# Patient Record
Sex: MALE | Race: WHITE | NOT HISPANIC OR LATINO
[De-identification: names, ages, dates, MRNs, and addresses within clinical notes are randomized per-mention and may not be internally consistent; named-entity substitution may affect disease eponyms.]

---

## 2018-02-06 PROBLEM — Z00.00 ENCOUNTER FOR PREVENTIVE HEALTH EXAMINATION: Status: ACTIVE | Noted: 2018-02-06

## 2018-02-13 ENCOUNTER — APPOINTMENT (OUTPATIENT)
Dept: ORTHOPEDIC SURGERY | Facility: CLINIC | Age: 68
End: 2018-02-13
Payer: MEDICARE

## 2018-02-13 VITALS
SYSTOLIC BLOOD PRESSURE: 127 MMHG | HEART RATE: 68 BPM | BODY MASS INDEX: 32.9 KG/M2 | WEIGHT: 235 LBS | HEIGHT: 71 IN | DIASTOLIC BLOOD PRESSURE: 84 MMHG

## 2018-02-13 DIAGNOSIS — S76.012A STRAIN OF MUSCLE, FASCIA AND TENDON OF LEFT HIP, INITIAL ENCOUNTER: ICD-10-CM

## 2018-02-13 DIAGNOSIS — Z87.09 PERSONAL HISTORY OF OTHER DISEASES OF THE RESPIRATORY SYSTEM: ICD-10-CM

## 2018-02-13 DIAGNOSIS — Z78.9 OTHER SPECIFIED HEALTH STATUS: ICD-10-CM

## 2018-02-13 DIAGNOSIS — Z86.79 PERSONAL HISTORY OF OTHER DISEASES OF THE CIRCULATORY SYSTEM: ICD-10-CM

## 2018-02-13 DIAGNOSIS — Z86.69 PERSONAL HISTORY OF OTHER DISEASES OF THE NERVOUS SYSTEM AND SENSE ORGANS: ICD-10-CM

## 2018-02-13 DIAGNOSIS — Z80.3 FAMILY HISTORY OF MALIGNANT NEOPLASM OF BREAST: ICD-10-CM

## 2018-02-13 DIAGNOSIS — Z86.39 PERSONAL HISTORY OF OTHER ENDOCRINE, NUTRITIONAL AND METABOLIC DISEASE: ICD-10-CM

## 2018-02-13 DIAGNOSIS — Z87.891 PERSONAL HISTORY OF NICOTINE DEPENDENCE: ICD-10-CM

## 2018-02-13 DIAGNOSIS — Z87.39 PERSONAL HISTORY OF OTHER DISEASES OF THE MUSCULOSKELETAL SYSTEM AND CONNECTIVE TISSUE: ICD-10-CM

## 2018-02-13 DIAGNOSIS — Z80.0 FAMILY HISTORY OF MALIGNANT NEOPLASM OF DIGESTIVE ORGANS: ICD-10-CM

## 2018-02-13 DIAGNOSIS — C44.91 BASAL CELL CARCINOMA OF SKIN, UNSPECIFIED: ICD-10-CM

## 2018-02-13 PROCEDURE — 99204 OFFICE O/P NEW MOD 45 MIN: CPT

## 2018-02-13 RX ORDER — DOXAZOSIN 2 MG/1
2 TABLET ORAL
Qty: 30 | Refills: 0 | Status: ACTIVE | COMMUNITY
Start: 2017-08-20

## 2018-02-13 RX ORDER — MONTELUKAST 10 MG/1
10 TABLET, FILM COATED ORAL
Qty: 30 | Refills: 0 | Status: ACTIVE | COMMUNITY
Start: 2017-11-09

## 2018-02-13 RX ORDER — INGENOL MEBUTATE 150 UG/G
0.01 GEL TOPICAL
Qty: 3 | Refills: 0 | Status: ACTIVE | COMMUNITY
Start: 2017-09-01

## 2018-02-13 RX ORDER — BUSPIRONE HYDROCHLORIDE 15 MG/1
15 TABLET ORAL
Qty: 90 | Refills: 0 | Status: ACTIVE | COMMUNITY
Start: 2017-08-19

## 2018-02-13 RX ORDER — TIOTROPIUM BROMIDE 18 UG/1
18 CAPSULE ORAL; RESPIRATORY (INHALATION)
Qty: 30 | Refills: 0 | Status: ACTIVE | COMMUNITY
Start: 2017-08-21

## 2018-02-13 RX ORDER — BENZONATATE 100 MG/1
100 CAPSULE ORAL
Qty: 90 | Refills: 0 | Status: ACTIVE | COMMUNITY
Start: 2017-09-27

## 2018-02-13 RX ORDER — AZITHROMYCIN 250 MG/1
250 TABLET, FILM COATED ORAL
Qty: 6 | Refills: 0 | Status: ACTIVE | COMMUNITY
Start: 2017-12-28

## 2018-02-13 RX ORDER — ATORVASTATIN CALCIUM 10 MG/1
10 TABLET, FILM COATED ORAL
Qty: 90 | Refills: 0 | Status: ACTIVE | COMMUNITY
Start: 2017-09-25

## 2018-02-13 RX ORDER — HYDROCHLOROTHIAZIDE 25 MG/1
25 TABLET ORAL
Qty: 90 | Refills: 0 | Status: ACTIVE | COMMUNITY
Start: 2017-09-15

## 2018-02-13 RX ORDER — PAROXETINE HYDROCHLORIDE 30 MG/1
30 TABLET, FILM COATED ORAL
Qty: 30 | Refills: 0 | Status: ACTIVE | COMMUNITY
Start: 2017-09-29

## 2018-02-13 RX ORDER — CLARITHROMYCIN 500 MG/1
500 TABLET, FILM COATED ORAL
Qty: 20 | Refills: 0 | Status: ACTIVE | COMMUNITY
Start: 2017-12-23

## 2018-02-13 RX ORDER — DILTIAZEM HYDROCHLORIDE 240 MG/1
240 CAPSULE, EXTENDED RELEASE ORAL
Qty: 30 | Refills: 0 | Status: ACTIVE | COMMUNITY
Start: 2017-08-20

## 2018-02-13 RX ORDER — MOMETASONE FUROATE 200 UG/1
200 AEROSOL RESPIRATORY (INHALATION)
Qty: 13 | Refills: 0 | Status: ACTIVE | COMMUNITY
Start: 2017-09-28

## 2018-02-13 RX ORDER — OLMESARTAN MEDOXOMIL 40 MG/1
40 TABLET, FILM COATED ORAL
Qty: 30 | Refills: 0 | Status: ACTIVE | COMMUNITY
Start: 2017-08-20

## 2018-02-13 RX ORDER — CLONAZEPAM 0.5 MG/1
0.5 TABLET ORAL
Qty: 120 | Refills: 0 | Status: ACTIVE | COMMUNITY
Start: 2017-09-25

## 2018-02-22 ENCOUNTER — APPOINTMENT (OUTPATIENT)
Dept: ORTHOPEDIC SURGERY | Facility: CLINIC | Age: 68
End: 2018-02-22

## 2018-03-06 ENCOUNTER — APPOINTMENT (OUTPATIENT)
Dept: ORTHOPEDIC SURGERY | Facility: CLINIC | Age: 68
End: 2018-03-06
Payer: MEDICARE

## 2018-03-06 ENCOUNTER — APPOINTMENT (OUTPATIENT)
Dept: ORTHOPEDIC SURGERY | Facility: CLINIC | Age: 68
End: 2018-03-06

## 2018-03-06 DIAGNOSIS — M25.552 PAIN IN LEFT HIP: ICD-10-CM

## 2018-03-06 DIAGNOSIS — T14.8XXA OTHER INJURY OF UNSPECIFIED BODY REGION, INITIAL ENCOUNTER: ICD-10-CM

## 2018-03-06 PROCEDURE — 73502 X-RAY EXAM HIP UNI 2-3 VIEWS: CPT | Mod: LT

## 2018-03-06 PROCEDURE — 99214 OFFICE O/P EST MOD 30 MIN: CPT

## 2020-02-19 ENCOUNTER — INPATIENT (INPATIENT)
Facility: HOSPITAL | Age: 70
LOS: 16 days | DRG: 871 | End: 2020-03-07
Attending: INTERNAL MEDICINE | Admitting: HOSPITALIST
Payer: MEDICARE

## 2020-02-19 VITALS
TEMPERATURE: 98 F | HEIGHT: 73 IN | DIASTOLIC BLOOD PRESSURE: 70 MMHG | WEIGHT: 225.09 LBS | RESPIRATION RATE: 22 BRPM | HEART RATE: 73 BPM | OXYGEN SATURATION: 80 % | SYSTOLIC BLOOD PRESSURE: 116 MMHG

## 2020-02-19 LAB
ALBUMIN SERPL ELPH-MCNC: 3.4 G/DL — SIGNIFICANT CHANGE UP (ref 3.3–5)
ALP SERPL-CCNC: 50 U/L — SIGNIFICANT CHANGE UP (ref 40–120)
ALT FLD-CCNC: 21 U/L — SIGNIFICANT CHANGE UP (ref 10–45)
ANION GAP SERPL CALC-SCNC: 19 MMOL/L — HIGH (ref 5–17)
AST SERPL-CCNC: 48 U/L — HIGH (ref 10–40)
BASE EXCESS BLDV CALC-SCNC: 0.2 MMOL/L — SIGNIFICANT CHANGE UP (ref -2–2)
BILIRUB SERPL-MCNC: 0.5 MG/DL — SIGNIFICANT CHANGE UP (ref 0.2–1.2)
BUN SERPL-MCNC: 21 MG/DL — SIGNIFICANT CHANGE UP (ref 7–23)
CA-I SERPL-SCNC: 0.96 MMOL/L — LOW (ref 1.12–1.3)
CALCIUM SERPL-MCNC: 7.8 MG/DL — LOW (ref 8.4–10.5)
CHLORIDE BLDV-SCNC: 91 MMOL/L — LOW (ref 96–108)
CHLORIDE SERPL-SCNC: 85 MMOL/L — LOW (ref 96–108)
CO2 BLDV-SCNC: 25 MMOL/L — SIGNIFICANT CHANGE UP (ref 22–30)
CO2 SERPL-SCNC: 20 MMOL/L — LOW (ref 22–31)
CREAT SERPL-MCNC: 1.38 MG/DL — HIGH (ref 0.5–1.3)
FLU A RESULT: DETECTED
FLU A RESULT: DETECTED
FLUAV AG NPH QL: DETECTED
FLUBV AG NPH QL: SIGNIFICANT CHANGE UP
GAS PNL BLDV: 118 MMOL/L — CRITICAL LOW (ref 135–145)
GAS PNL BLDV: SIGNIFICANT CHANGE UP
GAS PNL BLDV: SIGNIFICANT CHANGE UP
GLUCOSE BLDV-MCNC: 151 MG/DL — HIGH (ref 70–99)
GLUCOSE SERPL-MCNC: 156 MG/DL — HIGH (ref 70–99)
HCO3 BLDV-SCNC: 24 MMOL/L — SIGNIFICANT CHANGE UP (ref 21–29)
HCT VFR BLD CALC: 35.7 % — LOW (ref 39–50)
HCT VFR BLDA CALC: 40 % — SIGNIFICANT CHANGE UP (ref 39–50)
HGB BLD CALC-MCNC: 13 G/DL — SIGNIFICANT CHANGE UP (ref 13–17)
HGB BLD-MCNC: 12.4 G/DL — LOW (ref 13–17)
LACTATE BLDV-MCNC: 2.8 MMOL/L — HIGH (ref 0.7–2)
MAGNESIUM SERPL-MCNC: 1.9 MG/DL — SIGNIFICANT CHANGE UP (ref 1.6–2.6)
MCHC RBC-ENTMCNC: 30.5 PG — SIGNIFICANT CHANGE UP (ref 27–34)
MCHC RBC-ENTMCNC: 34.7 GM/DL — SIGNIFICANT CHANGE UP (ref 32–36)
MCV RBC AUTO: 87.7 FL — SIGNIFICANT CHANGE UP (ref 80–100)
NRBC # BLD: 0 /100 WBCS — SIGNIFICANT CHANGE UP (ref 0–0)
NT-PROBNP SERPL-SCNC: 364 PG/ML — HIGH (ref 0–300)
PCO2 BLDV: 36 MMHG — SIGNIFICANT CHANGE UP (ref 35–50)
PH BLDV: 7.43 — SIGNIFICANT CHANGE UP (ref 7.35–7.45)
PLATELET # BLD AUTO: 196 K/UL — SIGNIFICANT CHANGE UP (ref 150–400)
PO2 BLDV: 24 MMHG — LOW (ref 25–45)
POTASSIUM BLDV-SCNC: 3.3 MMOL/L — LOW (ref 3.5–5.3)
POTASSIUM SERPL-MCNC: 3.6 MMOL/L — SIGNIFICANT CHANGE UP (ref 3.5–5.3)
POTASSIUM SERPL-SCNC: 3.6 MMOL/L — SIGNIFICANT CHANGE UP (ref 3.5–5.3)
PROT SERPL-MCNC: 6.2 G/DL — SIGNIFICANT CHANGE UP (ref 6–8.3)
RBC # BLD: 4.07 M/UL — LOW (ref 4.2–5.8)
RBC # FLD: 13.3 % — SIGNIFICANT CHANGE UP (ref 10.3–14.5)
RSV RESULT: SIGNIFICANT CHANGE UP
RSV RNA RESP QL NAA+PROBE: SIGNIFICANT CHANGE UP
SAO2 % BLDV: 34 % — LOW (ref 67–88)
SODIUM SERPL-SCNC: 124 MMOL/L — LOW (ref 135–145)
TROPONIN T, HIGH SENSITIVITY RESULT: 19 NG/L — SIGNIFICANT CHANGE UP (ref 0–51)
WBC # BLD: 8.58 K/UL — SIGNIFICANT CHANGE UP (ref 3.8–10.5)
WBC # FLD AUTO: 8.58 K/UL — SIGNIFICANT CHANGE UP (ref 3.8–10.5)

## 2020-02-19 PROCEDURE — 71045 X-RAY EXAM CHEST 1 VIEW: CPT | Mod: 26

## 2020-02-19 PROCEDURE — 99291 CRITICAL CARE FIRST HOUR: CPT | Mod: GC

## 2020-02-19 RX ORDER — CEFTRIAXONE 500 MG/1
1000 INJECTION, POWDER, FOR SOLUTION INTRAMUSCULAR; INTRAVENOUS ONCE
Refills: 0 | Status: COMPLETED | OUTPATIENT
Start: 2020-02-19 | End: 2020-02-19

## 2020-02-19 RX ORDER — ACETAMINOPHEN 500 MG
650 TABLET ORAL EVERY 6 HOURS
Refills: 0 | Status: DISCONTINUED | OUTPATIENT
Start: 2020-02-19 | End: 2020-02-22

## 2020-02-19 RX ORDER — AZITHROMYCIN 500 MG/1
500 TABLET, FILM COATED ORAL EVERY 24 HOURS
Refills: 0 | Status: DISCONTINUED | OUTPATIENT
Start: 2020-02-20 | End: 2020-02-25

## 2020-02-19 RX ORDER — SODIUM CHLORIDE 9 MG/ML
500 INJECTION INTRAMUSCULAR; INTRAVENOUS; SUBCUTANEOUS ONCE
Refills: 0 | Status: COMPLETED | OUTPATIENT
Start: 2020-02-19 | End: 2020-02-19

## 2020-02-19 RX ORDER — AZITHROMYCIN 500 MG/1
TABLET, FILM COATED ORAL
Refills: 0 | Status: DISCONTINUED | OUTPATIENT
Start: 2020-02-19 | End: 2020-02-25

## 2020-02-19 RX ORDER — CALCIUM GLUCONATE 100 MG/ML
1 VIAL (ML) INTRAVENOUS ONCE
Refills: 0 | Status: COMPLETED | OUTPATIENT
Start: 2020-02-19 | End: 2020-02-19

## 2020-02-19 RX ORDER — AZITHROMYCIN 500 MG/1
500 TABLET, FILM COATED ORAL ONCE
Refills: 0 | Status: COMPLETED | OUTPATIENT
Start: 2020-02-19 | End: 2020-02-19

## 2020-02-19 RX ADMIN — Medication 650 MILLIGRAM(S): at 23:51

## 2020-02-19 RX ADMIN — Medication 650 MILLIGRAM(S): at 23:12

## 2020-02-19 RX ADMIN — AZITHROMYCIN 250 MILLIGRAM(S): 500 TABLET, FILM COATED ORAL at 23:14

## 2020-02-19 NOTE — ED PROVIDER NOTE - CLINICAL SUMMARY MEDICAL DECISION MAKING FREE TEXT BOX
Patient in acute respiratory distress. most likely infectious vs chf. Bedside pocus remarkable for b/l diffuse b-lines. RV WNL and no focal wall abnormalities. will do infectious, chf, acs workup. unlikely pe. bipap vs high flow, admit. Patient in acute respiratory distress. most likely infectious vs chf. Bedside pocus remarkable for b/l diffuse b-lines. RV WNL and no focal wall abnormalities. will do infectious, chf, acs workup. unlikely pe. bipap vs high flow, admit.  Attending Korina Albarran: 68 y/o male presenting with sob and difficulty breathing. recently diagnosed with flul. upon arrival pt ill appearing and hypoxic. pt placed on bipap with improvement. pt with evidence of diffuse b l ketan on pocus. initially unclear pulmonary disease and as BP stable given only small amount of IVF an abx. CT performed to further evaluate showing evidence of pulmonary fibrosis. unable to exclude pna. will give abx, IVF. breathing treatments as needed. plan to admit

## 2020-02-19 NOTE — ED ADULT NURSE NOTE - OBJECTIVE STATEMENT
c/o SOB, AMS and weakness x 1 week, and 1  fall tonight at 2000. Pt states, "I have balance issues, weakness, flu like symptoms, fevers, feeling off, confused for a few days." Pt endorses headaches, SOB, cough, nausea. Denies lightheadness, blurred vision, chest pain, abdominal pain, V/D, urinary symptoms, numbness and tingling. Pt has diminished breath sounds in bilateral lower lobes. Pt has a dry cough c/o SOB, AMS and weakness x 1 week, and 1  fall tonight at 2000. Pt states, "Tracy had balance issues, weakness, flu like symptoms, fevers, feeling off, confused for a few days." Pt endorses headaches, SOB, cough, nausea. Denies lightheadness, blurred vision, chest pain, abdominal pain, V/D, urinary symptoms, numbness and tingling. Pt has diminished breath sounds in bilateral lower lobes. Pt has a dry cough, pt is satting 85 on room air and 10% on non rebreather. Pt denies using oxygen at home. Pt has bruises on right hand and arm and is neurologically intact. A&Ox4 PERRL.

## 2020-02-19 NOTE — ED PROVIDER NOTE - ATTENDING CONTRIBUTION TO CARE
Attending MD Korina Albarran:  I personally have seen and examined this patient.  Resident note reviewed and agree on plan of care and except where noted.  See HPI, PE, and MDM for details.

## 2020-02-19 NOTE — ED ADULT TRIAGE NOTE - CHIEF COMPLAINT QUOTE
congestion, shortness of breath , weakness, and a diagnosis of the flu that started five days ago. Fall this evening with difficulty ambulating.

## 2020-02-19 NOTE — ED PROVIDER NOTE - PROGRESS NOTE DETAILS
Attending Korina Albarran: labs show evidence of hyponatremia, unclear etiology. consider SIADH, pt denies any sig water intake. and less likely from medication. cxr not diagnostic therefore will obtain ct scan to further evaluate Attending Korina Albarran: pt more comfortable on bipap. BP stable sbP 120, ext wwp. awaiting cta read. wob improved.

## 2020-02-19 NOTE — ED PROVIDER NOTE - CARE PLAN
Principal Discharge DX:	Hypoxia Principal Discharge DX:	Hypoxia  Secondary Diagnosis:	Influenza  Secondary Diagnosis:	Pulmonary fibrosis

## 2020-02-19 NOTE — ED PROVIDER NOTE - PHYSICAL EXAMINATION
Attending Korina Albarran: Gen: increased wob, heent: atrauamtic, eomi, perrla, mmm, op pink, uvula midline, neck; nttp, no nuchal rigidity, chest: nttp, no crepitus, cv: rrr, no murmurs, lungs: rales diffusely, abd: soft, nontender, nondistended, no peritoneal signs, +BS, no guarding, ext: wwp, neg homans, skin: no rash, neuro: awake and alert, following commands, speech clear, sensation and strength intact, no focal deficits

## 2020-02-19 NOTE — ED PROVIDER NOTE - OBJECTIVE STATEMENT
Attending Korina Albarran: 70 y/o male h/o HTN, lung disease followed by pulmonary presenting with sob. pt;s mom recently passed away 1 week ago. afterward pt started not feeling well with increased cough, fatigue and sob. went to urgent care and tested positive for the flu. pt states has been feeling more short of breath and having difficulty breathing. does not think he has a fever. no h/o heart failure. no LE edema. no recent travel. no pain with inspiration. not on any diuretics    PCP: dr gabbie rabago.

## 2020-02-20 DIAGNOSIS — J84.10 PULMONARY FIBROSIS, UNSPECIFIED: ICD-10-CM

## 2020-02-20 DIAGNOSIS — Z98.890 OTHER SPECIFIED POSTPROCEDURAL STATES: Chronic | ICD-10-CM

## 2020-02-20 DIAGNOSIS — R09.02 HYPOXEMIA: ICD-10-CM

## 2020-02-20 DIAGNOSIS — J96.01 ACUTE RESPIRATORY FAILURE WITH HYPOXIA: ICD-10-CM

## 2020-02-20 DIAGNOSIS — E87.1 HYPO-OSMOLALITY AND HYPONATREMIA: ICD-10-CM

## 2020-02-20 DIAGNOSIS — N17.9 ACUTE KIDNEY FAILURE, UNSPECIFIED: ICD-10-CM

## 2020-02-20 DIAGNOSIS — A41.9 SEPSIS, UNSPECIFIED ORGANISM: ICD-10-CM

## 2020-02-20 DIAGNOSIS — E87.8 OTHER DISORDERS OF ELECTROLYTE AND FLUID BALANCE, NOT ELSEWHERE CLASSIFIED: ICD-10-CM

## 2020-02-20 DIAGNOSIS — J18.9 PNEUMONIA, UNSPECIFIED ORGANISM: ICD-10-CM

## 2020-02-20 DIAGNOSIS — Z02.9 ENCOUNTER FOR ADMINISTRATIVE EXAMINATIONS, UNSPECIFIED: ICD-10-CM

## 2020-02-20 DIAGNOSIS — F32.9 MAJOR DEPRESSIVE DISORDER, SINGLE EPISODE, UNSPECIFIED: ICD-10-CM

## 2020-02-20 DIAGNOSIS — I10 ESSENTIAL (PRIMARY) HYPERTENSION: ICD-10-CM

## 2020-02-20 DIAGNOSIS — J11.1 INFLUENZA DUE TO UNIDENTIFIED INFLUENZA VIRUS WITH OTHER RESPIRATORY MANIFESTATIONS: ICD-10-CM

## 2020-02-20 LAB
ALBUMIN SERPL ELPH-MCNC: 2.8 G/DL — LOW (ref 3.3–5)
ALP SERPL-CCNC: 46 U/L — SIGNIFICANT CHANGE UP (ref 40–120)
ALT FLD-CCNC: 24 U/L — SIGNIFICANT CHANGE UP (ref 10–45)
ANION GAP SERPL CALC-SCNC: 13 MMOL/L — SIGNIFICANT CHANGE UP (ref 5–17)
ANION GAP SERPL CALC-SCNC: 13 MMOL/L — SIGNIFICANT CHANGE UP (ref 5–17)
ANION GAP SERPL CALC-SCNC: 15 MMOL/L — SIGNIFICANT CHANGE UP (ref 5–17)
ANION GAP SERPL CALC-SCNC: 15 MMOL/L — SIGNIFICANT CHANGE UP (ref 5–17)
AST SERPL-CCNC: 70 U/L — HIGH (ref 10–40)
BASE EXCESS BLDA CALC-SCNC: -0.3 MMOL/L — SIGNIFICANT CHANGE UP (ref -2–2)
BASE EXCESS BLDV CALC-SCNC: 0.7 MMOL/L — SIGNIFICANT CHANGE UP (ref -2–2)
BASE EXCESS BLDV CALC-SCNC: 1 MMOL/L — SIGNIFICANT CHANGE UP (ref -2–2)
BILIRUB DIRECT SERPL-MCNC: <0.1 MG/DL — SIGNIFICANT CHANGE UP (ref 0–0.2)
BILIRUB INDIRECT FLD-MCNC: >0.2 MG/DL — SIGNIFICANT CHANGE UP (ref 0.2–1)
BILIRUB SERPL-MCNC: 0.3 MG/DL — SIGNIFICANT CHANGE UP (ref 0.2–1.2)
BUN SERPL-MCNC: 13 MG/DL — SIGNIFICANT CHANGE UP (ref 7–23)
BUN SERPL-MCNC: 13 MG/DL — SIGNIFICANT CHANGE UP (ref 7–23)
BUN SERPL-MCNC: 14 MG/DL — SIGNIFICANT CHANGE UP (ref 7–23)
BUN SERPL-MCNC: 16 MG/DL — SIGNIFICANT CHANGE UP (ref 7–23)
CA-I SERPL-SCNC: 0.97 MMOL/L — LOW (ref 1.12–1.3)
CA-I SERPL-SCNC: 0.99 MMOL/L — LOW (ref 1.12–1.3)
CALCIUM SERPL-MCNC: 7.5 MG/DL — LOW (ref 8.4–10.5)
CALCIUM SERPL-MCNC: 7.6 MG/DL — LOW (ref 8.4–10.5)
CHLORIDE BLDV-SCNC: 89 MMOL/L — LOW (ref 96–108)
CHLORIDE BLDV-SCNC: 95 MMOL/L — LOW (ref 96–108)
CHLORIDE SERPL-SCNC: 86 MMOL/L — LOW (ref 96–108)
CHLORIDE SERPL-SCNC: 88 MMOL/L — LOW (ref 96–108)
CHLORIDE SERPL-SCNC: 89 MMOL/L — LOW (ref 96–108)
CHLORIDE SERPL-SCNC: 91 MMOL/L — LOW (ref 96–108)
CHLORIDE UR-SCNC: <35 MMOL/L — SIGNIFICANT CHANGE UP
CO2 BLDA-SCNC: 23 MMOL/L — SIGNIFICANT CHANGE UP (ref 22–30)
CO2 BLDV-SCNC: 26 MMOL/L — SIGNIFICANT CHANGE UP (ref 22–30)
CO2 BLDV-SCNC: 26 MMOL/L — SIGNIFICANT CHANGE UP (ref 22–30)
CO2 SERPL-SCNC: 19 MMOL/L — LOW (ref 22–31)
CO2 SERPL-SCNC: 20 MMOL/L — LOW (ref 22–31)
CO2 SERPL-SCNC: 21 MMOL/L — LOW (ref 22–31)
CO2 SERPL-SCNC: 22 MMOL/L — SIGNIFICANT CHANGE UP (ref 22–31)
CREAT ?TM UR-MCNC: 167 MG/DL — SIGNIFICANT CHANGE UP
CREAT SERPL-MCNC: 0.85 MG/DL — SIGNIFICANT CHANGE UP (ref 0.5–1.3)
CREAT SERPL-MCNC: 0.91 MG/DL — SIGNIFICANT CHANGE UP (ref 0.5–1.3)
CREAT SERPL-MCNC: 0.95 MG/DL — SIGNIFICANT CHANGE UP (ref 0.5–1.3)
CREAT SERPL-MCNC: 1.07 MG/DL — SIGNIFICANT CHANGE UP (ref 0.5–1.3)
GAS PNL BLDV: 118 MMOL/L — CRITICAL LOW (ref 135–145)
GAS PNL BLDV: 120 MMOL/L — CRITICAL LOW (ref 135–145)
GAS PNL BLDV: SIGNIFICANT CHANGE UP
GLUCOSE BLDV-MCNC: 123 MG/DL — HIGH (ref 70–99)
GLUCOSE BLDV-MCNC: 162 MG/DL — HIGH (ref 70–99)
GLUCOSE SERPL-MCNC: 107 MG/DL — HIGH (ref 70–99)
GLUCOSE SERPL-MCNC: 118 MG/DL — HIGH (ref 70–99)
GLUCOSE SERPL-MCNC: 124 MG/DL — HIGH (ref 70–99)
GLUCOSE SERPL-MCNC: 127 MG/DL — HIGH (ref 70–99)
HCO3 BLDA-SCNC: 22 MMOL/L — SIGNIFICANT CHANGE UP (ref 21–29)
HCO3 BLDV-SCNC: 25 MMOL/L — SIGNIFICANT CHANGE UP (ref 21–29)
HCO3 BLDV-SCNC: 25 MMOL/L — SIGNIFICANT CHANGE UP (ref 21–29)
HCT VFR BLD CALC: 32.9 % — LOW (ref 39–50)
HCT VFR BLDA CALC: 34 % — LOW (ref 39–50)
HCT VFR BLDA CALC: 37 % — LOW (ref 39–50)
HGB BLD CALC-MCNC: 11 G/DL — LOW (ref 13–17)
HGB BLD CALC-MCNC: 12 G/DL — LOW (ref 13–17)
HGB BLD-MCNC: 11.8 G/DL — LOW (ref 13–17)
LACTATE BLDV-MCNC: 1.1 MMOL/L — SIGNIFICANT CHANGE UP (ref 0.7–2)
LACTATE BLDV-MCNC: 1.4 MMOL/L — SIGNIFICANT CHANGE UP (ref 0.7–2)
LEGIONELLA AG UR QL: NEGATIVE — SIGNIFICANT CHANGE UP
MAGNESIUM SERPL-MCNC: 1.9 MG/DL — SIGNIFICANT CHANGE UP (ref 1.6–2.6)
MAGNESIUM SERPL-MCNC: 1.9 MG/DL — SIGNIFICANT CHANGE UP (ref 1.6–2.6)
MCHC RBC-ENTMCNC: 31.2 PG — SIGNIFICANT CHANGE UP (ref 27–34)
MCHC RBC-ENTMCNC: 35.9 GM/DL — SIGNIFICANT CHANGE UP (ref 32–36)
MCV RBC AUTO: 87 FL — SIGNIFICANT CHANGE UP (ref 80–100)
NRBC # BLD: 0 /100 WBCS — SIGNIFICANT CHANGE UP (ref 0–0)
OSMOLALITY SERPL: 273 MOSMOL/KG — LOW (ref 280–301)
OSMOLALITY UR: 560 MOS/KG — SIGNIFICANT CHANGE UP (ref 300–900)
OTHER CELLS CSF MANUAL: 8 ML/DL — LOW (ref 18–22)
OTHER CELLS CSF MANUAL: 8 ML/DL — LOW (ref 18–22)
PCO2 BLDA: 29 MMHG — LOW (ref 32–46)
PCO2 BLDV: 39 MMHG — SIGNIFICANT CHANGE UP (ref 35–50)
PCO2 BLDV: 39 MMHG — SIGNIFICANT CHANGE UP (ref 35–50)
PH BLDA: 7.49 — HIGH (ref 7.35–7.45)
PH BLDV: 7.42 — SIGNIFICANT CHANGE UP (ref 7.35–7.45)
PH BLDV: 7.42 — SIGNIFICANT CHANGE UP (ref 7.35–7.45)
PHOSPHATE SERPL-MCNC: 2.3 MG/DL — LOW (ref 2.5–4.5)
PLATELET # BLD AUTO: 196 K/UL — SIGNIFICANT CHANGE UP (ref 150–400)
PO2 BLDA: 58 MMHG — LOW (ref 74–108)
PO2 BLDV: 28 MMHG — SIGNIFICANT CHANGE UP (ref 25–45)
PO2 BLDV: 32 MMHG — SIGNIFICANT CHANGE UP (ref 25–45)
POTASSIUM BLDV-SCNC: 2.9 MMOL/L — CRITICAL LOW (ref 3.5–5.3)
POTASSIUM BLDV-SCNC: 3 MMOL/L — LOW (ref 3.5–5.3)
POTASSIUM SERPL-MCNC: 3.2 MMOL/L — LOW (ref 3.5–5.3)
POTASSIUM SERPL-MCNC: 3.2 MMOL/L — LOW (ref 3.5–5.3)
POTASSIUM SERPL-MCNC: 3.8 MMOL/L — SIGNIFICANT CHANGE UP (ref 3.5–5.3)
POTASSIUM SERPL-MCNC: 4 MMOL/L — SIGNIFICANT CHANGE UP (ref 3.5–5.3)
POTASSIUM SERPL-SCNC: 3.2 MMOL/L — LOW (ref 3.5–5.3)
POTASSIUM SERPL-SCNC: 3.2 MMOL/L — LOW (ref 3.5–5.3)
POTASSIUM SERPL-SCNC: 3.8 MMOL/L — SIGNIFICANT CHANGE UP (ref 3.5–5.3)
POTASSIUM SERPL-SCNC: 4 MMOL/L — SIGNIFICANT CHANGE UP (ref 3.5–5.3)
POTASSIUM UR-SCNC: 49 MMOL/L — SIGNIFICANT CHANGE UP
PROT ?TM UR-MCNC: 76 MG/DL — HIGH (ref 0–12)
PROT SERPL-MCNC: 5.9 G/DL — LOW (ref 6–8.3)
PROT/CREAT UR-RTO: 0.5 RATIO — HIGH (ref 0–0.2)
RBC # BLD: 3.78 M/UL — LOW (ref 4.2–5.8)
RBC # FLD: 13.3 % — SIGNIFICANT CHANGE UP (ref 10.3–14.5)
SAO2 % BLDA: 91 % — LOW (ref 92–96)
SAO2 % BLDV: 46 % — LOW (ref 67–88)
SAO2 % BLDV: 55 % — LOW (ref 67–88)
SODIUM SERPL-SCNC: 121 MMOL/L — LOW (ref 135–145)
SODIUM SERPL-SCNC: 122 MMOL/L — LOW (ref 135–145)
SODIUM SERPL-SCNC: 124 MMOL/L — LOW (ref 135–145)
SODIUM SERPL-SCNC: 125 MMOL/L — LOW (ref 135–145)
SODIUM SERPL-SCNC: 125 MMOL/L — LOW (ref 135–145)
SODIUM UR-SCNC: <35 MMOL/L — SIGNIFICANT CHANGE UP
TROPONIN T, HIGH SENSITIVITY RESULT: 17 NG/L — SIGNIFICANT CHANGE UP (ref 0–51)
URATE SERPL-MCNC: 5.8 MG/DL — SIGNIFICANT CHANGE UP (ref 3.4–8.8)
WBC # BLD: 7.28 K/UL — SIGNIFICANT CHANGE UP (ref 3.8–10.5)
WBC # FLD AUTO: 7.28 K/UL — SIGNIFICANT CHANGE UP (ref 3.8–10.5)

## 2020-02-20 PROCEDURE — 99223 1ST HOSP IP/OBS HIGH 75: CPT

## 2020-02-20 PROCEDURE — 71275 CT ANGIOGRAPHY CHEST: CPT | Mod: 26

## 2020-02-20 RX ORDER — TAMSULOSIN HYDROCHLORIDE 0.4 MG/1
0.4 CAPSULE ORAL AT BEDTIME
Refills: 0 | Status: DISCONTINUED | OUTPATIENT
Start: 2020-02-20 | End: 2020-02-20

## 2020-02-20 RX ORDER — VANCOMYCIN HCL 1 G
1000 VIAL (EA) INTRAVENOUS ONCE
Refills: 0 | Status: COMPLETED | OUTPATIENT
Start: 2020-02-20 | End: 2020-02-20

## 2020-02-20 RX ORDER — SODIUM CHLORIDE 9 MG/ML
1000 INJECTION INTRAMUSCULAR; INTRAVENOUS; SUBCUTANEOUS
Refills: 0 | Status: DISCONTINUED | OUTPATIENT
Start: 2020-02-20 | End: 2020-02-20

## 2020-02-20 RX ORDER — TIOTROPIUM BROMIDE 18 UG/1
1 CAPSULE ORAL; RESPIRATORY (INHALATION) DAILY
Refills: 0 | Status: DISCONTINUED | OUTPATIENT
Start: 2020-02-20 | End: 2020-02-20

## 2020-02-20 RX ORDER — GUAIFENESIN/DEXTROMETHORPHAN 600MG-30MG
10 TABLET, EXTENDED RELEASE 12 HR ORAL EVERY 6 HOURS
Refills: 0 | Status: DISCONTINUED | OUTPATIENT
Start: 2020-02-20 | End: 2020-02-25

## 2020-02-20 RX ORDER — DOXAZOSIN MESYLATE 4 MG
1 TABLET ORAL
Qty: 0 | Refills: 0 | DISCHARGE

## 2020-02-20 RX ORDER — ACETAMINOPHEN 500 MG
650 TABLET ORAL ONCE
Refills: 0 | Status: COMPLETED | OUTPATIENT
Start: 2020-02-20 | End: 2020-02-20

## 2020-02-20 RX ORDER — POTASSIUM PHOSPHATE, MONOBASIC POTASSIUM PHOSPHATE, DIBASIC 236; 224 MG/ML; MG/ML
15 INJECTION, SOLUTION INTRAVENOUS ONCE
Refills: 0 | Status: COMPLETED | OUTPATIENT
Start: 2020-02-20 | End: 2020-02-20

## 2020-02-20 RX ORDER — DOXAZOSIN MESYLATE 4 MG
2 TABLET ORAL AT BEDTIME
Refills: 0 | Status: DISCONTINUED | OUTPATIENT
Start: 2020-02-20 | End: 2020-02-21

## 2020-02-20 RX ORDER — CLONAZEPAM 1 MG
1 TABLET ORAL THREE TIMES A DAY
Refills: 0 | Status: DISCONTINUED | OUTPATIENT
Start: 2020-02-20 | End: 2020-02-25

## 2020-02-20 RX ORDER — SODIUM CHLORIDE 9 MG/ML
500 INJECTION, SOLUTION INTRAVENOUS ONCE
Refills: 0 | Status: COMPLETED | OUTPATIENT
Start: 2020-02-20 | End: 2020-02-20

## 2020-02-20 RX ORDER — POTASSIUM CHLORIDE 20 MEQ
10 PACKET (EA) ORAL
Refills: 0 | Status: COMPLETED | OUTPATIENT
Start: 2020-02-20 | End: 2020-02-21

## 2020-02-20 RX ORDER — CEFTRIAXONE 500 MG/1
1000 INJECTION, POWDER, FOR SOLUTION INTRAMUSCULAR; INTRAVENOUS EVERY 24 HOURS
Refills: 0 | Status: COMPLETED | OUTPATIENT
Start: 2020-02-20 | End: 2020-02-24

## 2020-02-20 RX ORDER — PANTOPRAZOLE SODIUM 20 MG/1
40 TABLET, DELAYED RELEASE ORAL DAILY
Refills: 0 | Status: DISCONTINUED | OUTPATIENT
Start: 2020-02-20 | End: 2020-02-25

## 2020-02-20 RX ORDER — OLMESARTAN MEDOXOMIL 5 MG/1
1 TABLET, FILM COATED ORAL
Qty: 0 | Refills: 0 | DISCHARGE

## 2020-02-20 RX ORDER — POTASSIUM CHLORIDE 20 MEQ
40 PACKET (EA) ORAL ONCE
Refills: 0 | Status: DISCONTINUED | OUTPATIENT
Start: 2020-02-20 | End: 2020-02-25

## 2020-02-20 RX ORDER — CLONAZEPAM 1 MG
1 TABLET ORAL
Qty: 0 | Refills: 0 | DISCHARGE

## 2020-02-20 RX ORDER — POTASSIUM CHLORIDE 20 MEQ
40 PACKET (EA) ORAL ONCE
Refills: 0 | Status: COMPLETED | OUTPATIENT
Start: 2020-02-20 | End: 2020-02-20

## 2020-02-20 RX ORDER — ATORVASTATIN CALCIUM 80 MG/1
1 TABLET, FILM COATED ORAL
Qty: 0 | Refills: 0 | DISCHARGE

## 2020-02-20 RX ORDER — IPRATROPIUM/ALBUTEROL SULFATE 18-103MCG
3 AEROSOL WITH ADAPTER (GRAM) INHALATION EVERY 4 HOURS
Refills: 0 | Status: DISCONTINUED | OUTPATIENT
Start: 2020-02-20 | End: 2020-02-21

## 2020-02-20 RX ORDER — ENOXAPARIN SODIUM 100 MG/ML
40 INJECTION SUBCUTANEOUS DAILY
Refills: 0 | Status: DISCONTINUED | OUTPATIENT
Start: 2020-02-20 | End: 2020-02-25

## 2020-02-20 RX ORDER — DILTIAZEM HCL 120 MG
1 CAPSULE, EXT RELEASE 24 HR ORAL
Qty: 0 | Refills: 0 | DISCHARGE

## 2020-02-20 RX ORDER — POTASSIUM PHOSPHATE, MONOBASIC POTASSIUM PHOSPHATE, DIBASIC 236; 224 MG/ML; MG/ML
15 INJECTION, SOLUTION INTRAVENOUS ONCE
Refills: 0 | Status: DISCONTINUED | OUTPATIENT
Start: 2020-02-20 | End: 2020-02-25

## 2020-02-20 RX ORDER — BUDESONIDE, MICRONIZED 100 %
0.5 POWDER (GRAM) MISCELLANEOUS EVERY 12 HOURS
Refills: 0 | Status: DISCONTINUED | OUTPATIENT
Start: 2020-02-20 | End: 2020-02-25

## 2020-02-20 RX ORDER — ATORVASTATIN CALCIUM 80 MG/1
10 TABLET, FILM COATED ORAL AT BEDTIME
Refills: 0 | Status: DISCONTINUED | OUTPATIENT
Start: 2020-02-20 | End: 2020-02-25

## 2020-02-20 RX ORDER — DILTIAZEM HCL 120 MG
240 CAPSULE, EXT RELEASE 24 HR ORAL DAILY
Refills: 0 | Status: DISCONTINUED | OUTPATIENT
Start: 2020-02-20 | End: 2020-02-25

## 2020-02-20 RX ORDER — IPRATROPIUM/ALBUTEROL SULFATE 18-103MCG
3 AEROSOL WITH ADAPTER (GRAM) INHALATION EVERY 6 HOURS
Refills: 0 | Status: DISCONTINUED | OUTPATIENT
Start: 2020-02-20 | End: 2020-02-20

## 2020-02-20 RX ORDER — POTASSIUM CHLORIDE 20 MEQ
10 PACKET (EA) ORAL
Refills: 0 | Status: COMPLETED | OUTPATIENT
Start: 2020-02-20 | End: 2020-02-20

## 2020-02-20 RX ORDER — ALBUTEROL 90 UG/1
1 AEROSOL, METERED ORAL EVERY 4 HOURS
Refills: 0 | Status: DISCONTINUED | OUTPATIENT
Start: 2020-02-20 | End: 2020-02-20

## 2020-02-20 RX ADMIN — Medication 75 MILLIGRAM(S): at 18:30

## 2020-02-20 RX ADMIN — CEFTRIAXONE 100 MILLIGRAM(S): 500 INJECTION, POWDER, FOR SOLUTION INTRAMUSCULAR; INTRAVENOUS at 06:38

## 2020-02-20 RX ADMIN — CEFTRIAXONE 1000 MILLIGRAM(S): 500 INJECTION, POWDER, FOR SOLUTION INTRAMUSCULAR; INTRAVENOUS at 04:35

## 2020-02-20 RX ADMIN — Medication 650 MILLIGRAM(S): at 22:04

## 2020-02-20 RX ADMIN — SODIUM CHLORIDE 500 MILLILITER(S): 9 INJECTION, SOLUTION INTRAVENOUS at 02:34

## 2020-02-20 RX ADMIN — Medication 40 MILLIGRAM(S): at 18:30

## 2020-02-20 RX ADMIN — CEFTRIAXONE 100 MILLIGRAM(S): 500 INJECTION, POWDER, FOR SOLUTION INTRAMUSCULAR; INTRAVENOUS at 00:33

## 2020-02-20 RX ADMIN — SODIUM CHLORIDE 1000 MILLILITER(S): 9 INJECTION, SOLUTION INTRAVENOUS at 02:35

## 2020-02-20 RX ADMIN — AZITHROMYCIN 500 MILLIGRAM(S): 500 TABLET, FILM COATED ORAL at 04:35

## 2020-02-20 RX ADMIN — Medication 100 MILLIEQUIVALENT(S): at 08:21

## 2020-02-20 RX ADMIN — Medication 3 MILLILITER(S): at 12:15

## 2020-02-20 RX ADMIN — Medication 15 MILLIGRAM(S): at 18:29

## 2020-02-20 RX ADMIN — Medication 10 MILLILITER(S): at 18:29

## 2020-02-20 RX ADMIN — Medication 15 MILLIGRAM(S): at 06:48

## 2020-02-20 RX ADMIN — Medication 100 MILLIEQUIVALENT(S): at 06:38

## 2020-02-20 RX ADMIN — Medication 30 MILLIGRAM(S): at 12:12

## 2020-02-20 RX ADMIN — Medication 650 MILLIGRAM(S): at 22:35

## 2020-02-20 RX ADMIN — Medication 1 GRAM(S): at 04:35

## 2020-02-20 RX ADMIN — Medication 3 MILLILITER(S): at 18:30

## 2020-02-20 RX ADMIN — Medication 100 MILLIEQUIVALENT(S): at 09:36

## 2020-02-20 RX ADMIN — Medication 3 MILLILITER(S): at 22:04

## 2020-02-20 RX ADMIN — Medication 250 MILLIGRAM(S): at 01:56

## 2020-02-20 RX ADMIN — ENOXAPARIN SODIUM 40 MILLIGRAM(S): 100 INJECTION SUBCUTANEOUS at 12:27

## 2020-02-20 RX ADMIN — SODIUM CHLORIDE 500 MILLILITER(S): 9 INJECTION INTRAMUSCULAR; INTRAVENOUS; SUBCUTANEOUS at 04:35

## 2020-02-20 RX ADMIN — SODIUM CHLORIDE 500 MILLILITER(S): 9 INJECTION INTRAMUSCULAR; INTRAVENOUS; SUBCUTANEOUS at 00:32

## 2020-02-20 RX ADMIN — Medication 75 MILLIGRAM(S): at 00:32

## 2020-02-20 RX ADMIN — Medication 100 GRAM(S): at 00:33

## 2020-02-20 RX ADMIN — Medication 1 MILLIGRAM(S): at 11:30

## 2020-02-20 RX ADMIN — Medication 0.5 MILLIGRAM(S): at 18:30

## 2020-02-20 RX ADMIN — Medication 240 MILLIGRAM(S): at 12:13

## 2020-02-20 RX ADMIN — Medication 1000 MILLIGRAM(S): at 04:35

## 2020-02-20 RX ADMIN — POTASSIUM PHOSPHATE, MONOBASIC POTASSIUM PHOSPHATE, DIBASIC 62.5 MILLIMOLE(S): 236; 224 INJECTION, SOLUTION INTRAVENOUS at 07:46

## 2020-02-20 RX ADMIN — SODIUM CHLORIDE 500 MILLILITER(S): 9 INJECTION, SOLUTION INTRAVENOUS at 04:35

## 2020-02-20 RX ADMIN — Medication 75 MILLIGRAM(S): at 05:25

## 2020-02-20 NOTE — H&P ADULT - PROBLEM SELECTOR PLAN 4
pt septic on arrival; CT chest cannot r/o underlying PNA  given severity of pts hypoxia and sepsis, will treat for CAP with ceftriaxone and azithromycin  f/u urine legionella   c/w O2 supplementation AURA with Cr 1.4, improving now with IVF  pre-renal in setting of sepsis vs obstructive (pt with 900cc output during straight cath)   c/t monitor Cr  renally dose medications

## 2020-02-20 NOTE — ED ADULT NURSE REASSESSMENT NOTE - NS ED NURSE REASSESS COMMENT FT1
*klonopin 1 mg tablet was to be returned to [facility] as pt took home med while in ED, *return-process was witnessed by Rn CARLEY Baez - however [facility] drawer did not open to return tablet, tablet was taken to Pharmacist Harvey - who will take the tablet to upstairs pharmacy

## 2020-02-20 NOTE — H&P ADULT - NSHPREVIEWOFSYSTEMS_GEN_ALL_CORE
REVIEW OF SYSTEMS:    CONSTITUTIONAL: +weakness, no fevers, no chills  EYES/ENT: No visual changes;  no throat pain   NECK: No pain or stiffness  RESPIRATORY: as per HPI  CARDIOVASCULAR: No chest pain or palpitations, no LE swelling   GASTROINTESTINAL: +nausea, no vomiting, no abdominal pain, no BRBPR  GENITOURINARY: no hematuria, no dysuria, +decreased UOP  NEUROLOGICAL: no numbness, +headaches, no confusion   MUSCULOSKELETAL: no back pain, no focal weakness   SKIN: No rashes, or lesions   PSYCH: no anxiety, depression  HEME: no gum bleeding, no bruising

## 2020-02-20 NOTE — H&P ADULT - PROBLEM SELECTOR PLAN 2
pt with significant hypoNa 124 on admission; s/p IVF (1500cc total) in ED, with repeat BMP showing Na 121  ddx includes 2/2 poor solute intake vs SIADH (2/2 pain/nausea vs SSRI) vs HCTZ use vs in setting of urinary retention    urine studies drawn after IVF initiated - showing low serum osm, John<35 but UOsm>500  given Na has further decreased after IVF, will hold off on further fluids until renal consult in AM  monitor BMP q6h for now

## 2020-02-20 NOTE — CHART NOTE - NSCHARTNOTEFT_GEN_A_CORE
c/o unable to urinate. bladder scan showed about 440 ml. He had straight catheterization X2 in ER. Will place Scruggs catheter for now.     Mary Marx NP -C   #[id number]

## 2020-02-20 NOTE — ED ADULT NURSE REASSESSMENT NOTE - NS ED NURSE REASSESS COMMENT FT1
Handoff received from GUSTAVO Lieberman, pt. on Bipap, tachypneic, speaking in clear sentences,  A&Ox4, requesting use of urinal, pt assisted to use urinal but unable to micturate, bladder scan yields greater than 779 mL of urine, admitting team called, straight cath performed using sterile technique, 850 mL clear yellow urine voided via straight cath. First bag of Potassium Chloride 10 Meq infusing in 18 G to Left AC, Potassium Phosphate 15 mmol infusing over 4 hours. Os sat on Bipap 92%, resp 32, HR on cardiac monitor 80, /76, axillary temp 98.5. Expiratory wheezes auscultated. Normoactive bowel sounds to all 4 quadrants. Handoff received from GUSTAVO Lieberman, pt. on Bipap, tachypneic, speaking in clear sentences,  A&Ox4, requesting use of urinal, pt assisted to use urinal but unable to micturate, bladder scan yields greater than 779 mL of urine, admitting team called, straight cath performed using sterile technique, 850 mL clear yellow urine voided via straight cath. First bag of Potassium Chloride 10 Meq infusing in 18 G to Left AC, Potassium Phosphate 15 mmol infusing over 4 hours. Os sat on Bipap 92%, resp 32, HR on cardiac monitor 80, /76, axillary temp 98.5. Expiratory wheezes auscultated. Normoactive bowel sounds to all 4 quadrants. Respiratory therapist at bedside.

## 2020-02-20 NOTE — H&P ADULT - NSHPPHYSICALEXAM_GEN_ALL_CORE
Vital Signs Last 24 Hrs  T(C): 37.3 (20 Feb 2020 04:32), Max: 38.2 (19 Feb 2020 22:06)  T(F): 99.1 (20 Feb 2020 04:32), Max: 100.7 (19 Feb 2020 22:06)  HR: 76 (20 Feb 2020 04:32) (72 - 101)  BP: 120/69 (20 Feb 2020 04:32) (116/70 - 127/55)  BP(mean): 82 (20 Feb 2020 04:32) (82 - 82)  RR: 29 (20 Feb 2020 04:32) (20 - 29)  SpO2: 92% (20 Feb 2020 04:32) (80% - 100%)    PHYSICAL EXAM:  GENERAL: NAD, well-developed  HEAD:  Atraumatic, normocephalic  EYES: EOMI, conjunctiva and sclera clear  NECK: Supple, no JVD, +BiPAP  CHEST/LUNG: rhonchi throughout   HEART: Regular rate and rhythm; no murmurs  ABDOMEN: Soft, nontender, nondistended; bowel sounds present  EXTREMITIES:  2+ Peripheral Pulses, no edema  PSYCH: calm affect, not anxious  NEUROLOGY: non-focal, AAOx3  SKIN: No rashes or lesions  MUSCULOSKELETAL: no back pain, moving all extremities

## 2020-02-20 NOTE — CONSULT NOTE ADULT - ASSESSMENT
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  Urinary retention may be caused by/contributing to hyponatremia,  He is not clinically hypovolemic.  Serum Uric acid unless it is low will not assist in differentiating hypovolemia vs SIADH in this case.    Recommend:  Stop IVF.  Avoid HCTZ/ARB/NSAID.  1000 ml fluid restriction.  Bladder scan every 8 hours with straight cath for PVR over 200 ml.  Electrolytes every 4 hours x 3.  Check TFT's, Uric acid.  Will follow.  I educated the patient, all questions answered.  Thank you.

## 2020-02-20 NOTE — CONSULT NOTE ADULT - SUBJECTIVE AND OBJECTIVE BOX
[facility] DIVISION OF PULMONARY, CRITICAL CARE AND SLEEP MEDICINE  PULMONARY CONSULTATION NOTE  CONSULT NUMBER: [phone] (Monday-Friday 9am-5pm)  OFFICE NUMBER: [phone] (Afterhours and Weekends)    NAME: DOUGLAS MORRWO  MEDICAL RECORD NUMBER: [id number]    CHIEF COMPLAINT: Patient is a 69y old  Male who presents with a chief complaint of SOB, cough and weakness (2020 13:40)      HISTORY OF PRESENT ILLNESS: 69 year old gentleman, remote light smoker, followed by Dr. Dia of our practice for pulmonary fibrosis with bouts of cough following viral infections. He also has a history of HTN, HLD and anxiety type depression. The patient's mother passed away ~ 2 weeks ago and he was exposed to many sick friends and relatives at the  and [employer] services. Last week, the patient developed fatigue, malaise and fatigue associated with a hacking non-productive cough. He took biaxin which he had at home and was seen at an urgent care center ~ 4 days ago and diagnosed with the flu. He was advised to rest and continue biaxin. Over the last several days, the patient has become extremely short of breath with minimal exertion associated with chest congestion and wheeze. He was so weak yesterday, that he was unable to stand and fell to the floor. He has been unable to sleep due to the severity of his cough. The patient was brought to the ER yesterday and placed on BIPAP for hypoxic respiratory failure. RVP -> influenza. CXR is abnormal.     Patient's wife had asked for a second opinion given CT scan findings. Asked by Dr. Calzada to complete second opinion.       ====================BACKGROUND INFORMATION============================    FAMILY HISTORY: FAMILY HISTORY:  No pertinent family history in first degree relatives      PAST MEDICAL AND SURGICAL HISTORY: PAST MEDICAL & SURGICAL HISTORY:  HTN (hypertension)  Depression  Pulmonary fibrosis  H/O inguinal hernia repair      ALLERGIES:Allergies    penicillin (Anaphylaxis)    Intolerances        HOME MEDICATIONS: as per Med Rec    OUTPATIENT PULMONARY DOCTOR:     SOCIAL HISTORY:  TOBACCO USE: former smoker 10 pack years discontinued in ;   ALCOHOL: Social  DRUGS: Denies  MARITAL STATUS:   EMPLOYMENT: Retired  EXPOSURES: Denies  RECENT TRAVELS: Denies  CODE STATUS: Full Code    ====================REVIEW OF SYSTEMS=====================================  CONSTITUTIONAL: Positive fevers, chills, no night sweats  CARDIOVASCULAR: Denies chest pain or palpitations  PULMONARY: As per HPI (SOB, cough)  GASTROINTESTINAL: Denies diarrhea or abd pain  [x] ALL OTHER REVIEW OF SYSTEMS ARE NEGATIVE   [] UNABLE TO OBTAIN REVIEW OF SYSTEMS DUE TO ______________      ====================PHYSICAL EXAM=========================================    VITALS: ICU Vital Signs Last 24 Hrs  T(C): 36.6 (2020 12:29), Max: 38.2 (2020 22:06)  T(F): 97.8 (2020 12:29), Max: 100.7 (2020 22:06)  HR: 89 (2020 13:57) (72 - 101)  BP: 144/61 (2020 13:52) (116/70 - 158/70)  BP(mean): 82 (2020 04:32) (82 - 82)  ABP: --  ABP(mean): --  RR: 30 (2020 13:57) (20 - 37)  SpO2: 88% (2020 13:57) (76% - 100%)      INTAKE and OUTPUT: I&O's Summary    2020 07:01  -  2020 14:58  --------------------------------------------------------  IN: 0 mL / OUT: 850 mL / NET: -850 mL        WEIGHT: Daily Height in cm: 185.42 (2020 21:50)    Daily     GLUCOSE: CAPILLARY BLOOD GLUCOSE      GENERAL: Under mild respiratory distress  HEENT: PERRLA  NECK: Supple  CARDIOVASCULAR: Tachycardia but regular rhythm  PULMONARY: Crackles at the bases no wheezes or rhonchi noted  ABDOMEN: SNT, +BS, No R/G/R  SKIN: No rashes noted  EXTREMITIES: 1/4 edema in LE bilaterally no clubbing or cyanosis  NEUROLOGIC: Nonfocal  PSYCHIATRIC: AAOx3, No anxiety or depression noted    ====================MEDICATIONS===========================================  MEDICATIONS  (STANDING):  albuterol/ipratropium for Nebulization 3 milliLiter(s) Nebulizer every 4 hours  atorvastatin 10 milliGRAM(s) Oral at bedtime  azithromycin  IVPB      azithromycin  IVPB 500 milliGRAM(s) IV Intermittent every 24 hours  benzonatate 200 milliGRAM(s) Oral three times a day  buDESOnide    Inhalation Suspension 0.5 milliGRAM(s) Inhalation every 12 hours  busPIRone 15 milliGRAM(s) Oral three times a day  cefTRIAXone   IVPB 1000 milliGRAM(s) IV Intermittent every 24 hours  diltiazem    milliGRAM(s) Oral daily  doxazosin 2 milliGRAM(s) Oral at bedtime  enoxaparin Injectable 40 milliGRAM(s) SubCutaneous daily  guaifenesin/dextromethorphan  Syrup 10 milliLiter(s) Oral every 6 hours  hydrocodone/homatropine Syrup 5 milliLiter(s) Oral <User Schedule>  methylPREDNISolone sodium succinate Injectable 40 milliGRAM(s) IV Push every 6 hours  oseltamivir 75 milliGRAM(s) Oral two times a day  PARoxetine 30 milliGRAM(s) Oral daily  potassium chloride    Tablet ER 40 milliEquivalent(s) Oral once  potassium phosphate IVPB 15 milliMole(s) IV Intermittent once      MEDICATIONS  (PRN):  acetaminophen   Tablet .. 650 milliGRAM(s) Oral every 6 hours PRN Temp greater or equal to 38C (100.4F)  clonazePAM  Tablet 1 milliGRAM(s) Oral three times a day PRN anxiety      ====================LABORATORY RESULTS====================================  CBC Full  -  ( 2020 04:46 )  WBC Count : 7.28 K/uL  RBC Count : 3.78 M/uL  Hemoglobin : 11.8 g/dL  Hematocrit : 32.9 %  Platelet Count - Automated : 196 K/uL  Mean Cell Volume : 87.0 fl  Mean Cell Hemoglobin : 31.2 pg  Mean Cell Hemoglobin Concentration : 35.9 gm/dL  Auto Neutrophil # : x  Auto Lymphocyte # : x  Auto Monocyte # : x  Auto Eosinophil # : x  Auto Basophil # : x  Auto Neutrophil % : x  Auto Lymphocyte % : x  Auto Monocyte % : x  Auto Eosinophil % : x  Auto Basophil % : x                                        122<L>  |  88<L>  |  14  ----------------------------<  107<H>  3.8   |  21<L>  |  0.95    Ca    7.6<L>      2020 11:45  Phos  2.3     -  Mg     1.9         TPro  6.2  /  Alb  3.4  /  TBili  0.5  /  DBili  x   /  AST  48<H>  /  ALT  21  /  AlkPhos  50  02-19            Creatinine Trend: 0.95<--, 1.07<--, 1.38<--    ABG - ( 2020 11:46 )  pH, Arterial: 7.49  pH, Blood: x     /  pCO2: 29    /  pO2: 58    / HCO3: 22    / Base Excess: -.3   /  SaO2: 91                ====================RADIOLOGY and ECHOCARDIOGRAPHY=======================  CT CHEST: < from: CT Angio Chest w/ IV Cont (20 @ 00:14) >  LUNGS AND AIRWAYS: Patent central airways. Areas of architectural distortion, mild traction bronchiectasis and linear/reticular opacities. Nonspecific groundglass/patchy opacities in both lungs.      PLEURA: No pleural effusion or pneumothorax.    MEDIASTINUM AND CHAVO: Subcentimeter mediastinaland hilar lymph nodes without lymphadenopathy.    VESSELS: Adequate contrast opacification of the pulmonary arteries. No obvious pulmonary embolus.    HEART: Normal heart size. No pericardial effusion.    CHEST WALL AND LOWER NECK: Heterogeneous thyroid gland, obscured by artifact.    VISUALIZED UPPER ABDOMEN: Colon diverticulosis.    BONES: Degenerative changes of the spine.

## 2020-02-20 NOTE — PROGRESS NOTE ADULT - ASSESSMENT
69M with        PMH of HTN,  HLD, , pulmonary fibrosis,   depression/anxiety         p/w SOB and cough/  acute resp failure with O2  sat of  8 0 on arrival to  er  O2  sat is  92  to 99 %, nowIinfluenza A positive, with  bronchospasm   on Tamiflu. / nebs  wbc of  7,000/ mild anemia/  GI eval  Hyponatremia fro  poor po intake/ dehydration/ on iv fluids  ct chest,    b/l patchy opacities/   traction bronchiectasis/   pulm fibrosis  on rocephin/  zmax   dvt ppx   labs in am                       < from: CT Angio Chest w/ IV Cont (02.20.20 @ 00:14) >    IMPRESSION:   No obvious pulmonary embolus.  Findings reflecting known pulmonary fibrosis. Recommend clinical correlation to assess underlying pneumonia. Recommend comparison to previous outside study and follow-up to exclude potential underlying neoplasm.  Heterogeneous thyroid gland, which is difficult to assess due to artifact and can be further characterizedon a nonemergent ultrasound as clinically indicated.   Additional findings as described  < end of copied text > 69M with        PMH of HTN,  HLD, , pulmonary fibrosis,   depression/anxiety         p/w SOB and cough/  acute resp failure with O2  sat of  8 0 on arrival to  er  O2  sat is  92  to 99 %, now    Iinfluenza A positive, with  bronchospasm   on Tamiflu. / nebs  wbc of  7,000/ mild anemia/  GI eval  Hyponatremia fro  poor po intake/ dehydration/ on iv fluids  ct chest,    b/l patchy opacities/   traction bronchiectasis/   pulm fibrosis  on rocephin/  zmax/  CAP  hypokalemia  hypophosphatemia, repleted   dvt ppx   labs in am                       < from: CT Angio Chest w/ IV Cont (02.20.20 @ 00:14) >    IMPRESSION:   No obvious pulmonary embolus.  Findings reflecting known pulmonary fibrosis. Recommend clinical correlation to assess underlying pneumonia. Recommend comparison to previous outside study and follow-up to exclude potential underlying neoplasm.  Heterogeneous thyroid gland, which is difficult to assess due to artifact and can be further characterizedon a nonemergent ultrasound as clinically indicated.   Additional findings as described  < end of copied text > 69M with        PMH of HTN,  HLD, , pulmonary fibrosis,   depression/anxiety         p/w SOB and cough/  acute resp failure with O2  sat of  8 0 on arrival to  er  O2  sat is  92  to 99 %, now    Iinfluenza A positive, with  bronchospasm   on Tamiflu. / nebs  iv solumedrol bid  wbc of  7,000/ mild anemia/  GI eval  Hyponatremia from   poor po intake/ dehydration/ on iv fluids  ct chest,    b/l patchy opacities/   traction bronchiectasis/   pulm fibrosis  on rocephin/  zmax/  CAP  hypokalemia  hypophosphatemia, repleted  on  buspar/ paxil  and klonopin/ home mds   dvt ppx   labs in am/  follow bmp/ sodium, later today                       < from: CT Angio Chest w/ IV Cont (02.20.20 @ 00:14) >    IMPRESSION:   No obvious pulmonary embolus.  Findings reflecting known pulmonary fibrosis. Recommend clinical correlation to assess underlying pneumonia. Recommend comparison to previous outside study and follow-up to exclude potential underlying neoplasm.  Heterogeneous thyroid gland, which is difficult to assess due to artifact and can be further characterizedon a nonemergent ultrasound as clinically indicated.   Additional findings as described  < end of copied text > 69M with        PMH of HTN,  HLD, , pulmonary fibrosis,   depression/anxiety         p/w SOB and cough/  acute resp failure with O2  sat of  8 0 on arrival to  er  O2  sat is  92  to 99 %, now    Iinfluenza A positive, with  bronchospasm   on Tamiflu. / nebs  iv solumedrol bid  wbc of  7,000/ mild anemia/  GI eval  Hyponatremia from   poor po intake/ dehydration/ on iv fluids  renal  called  ct chest,    b/l patchy opacities/   traction bronchiectasis/   pulm fibrosis  on rocephin/  zmax/  CAP  hypokalemia  hypophosphatemia, repleted  on  buspar/ paxil  and klonopin/ home mds   dvt ppx   labs in am/  follow bmp/ sodium, later today                       < from: CT Angio Chest w/ IV Cont (02.20.20 @ 00:14) >    IMPRESSION:   No obvious pulmonary embolus.  Findings reflecting known pulmonary fibrosis. Recommend clinical correlation to assess underlying pneumonia. Recommend comparison to previous outside study and follow-up to exclude potential underlying neoplasm.  Heterogeneous thyroid gland, which is difficult to assess due to artifact and can be further characterizedon a nonemergent ultrasound as clinically indicated.   Additional findings as described  < end of copied text > 69M with        PMH of HTN,  HLD, , pulmonary fibrosis,   depression/anxiety         p/w SOB and cough/  acute resp failure with O2  sat of  80 on arrival to  er  O2  sat is  92  to 99 %,  earlier  and  now  lower  again    Iinfluenza A positive, with  bronchospasm   on Tamiflu. / nebs  iv solumedrol bid  wbc of  7,000/ mild anemia/  GI eval  Hyponatremia from   poor po intake/ dehydration/ on iv fluids/  stop  hctz  renal  called  ct chest,    b/l patchy opacities/   traction bronchiectasis/   pulm fibrosis  on rocephin/  zmax/  CAP/  house  pulm    hypokalemia  hypophosphatemia, repleted  on  buspar/ paxil  and klonopin/ home meds   dvt ppx   labs in am/  follow bmp/ sodium, later today                       < from: CT Angio Chest w/ IV Cont (02.20.20 @ 00:14) >    IMPRESSION:   No obvious pulmonary embolus.  Findings reflecting known pulmonary fibrosis. Recommend clinical correlation to assess underlying pneumonia. Recommend comparison to previous outside study and follow-up to exclude potential underlying neoplasm.  Heterogeneous thyroid gland, which is difficult to assess due to artifact and can be further characterizedon a nonemergent ultrasound as clinically indicated.   Additional findings as described  < end of copied text > 69M with        PMH of HTN,  HLD, , pulmonary fibrosis,  not on home  O 2.   depression/anxiety , on  Klonopin          p/w SOB and cough/  acute resp failure with O2  sat of  80 on arrival to  er  O2  sat is  92  to 99 %,  earlier  and  now  lower  again    Iinfluenza A positive, with  bronchospasm   on Tamiflu. / nebs  iv solumedrol bid  wbc of  7,000/ mild anemia/  GI eval  Hyponatremia from   poor po intake/ dehydration/ on iv fluids/  stop  hctz  ct chest,    b/l patchy opacities/   traction bronchiectasis/   pulm fibrosis  on rocephin/  zmax/  CAP/   pulm    dr gusman,  for  hypoxia  hypokalemia  hypophosphatemia, repleted  on  buspar/ paxil  and klonopin/ home meds   dvt ppx   labs in am/  follow bmp/ sodium, later today/ renal dr bah  ok to  give pt  klonopin, may briefly interrupt   bipap  for taking his meds/  pt  had  demonstrated    agitation and  anxiety,  earlier  in   er,  upon not receiving  his Klonopin  ok to  restart  cardizem/  and  will   hold  olmesartan/ hctz  ID eval  called    discussed with  PA                         < from: CT Angio Chest w/ IV Cont (02.20.20 @ 00:14) >    IMPRESSION:   No obvious pulmonary embolus.  Findings reflecting known pulmonary fibrosis. Recommend clinical correlation to assess underlying pneumonia. Recommend comparison to previous outside study and follow-up to exclude potential underlying neoplasm.  Heterogeneous thyroid gland, which is difficult to assess due to artifact and can be further characterizedon a nonemergent ultrasound as clinically indicated.   Additional findings as described  < end of copied text > 69M with        PMH of HTN,  HLD, , pulmonary fibrosis,  not on home  O 2.   depression/anxiety , on  Klonopin          p/w SOB and cough/  acute resp failure with O2  sat of  80 on arrival to  er  O2  sat is  92  to 99 %,  earlier  and  now  lower  again    Iinfluenza A positive, with  bronchospasm   on Tamiflu. / nebs  iv solumedrol bid  wbc of  7,000/ mild anemia/  GI eval  Hyponatremia from   poor po intake/ dehydration/ on iv fluids/  stop  hctz  ct chest,    b/l patchy opacities/   traction bronchiectasis/   pulm fibrosis  on rocephin/  zmax/  CAP/   pulm    dr gusman,  for  hypoxia  hypokalemia  hypophosphatemia, repleted  on  buspar/ paxil  and klonopin/ home meds   dvt ppx   labs in am/  follow bmp/ sodium, later today/ renal dr bah  ok to  give pt  klonopin, may briefly interrupt   bipap  for taking his meds/  pt  had  demonstrated    agitation and  anxiety,  earlier  in   er,  upon not receiving  his Klonopin  ok to  restart  cardizem/  and  will   hold  olmesartan/ hctz  ID eval  called    discussed with  PA  spoke  with wife. /  requesting  second   pulm opinion       < from: CT Angio Chest w/ IV Cont (02.20.20 @ 00:14) >  IMPRESSION:   No obvious pulmonary embolus.  Findings reflecting known pulmonary fibrosis. Recommend clinical correlation to assess underlying pneumonia. Recommend comparison to previous outside study and follow-up to exclude potential underlying neoplasm.  Heterogeneous thyroid gland, which is difficult to assess due to artifact and can be further characterizedon a nonemergent ultrasound as clinically indicated.   Additional findings as described  < end of copied text > 69M with        PMH of HTN,  HLD, , pulmonary fibrosis,  not on home  O 2.   depression/anxiety , on  Klonopin          p/w SOB and cough/  acute resp failure with O2  sat of  80 on arrival to  er  O2  sat is  92  to 99 %,  earlier  and  now  lower  again    Iinfluenza A positive, with  bronchospasm   on Tamiflu. / nebs  iv solumedrol bid  wbc of  7,000/ mild anemia/  GI eval  Hyponatremia from   poor po intake/ dehydration/ on iv fluids/  stop  hctz  ct chest,    b/l patchy opacities/   traction bronchiectasis/   pulm fibrosis  on rocephin/  zmax/  CAP/   pulm    dr gusman,  for  hypoxia  hypokalemia  hypophosphatemia, repleted  on  buspar/ paxil  and klonopin/ home meds   dvt ppx   labs in am/  follow bmp/ sodium, later today/ renal dr bah  ok to  give pt  klonopin, may briefly interrupt   bipap  for taking his meds/  pt  had  demonstrated    agitation and  anxiety,  earlier  in   er,  upon not receiving  his Klonopin  ok to  restart  cardizem/  and  will   hold  olmesartan/ hctz  ID eval  called    discussed with  PA  spoke  with wife. /  requesting  second   pulm opinion/  upset that  er    mentioned  ct findings     and  wants  call back   from    dr Kline  and  house    pulm     < from: CT Angio Chest w/ IV Cont (02.20.20 @ 00:14) >  IMPRESSION:   No obvious pulmonary embolus.  Findings reflecting known pulmonary fibrosis. Recommend clinical correlation to assess underlying pneumonia. Recommend comparison to previous outside study and follow-up to exclude potential underlying neoplasm.  Heterogeneous thyroid gland, which is difficult to assess due to artifact and can be further characterizedon a nonemergent ultrasound as clinically indicated.   Additional findings as described  < end of copied text >

## 2020-02-20 NOTE — CONSULT NOTE ADULT - CONSULT REASON
dyspnea; hypoxemia; influenza; pneumonia; pulmonary fiborsis hypoxic respiratory failure; influenza; pneumonia; pulmonary fibrosis

## 2020-02-20 NOTE — CONSULT NOTE ADULT - SUBJECTIVE AND OBJECTIVE BOX
[facility]  Kevin Killian PA-C  [address] [place] [address]  [phone]      Chief Complaint:  Patient is a 69y old  Male who presents with a chief complaint of SOB, cough and weakness (20 Feb 2020 10:00)      HPI: 69M with PMH of HTN, pulmonary fibrosis, depression/anxiety p/w SOB and cough. Pt states his mother passed away about 2 weeks ago and shortly after burying here, started to feel very unwell. He has had progressively worsening cough, non productive, but feels a lot of chest congestion. Associated with ROSE, headache and myalgias and fatigue. Was seen at  earlier this week and was diagnosed with flu. In the past couple of days, has been having very labored breathing; today, had an episode where he fell while trying to reach for [employer] and was so winded and weak, he was unable to get up. Has barely been eating anything due to poor appetite and nausea, but denies vomiting. Denies fevers or chills, no CP, no palpitations, no abdominal pain, had 3 episodes of loose stools today, +decreased UOP, no LE swelling. Does not know of any sick contacts. Denies recent travel.     Allergies:  penicillin (Anaphylaxis)      Medications:  acetaminophen   Tablet .. 650 milliGRAM(s) Oral every 6 hours PRN  ALBUTerol    90 MICROgram(s) HFA Inhaler 1 Puff(s) Inhalation every 4 hours  albuterol/ipratropium for Nebulization 3 milliLiter(s) Nebulizer every 6 hours  atorvastatin 10 milliGRAM(s) Oral at bedtime  azithromycin  IVPB      azithromycin  IVPB 500 milliGRAM(s) IV Intermittent every 24 hours  busPIRone 15 milliGRAM(s) Oral three times a day  cefTRIAXone   IVPB 1000 milliGRAM(s) IV Intermittent every 24 hours  clonazePAM  Tablet 1 milliGRAM(s) Oral three times a day PRN  diltiazem    milliGRAM(s) Oral daily  doxazosin 2 milliGRAM(s) Oral at bedtime  enoxaparin Injectable 40 milliGRAM(s) SubCutaneous daily  methylPREDNISolone sodium succinate Injectable 20 milliGRAM(s) IV Push two times a day  oseltamivir 75 milliGRAM(s) Oral two times a day  PARoxetine 30 milliGRAM(s) Oral daily  potassium chloride    Tablet ER 40 milliEquivalent(s) Oral once  potassium phosphate IVPB 15 milliMole(s) IV Intermittent once  sodium chloride 0.9%. 1000 milliLiter(s) IV Continuous <Continuous>  tiotropium 18 MICROgram(s) Capsule 1 Capsule(s) Inhalation daily      PMHX/PSHX:  HTN (hypertension)  Depression  Pulmonary fibrosis  BPH (benign prostatic hyperplasia)  HTN, age 0-18  H/O inguinal hernia repair      Family history:  No pertinent family history in first degree relatives      Social History:     ROS:     General:  No wt loss, fevers, chills, night sweats, fatigue,   Eyes:  Good vision, no reported pain  ENT:  No sore throat, pain, runny nose, dysphagia  CV:  No pain, palpitations, hypo/hypertension  Resp:  No dyspnea, cough, tachypnea, wheezing  GI:  No pain, No nausea, No vomiting, No diarrhea, No constipation, No weight loss, No fever, No pruritis, No rectal bleeding, No tarry stools, No dysphagia,  :  No pain, bleeding, incontinence, nocturia  Muscle:  No pain, weakness  Neuro:  No weakness, tingling, memory problems  Psych:  No fatigue, insomnia, mood problems, depression  Endocrine:  No polyuria, polydipsia, cold/heat intolerance  Heme:  No petechiae, ecchymosis, easy bruisability  Skin:  No rash, tattoos, scars, edema      PHYSICAL EXAM:   Vital Signs:  Vital Signs Last 24 Hrs  T(C): 36.9 (20 Feb 2020 07:32), Max: 38.2 (19 Feb 2020 22:06)  T(F): 98.5 (20 Feb 2020 07:32), Max: 100.7 (19 Feb 2020 22:06)  HR: 85 (20 Feb 2020 11:14) (72 - 101)  BP: 141/67 (20 Feb 2020 11:14) (116/70 - 158/70)  BP(mean): 82 (20 Feb 2020 04:32) (82 - 82)  RR: 32 (20 Feb 2020 11:14) (20 - 37)  SpO2: 89% (20 Feb 2020 11:14) (76% - 100%)  Daily Height in cm: 185.42 (19 Feb 2020 21:50)    Daily     GENERAL:  Appears stated age, well-groomed, well-nourished, no distress  HEENT:  NC/AT,  conjunctivae clear and pink, no thyromegaly, nodules, adenopathy, no JVD, sclera -anicteric  CHEST:  Full & symmetric excursion, no increased effort, breath sounds clear  HEART:  Regular rhythm, S1, S2, no murmur/rub/S3/S4, no abdominal bruit, no edema  ABDOMEN:  Soft, non-tender, non-distended, normoactive bowel sounds,  no masses ,no hepato-splenomegaly, no signs of chronic liver disease  EXTEREMITIES:  no cyanosis,clubbing or edema  SKIN:  No rash/erythema/ecchymoses/petechiae/wounds/abscess/warm/dry  NEURO:  Alert, oriented, no asterixis, no tremor, no encephalopathy    LABS:                        11.8   7.28  )-----------( 196      ( 20 Feb 2020 04:46 )             32.9     02-20    121<L>  |  86<L>  |  16  ----------------------------<  124<H>  3.2<L>   |  22  |  1.07    Ca    7.5<L>      20 Feb 2020 04:46  Phos  2.3     02-20  Mg     1.9     02-20    TPro  6.2  /  Alb  3.4  /  TBili  0.5  /  DBili  x   /  AST  48<H>  /  ALT  21  /  AlkPhos  50  02-19    LIVER FUNCTIONS - ( 19 Feb 2020 22:30 )  Alb: 3.4 g/dL / Pro: 6.2 g/dL / ALK PHOS: 50 U/L / ALT: 21 U/L / AST: 48 U/L / GGT: x                   Imaging:

## 2020-02-20 NOTE — CHART NOTE - NSCHARTNOTEFT_GEN_A_CORE
Reference #: [id number]    Others' Prescriptions  Patient Name:	Joe Bob	YOB: 1950  Address:	[address] [place] [address]	Sex:	Male  Rx Written	Rx Dispensed	Drug	Quantity	Days Supply	Prescriber Name  01/29/2020	01/30/2020	clonazepam 1 mg tablet	90	30	Vick Albarran MD  12/16/2019	12/16/2019	clonazepam 1 mg tablet	90	30	Johnson Verduzco MD  10/21/2019	10/22/2019	clonazepam 1 mg tablet	90	90	Johnson Verduzco MD  10/15/2019	10/15/2019	hydrocodone-homatropine syrup	140ml	7	Darell Dia MD  09/21/2019	09/21/2019	clonazepam 1 mg tablet	90	30	Johnson Verduzco MD  07/16/2019	07/18/2019	clonazepam 1 mg tablet	90	30	Johnson Verduzco MD  05/21/2019	05/23/2019	clonazepam 1 mg tablet	90	30	Johnson Verduzco MD  04/02/2019	04/06/2019	clonazepam 0.5 mg tablet	90	30	Johnson Verduzco MD

## 2020-02-20 NOTE — H&P ADULT - PROBLEM SELECTOR PLAN 9
1.  Name of PCP: Dr Johnson Verduzco   2.  PCP Contacted on Admission: [ ] Y    [ ] N    3.  PCP contacted at Discharge: [ ] Y    [ ] N    [ ] N/A  4.  Post-Discharge Appointment Date and Location:  5.  Summary of Handoff given to PCP: c/w home medications for now - paxil 30 daily and buspar 15 TID  if hyponatremia does not improve, may have to continue tapering off   c/w klonopin 1mg TID as needed (ISTOP #[id number])

## 2020-02-20 NOTE — CONSULT NOTE ADULT - SUBJECTIVE AND OBJECTIVE BOX
[facility] - [facility] - CONSULT NOTE    HPI: 69 year old gentleman, remote light smoker, followed by Dr. Dia of our practice for pulmonary fibrosis with bouts of cough following viral infections. He also has a history of HTN, HLD and anxiety type depression.     PMHX:  Pulmonary fibrosis  Allergic rhinitis  HTN (hypertension)  HLD (hyperlipidemia)  Anxiety type Depression  BPH (benign prostatic hyperplasia)  Basal cell carcinoma        PSHX:  Inguinal hernia repair  Moh's surgery      FAMILY HISTORY:  father - stomach cancer - HTN  mother - breast cancer - HTN  paternal grandmother - DM      SOCIAL HISTORY:  former smoker 10 pack years discontinued in 1977    Pulmonary Medications:   ALBUTerol    90 MICROgram(s) HFA Inhaler 1 Puff(s) Inhalation every 4 hours  albuterol/ipratropium for Nebulization 3 milliLiter(s) Nebulizer every 6 hours  tiotropium 18 MICROgram(s) Capsule 1 Capsule(s) Inhalation daily      Antimicrobials:  azithromycin  IVPB      azithromycin  IVPB 500 milliGRAM(s) IV Intermittent every 24 hours  cefTRIAXone   IVPB 1000 milliGRAM(s) IV Intermittent every 24 hours  oseltamivir 75 milliGRAM(s) Oral two times a day      Cardiology:  diltiazem    milliGRAM(s) Oral daily  doxazosin 2 milliGRAM(s) Oral at bedtime      Other:  atorvastatin 10 milliGRAM(s) Oral at bedtime  busPIRone 15 milliGRAM(s) Oral three times a day  enoxaparin Injectable 40 milliGRAM(s) SubCutaneous daily  methylPREDNISolone sodium succinate Injectable 20 milliGRAM(s) IV Push two times a day  PARoxetine 30 milliGRAM(s) Oral daily  potassium chloride    Tablet ER 40 milliEquivalent(s) Oral once  potassium phosphate IVPB 15 milliMole(s) IV Intermittent once      Prn:  MEDICATIONS  (PRN):  acetaminophen   Tablet .. 650 milliGRAM(s) Oral every 6 hours PRN Temp greater or equal to 38C (100.4F)  clonazePAM  Tablet 1 milliGRAM(s) Oral three times a day PRN anxiety      Allergies    penicillin (Anaphylaxis)    Intolerances        HOME MEDICATIONS: see  H & P    REVIEW OF SYSTEMS:  Constitutional: As per HPI  HEENT: Within normal limits  CV: As per HPI  Resp: As per HPI  GI: Within normal limits   : Within normal limits  Musculoskeletal: Within normal limits  Skin: Within normal limits  Neurological: Within normal limits  Psychiatric: Within normal limits  Endocrine: Within normal limits  Hematologic/Lymphatic: Within normal limits  Allergic/Immunologic: Within normal limits    [x] All other systems negative    OBJECTIVE:    I&O's Detail    20 Feb 2020 07:01  -  20 Feb 2020 12:54  --------------------------------------------------------  IN:  Total IN: 0 mL    OUT:    Intermittent Catheterization - Urethral: 850 mL  Total OUT: 850 mL    Total NET: -850 mL        Daily Height in cm: 185.42 (19 Feb 2020 21:50)    Daily       PHYSICAL EXAM:  ICU Vital Signs Last 24 Hrs  T(C): 36.6 (20 Feb 2020 12:29), Max: 38.2 (19 Feb 2020 22:06)  T(F): 97.8 (20 Feb 2020 12:29), Max: 100.7 (19 Feb 2020 22:06)  HR: 91 (20 Feb 2020 12:29) (72 - 101)  BP: 144/61 (20 Feb 2020 12:29) (116/70 - 158/70)  BP(mean): 82 (20 Feb 2020 04:32) (82 - 82)  ABP: --  ABP(mean): --  RR: 28 (20 Feb 2020 12:29) (20 - 37)  SpO2: 93% (20 Feb 2020 12:29) (76% - 100%)    General: Awake. Alert. Cooperative. No distress. Appears stated age 	  HEENT:   Atraumatic. Normocephalic. Anicteric. Normal oral mucosa. PERRL. EOMI.  Neck: Supple. Trachea midline. Thyroid without enlargement/tenderness/nodules. No carotid bruit. No JVD.	  Cardiovascular: Regular rate and rhythm. S1 S2 normal. No murmurs, rubs or gallops.  Respiratory: Respirations unlabored. Clear to auscultation and percussion bilaterally. No curvature.  Abdomen: Soft. Non-tender. Non-distended. No organomegaly. No masses. Normal bowel sounds.  Extremities: Warm to touch. No clubbing or cyanosis. No pedal edema.  Pulses: 2+ peripheral pulses all extremities.	  Skin: Normal skin color. No rashes or lesions. No ecchymoses. No cyanosis. Warm to touch.  Lymph Nodes: Cervical, supraclavicular and axillary nodes normal  Neurological: Motor and sensory examination equal and normal. A and O x 3  Psychiatry: Appropriate mood and affect.      LABS:                          11.8   7.28  )-----------( 196      ( 20 Feb 2020 04:46 )             32.9     CBC    WBC  7.28 <==, 8.58 <==    Hemoglobin  11.8 <<==, 12.4 <<==    Hematocrit  32.9 <==, 35.7 <==    Platelets  196 <==, 196 <==      122<L>  |  88<L>  |  14  ----------------------------<  107<H>    02-20  3.8   |  21<L>  |  0.95    LYTES    sodium  122 <==, 121 <==, 124 <==    potassium   3.8 <==, 3.2 <==, 3.6 <==    chloride  88 <==, 86 <==, 85 <==    carbon dioxide  21 <==, 22 <==, 20 <==    =============================================================================================  RENAL FUNCTION:    Creatinine:   0.95  <<==, 1.07  <<==, 1.38  <<==    BUN:   14 <==, 16 <==, 21 <==    ============================================================================================    calcium   7.6 <==, 7.5 <==, 7.8 <==    phos   2.3 <==    mag   1.9 <==, 1.9 <==    ============================================================================================  LFTs    AST:   48 <==     ALT:  21  <==     AP:  50  <=    Bili:  0.5  <=      ABG - ( 20 Feb 2020 11:46 )  pH: 7.49  /  pCO2: 29    /  pO2: 58    / HCO3: 22    / Base Excess: -.3   /  SaO2: 91        Venous Blood Gas:  02-20 @ 04:51  7.42/39/28/25/46  VBG Lactate: 1.1    Venous Blood Gas:  02-20 @ 00:57  7.42/39/32/25/55  VBG Lactate: 1.4    Venous Blood Gas:  02-19 @ 22:30  7.43/36/24/24/34  VBG Lactate: 2.8    Serum Pro-Brain Natriuretic Peptide: 364 pg/mL (02-19 @ 22:30)      CARDIAC MARKERS ( 20 Feb 2020 00:57 )  CPK x     /CKMB x     /CKMB Units x        troponin 17 ng/L    CARDIAC MARKERS ( 19 Feb 2020 22:30 )  CPK x     /CKMB x     /CKMB Units x        troponin 19 ng/L      MICROBIOLOGY:     FLU A B RSV Detection by PCR (02.19.20 @ 22:30)    Flu A Result: Detected    Flu B Result: NotDetec    RSV Result: NotDetec    RADIOLOGY:  [x ] Chest radiographs reviewed and interpreted by me      EXAM:  XR CHEST AP OR PA 1V                          PROCEDURE DATE:  02/19/2020      INTERPRETATION:  CLINICAL INFORMATION: Chest pain.    EXAM: 1 view of the chest.    COMPARISON: None.    FINDINGS:    Bilateral midlung linear opacities. No pneumothorax. The cardiomediastinal silhouette is poorly evaluated on this projection. No acute osseous findings.    IMPRESSION:    Bilateral midlung linear opacities, which are of uncertain etiology.      CHRISTROBERTER YEISLEY M.D., RADIOLOGY RESIDENT  This document has been electronically signed.  ES VELAZQUEZ M.D., ATTENDING RADIOLOGIST  This document has been electronically signed. Feb 20 2020  9:42AM    ---------------------------------------------------------------------------------------------------------------    EXAM:  CT ANGIO CHEST (W)AW IC                          PROCEDURE DATE:  02/20/2020      INTERPRETATION:  CLINICAL INFORMATION: Shortness of breath and fatigue. History of lung disease.    COMPARISON: Chest radiograph 2/19/2020    PROCEDURE:   CT Angiography of the Chest.  90 ml of Omnipaque 350 was injected intravenously. 10 ml were discarded.  Sagittal and coronal reformats were performed as well as 3D (MIP) reconstructions.    FINDINGS:    LUNGS AND AIRWAYS: Patent central airways. Areas of architectural distortion, mild traction bronchiectasis and linear/reticular opacities. Nonspecific groundglass/patchy opacities in both lungs.      PLEURA: No pleural effusion or pneumothorax.    MEDIASTINUM AND CHAVO: Subcentimeter mediastinal and hilar lymph nodes without lymphadenopathy.    VESSELS: Adequate contrast opacification of the pulmonary arteries. No obvious pulmonary embolus.    HEART: Normal heart size. No pericardial effusion.    CHEST WALL AND LOWER NECK: Heterogeneous thyroid gland, obscured by artifact.    VISUALIZED UPPER ABDOMEN: Colon diverticulosis.    BONES: Degenerative changes of the spine.    IMPRESSION:     No obvious pulmonary embolus.    Findings reflecting known pulmonary fibrosis. Recommend clinical correlation to assess underlying pneumonia. Recommend comparison to previous outside study and follow-up to exclude potential underlying neoplasm.    Heterogeneous thyroid gland, which is difficult to assess due to artifact and can be further characterized on a nonemergent ultrasound as clinically indicated.     Additional findings as described.    RAMONA REDDY M.D., RADIOLOGY RESIDENT  This document has been electronically signed.  MIAH CHAUDHRY M.D., ATTENDING RADIOLOGIST  This document has been electronically signed. Feb 20 2020  1:49AM  --------------------------------------------------------------------------------------------------------------- [facility] - [facility] - CONSULT NOTE    HPI: 69 year old gentleman, remote light smoker, followed by Dr. Dia of our practice for pulmonary fibrosis with bouts of cough following viral infections. He also has a history of HTN, HLD and anxiety type depression. The patient's mother passed away ~ 2 weeks ago and he was exposed to many sick friends and relatives at the  and memorial services. Last week, the patient developed fatigue, malaise and fatigue associated with a hacking non-productive cough. He took biaxin which he had at home and was seen at an urgent care center ~ 4 days ago and diagnosed with the flu. He was advised to rest and continue biaxin. Over the last several days, the patient has become extremely short of breath with minimal exertion associated with chest congestion and wheeze. He was so weak yesterday, that he was unable to stand and fell to the floor. He has been unable to sleep due to the severity of his cough. The patient was brought to the ER yesterday and placed on BIPAP for hypoxic respiratory failure. RVP -> influenza. CXR is abnormal. Asked to evaluate.    PMHX:  Pulmonary fibrosis  Allergic rhinitis  HTN (hypertension)  HLD (hyperlipidemia)  Anxiety type Depression  BPH (benign prostatic hyperplasia)  Basal cell carcinoma    PSHX:  Inguinal hernia repair  Moh's surgery      FAMILY HISTORY:  father - stomach cancer - HTN  mother - breast cancer - HTN  paternal grandmother - DM      SOCIAL HISTORY:  former smoker 10 pack years discontinued in ;     Pulmonary Medications:   ALBUTerol    90 MICROgram(s) HFA Inhaler 1 Puff(s) Inhalation every 4 hours  albuterol/ipratropium for Nebulization 3 milliLiter(s) Nebulizer every 6 hours  tiotropium 18 MICROgram(s) Capsule 1 Capsule(s) Inhalation daily      Antimicrobials:  azithromycin  IVPB      azithromycin  IVPB 500 milliGRAM(s) IV Intermittent every 24 hours  cefTRIAXone   IVPB 1000 milliGRAM(s) IV Intermittent every 24 hours  oseltamivir 75 milliGRAM(s) Oral two times a day      Cardiology:  diltiazem    milliGRAM(s) Oral daily  doxazosin 2 milliGRAM(s) Oral at bedtime      Other:  atorvastatin 10 milliGRAM(s) Oral at bedtime  busPIRone 15 milliGRAM(s) Oral three times a day  enoxaparin Injectable 40 milliGRAM(s) SubCutaneous daily  methylPREDNISolone sodium succinate Injectable 20 milliGRAM(s) IV Push two times a day  PARoxetine 30 milliGRAM(s) Oral daily  potassium chloride    Tablet ER 40 milliEquivalent(s) Oral once  potassium phosphate IVPB 15 milliMole(s) IV Intermittent once      Prn:  MEDICATIONS  (PRN):  acetaminophen   Tablet .. 650 milliGRAM(s) Oral every 6 hours PRN Temp greater or equal to 38C (100.4F)  clonazePAM  Tablet 1 milliGRAM(s) Oral three times a day PRN anxiety      Allergies    penicillin (Anaphylaxis)    HOME MEDICATIONS: see  H & P    REVIEW OF SYSTEMS:  Constitutional: As per HPI  HEENT: Within normal limits  CV: As per HPI  Resp: As per HPI  GI: Within normal limits   : Within normal limits  Musculoskeletal: Within normal limits  Skin: Within normal limits  Neurological: Within normal limits  Psychiatric: anxiety/depresson  Endocrine: Within normal limits  Hematologic/Lymphatic: Within normal limits  Allergic/Immunologic: Within normal limits    [x] All other systems negative    OBJECTIVE:    I&O's Detail    2020 07:01  -  2020 12:54  --------------------------------------------------------  IN:  Total IN: 0 mL    OUT:    Intermittent Catheterization - Urethral: 850 mL  Total OUT: 850 mL    Total NET: -850 mL    Daily Height in cm: 185.42 (2020 21:50)      PHYSICAL EXAM:  ICU Vital Signs Last 24 Hrs  T(C): 36.6 (2020 12:29), Max: 38.2 (2020 22:06)  T(F): 97.8 (2020 12:29), Max: 100.7 (2020 22:06)  HR: 91 (2020 12:29) (72 - 101)  BP: 144/61 (2020 12:29) (116/70 - 158/70)  BP(mean): 82 (2020 04:32) (82 - 82)  ABP: --  ABP(mean): --  RR: 28 (2020 12:29) (20 - 37)  SpO2: 93% (2020 12:29) (76% on 6lpm nasal canula - 100% on BIPAP 60% - 87% on high flow oxygen 100% FiO2 @ 60lpm    General: Awake. Alert. Cooperative. No distress. Appears stated age. BIPAP.	  HEENT: Atraumatic. Normocephalic. Anicteric. Normal oral mucosa. PERRL. EOMI.  Neck: Supple. Trachea midline. Thyroid without enlargement/tenderness/nodules. No carotid bruit. No JVD.	  Cardiovascular: Regular rate and rhythm. S1 S2 normal. No murmurs, rubs or gallops.  Respiratory: Respirations unlabored. Cough with inspiration. Diffuse rhonchi and wheeze. No curvature.  Abdomen: Soft. Non-tender. Non-distended. No organomegaly. No masses. Normal bowel sounds. Obese.  Extremities: Warm to touch. No clubbing or cyanosis. No pedal edema.  Pulses: 2+ peripheral pulses all extremities.	  Skin: Normal skin color. No rashes or lesions. No ecchymoses. No cyanosis. Warm to touch.  Lymph Nodes: Cervical, supraclavicular and axillary nodes normal  Neurological: Motor and sensory examination equal and normal. A and O x 3  Psychiatry: Appropriate mood and affect.      LABS:                          11.8   7.28  )-----------( 196      ( 2020 04:46 )             32.9     CBC    WBC  7.28 <==, 8.58 <==    Hemoglobin  11.8 <<==, 12.4 <<==    Hematocrit  32.9 <==, 35.7 <==    Platelets  196 <==, 196 <==      122<L>  |  88<L>  |  14  ----------------------------<  107<H>    02-20  3.8   |  21<L>  |  0.95    LYTES    sodium  122 <==, 121 <==, 124 <==    potassium   3.8 <==, 3.2 <==, 3.6 <==    chloride  88 <==, 86 <==, 85 <==    carbon dioxide  21 <==, 22 <==, 20 <==    =============================================================================================  RENAL FUNCTION:    Creatinine:   0.95  <<==, 1.07  <<==, 1.38  <<==    BUN:   14 <==, 16 <==, 21 <==    ============================================================================================    calcium   7.6 <==, 7.5 <==, 7.8 <==    phos   2.3 <==    mag   1.9 <==, 1.9 <==    ============================================================================================  LFTs    AST:   48 <==     ALT:  21  <==     AP:  50  <=    Bili:  0.5  <=      ABG - ( 2020 11:46 )  pH: 7.49  /  pCO2: 29    /  pO2: 58    / HCO3: 22    / Base Excess: -.3   /  SaO2: 91        Venous Blood Gas:   @ 04:51  7.42/39/28//46  VBG Lactate: 1.1    Venous Blood Gas:   @ 00:57  7.42/39/32//55  VBG Lactate: 1.4    Venous Blood Gas:   @ 22:30  7.43/36/24/24/34  VBG Lactate: 2.8    Serum Pro-Brain Natriuretic Peptide: 364 pg/mL ( @ 22:30)      CARDIAC MARKERS ( 2020 00:57 )  CPK x     /CKMB x     /CKMB Units x        troponin 17 ng/L    CARDIAC MARKERS ( 2020 22:30 )  CPK x     /CKMB x     /CKMB Units x        troponin 19 ng/L      MICROBIOLOGY:     FLU A B RSV Detection by PCR (20 @ 22:30)    Flu A Result: Detected    Flu B Result: NotDetec    RSV Result: NotDetec    RADIOLOGY:  [x ] Chest radiographs reviewed and interpreted by me      EXAM:  XR CHEST AP OR PA 1V                          PROCEDURE DATE:  2020      INTERPRETATION:  CLINICAL INFORMATION: Chest pain.    EXAM: 1 view of the chest.    COMPARISON: None.    FINDINGS:    Bilateral midlung linear opacities. No pneumothorax. The cardiomediastinal silhouette is poorly evaluated on this projection. No acute osseous findings.    IMPRESSION:    Bilateral midlung linear opacities, which are of uncertain etiology.      RAMONA REDDY M.D., RADIOLOGY RESIDENT  This document has been electronically signed.  ES VELAZQUEZ M.D., ATTENDING RADIOLOGIST  This document has been electronically signed. 2020  9:42AM    ---------------------------------------------------------------------------------------------------------------    EXAM:  CT ANGIO CHEST (W)AW IC                          PROCEDURE DATE:  2020      INTERPRETATION:  CLINICAL INFORMATION: Shortness of breath and fatigue. History of lung disease.    COMPARISON: Chest radiograph 2020    PROCEDURE:   CT Angiography of the Chest.  90 ml of Omnipaque 350 was injected intravenously. 10 ml were discarded.  Sagittal and coronal reformats were performed as well as 3D (MIP) reconstructions.    FINDINGS:    LUNGS AND AIRWAYS: Patent central airways. Areas of architectural distortion, mild traction bronchiectasis and linear/reticular opacities. Nonspecific groundglass/patchy opacities in both lungs.      PLEURA: No pleural effusion or pneumothorax.    MEDIASTINUM AND CHAVO: Subcentimeter mediastinal and hilar lymph nodes without lymphadenopathy.    VESSELS: Adequate contrast opacification of the pulmonary arteries. No obvious pulmonary embolus.    HEART: Normal heart size. No pericardial effusion.    CHEST WALL AND LOWER NECK: Heterogeneous thyroid gland, obscured by artifact.    VISUALIZED UPPER ABDOMEN: Colon diverticulosis.    BONES: Degenerative changes of the spine.    IMPRESSION:     No obvious pulmonary embolus.    Findings reflecting known pulmonary fibrosis. Recommend clinical correlation to assess underlying pneumonia. Recommend comparison to previous outside study and follow-up to exclude potential underlying neoplasm.    Heterogeneous thyroid gland, which is difficult to assess due to artifact and can be further characterized on a nonemergent ultrasound as clinically indicated.     Additional findings as described.    RAMONA REDDY M.D., RADIOLOGY RESIDENT  This document has been electronically signed.  MIAH CHAUDHRY M.D., ATTENDING RADIOLOGIST  This document has been electronically signed. 2020  1:49AM  ---------------------------------------------------------------------------------------------------------------

## 2020-02-20 NOTE — ED ADULT NURSE REASSESSMENT NOTE - NS ED NURSE REASSESS COMMENT FT1
Report called to GUSTAVO Lea on [facility], GUSTAVO Lea informed me that [facility] does not have continuous pulse ox monitoring capability. I called [phone] and spoke with NITHIN Fisher to discuss either cancelling order for continuous pulse ox monitoring or cancel the bed. NITHIN Fisher states pt. needs continuous pulse ox monitoring because he is on High Flow Nasal Cannula. Called [phone], spoke with Yanelis at bed board, explained situation, bed board states [facility] is the only floor with continuous pulse ox monitoring capability and has no available beds, pt will remain in ED.

## 2020-02-20 NOTE — H&P ADULT - PROBLEM SELECTOR PLAN 8
c/w home medications for now - paxil 30 daily and buspar 15 TID  if hyponatremia does not improve, may have to continue tapering off   c/w klonopin 1mg TID as needed ([facility]OP #[id number]) BP currently at goal   will hold home antiHTN in setting of sepsis - resume as appropriate   would c/t hold HCTZ given hypoNa

## 2020-02-20 NOTE — H&P ADULT - NSHPSOCIALHISTORY_GEN_ALL_CORE
remote smoking hx (quit >40y ago), no etoh, no drugs   lives at home with his wife  normally ambulatory

## 2020-02-20 NOTE — CONSULT NOTE ADULT - SUBJECTIVE AND OBJECTIVE BOX
"Patient is a 69y old  Male who presents with a chief complaint of SOB, cough and weakness.  HPI:  69M with PMH of HTN, pulmonary fibrosis, depression/anxiety p/w SOB and cough. Pt states his mother passed away about 2 weeks ago and shortly after burying here, started to feel very unwell. He has had progressively worsening cough, non productive, but feels a lot of chest congestion. Associated with ROSE, headache and myalgias and fatigue. Was seen at  earlier this week and was diagnosed with flu. In the past couple of days, has been having very labored breathing; today, had an episode where he fell while trying to reach for [employer] switch and was so winded and weak, he was unable to get up. Has barely been eating anything due to poor appetite and nausea, but denies vomiting. Denies fevers or chills, no CP, no palpitations, no abdominal pain, had 3 episodes of loose stools today, +decreased UOP, no LE swelling. Does not know of any sick contacts. Denies recent travel."    Renal consult called for hyponatremia. The patient denies prior history of hyponatremia. He has been on SSRI, Aleve, ARB, and HCTZ, has not been eating well for a few days but drinking at least 8 bottles of water every day. His urine osmolarity was over 500 suggestive ADH presence (not simple isostenuria from HCTZ). He was not hypotensive on presentation. He received 1500 ml LR and 100 ml NS boluses for sepsis considerations. He also had CT scan with IV contrast. He has urinary retention with straight cath this AM of 850 ml (known prostate "issues' and no recent G.U. care). His Sodium dropped post IVF boluses.    At present he is in iso room in E.R., on BIPAP. AAO x3. BP high-normal.    PAST MEDICAL & SURGICAL HISTORY:  HTN (hypertension)  Depression  Pulmonary fibrosis  H/O inguinal hernia repair    FAMILY HISTORY:  No pertinent family history in first degree relatives    Allergies:  Penicillin (Anaphylaxis)    Home Medications:   · 	dilTIAZem 240 mg/24 hours oral tablet, extended release: Last Dose Taken:  , 1 tab(s) orally once a day  · 	BuSpar: Last Dose Taken:  , 15 milligram(s) orally 3 times a day  · 	doxazosin 2 mg oral tablet: Last Dose Taken:  , 1 tab(s) orally once a day  · 	Paxil 30 mg oral tablet: Last Dose Taken:  , 1 tab(s) orally once a day  · 	Lipitor 10 mg oral tablet: Last Dose Taken:  , 1 tab(s) orally once a day  · 	KlonoPIN 0.5 mg oral tablet: Last Dose Taken:  , 1 tab(s) orally 4 times a day, As Needed  · 	hydroCHLOROthiazide 25 mg oral tablet: Last Dose Taken:  , 1 tab(s) orally once a day          · 	olmesartan 40 mg oral tablet: 1 tab(s) orally once a day    MEDICATIONS  (STANDING):  ALBUTerol    90 MICROgram(s) HFA Inhaler 1 Puff(s) Inhalation every 4 hours  albuterol/ipratropium for Nebulization 3 milliLiter(s) Nebulizer every 6 hours  atorvastatin 10 milliGRAM(s) Oral at bedtime  azithromycin  IVPB      azithromycin  IVPB 500 milliGRAM(s) IV Intermittent every 24 hours  busPIRone 15 milliGRAM(s) Oral three times a day  cefTRIAXone   IVPB 1000 milliGRAM(s) IV Intermittent every 24 hours  diltiazem    milliGRAM(s) Oral daily  doxazosin 2 milliGRAM(s) Oral at bedtime  enoxaparin Injectable 40 milliGRAM(s) SubCutaneous daily  methylPREDNISolone sodium succinate Injectable 20 milliGRAM(s) IV Push two times a day  oseltamivir 75 milliGRAM(s) Oral two times a day  PARoxetine 30 milliGRAM(s) Oral daily  potassium chloride    Tablet ER 40 milliEquivalent(s) Oral once  potassium phosphate IVPB 15 milliMole(s) IV Intermittent once  tiotropium 18 MICROgram(s) Capsule 1 Capsule(s) Inhalation daily    MEDICATIONS  (PRN):  acetaminophen   Tablet .. 650 milliGRAM(s) Oral every 6 hours PRN Temp greater or equal to 38C (100.4F)  clonazePAM  Tablet 1 milliGRAM(s) Oral three times a day PRN anxiety    I&O's Detail    20 Feb 2020 07:01  -  20 Feb 2020 12:31  --------------------------------------------------------  IN:  Total IN: 0 mL    OUT:    Intermittent Catheterization - Urethral: 850 mL  Total OUT: 850 mL    Total NET: -850 mL      Vital Signs Last 24 Hrs  T(C): 36.9 (20 Feb 2020 07:32), Max: 38.2 (19 Feb 2020 22:06)  T(F): 98.5 (20 Feb 2020 07:32), Max: 100.7 (19 Feb 2020 22:06)  HR: 85 (20 Feb 2020 11:14) (72 - 101)  BP: 141/67 (20 Feb 2020 11:14) (116/70 - 158/70)  BP(mean): 82 (20 Feb 2020 04:32) (82 - 82)  RR: 32 (20 Feb 2020 11:14) (20 - 37)  SpO2: 89% (20 Feb 2020 11:14) (76% - 100%)    PHYSICAL EXAM:  General: NAD, aao X3, on BIPAP, [place], conversant  No JVD  Respiratory: b/l air entry, scattered crackles  Cardiovascular: S1 S2 reg  Gastrointestinal: soft, obese, NT  Extremities: no edema  Neuro: nonfocal      02-20    122<L>  |  88<L>  |  14  ----------------------------<  107<H>  3.8   |  21<L>  |  0.95    Ca    7.6<L>      20 Feb 2020 11:45  Phos  2.3     02-20  Mg     1.9     02-20    TPro  6.2  /  Alb  3.4  /  TBili  0.5  /  DBili  x   /  AST  48<H>  /  ALT  21  /  AlkPhos  50  02-19    Potassium, Serum: 3.8 mmol/L (02-20 @ 11:45)  Blood Urea Nitrogen, Serum: 14 mg/dL (02-20 @ 11:45)  Calcium, Total Serum: 7.6 mg/dL (02-20 @ 11:45)  Hemoglobin: 11.8 g/dL (02-20 @ 04:46)  Hematocrit: 32.9 % (02-20 @ 04:46)  Potassium, Serum: 3.2 mmol/L (02-20 @ 04:46)  Blood Urea Nitrogen, Serum: 16 mg/dL (02-20 @ 04:46)  Calcium, Total Serum: 7.5 mg/dL (02-20 @ 04:46)  Potassium, Serum: 3.6 mmol/L (02-19 @ 22:30)  Blood Urea Nitrogen, Serum: 21 mg/dL (02-19 @ 22:30)  Calcium, Total Serum: 7.8 mg/dL (02-19 @ 22:30)  Hemoglobin: 12.4 g/dL (02-19 @ 22:30)  Hematocrit: 35.7 % (02-19 @ 22:30)      Creatinine, Serum: 0.95 (02-20 @ 11:45)  Creatinine, Serum: 1.07 (02-20 @ 04:46)  Creatinine, Serum: 1.38 (02-19 @ 22:30)    CBC Full  -  ( 20 Feb 2020 04:46 )  WBC Count : 7.28 K/uL  RBC Count : 3.78 M/uL  Hemoglobin : 11.8 g/dL  Hematocrit : 32.9 %  Platelet Count - Automated : 196 K/uL  Mean Cell Volume : 87.0 fl  Mean Cell Hemoglobin : 31.2 pg  Mean Cell Hemoglobin Concentration : 35.9 gm/dL  Auto Neutrophil # : x  Auto Lymphocyte # : x  Auto Monocyte # : x  Auto Eosinophil # : x  Auto Basophil # : x  Auto Neutrophil % : x  Auto Lymphocyte % : x  Auto Monocyte % : x  Auto Eosinophil % : x  Auto Basophil % : x      ABG - ( 20 Feb 2020 11:46 )  pH, Arterial: 7.49  pH, Blood: x     /  pCO2: 29    /  pO2: 58    / HCO3: 22    / Base Excess: -.3   /  SaO2: 91 "Patient is a 69y old  Male who presents with a chief complaint of SOB, cough and weakness.  HPI:  69M with PMH of HTN, pulmonary fibrosis, depression/anxiety p/w SOB and cough. Pt states his mother passed away about 2 weeks ago and shortly after burying here, started to feel very unwell. He has had progressively worsening cough, non productive, but feels a lot of chest congestion. Associated with ROSE, headache and myalgias and fatigue. Was seen at  earlier this week and was diagnosed with flu. In the past couple of days, has been having very labored breathing; today, had an episode where he fell while trying to reach for [employer] switch and was so winded and weak, he was unable to get up. Has barely been eating anything due to poor appetite and nausea, but denies vomiting. Denies fevers or chills, no CP, no palpitations, no abdominal pain, had 3 episodes of loose stools today, +decreased UOP, no LE swelling. Does not know of any sick contacts. Denies recent travel."    Renal consult called for hyponatremia. The patient denies prior history of hyponatremia. He has been on SSRI, Aleve, ARB, and HCTZ, has not been eating well for a few days but drinking at least 8 bottles of water every day. His urine osmolarity was over 500 suggestive ADH presence (not simple isostenuria from HCTZ). He was not hypotensive on presentation. He received 1500 ml LR and 1000 ml NS boluses for sepsis considerations. He also had CT scan with IV contrast. He has urinary retention with straight cath this AM of 850 ml (known prostate "issues' and no recent G.U. care). His Sodium dropped post IVF boluses.    At present he is in iso room in E.R., on BIPAP. AAO x3. BP high-normal.    PAST MEDICAL & SURGICAL HISTORY:  HTN (hypertension)  Depression  Pulmonary fibrosis  H/O inguinal hernia repair    FAMILY HISTORY:  No pertinent family history in first degree relatives    Allergies:  Penicillin (Anaphylaxis)    Home Medications:   · 	dilTIAZem 240 mg/24 hours oral tablet, extended release: Last Dose Taken:  , 1 tab(s) orally once a day  · 	BuSpar: Last Dose Taken:  , 15 milligram(s) orally 3 times a day  · 	doxazosin 2 mg oral tablet: Last Dose Taken:  , 1 tab(s) orally once a day  · 	Paxil 30 mg oral tablet: Last Dose Taken:  , 1 tab(s) orally once a day  · 	Lipitor 10 mg oral tablet: Last Dose Taken:  , 1 tab(s) orally once a day  · 	KlonoPIN 0.5 mg oral tablet: Last Dose Taken:  , 1 tab(s) orally 4 times a day, As Needed  · 	hydroCHLOROthiazide 25 mg oral tablet: Last Dose Taken:  , 1 tab(s) orally once a day          · 	olmesartan 40 mg oral tablet: 1 tab(s) orally once a day    MEDICATIONS  (STANDING):  ALBUTerol    90 MICROgram(s) HFA Inhaler 1 Puff(s) Inhalation every 4 hours  albuterol/ipratropium for Nebulization 3 milliLiter(s) Nebulizer every 6 hours  atorvastatin 10 milliGRAM(s) Oral at bedtime  azithromycin  IVPB      azithromycin  IVPB 500 milliGRAM(s) IV Intermittent every 24 hours  busPIRone 15 milliGRAM(s) Oral three times a day  cefTRIAXone   IVPB 1000 milliGRAM(s) IV Intermittent every 24 hours  diltiazem    milliGRAM(s) Oral daily  doxazosin 2 milliGRAM(s) Oral at bedtime  enoxaparin Injectable 40 milliGRAM(s) SubCutaneous daily  methylPREDNISolone sodium succinate Injectable 20 milliGRAM(s) IV Push two times a day  oseltamivir 75 milliGRAM(s) Oral two times a day  PARoxetine 30 milliGRAM(s) Oral daily  potassium chloride    Tablet ER 40 milliEquivalent(s) Oral once  potassium phosphate IVPB 15 milliMole(s) IV Intermittent once  tiotropium 18 MICROgram(s) Capsule 1 Capsule(s) Inhalation daily    MEDICATIONS  (PRN):  acetaminophen   Tablet .. 650 milliGRAM(s) Oral every 6 hours PRN Temp greater or equal to 38C (100.4F)  clonazePAM  Tablet 1 milliGRAM(s) Oral three times a day PRN anxiety    I&O's Detail    20 Feb 2020 07:01  -  20 Feb 2020 12:31  --------------------------------------------------------  IN:  Total IN: 0 mL    OUT:    Intermittent Catheterization - Urethral: 850 mL  Total OUT: 850 mL    Total NET: -850 mL      Vital Signs Last 24 Hrs  T(C): 36.9 (20 Feb 2020 07:32), Max: 38.2 (19 Feb 2020 22:06)  T(F): 98.5 (20 Feb 2020 07:32), Max: 100.7 (19 Feb 2020 22:06)  HR: 85 (20 Feb 2020 11:14) (72 - 101)  BP: 141/67 (20 Feb 2020 11:14) (116/70 - 158/70)  BP(mean): 82 (20 Feb 2020 04:32) (82 - 82)  RR: 32 (20 Feb 2020 11:14) (20 - 37)  SpO2: 89% (20 Feb 2020 11:14) (76% - 100%)    PHYSICAL EXAM:  General: NAD, aao X3, on BIPAP, [place], conversant  No JVD  Respiratory: b/l air entry, scattered crackles  Cardiovascular: S1 S2 reg  Gastrointestinal: soft, obese, NT  Extremities: no edema  Neuro: nonfocal      02-20    122<L>  |  88<L>  |  14  ----------------------------<  107<H>  3.8   |  21<L>  |  0.95    Ca    7.6<L>      20 Feb 2020 11:45  Phos  2.3     02-20  Mg     1.9     02-20    TPro  6.2  /  Alb  3.4  /  TBili  0.5  /  DBili  x   /  AST  48<H>  /  ALT  21  /  AlkPhos  50  02-19    Potassium, Serum: 3.8 mmol/L (02-20 @ 11:45)  Blood Urea Nitrogen, Serum: 14 mg/dL (02-20 @ 11:45)  Calcium, Total Serum: 7.6 mg/dL (02-20 @ 11:45)  Hemoglobin: 11.8 g/dL (02-20 @ 04:46)  Hematocrit: 32.9 % (02-20 @ 04:46)  Potassium, Serum: 3.2 mmol/L (02-20 @ 04:46)  Blood Urea Nitrogen, Serum: 16 mg/dL (02-20 @ 04:46)  Calcium, Total Serum: 7.5 mg/dL (02-20 @ 04:46)  Potassium, Serum: 3.6 mmol/L (02-19 @ 22:30)  Blood Urea Nitrogen, Serum: 21 mg/dL (02-19 @ 22:30)  Calcium, Total Serum: 7.8 mg/dL (02-19 @ 22:30)  Hemoglobin: 12.4 g/dL (02-19 @ 22:30)  Hematocrit: 35.7 % (02-19 @ 22:30)      Creatinine, Serum: 0.95 (02-20 @ 11:45)  Creatinine, Serum: 1.07 (02-20 @ 04:46)  Creatinine, Serum: 1.38 (02-19 @ 22:30)    CBC Full  -  ( 20 Feb 2020 04:46 )  WBC Count : 7.28 K/uL  RBC Count : 3.78 M/uL  Hemoglobin : 11.8 g/dL  Hematocrit : 32.9 %  Platelet Count - Automated : 196 K/uL  Mean Cell Volume : 87.0 fl  Mean Cell Hemoglobin : 31.2 pg  Mean Cell Hemoglobin Concentration : 35.9 gm/dL  Auto Neutrophil # : x  Auto Lymphocyte # : x  Auto Monocyte # : x  Auto Eosinophil # : x  Auto Basophil # : x  Auto Neutrophil % : x  Auto Lymphocyte % : x  Auto Monocyte % : x  Auto Eosinophil % : x  Auto Basophil % : x      ABG - ( 20 Feb 2020 11:46 )  pH, Arterial: 7.49  pH, Blood: x     /  pCO2: 29    /  pO2: 58    / HCO3: 22    / Base Excess: -.3   /  SaO2: 91

## 2020-02-20 NOTE — CONSULT NOTE ADULT - SUBJECTIVE AND OBJECTIVE BOX
HPI:   Patient is a 69y male with a past history of anxiety disorder, HTN,depression, and pulmonary fibrosis whom was admitted 2/19 with cough and difficulty breathing.He is in ER, wife is at bedside and states"my  is being ignored".She is reluctant to answer questions and threatening to remove him from hospital before he is killed here.He went to a [facility] on 2/16 with fever and cough, diagnosed with the flu.Despite tamiflu he got worse, daily fever and difficulty breathing.He is not bringing up much phlegm.He came to ER yesterday , has received CTX,azithro and tamiflu as well as steroids.No recent travel,no one ill at home.No recent antibiotic use.He is presently very anxious on BIPAP machine.He required a strait cath this AM to drain bladder    REVIEW OF SYSTEMS:  All other review of systems negative (Comprehensive ROS)    PAST MEDICAL & SURGICAL HISTORY:  HTN (hypertension)  Depression  Pulmonary fibrosis  H/O inguinal hernia repair      Allergies    penicillin (Anaphylaxis)    Intolerances        Antimicrobials Day #  :day 2  azithromycin  IVPB      azithromycin  IVPB 500 milliGRAM(s) IV Intermittent every 24 hours  cefTRIAXone   IVPB 1000 milliGRAM(s) IV Intermittent every 24 hours  oseltamivir 75 milliGRAM(s) Oral two times a day day 4    Other Medications:  acetaminophen   Tablet .. 650 milliGRAM(s) Oral every 6 hours PRN  ALBUTerol    90 MICROgram(s) HFA Inhaler 1 Puff(s) Inhalation every 4 hours  albuterol/ipratropium for Nebulization 3 milliLiter(s) Nebulizer every 6 hours  atorvastatin 10 milliGRAM(s) Oral at bedtime  busPIRone 15 milliGRAM(s) Oral three times a day  clonazePAM  Tablet 1 milliGRAM(s) Oral three times a day PRN  diltiazem    milliGRAM(s) Oral daily  doxazosin 2 milliGRAM(s) Oral at bedtime  enoxaparin Injectable 40 milliGRAM(s) SubCutaneous daily  methylPREDNISolone sodium succinate Injectable 20 milliGRAM(s) IV Push two times a day  PARoxetine 30 milliGRAM(s) Oral daily  potassium chloride    Tablet ER 40 milliEquivalent(s) Oral once  potassium phosphate IVPB 15 milliMole(s) IV Intermittent once  tiotropium 18 MICROgram(s) Capsule 1 Capsule(s) Inhalation daily      FAMILY HISTORY:  No pertinent family history in first degree relatives      SOCIAL HISTORY:  Smoking: ex    ETOH:?     Drug Use: ?       T(F): 97.8 (02-20-20 @ 12:29), Max: 100.7 (02-19-20 @ 22:06)  HR: 87 (02-20-20 @ 12:52)  BP: 144/61 (02-20-20 @ 12:29)  RR: 28 (02-20-20 @ 12:29)  SpO2: 92% (02-20-20 @ 12:52)  Wt(kg): --    PHYSICAL EXAM:  General: alert, moderate respiratory distress on BIPAP  Eyes:  anicteric, no conjunctival injection, no discharge  Oropharynx: no lesions or injection , dry  Neck: supple, without adenopathy  Lungs: coarse BS  Heart: regular rate and rhythm; no murmur, rubs or gallops  Abdomen: soft, nondistended, nontender, without mass or organomegaly  Skin: no lesions  Extremities: no clubbing, cyanosis, or edema  Neurologic: alert, oriented, moves all extremities    LAB RESULTS:                        11.8   7.28  )-----------( 196      ( 20 Feb 2020 04:46 )             32.9     02-20    122<L>  |  88<L>  |  14  ----------------------------<  107<H>  3.8   |  21<L>  |  0.95    Ca    7.6<L>      20 Feb 2020 11:45  Phos  2.3     02-20  Mg     1.9     02-20    TPro  6.2  /  Alb  3.4  /  TBili  0.5  /  DBili  x   /  AST  48<H>  /  ALT  21  /  AlkPhos  50  02-19    LIVER FUNCTIONS - ( 19 Feb 2020 22:30 )  Alb: 3.4 g/dL / Pro: 6.2 g/dL / ALK PHOS: 50 U/L / ALT: 21 U/L / AST: 48 U/L / GGT: x               MICROBIOLOGY:  RECENT CULTURES:    RVP influenza    RADIOLOGY REVIEWED:  < from: CT Angio Chest w/ IV Cont (02.20.20 @ 00:14) >  IMPRESSION:     No obvious pulmonary embolus.    Findings reflecting known pulmonary fibrosis. Recommend clinical correlation to assess underlying pneumonia. Recommend comparison to previous outside study and follow-up to exclude potential underlying neoplasm.    Heterogeneous thyroid gland, which is difficult to assess due to artifact and can be further characterizedon a nonemergent ultrasound as clinically indicated.     Additional findings as described.    < end of copied text >

## 2020-02-20 NOTE — CONSULT NOTE ADULT - ASSESSMENT
diarrhea  anemia    no further diarrhea  denies melena/hematochezia  diet as tolerated  colonoscopy 3 years ago, no need to repeat as inpatient  monitor for gi bleed  will follow

## 2020-02-20 NOTE — H&P ADULT - PROBLEM SELECTOR PLAN 10
1.  Name of PCP: Dr Johnson Verduzco   2.  PCP Contacted on Admission: [ ] Y    [ ] N    3.  PCP contacted at Discharge: [ ] Y    [ ] N    [ ] N/A  4.  Post-Discharge Appointment Date and Location:  5.  Summary of Handoff given to PCP:

## 2020-02-20 NOTE — H&P ADULT - NSHPLABSRESULTS_GEN_ALL_CORE
Labs personally reviewed and interpreted by me -                           12.4   8.58  )-----------( 196      ( 19 Feb 2020 22:30 )             35.7     02-19    124<L>  |  85<L>  |  21  ----------------------------<  156<H>  3.6   |  20<L>  |  1.38<H>    Ca    7.8<L>      19 Feb 2020 22:30  Mg     1.9     02-19    TPro  6.2  /  Alb  3.4  /  TBili  0.5  /  DBili  x   /  AST  48<H>  /  ALT  21  /  AlkPhos  50  02-19      < from: CT Angio Chest w/ IV Cont (02.20.20 @ 00:14) >  LUNGS AND AIRWAYS: Patent central airways. Areas of architectural distortion, mild traction bronchiectasis and linear/reticular opacities. Nonspecific groundglass/patchy opacities in both lungs.      PLEURA: No pleural effusion or pneumothorax.  MEDIASTINUM AND CHAVO: Subcentimeter mediastinaland hilar lymph nodes without lymphadenopathy.  VESSELS: Adequate contrast opacification of the pulmonary arteries. No obvious pulmonary embolus.  HEART: Normal heart size. No pericardial effusion.  CHEST WALL AND LOWER NECK: Heterogeneous thyroid gland, obscured by artifact.  VISUALIZED UPPER ABDOMEN: Colon diverticulosis.  BONES: Degenerative changes of the spine.    IMPRESSION:   No obvious pulmonary embolus.  Findings reflecting known pulmonary fibrosis. Recommend clinical correlation to assess underlying pneumonia. Recommend comparison to previous outside study and follow-up to exclude potential underlying neoplasm.  Heterogeneous thyroid gland, which is difficult to assess due to artifact and can be further characterizedon a nonemergent ultrasound as clinically indicated. Labs, imaging and EKG personally reviewed and interpreted by me. EKG sinus 70s, no acute ischemic changes,                            12.4   8.58  )-----------( 196      ( 19 Feb 2020 22:30 )             35.7     02-19    124<L>  |  85<L>  |  21  ----------------------------<  156<H>  3.6   |  20<L>  |  1.38<H>    Ca    7.8<L>      19 Feb 2020 22:30  Mg     1.9     02-19    TPro  6.2  /  Alb  3.4  /  TBili  0.5  /  DBili  x   /  AST  48<H>  /  ALT  21  /  AlkPhos  50  02-19      < from: CT Angio Chest w/ IV Cont (02.20.20 @ 00:14) >  LUNGS AND AIRWAYS: Patent central airways. Areas of architectural distortion, mild traction bronchiectasis and linear/reticular opacities. Nonspecific groundglass/patchy opacities in both lungs.      PLEURA: No pleural effusion or pneumothorax.  MEDIASTINUM AND CHAVO: Subcentimeter mediastinaland hilar lymph nodes without lymphadenopathy.  VESSELS: Adequate contrast opacification of the pulmonary arteries. No obvious pulmonary embolus.  HEART: Normal heart size. No pericardial effusion.  CHEST WALL AND LOWER NECK: Heterogeneous thyroid gland, obscured by artifact.  VISUALIZED UPPER ABDOMEN: Colon diverticulosis.  BONES: Degenerative changes of the spine.    IMPRESSION:   No obvious pulmonary embolus.  Findings reflecting known pulmonary fibrosis. Recommend clinical correlation to assess underlying pneumonia. Recommend comparison to previous outside study and follow-up to exclude potential underlying neoplasm.  Heterogeneous thyroid gland, which is difficult to assess due to artifact and can be further characterizedon a nonemergent ultrasound as clinically indicated.

## 2020-02-20 NOTE — CONSULT NOTE ADULT - ASSESSMENT
ASSESSMENT:    hypoxic respiratory failure - multifactorial - no evidence of hypercapnic respiratory failure    1) influenza infection complicated by bronchospasm and pneumonia (likely viral infection in the absence of a leukocytosis  2) underlying pulmonary fibrosis    PLAN/RECOMMENDATIONS:    trial off BIPAP and on high flow nasal canula - keep saturation greater than 88%  restart BIPAP for increased work of breathing, resistant hypoxemia or respiratory acidosis  tamiflu x 5 days  ceftriaxone/azithromycin for now pending procalcitonin level  solumedrol 40mg IVP q6h  albuterol/atrovent nebs q4h   pulmicort 0.5mg nebs q12h - give after duoneb - rinse mouth after use  robitussin DM/tessalon/hycodan  buspar/clonopin/paxil  DVT prophylaxis    Thank you for the courtesy of this referral. Plan of care discussed with the patient and his daughter at bedside and the dedicated ER PA.    Galileo Pop MD, [facility]  [phone]  Pulmonary Medicine

## 2020-02-20 NOTE — H&P ADULT - PROBLEM SELECTOR PLAN 1
pt hypoxic to 80% on RA on arrival to ED, initially on NRB and now on BiPAP at 60% fiO2  likely 2/2 flu and PNA in setting of pulmonary fibrosis   c/w BiPAP for now, can consider switching to HFNC   continuous pulse ox   pulmonary consult for pulmonary fibrosis

## 2020-02-20 NOTE — H&P ADULT - HISTORY OF PRESENT ILLNESS
69M with PMH of HTN, pulmonary fibrosis, depression/anxiety p/w SOB and cough. Pt states his mother passed away about 2 weeks ago and shortly after burying here, started to feel very unwell. He has had progressively worsening cough, non productive, but feels a lot of chest congestion. Associated with ROSE, headache and myalgias and fatigue. Was seen at  earlier this week and was diagnosed with flu. In the past couple of days, has been having very labored breathing; today, had an episode where he fell while trying to reach for [employer] switch and was so winded and weak, he was unable to get up. Has barely been eating anything due to poor appetite and nausea, but denies vomiting. Denies fevers or chills, no CP, no palpitations, no abdominal pain, had 3 episodes of loose stools today, +decreased UOP, no LE swelling. Does not know of any sick contacts. Denies recent travel.

## 2020-02-20 NOTE — PROGRESS NOTE ADULT - SUBJECTIVE AND OBJECTIVE BOX
no cp/   afebriel    REVIEW OF SYSTEMS:  GEN: no fever,    no chills  RESP: no SOB,   no cough  CVS: no chest pain,   no palpitations  GI: no abdominal pain,   no nausea,   no vomiting,   no constipation,   no diarrhea  : no dysuria,   no frequency  NEURO: no headache,   no dizziness  PSYCH: no depression,   not anxious  Derm : no rash    MEDICATIONS  (STANDING):  atorvastatin 10 milliGRAM(s) Oral at bedtime  azithromycin  IVPB      azithromycin  IVPB 500 milliGRAM(s) IV Intermittent every 24 hours  busPIRone 15 milliGRAM(s) Oral three times a day  cefTRIAXone   IVPB 1000 milliGRAM(s) IV Intermittent every 24 hours  doxazosin 2 milliGRAM(s) Oral at bedtime  enoxaparin Injectable 40 milliGRAM(s) SubCutaneous daily  oseltamivir 75 milliGRAM(s) Oral two times a day  PARoxetine 30 milliGRAM(s) Oral daily    MEDICATIONS  (PRN):  acetaminophen   Tablet .. 650 milliGRAM(s) Oral every 6 hours PRN Temp greater or equal to 38C (100.4F)  clonazePAM  Tablet 1 milliGRAM(s) Oral three times a day PRN anxiety      Vital Signs Last 24 Hrs  T(C): 36.9 (20 Feb 2020 07:32), Max: 38.2 (19 Feb 2020 22:06)  T(F): 98.5 (20 Feb 2020 07:32), Max: 100.7 (19 Feb 2020 22:06)  HR: 87 (20 Feb 2020 09:39) (72 - 101)  BP: 128/64 (20 Feb 2020 09:39) (116/70 - 145/86)  BP(mean): 82 (20 Feb 2020 04:32) (82 - 82)  RR: 37 (20 Feb 2020 09:39) (20 - 37)  SpO2: 92% (20 Feb 2020 09:39) (80% - 100%)  CAPILLARY BLOOD GLUCOSE        I&O's Summary    20 Feb 2020 07:01  -  20 Feb 2020 10:00  --------------------------------------------------------  IN: 0 mL / OUT: 850 mL / NET: -850 mL        PHYSICAL EXAM:  HEAD:  Atraumatic, Normocephalic  NECK: Supple, No   JVD  CHEST/LUNG:   no     rales,     no,    rhonchi  HEART: Regular rate and rhythm;         murmur  ABDOMEN: Soft, Nontender, ;   EXTREMITIES:   no     edema  NEUROLOGY:  alert    LABS:                        11.8   7.28  )-----------( 196      ( 20 Feb 2020 04:46 )             32.9     02-20    121<L>  |  86<L>  |  16  ----------------------------<  124<H>  3.2<L>   |  22  |  1.07    Ca    7.5<L>      20 Feb 2020 04:46  Phos  2.3     02-20  Mg     1.9     02-20    TPro  6.2  /  Alb  3.4  /  TBili  0.5  /  DBili  x   /  AST  48<H>  /  ALT  21  /  AlkPhos  50  02-19 02-20 @ 04:51  2.9  28              Consultant(s) Notes Reviewed:      Care Discussed with Consultants/Other Providers: no cp/   afebriel    REVIEW OF SYSTEMS:  GEN: no fever,    no chills  RESP:less SOB,   no cough  CVS: no chest pain,   no palpitations  GI: no abdominal pain,   no nausea,   no vomiting,   no constipation,   no diarrhea  : no dysuria,   no frequency  NEURO: no headache,   no dizziness  PSYCH: no depression,   not anxious  Derm : no rash    MEDICATIONS  (STANDING):  atorvastatin 10 milliGRAM(s) Oral at bedtime  azithromycin  IVPB      azithromycin  IVPB 500 milliGRAM(s) IV Intermittent every 24 hours  busPIRone 15 milliGRAM(s) Oral three times a day  cefTRIAXone   IVPB 1000 milliGRAM(s) IV Intermittent every 24 hours  doxazosin 2 milliGRAM(s) Oral at bedtime  enoxaparin Injectable 40 milliGRAM(s) SubCutaneous daily  oseltamivir 75 milliGRAM(s) Oral two times a day  PARoxetine 30 milliGRAM(s) Oral daily    MEDICATIONS  (PRN):  acetaminophen   Tablet .. 650 milliGRAM(s) Oral every 6 hours PRN Temp greater or equal to 38C (100.4F)  clonazePAM  Tablet 1 milliGRAM(s) Oral three times a day PRN anxiety      Vital Signs Last 24 Hrs  T(C): 36.9 (20 Feb 2020 07:32), Max: 38.2 (19 Feb 2020 22:06)  T(F): 98.5 (20 Feb 2020 07:32), Max: 100.7 (19 Feb 2020 22:06)  HR: 87 (20 Feb 2020 09:39) (72 - 101)  BP: 128/64 (20 Feb 2020 09:39) (116/70 - 145/86)  BP(mean): 82 (20 Feb 2020 04:32) (82 - 82)  RR: 37 (20 Feb 2020 09:39) (20 - 37)  SpO2: 92% (20 Feb 2020 09:39) (80% - 100%)  CAPILLARY BLOOD GLUCOSE        I&O's Summary    20 Feb 2020 07:01  -  20 Feb 2020 10:00  --------------------------------------------------------  IN: 0 mL / OUT: 850 mL / NET: -850 mL        PHYSICAL EXAM:  HEAD:  Atraumatic, Normocephalic  NECK: Supple, No   JVD  CHEST/LUNG:   no     rales,      b/l    rhonchi  HEART: Regular rate and rhythm;         murmur  ABDOMEN: Soft, Nontender, ;   EXTREMITIES:   no     edema  NEUROLOGY:  alert    LABS:                        11.8   7.28  )-----------( 196      ( 20 Feb 2020 04:46 )             32.9     02-20    121<L>  |  86<L>  |  16  ----------------------------<  124<H>  3.2<L>   |  22  |  1.07    Ca    7.5<L>      20 Feb 2020 04:46  Phos  2.3     02-20  Mg     1.9     02-20    TPro  6.2  /  Alb  3.4  /  TBili  0.5  /  DBili  x   /  AST  48<H>  /  ALT  21  /  AlkPhos  50  02-19 02-20 @ 04:51  2.9  28              Consultant(s) Notes Reviewed:      Care Discussed with Consultants/Other Providers: no cp/   afebriel  on bipap/ failed  non breather   pt  with h/o anxiety/   pt  was  given klonopinbrian rn    REVIEW OF SYSTEMS:  GEN: no fever,    no chills  RESP:less SOB,   no cough  CVS: no chest pain,   no palpitations  GI: no abdominal pain,   no nausea,   no vomiting,   no constipation,   no diarrhea  : no dysuria,   no frequency  NEURO: no headache,   no dizziness  PSYCH: no depression,   not anxious  Derm : no rash    MEDICATIONS  (STANDING):  atorvastatin 10 milliGRAM(s) Oral at bedtime  azithromycin  IVPB      azithromycin  IVPB 500 milliGRAM(s) IV Intermittent every 24 hours  busPIRone 15 milliGRAM(s) Oral three times a day  cefTRIAXone   IVPB 1000 milliGRAM(s) IV Intermittent every 24 hours  doxazosin 2 milliGRAM(s) Oral at bedtime  enoxaparin Injectable 40 milliGRAM(s) SubCutaneous daily  oseltamivir 75 milliGRAM(s) Oral two times a day  PARoxetine 30 milliGRAM(s) Oral daily    MEDICATIONS  (PRN):  acetaminophen   Tablet .. 650 milliGRAM(s) Oral every 6 hours PRN Temp greater or equal to 38C (100.4F)  clonazePAM  Tablet 1 milliGRAM(s) Oral three times a day PRN anxiety      Vital Signs Last 24 Hrs  T(C): 36.9 (20 Feb 2020 07:32), Max: 38.2 (19 Feb 2020 22:06)  T(F): 98.5 (20 Feb 2020 07:32), Max: 100.7 (19 Feb 2020 22:06)  HR: 87 (20 Feb 2020 09:39) (72 - 101)  BP: 128/64 (20 Feb 2020 09:39) (116/70 - 145/86)  BP(mean): 82 (20 Feb 2020 04:32) (82 - 82)  RR: 37 (20 Feb 2020 09:39) (20 - 37)  SpO2: 92% (20 Feb 2020 09:39) (80% - 100%)  CAPILLARY BLOOD GLUCOSE        I&O's Summary    20 Feb 2020 07:01  -  20 Feb 2020 10:00  --------------------------------------------------------  IN: 0 mL / OUT: 850 mL / NET: -850 mL        PHYSICAL EXAM:  HEAD:  Atraumatic, Normocephalic  NECK: Supple, No   JVD  CHEST/LUNG:   no     rales,        mild  rhonchi  HEART: Regular rate and rhythm;         murmur  ABDOMEN: Soft, Nontender, ;   EXTREMITIES:   no     edema  NEUROLOGY:  alert    LABS:                        11.8   7.28  )-----------( 196      ( 20 Feb 2020 04:46 )             32.9     02-20    121<L>  |  86<L>  |  16  ----------------------------<  124<H>  3.2<L>   |  22  |  1.07    Ca    7.5<L>      20 Feb 2020 04:46  Phos  2.3     02-20  Mg     1.9     02-20    TPro  6.2  /  Alb  3.4  /  TBili  0.5  /  DBili  x   /  AST  48<H>  /  ALT  21  /  AlkPhos  50  02-19 02-20 @ 04:51  2.9  28              Consultant(s) Notes Reviewed:      Care Discussed with Consultants/Other Providers:

## 2020-02-20 NOTE — ED ADULT NURSE REASSESSMENT NOTE - NS ED NURSE REASSESS COMMENT FT1
NITHIN Adkins at bedside, pt. took off his Bipap mask and began taking his own home meds when PA walked in. Pt. was repeatedly instructed not to take his home meds but did not heed instructions. On room air with NITHIN Adkins at bedside pt. o2 sat 78 %. Patient placed. NITHIN Adkins at bedside, pt. took off his Bipap mask and began taking his own home meds when PA walked in. Pt. was repeatedly instructed not to take his home meds but did not heed instructions. On room air with NITHIN Adkins at bedside pt. o2 sat 78 %. Patient placed on non rebreather, o2 sat 81 %. Patient took Klonopin 5 mg, Paxil 30 mg, Cardizem 240, Onelsted 40 mg. Patient placed back on BiPAp, PA states O2 sat of 92% s goal. Patient endorsed anxiety af Called [phone] to contact admitting team  NITHIN Adkins at bedside, pt. took off his Bipap mask and began taking his own home meds when PA walked in. Pt. was repeatedly instructed not to take his home meds but did not heed instructions. On room air with NITHIN Adkins at bedside pt. o2 sat 78 %. Patient placed on non rebreather, o2 sat 81 %. Patient took Klonopin 5 mg, Paxil 30 mg, Cardizem 240, Onelsted 40 mg. Patient placed back on BiPAp, PA states O2 sat of 92% s goal. Patient endorsed anxiety and desire to take HTN medications - Explained to pt. that while on Bipap with O2 Sat 91% - PO meds on hold, Called [phone] to contact admitting team to get orders for IV anxiolytic medication. PA was not familiar with pt and told me to call back in 5 minutes after she was able to speak with hospitalist.     Upon return from break, pt was found sitting up in bed with NITHIN Adkins at bedside, bipap removed by pt, found to be taking his own home meds when PA walked in. Pt. was repeatedly instructed not to take his home meds but did not heed instructions. On room air with NITHIN Adkins at bedside pt. o2 sat: 78 % with normal waveform and +speaking in full sentences, placed NRB with o2 sat 81 % with normal waveform, pt took home dose of: Klonopin 5 mg, Paxil 30 mg, Cardizem 240, olmesartan 40 mg and hydrocholorothiazide 10 mg - witnessed by Lolis Sweeney and NITHIN Moses. . Patient placed back on BiPAp, PA states O2 sat goal: of 92%. pt reclined in bed, awaiting wife's arrival to ED, no other c/o, call bell in reach, +droplet iso maintained, bed pending Patient endorsed anxiety and desire to take HTN medications - Explained to pt. that while on Bipap with O2 Sat 91% - PO meds on hold, Called [phone] to contact admitting team to get orders for IV anxiolytic medication. PA was not familiar with pt and told me to call back in 5 minutes after she was able to speak with hospitalist.     Upon return from break, pt was found sitting up in bed with NITHIN Adkins at bedside, bipap removed by pt, found to be taking his own home meds when PA walked in. Pt. was repeatedly instructed not to take his home meds but did not heed instructions. On room air with NITHIN Adkins at bedside pt. o2 sat: 78 % with normal waveform and +speaking in full sentences, placed NRB with o2 sat 81 % with normal waveform, pt took home dose of: Klonopin 5 mg, Paxil 30 mg, Cardizem 240, olmesartan 40 mg and hydrocholorothiazide 10 mg - witnessed by Lolis Sweeney and NITHIN Moses. . Patient placed back on BiPAp, PA states O2 sat goal: of 92%. pt reclined in bed, awaiting wife's arrival to ED, no other c/o, call bell in reach, +droplet iso maintained, bed pending    ABG and activate-able q6 hr BMP sent to lab Patient endorsed anxiety and desire to take HTN medications - Explained to pt. that while on Bipap with O2 Sat 91% - PO meds on hold, Called [phone] to contact admitting team to get orders for IV anxiolytic medication. PA was not familiar with pt and told me to call back in 5 minutes after she was able to speak with hospitalist.     Upon return from break, pt was found sitting up in bed with NITHIN Adkins at bedside, bipap removed by pt, found to be taking his own home meds when PA walked in. Pt. was repeatedly instructed not to take his home meds but did not heed instructions. On room air with NITHIN Adkins at bedside pt. o2 sat: 78 % with normal waveform and +speaking in full sentences, placed NRB with o2 sat 81 % with normal waveform, pt took home dose of: Klonopin 5 mg, Paxil 30 mg, Cardizem 240, olmesartan 40 mg and hydrocholorothiazide 10 mg - witnessed by Lolis Sweeney and NITHIN Moess. . Patient placed back on BiPAp, PA states O2 sat goal: of 92%. pt reclined in bed, awaiting wife's arrival to ED, no other c/o, call bell in reach, +droplet iso maintained, bed pending    ABG and activate-able q6 hr BMP sent to lab, dr genaro corea arrived at bedside

## 2020-02-20 NOTE — H&P ADULT - PROBLEM SELECTOR PLAN 5
+fluA, possibly cause of pts sepsis and hypoxia (in setting of pulm fibrosis)   c/w tamiflu   supportive care pt septic on arrival; CT chest cannot r/o underlying PNA  given severity of pts hypoxia and sepsis, will treat for CAP with ceftriaxone and azithromycin  f/u urine legionella   c/w O2 supplementation

## 2020-02-20 NOTE — CHART NOTE - NSCHARTNOTEFT_GEN_A_CORE
Medicine NP     Received callback from Dr. Sommer for Mary, NP regarding Scruggs placement for retention. To add Flomax per Dr. Sommer however received call from pharmacy patient on [facility] and do not accept both medications. F/u primary team in .    Natacha Sequeira, [facility]  [phone]

## 2020-02-20 NOTE — ED ADULT NURSE REASSESSMENT NOTE - NS ED NURSE REASSESS COMMENT FT1
Pt straight catheterized using sterile technique. 900ml of dark urine drained. Pt tolerated well and feels relief. Urine specimens sent to lab as ordered.

## 2020-02-20 NOTE — ED ADULT NURSE REASSESSMENT NOTE - NS ED NURSE REASSESS COMMENT FT1
Patient oxygen saturation ranging from 89-91% on BiPap. Patient was ordered potassium orally. Asked ED Resident Vamsi Anderson if we will be able to administer potassium via IV. ED Resident stated he will replace oral potassium for IV Potassium.

## 2020-02-20 NOTE — H&P ADULT - PROBLEM SELECTOR PLAN 3
sepsis with fever and tachycardia, likely 2/2 sepsis with fever and tachycardia, likely 2/2 PNA and fluA+   lactate 3.3 on admission, improved after IVF   treat PNA with ceftriaxone/azithromycin as below   tamiflu for FluA  hold off on further IVF for now in setting of hypoNa of unclear etiology   monitor vitals q4h   trend fever curve

## 2020-02-20 NOTE — CONSULT NOTE ADULT - ASSESSMENT
69 year old male with a significant pmhx of pulmonary fibrosis coming in with acute hypoxic respiratory failure likely secondary to influenza pneumonia. Asked to see patient for a second opinion at the request of the wife and Dr. Calzada. Dr. Ppo assessment and plan was reviewed. CT scan findings were reviewed as well. I spoke with both the wife and the patient while Dr. Pop was in the room.     trial off BIPAP and on high flow nasal canula - keep saturation greater than 88%  restart BIPAP for increased work of breathing, resistant hypoxemia or respiratory acidosis  tamiflu x 5 days  ceftriaxone/azithromycin for now pending procalcitonin level  solumedrol 40mg IVP q6h  albuterol/atrovent nebs q4h   pulmicort 0.5mg nebs q12h - give after duoneb - rinse mouth after use  robitussin DM/tessalon/hycodan    Thank you for your consult. The patient may continue to receive his pulmonary care by Dr. Pop.

## 2020-02-20 NOTE — H&P ADULT - PROBLEM SELECTOR PLAN 7
BP currently at goal   will hold home antiHTN in setting of sepsis - resume as appropriate   would c/t hold HCTZ given hypoNa pt with e/o pulmonary fibrosis on CT chest, pt unaware of any such diagnosis but was following with pulmonologist who told pt he had nodules in his lung  would consult pulmonology for work-up   c/w O2 as needed

## 2020-02-20 NOTE — CONSULT NOTE ADULT - ASSESSMENT
70 yo male with HTN, anxiety disorder, and pulmonary fibrosis admitted with hypoxic respiratory failure.  Influenza A diagnosed 2/16 with a rapid test.  Lack of improvement with tamiflu documented and raises concern of both viral pneumonia and a secondary process.  CTA is negative for PE.  No baseline CT is system to compare yesterdays scan to.  Agree with CTX and azithro pending w/u  Additional history may be available later from his pulmonary physician and he and his wife have been reluctant to talk to me, they are to upset.  Suggest:  1.Tamiflu  2,CTX,azithro  3,Sputum culture, MRSA screen, legionella urine antigen, PC level  4.Steroids per pulmonary  5.Thanks, will follow 68 yo male with HTN, anxiety disorder, and pulmonary fibrosis admitted with hypoxic respiratory failure.  Influenza A diagnosed 2/16 with a rapid test.  Lack of improvement with tamiflu documented and raises concern of both viral pneumonia and a secondary process such as a bacterial pneumonia  CTA is negative for PE.  No baseline CT in  system to compare yesterdays scan to.  Agree with CTX and azithro pending w/u  Additional history may be available later from his pulmonary physician and he and his wife have been reluctant to talk to me, they are to upset.  Suggest:  1.Tamiflu  2,CTX,azithro  3,Sputum culture, MRSA screen, legionella urine antigen, PC level  4.Steroids per pulmonary  5.Thanks, will follow

## 2020-02-20 NOTE — H&P ADULT - ASSESSMENT
69M with PMH of HTN, pulmonary fibrosis, depression/anxiety p/w SOB and cough with recent diagnosis of Flu, admitted with hypoxic respiratory failure.

## 2020-02-21 LAB
ANION GAP SERPL CALC-SCNC: 13 MMOL/L — SIGNIFICANT CHANGE UP (ref 5–17)
ANION GAP SERPL CALC-SCNC: 15 MMOL/L — SIGNIFICANT CHANGE UP (ref 5–17)
BASE EXCESS BLDA CALC-SCNC: -0.1 MMOL/L — SIGNIFICANT CHANGE UP (ref -2–2)
BASE EXCESS BLDA CALC-SCNC: -1 MMOL/L — SIGNIFICANT CHANGE UP (ref -2–2)
BUN SERPL-MCNC: 14 MG/DL — SIGNIFICANT CHANGE UP (ref 7–23)
BUN SERPL-MCNC: 14 MG/DL — SIGNIFICANT CHANGE UP (ref 7–23)
CALCIUM SERPL-MCNC: 7.6 MG/DL — LOW (ref 8.4–10.5)
CALCIUM SERPL-MCNC: 7.6 MG/DL — LOW (ref 8.4–10.5)
CHLORIDE SERPL-SCNC: 91 MMOL/L — LOW (ref 96–108)
CHLORIDE SERPL-SCNC: 92 MMOL/L — LOW (ref 96–108)
CO2 BLDA-SCNC: 23 MMOL/L — SIGNIFICANT CHANGE UP (ref 22–30)
CO2 BLDA-SCNC: 23 MMOL/L — SIGNIFICANT CHANGE UP (ref 22–30)
CO2 SERPL-SCNC: 21 MMOL/L — LOW (ref 22–31)
CO2 SERPL-SCNC: 21 MMOL/L — LOW (ref 22–31)
CREAT SERPL-MCNC: 0.79 MG/DL — SIGNIFICANT CHANGE UP (ref 0.5–1.3)
CREAT SERPL-MCNC: 0.8 MG/DL — SIGNIFICANT CHANGE UP (ref 0.5–1.3)
GAS PNL BLDA: SIGNIFICANT CHANGE UP
GAS PNL BLDA: SIGNIFICANT CHANGE UP
GLUCOSE SERPL-MCNC: 180 MG/DL — HIGH (ref 70–99)
GLUCOSE SERPL-MCNC: 283 MG/DL — HIGH (ref 70–99)
HCO3 BLDA-SCNC: 22 MMOL/L — SIGNIFICANT CHANGE UP (ref 21–29)
HCO3 BLDA-SCNC: 22 MMOL/L — SIGNIFICANT CHANGE UP (ref 21–29)
HCT VFR BLD CALC: 34.1 % — LOW (ref 39–50)
HCV AB S/CO SERPL IA: 0.63 S/CO — SIGNIFICANT CHANGE UP (ref 0–0.99)
HCV AB SERPL-IMP: SIGNIFICANT CHANGE UP
HGB BLD-MCNC: 11.6 G/DL — LOW (ref 13–17)
HOROWITZ INDEX BLDA+IHG-RTO: 100 — SIGNIFICANT CHANGE UP
HOROWITZ INDEX BLDA+IHG-RTO: 100 — SIGNIFICANT CHANGE UP
MAGNESIUM SERPL-MCNC: 2.3 MG/DL — SIGNIFICANT CHANGE UP (ref 1.6–2.6)
MCHC RBC-ENTMCNC: 30 PG — SIGNIFICANT CHANGE UP (ref 27–34)
MCHC RBC-ENTMCNC: 34 GM/DL — SIGNIFICANT CHANGE UP (ref 32–36)
MCV RBC AUTO: 88.1 FL — SIGNIFICANT CHANGE UP (ref 80–100)
NRBC # BLD: 0 /100 WBCS — SIGNIFICANT CHANGE UP (ref 0–0)
PCO2 BLDA: 29 MMHG — LOW (ref 32–46)
PCO2 BLDA: 31 MMHG — LOW (ref 32–46)
PH BLDA: 7.46 — HIGH (ref 7.35–7.45)
PH BLDA: 7.49 — HIGH (ref 7.35–7.45)
PHOSPHATE SERPL-MCNC: 2.8 MG/DL — SIGNIFICANT CHANGE UP (ref 2.5–4.5)
PLATELET # BLD AUTO: 194 K/UL — SIGNIFICANT CHANGE UP (ref 150–400)
PO2 BLDA: 53 MMHG — LOW (ref 74–108)
PO2 BLDA: 58 MMHG — LOW (ref 74–108)
POTASSIUM SERPL-MCNC: 3.6 MMOL/L — SIGNIFICANT CHANGE UP (ref 3.5–5.3)
POTASSIUM SERPL-MCNC: 3.7 MMOL/L — SIGNIFICANT CHANGE UP (ref 3.5–5.3)
POTASSIUM SERPL-SCNC: 3.6 MMOL/L — SIGNIFICANT CHANGE UP (ref 3.5–5.3)
POTASSIUM SERPL-SCNC: 3.7 MMOL/L — SIGNIFICANT CHANGE UP (ref 3.5–5.3)
PROCALCITONIN SERPL-MCNC: 0.69 NG/ML — HIGH (ref 0.02–0.1)
RBC # BLD: 3.87 M/UL — LOW (ref 4.2–5.8)
RBC # FLD: 13.8 % — SIGNIFICANT CHANGE UP (ref 10.3–14.5)
SAO2 % BLDA: 89 % — LOW (ref 92–96)
SAO2 % BLDA: 91 % — LOW (ref 92–96)
SODIUM SERPL-SCNC: 125 MMOL/L — LOW (ref 135–145)
SODIUM SERPL-SCNC: 128 MMOL/L — LOW (ref 135–145)
T3 SERPL-MCNC: 56 NG/DL — LOW (ref 80–200)
T4 AB SER-ACNC: 7.2 UG/DL — SIGNIFICANT CHANGE UP (ref 4.6–12)
T4 FREE SERPL-MCNC: 1.5 NG/DL — SIGNIFICANT CHANGE UP (ref 0.9–1.8)
TSH SERPL-MCNC: 0.39 UIU/ML — SIGNIFICANT CHANGE UP (ref 0.27–4.2)
TSH SERPL-MCNC: 0.47 UIU/ML — SIGNIFICANT CHANGE UP (ref 0.27–4.2)
URATE SERPL-MCNC: 5.8 MG/DL — SIGNIFICANT CHANGE UP (ref 3.4–8.8)
WBC # BLD: 5.46 K/UL — SIGNIFICANT CHANGE UP (ref 3.8–10.5)
WBC # FLD AUTO: 5.46 K/UL — SIGNIFICANT CHANGE UP (ref 3.8–10.5)

## 2020-02-21 PROCEDURE — 99222 1ST HOSP IP/OBS MODERATE 55: CPT

## 2020-02-21 PROCEDURE — 99233 SBSQ HOSP IP/OBS HIGH 50: CPT

## 2020-02-21 RX ORDER — DOXAZOSIN MESYLATE 4 MG
4 TABLET ORAL AT BEDTIME
Refills: 0 | Status: DISCONTINUED | OUTPATIENT
Start: 2020-02-21 | End: 2020-02-25

## 2020-02-21 RX ORDER — IPRATROPIUM/ALBUTEROL SULFATE 18-103MCG
3 AEROSOL WITH ADAPTER (GRAM) INHALATION EVERY 6 HOURS
Refills: 0 | Status: DISCONTINUED | OUTPATIENT
Start: 2020-02-21 | End: 2020-02-25

## 2020-02-21 RX ADMIN — Medication 40 MILLIGRAM(S): at 12:47

## 2020-02-21 RX ADMIN — Medication 1 MILLIGRAM(S): at 21:28

## 2020-02-21 RX ADMIN — Medication 240 MILLIGRAM(S): at 06:54

## 2020-02-21 RX ADMIN — Medication 4 MILLIGRAM(S): at 21:29

## 2020-02-21 RX ADMIN — Medication 0.5 MILLIGRAM(S): at 06:55

## 2020-02-21 RX ADMIN — ENOXAPARIN SODIUM 40 MILLIGRAM(S): 100 INJECTION SUBCUTANEOUS at 12:48

## 2020-02-21 RX ADMIN — Medication 100 MILLIEQUIVALENT(S): at 00:40

## 2020-02-21 RX ADMIN — Medication 10 MILLILITER(S): at 23:18

## 2020-02-21 RX ADMIN — Medication 15 MILLIGRAM(S): at 14:10

## 2020-02-21 RX ADMIN — PANTOPRAZOLE SODIUM 40 MILLIGRAM(S): 20 TABLET, DELAYED RELEASE ORAL at 06:26

## 2020-02-21 RX ADMIN — Medication 40 MILLIGRAM(S): at 00:41

## 2020-02-21 RX ADMIN — Medication 75 MILLIGRAM(S): at 06:55

## 2020-02-21 RX ADMIN — Medication 3 MILLILITER(S): at 09:48

## 2020-02-21 RX ADMIN — Medication 15 MILLIGRAM(S): at 21:29

## 2020-02-21 RX ADMIN — Medication 100 MILLIEQUIVALENT(S): at 01:48

## 2020-02-21 RX ADMIN — Medication 3 MILLILITER(S): at 23:18

## 2020-02-21 RX ADMIN — Medication 75 MILLIGRAM(S): at 17:58

## 2020-02-21 RX ADMIN — CEFTRIAXONE 100 MILLIGRAM(S): 500 INJECTION, POWDER, FOR SOLUTION INTRAMUSCULAR; INTRAVENOUS at 06:25

## 2020-02-21 RX ADMIN — Medication 200 MILLIGRAM(S): at 14:08

## 2020-02-21 RX ADMIN — Medication 3 MILLILITER(S): at 17:57

## 2020-02-21 RX ADMIN — Medication 40 MILLIGRAM(S): at 06:25

## 2020-02-21 RX ADMIN — ATORVASTATIN CALCIUM 10 MILLIGRAM(S): 80 TABLET, FILM COATED ORAL at 21:30

## 2020-02-21 RX ADMIN — Medication 40 MILLIGRAM(S): at 23:18

## 2020-02-21 RX ADMIN — Medication 15 MILLIGRAM(S): at 06:55

## 2020-02-21 RX ADMIN — AZITHROMYCIN 250 MILLIGRAM(S): 500 TABLET, FILM COATED ORAL at 23:18

## 2020-02-21 RX ADMIN — Medication 200 MILLIGRAM(S): at 21:29

## 2020-02-21 RX ADMIN — Medication 3 MILLILITER(S): at 12:47

## 2020-02-21 RX ADMIN — AZITHROMYCIN 250 MILLIGRAM(S): 500 TABLET, FILM COATED ORAL at 00:40

## 2020-02-21 RX ADMIN — Medication 10 MILLILITER(S): at 12:47

## 2020-02-21 RX ADMIN — Medication 100 MILLIEQUIVALENT(S): at 02:58

## 2020-02-21 RX ADMIN — Medication 3 MILLILITER(S): at 01:48

## 2020-02-21 RX ADMIN — Medication 3 MILLILITER(S): at 06:25

## 2020-02-21 RX ADMIN — Medication 10 MILLILITER(S): at 17:09

## 2020-02-21 RX ADMIN — Medication 0.5 MILLIGRAM(S): at 17:57

## 2020-02-21 RX ADMIN — Medication 40 MILLIGRAM(S): at 17:57

## 2020-02-21 NOTE — PROGRESS NOTE ADULT - ASSESSMENT
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  Urinary retention may be caused by/contributing to hyponatremia,    Recommend:  I re-educated the patient on 1000 ml fluid restriction, I spoke with patient's nurse as well.  Avoid HCTZ/ARB/NSAID.  Will follow.  Thank you.

## 2020-02-21 NOTE — CHART NOTE - NSCHARTNOTEFT_GEN_A_CORE
Medicine NP- EPISODIC NOTE    ABG this AM while on high flow for approx. 30 min with O2 sat 90s, pt w/o s/s resp distress. D/W RN to give medications this AM, wait 30 min before replacing BiPAP.     Natacha Sequeira, [facility]  [id number] Medicine NP- EPISODIC NOTE    Patient NPO overnight while on BIPAP. ABG this AM while on high flow for approx. 30 min with O2 sat 90s, pt w/o s/s resp distress. D/W RN to give oral medications this AM, wait 30 min before replacing BiPAP.     Natacha Sequeira, [facility]-BC  [id number]

## 2020-02-21 NOTE — PROGRESS NOTE ADULT - SUBJECTIVE AND OBJECTIVE BOX
no cp  REVIEW OF SYSTEMS:  GEN: no fever,    no chills  RESP: no SOB,   no cough  CVS: no chest pain,   no palpitations  GI: no abdominal pain,   no nausea,   no vomiting,   no constipation,   no diarrhea  : no dysuria,   no frequency  NEURO: no headache,   no dizziness  PSYCH: no depression,   not anxious  Derm : no rash    MEDICATIONS  (STANDING):  albuterol/ipratropium for Nebulization 3 milliLiter(s) Nebulizer every 4 hours  atorvastatin 10 milliGRAM(s) Oral at bedtime  azithromycin  IVPB      azithromycin  IVPB 500 milliGRAM(s) IV Intermittent every 24 hours  benzonatate 200 milliGRAM(s) Oral three times a day  buDESOnide    Inhalation Suspension 0.5 milliGRAM(s) Inhalation every 12 hours  busPIRone 15 milliGRAM(s) Oral three times a day  cefTRIAXone   IVPB 1000 milliGRAM(s) IV Intermittent every 24 hours  diltiazem    milliGRAM(s) Oral daily  doxazosin 2 milliGRAM(s) Oral at bedtime  enoxaparin Injectable 40 milliGRAM(s) SubCutaneous daily  guaifenesin/dextromethorphan  Syrup 10 milliLiter(s) Oral every 6 hours  hydrocodone/homatropine Syrup 5 milliLiter(s) Oral <User Schedule>  methylPREDNISolone sodium succinate Injectable 40 milliGRAM(s) IV Push every 6 hours  oseltamivir 75 milliGRAM(s) Oral two times a day  pantoprazole  Injectable 40 milliGRAM(s) IV Push daily  PARoxetine 30 milliGRAM(s) Oral daily  potassium chloride    Tablet ER 40 milliEquivalent(s) Oral once  potassium phosphate IVPB 15 milliMole(s) IV Intermittent once    MEDICATIONS  (PRN):  acetaminophen   Tablet .. 650 milliGRAM(s) Oral every 6 hours PRN Temp greater or equal to 38C (100.4F)  clonazePAM  Tablet 1 milliGRAM(s) Oral three times a day PRN anxiety      Vital Signs Last 24 Hrs  T(C): 36.6 (21 Feb 2020 05:10), Max: 38.2 (20 Feb 2020 20:49)  T(F): 97.8 (21 Feb 2020 05:10), Max: 100.7 (20 Feb 2020 20:49)  HR: 69 (21 Feb 2020 06:39) (66 - 98)  BP: 126/62 (21 Feb 2020 05:10) (126/62 - 164/78)  BP(mean): --  RR: 20 (21 Feb 2020 06:39) (19 - 37)  SpO2: 92% (21 Feb 2020 06:39) (76% - 98%)  CAPILLARY BLOOD GLUCOSE        I&O's Summary    20 Feb 2020 07:01  -  21 Feb 2020 07:00  --------------------------------------------------------  IN: 280 mL / OUT: 1650 mL / NET: -1370 mL        PHYSICAL EXAM:  HEAD:  Atraumatic, Normocephalic  NECK: Supple, No   JVD  CHEST/LUNG:   no     rales,    less   rhonchi  HEART: Regular rate and rhythm;         murmur  ABDOMEN: Soft, Nontender, ;   EXTREMITIES:   no     edema  NEUROLOGY:  alert    LABS:                        11.6   5.46  )-----------( 194      ( 21 Feb 2020 07:45 )             34.1     02-20    125<L>  |  91<L>  |  13  ----------------------------<  127<H>  3.2<L>   |  19<L>  |  0.85    Ca    7.5<L>      20 Feb 2020 22:02  Phos  2.3     02-20  Mg     1.9     02-20    TPro  5.9<L>  /  Alb  2.8<L>  /  TBili  0.3  /  DBili  <0.1  /  AST  70<H>  /  ALT  24  /  AlkPhos  46  02-20              ABG - ( 21 Feb 2020 06:24 )  pH, Arterial: 7.49  pH, Blood: x     /  pCO2: 29    /  pO2: 53    / HCO3: 22    / Base Excess: -.1   /  SaO2: 89                02-20 @ 04:51  2.9  28      Thyroid Stimulating Hormone, Serum: 0.47 uIU/mL (02-21 @ 00:19)          Consultant(s) Notes Reviewed:      Care Discussed with Consultants/Other Providers: no cp/  ha s improved/ on hi flow O2  REVIEW OF SYSTEMS:  GEN: no fever,    no chills  RESP: no SOB,   no cough  CVS: no chest pain,   no palpitations  GI: no abdominal pain,   no nausea,   no vomiting,   no constipation,   no diarrhea  : no dysuria,   no frequency  NEURO: no headache,   no dizziness  PSYCH: no depression,   not anxious  Derm : no rash    MEDICATIONS  (STANDING):  albuterol/ipratropium for Nebulization 3 milliLiter(s) Nebulizer every 4 hours  atorvastatin 10 milliGRAM(s) Oral at bedtime  azithromycin  IVPB      azithromycin  IVPB 500 milliGRAM(s) IV Intermittent every 24 hours  benzonatate 200 milliGRAM(s) Oral three times a day  buDESOnide    Inhalation Suspension 0.5 milliGRAM(s) Inhalation every 12 hours  busPIRone 15 milliGRAM(s) Oral three times a day  cefTRIAXone   IVPB 1000 milliGRAM(s) IV Intermittent every 24 hours  diltiazem    milliGRAM(s) Oral daily  doxazosin 2 milliGRAM(s) Oral at bedtime  enoxaparin Injectable 40 milliGRAM(s) SubCutaneous daily  guaifenesin/dextromethorphan  Syrup 10 milliLiter(s) Oral every 6 hours  hydrocodone/homatropine Syrup 5 milliLiter(s) Oral <User Schedule>  methylPREDNISolone sodium succinate Injectable 40 milliGRAM(s) IV Push every 6 hours  oseltamivir 75 milliGRAM(s) Oral two times a day  pantoprazole  Injectable 40 milliGRAM(s) IV Push daily  PARoxetine 30 milliGRAM(s) Oral daily  potassium chloride    Tablet ER 40 milliEquivalent(s) Oral once  potassium phosphate IVPB 15 milliMole(s) IV Intermittent once    MEDICATIONS  (PRN):  acetaminophen   Tablet .. 650 milliGRAM(s) Oral every 6 hours PRN Temp greater or equal to 38C (100.4F)  clonazePAM  Tablet 1 milliGRAM(s) Oral three times a day PRN anxiety      Vital Signs Last 24 Hrs  T(C): 36.6 (21 Feb 2020 05:10), Max: 38.2 (20 Feb 2020 20:49)  T(F): 97.8 (21 Feb 2020 05:10), Max: 100.7 (20 Feb 2020 20:49)  HR: 69 (21 Feb 2020 06:39) (66 - 98)  BP: 126/62 (21 Feb 2020 05:10) (126/62 - 164/78)  BP(mean): --  RR: 20 (21 Feb 2020 06:39) (19 - 37)  SpO2: 92% (21 Feb 2020 06:39) (76% - 98%)  CAPILLARY BLOOD GLUCOSE        I&O's Summary    20 Feb 2020 07:01  -  21 Feb 2020 07:00  --------------------------------------------------------  IN: 280 mL / OUT: 1650 mL / NET: -1370 mL        PHYSICAL EXAM:  HEAD:  Atraumatic, Normocephalic  NECK: Supple, No   JVD  CHEST/LUNG:   no     rales,    less   rhonchi  HEART: Regular rate and rhythm;         murmur  ABDOMEN: Soft, Nontender, ;   EXTREMITIES:   no     edema  NEUROLOGY:  alert    LABS:                        11.6   5.46  )-----------( 194      ( 21 Feb 2020 07:45 )             34.1     02-20    125<L>  |  91<L>  |  13  ----------------------------<  127<H>  3.2<L>   |  19<L>  |  0.85    Ca    7.5<L>      20 Feb 2020 22:02  Phos  2.3     02-20  Mg     1.9     02-20    TPro  5.9<L>  /  Alb  2.8<L>  /  TBili  0.3  /  DBili  <0.1  /  AST  70<H>  /  ALT  24  /  AlkPhos  46  02-20              ABG - ( 21 Feb 2020 06:24 )  pH, Arterial: 7.49  pH, Blood: x     /  pCO2: 29    /  pO2: 53    / HCO3: 22    / Base Excess: -.1   /  SaO2: 89                02-20 @ 04:51  2.9  28      Thyroid Stimulating Hormone, Serum: 0.47 uIU/mL (02-21 @ 00:19)          Consultant(s) Notes Reviewed:      Care Discussed with Consultants/Other Providers:

## 2020-02-21 NOTE — CONSULT NOTE ADULT - SUBJECTIVE AND OBJECTIVE BOX
HPI    68yo male h/o HTN, pulmonary fibrosis, depression/anxiety admitted 2/20 with hypoxia/respiratory failure, found to have +flu and pneumonia. In ED unable to urinate and was straight cath'd for 850 and 500 prior to loza placement. Pt states he noticed dribbling leading up to coming to hospital but typically does not have difficulty voiding. He does have h/o constipation but take metamucil and last BM was last night. Has not been ambulating lately because feeling unwell. Denies any hematuria, dysuria, frequency. Does take Cardura per his PMD but does not follow with a urologist.    PAST MEDICAL & SURGICAL HISTORY:  HTN (hypertension)  Depression  Pulmonary fibrosis  H/O inguinal hernia repair      MEDICATIONS  (STANDING):  albuterol/ipratropium for Nebulization 3 milliLiter(s) Nebulizer every 6 hours  atorvastatin 10 milliGRAM(s) Oral at bedtime  azithromycin  IVPB      azithromycin  IVPB 500 milliGRAM(s) IV Intermittent every 24 hours  benzonatate 200 milliGRAM(s) Oral three times a day  buDESOnide    Inhalation Suspension 0.5 milliGRAM(s) Inhalation every 12 hours  busPIRone 15 milliGRAM(s) Oral three times a day  cefTRIAXone   IVPB 1000 milliGRAM(s) IV Intermittent every 24 hours  diltiazem    milliGRAM(s) Oral daily  doxazosin 4 milliGRAM(s) Oral at bedtime  enoxaparin Injectable 40 milliGRAM(s) SubCutaneous daily  guaifenesin/dextromethorphan  Syrup 10 milliLiter(s) Oral every 6 hours  hydrocodone/homatropine Syrup 5 milliLiter(s) Oral <User Schedule>  methylPREDNISolone sodium succinate Injectable 40 milliGRAM(s) IV Push every 6 hours  oseltamivir 75 milliGRAM(s) Oral two times a day  pantoprazole  Injectable 40 milliGRAM(s) IV Push daily  PARoxetine 30 milliGRAM(s) Oral daily  potassium chloride    Tablet ER 40 milliEquivalent(s) Oral once  potassium phosphate IVPB 15 milliMole(s) IV Intermittent once    MEDICATIONS  (PRN):  acetaminophen   Tablet .. 650 milliGRAM(s) Oral every 6 hours PRN Temp greater or equal to 38C (100.4F)  clonazePAM  Tablet 1 milliGRAM(s) Oral three times a day PRN anxiety      FAMILY HISTORY:  No pertinent family history in first degree relatives      Allergies    penicillin (Anaphylaxis)    Intolerances        SOCIAL HISTORY:    REVIEW OF SYSTEMS: Otherwise negative as stated in HPI    Physical Exam  Vital signs  T(C): 37.1 (02-21-20 @ 16:24), Max: 38.2 (02-20-20 @ 20:49)  HR: 70 (02-21-20 @ 18:13)  BP: 128/67 (02-21-20 @ 16:24)  SpO2: 89% (02-21-20 @ 18:13)  Wt(kg): --    Output    OUT:    Intermittent Catheterization - Urethral: 850 mL    Voided: 800 mL  Total OUT: 1650 mL    Total NET: -1650 mL      OUT:    Intermittent Catheterization - Urethral: 500 mL  Total OUT: 500 mL    Total NET: -500 mL          Physical Exam  Gen: NAD  Pulm: No respiratory distress, no subcostal retractions  CV: RRR, no JVD  Abd: Soft, NT, ND  : Circumcised, no lesions.  No discharge or blood at urethral meatus. Testes and epididymis nontender bilaterally.    NIKHIL: nontender, non boggy prostate, no nodules appreciated       02-21 @ 16:08    WBC --    / Hct --    / SCr 0.80     02-21 @ 07:45    WBC 5.46  / Hct 34.1  / SCr --       02-21    125<L>  |  91<L>  |  14  ----------------------------<  283<H>  3.6   |  21<L>  |  0.80    Ca    7.6<L>      21 Feb 2020 16:08  Phos  2.8     02-21  Mg     2.3     02-21    TPro  5.9<L>  /  Alb  2.8<L>  /  TBili  0.3  /  DBili  <0.1  /  AST  70<H>  /  ALT  24  /  AlkPhos  46  02-20          Blood Cx:  Culture - Blood (02.20.20 @ 10:14)    Specimen Source: .Blood Blood-Venous    Culture Results:   No growth to date.

## 2020-02-21 NOTE — PROGRESS NOTE ADULT - SUBJECTIVE AND OBJECTIVE BOX
INTERVAL HPI/OVERNIGHT EVENTS:    patient seen and examined with wife present  on HFNC   tolerating PO  no new GI complaints   H/H stable     MEDICATIONS  (STANDING):  albuterol/ipratropium for Nebulization 3 milliLiter(s) Nebulizer every 6 hours  atorvastatin 10 milliGRAM(s) Oral at bedtime  azithromycin  IVPB      azithromycin  IVPB 500 milliGRAM(s) IV Intermittent every 24 hours  benzonatate 200 milliGRAM(s) Oral three times a day  buDESOnide    Inhalation Suspension 0.5 milliGRAM(s) Inhalation every 12 hours  busPIRone 15 milliGRAM(s) Oral three times a day  cefTRIAXone   IVPB 1000 milliGRAM(s) IV Intermittent every 24 hours  diltiazem    milliGRAM(s) Oral daily  doxazosin 4 milliGRAM(s) Oral at bedtime  enoxaparin Injectable 40 milliGRAM(s) SubCutaneous daily  guaifenesin/dextromethorphan  Syrup 10 milliLiter(s) Oral every 6 hours  hydrocodone/homatropine Syrup 5 milliLiter(s) Oral <User Schedule>  methylPREDNISolone sodium succinate Injectable 40 milliGRAM(s) IV Push every 6 hours  oseltamivir 75 milliGRAM(s) Oral two times a day  pantoprazole  Injectable 40 milliGRAM(s) IV Push daily  PARoxetine 30 milliGRAM(s) Oral daily  potassium chloride    Tablet ER 40 milliEquivalent(s) Oral once  potassium phosphate IVPB 15 milliMole(s) IV Intermittent once    MEDICATIONS  (PRN):  acetaminophen   Tablet .. 650 milliGRAM(s) Oral every 6 hours PRN Temp greater or equal to 38C (100.4F)  clonazePAM  Tablet 1 milliGRAM(s) Oral three times a day PRN anxiety      Allergies    penicillin (Anaphylaxis)    Intolerances        Review of Systems:    General:  No wt loss, fevers, chills, night sweats,fatigue,   Eyes:  Good vision, no reported pain  ENT:  No sore throat, pain, runny nose, dysphagia  CV:  No pain, palpitatioins, hypo/hypertension  Resp:  No dyspnea, cough, tachypnea, wheezing  GI:  No pain, No nausea, No vomiting, No diarrhea, No constipatiion, No weight loss, No fever, No pruritis, No rectal bleeding, No tarry stools, No dysphagia,  :  No pain, bleeding, incontinence, nocturia  Muscle:  No pain, weakness  Neuro:  No weakness, tingling, memory problems  Psych:  No fatigue, insomnia, mood problems, depression  Endocrine:  No polyuria, polydypsia, cold/heat intolerance  Heme:  No petechiae, ecchymosis, easy bruisability  Skin:  No rash, tattoos, scars, edema      Vital Signs Last 24 Hrs  T(C): 37.1 (21 Feb 2020 16:24), Max: 38.2 (20 Feb 2020 20:49)  T(F): 98.8 (21 Feb 2020 16:24), Max: 100.7 (20 Feb 2020 20:49)  HR: 72 (21 Feb 2020 16:24) (66 - 98)  BP: 128/67 (21 Feb 2020 16:24) (126/62 - 164/78)  BP(mean): --  RR: 20 (21 Feb 2020 16:24) (18 - 30)  SpO2: 88% (21 Feb 2020 16:24) (85% - 98%)    PHYSICAL EXAM:    Constitutional: NAD  HEENT: on HFNC   Cardiovascular: S1 and S2, RRR  Gastrointestinal: BS+, soft, NT/ND  Extremities: No peripheral edema  Neurological: A/O x 3, no focal deficits      LABS:                        11.6   5.46  )-----------( 194      ( 21 Feb 2020 07:45 )             34.1     02-21    125<L>  |  91<L>  |  14  ----------------------------<  283<H>  3.6   |  21<L>  |  0.80    Ca    7.6<L>      21 Feb 2020 16:08  Phos  2.8     02-21  Mg     2.3     02-21    TPro  5.9<L>  /  Alb  2.8<L>  /  TBili  0.3  /  DBili  <0.1  /  AST  70<H>  /  ALT  24  /  AlkPhos  46  02-20          RADIOLOGY & ADDITIONAL TESTS:

## 2020-02-21 NOTE — PROGRESS NOTE ADULT - ASSESSMENT
ASSESSMENT:    hypoxic respiratory failure - multifactorial - no evidence of hypercapnic respiratory failure - ABG reveals an acute respiratory alkalosis due to hyperventilatioin    1) influenza infection complicated by bronchospasm and pneumonia (likely viral infection in the absence of a leukocytosis although the procalcitonin level is somewhat elevated  2) underlying pulmonary fibrosis    PLAN/RECOMMENDATIONS:    high flow nasal canula 100% FiO2 @ 50lpm - keep saturation greater than 88%  restart BIPAP only for increased work of breathing, resistant hypoxemia or respiratory acidosis  tamiflu x 5 days  continue ceftriaxone/azithromycin  continue solumedrol 40mg IVP q6h  decrease albuterol/atrovent nebs to q6h  pulmicort 0.5mg nebs q12h - give after duoneb - rinse mouth after use  robitussin DM/tessalon/hycodan  buspar/klonopin/paxil  DVT prophylaxis    Will follow with you. Plan of care discussed with the patient at bedside, respiratory therapy and the dedicated floor NP.    Galileo Pop MD, [facility]  [phone]  Pulmonary Medicine ASSESSMENT:    hypoxic respiratory failure - multifactorial - no evidence of hypercapnic respiratory failure - ABG reveals an acute respiratory alkalosis due to hyperventilatioin    1) influenza infection complicated by bronchospasm and pneumonia (likely viral infection in the absence of a leukocytosis although the procalcitonin level is somewhat elevated  2) underlying pulmonary fibrosis    PLAN/RECOMMENDATIONS:    high flow nasal canula 100% FiO2 @ 50lpm - keep saturation greater than 88%  restart BIPAP only for increased work of breathing, resistant hypoxemia or respiratory acidosis  tamiflu x 5 days  continue ceftriaxone/azithromycin  continue solumedrol 40mg IVP q6h  decrease albuterol/atrovent nebs to q6h  pulmicort 0.5mg nebs q12h - give after duoneb - rinse mouth after use  robitussin DM/tessalon/hycodan  buspar/klonopin/paxil  DVT prophylaxis  urology consult noted - loza catheter/increase doxazosin      Will follow with you. Plan of care discussed with the patient at bedside, respiratory therapy and the dedicated floor NP.    Galileo Pop MD, [facility]  [phone]  Pulmonary Medicine

## 2020-02-21 NOTE — CONSULT NOTE ADULT - ASSESSMENT
70yo male with urinary retention.    - Rec increase cardura to 4mg daily  - keep loza for now  - encourage ambulation  - continue bowel regimen (takes metamucil at home)  - may repeat TOV in 2-3 days 70yo male with urinary retention.    - Please send urine culture  - Rec increase cardura to 4mg daily  - keep loza for now  - encourage ambulation  - continue bowel regimen (takes metamucil at home)  - may repeat TOV in 2-3 days

## 2020-02-21 NOTE — PROGRESS NOTE ADULT - SUBJECTIVE AND OBJECTIVE BOX
[facility] - [facility] - PROGRESS NOTE    CHIEF COMPLAINT: hypoxic respiratory failure; influenza; pneumonia; bronchospasm; pulmonary fibrosis;     INTERVAL HISTORY: placed on BIPAP overnight which the patient tolerated poorly; awake and alert on high flow nasal canula with borderline oxygenation (88% - 92%); low grade fever without chills or sweats; occasional cough productive of scant sputum; mild chest congestion and wheeze; less fatigue, malaise and weakness; good appetite;    REVIEW OF SYSTEMS:  Constitutional: As per interval history  HEENT: Within normal limits  CV: As per interval history  Resp: As per interval history  GI: Within normal limits   : Within normal limits  Musculoskeletal: Within normal limits  Skin: Within normal limits  Neurological: Within normal limits  Psychiatric: anxiety type depression  Endocrine: Within normal limits  Hematologic/Lymphatic: Within normal limits  Allergic/Immunologic: Within normal limits      MEDICATIONS:     Pulmonary "  albuterol/ipratropium for Nebulization 3 milliLiter(s) Nebulizer every 4 hours  benzonatate 200 milliGRAM(s) Oral three times a day  buDESOnide    Inhalation Suspension 0.5 milliGRAM(s) Inhalation every 12 hours  guaifenesin/dextromethorphan  Syrup 10 milliLiter(s) Oral every 6 hours  hydrocodone/homatropine Syrup 5 milliLiter(s) Oral <User Schedule>    Anti-microbials:  azithromycin  IVPB      azithromycin  IVPB 500 milliGRAM(s) IV Intermittent every 24 hours  cefTRIAXone   IVPB 1000 milliGRAM(s) IV Intermittent every 24 hours  oseltamivir 75 milliGRAM(s) Oral two times a day    Cardiovascular:  diltiazem    milliGRAM(s) Oral daily  doxazosin 2 milliGRAM(s) Oral at bedtime    Other:  atorvastatin 10 milliGRAM(s) Oral at bedtime  busPIRone 15 milliGRAM(s) Oral three times a day  enoxaparin Injectable 40 milliGRAM(s) SubCutaneous daily  methylPREDNISolone sodium succinate Injectable 40 milliGRAM(s) IV Push every 6 hours  pantoprazole  Injectable 40 milliGRAM(s) IV Push daily  PARoxetine 30 milliGRAM(s) Oral daily  potassium chloride    Tablet ER 40 milliEquivalent(s) Oral once  potassium phosphate IVPB 15 milliMole(s) IV Intermittent once    MEDICATIONS  (PRN):  acetaminophen   Tablet .. 650 milliGRAM(s) Oral every 6 hours PRN Temp greater or equal to 38C (100.4F)  clonazePAM  Tablet 1 milliGRAM(s) Oral three times a day PRN anxiety    OBJECTIVE:    I&O's Detail    20 Feb 2020 07:01  -  21 Feb 2020 07:00  --------------------------------------------------------  IN:    Oral Fluid: 280 mL  Total IN: 280 mL    OUT:    Intermittent Catheterization - Urethral: 850 mL    Voided: 800 mL  Total OUT: 1650 mL    Total NET: -1370 mL    Daily Height in cm: 182.88 (20 Feb 2020 16:53)      PHYSICAL EXAM:       ICU Vital Signs Last 24 Hrs  T(C): 36.6 (21 Feb 2020 05:10), Max: 38.2 (20 Feb 2020 20:49)  T(F): 97.8 (21 Feb 2020 05:10), Max: 100.7 (20 Feb 2020 20:49)  HR: 69 (21 Feb 2020 06:39) (66 - 98)  BP: 126/62 (21 Feb 2020 05:10) (126/62 - 164/78)  BP(mean): --  ABP: --  ABP(mean): --  RR: 20 (21 Feb 2020 06:39) (19 - 34)  SpO2: 92% (21 Feb 2020 06:39) (88% - 92% on high flow 100% @ 50lpm)     General: Awake. Alert. Cooperative. No distress. Appears stated age. 	  HEENT: Atraumatic. Normocephalic. Anicteric. Normal oral mucosa. PERRL. EOMI.  Neck: Supple. Trachea midline. Thyroid without enlargement/tenderness/nodules. No carotid bruit. No JVD.	  Cardiovascular: Regular rate and rhythm. S1 S2 normal. No murmurs, rubs or gallops.  Respiratory: Respirations unlabored. Cough with inspiration. Mild bilateral rhonchi and wheeze. Scattered rales. No curvature.  Abdomen: Soft. Non-tender. Non-distended. No organomegaly. No masses. Normal bowel sounds. Obese.  Extremities: Warm to touch. No clubbing or cyanosis. No pedal edema.  Pulses: 2+ peripheral pulses all extremities.	  Skin: Normal skin color. No rashes or lesions. No ecchymoses. No cyanosis. Warm to touch.  Lymph Nodes: Cervical, supraclavicular and axillary nodes normal  Neurological: Motor and sensory examination equal and normal. A and O x 3  Psychiatry: Appropriate mood and affect.    LABS:                          11.6   5.46  )-----------( 194      ( 21 Feb 2020 07:45 )             34.1     CBC    WBC  5.46 <==, 7.28 <==, 8.58 <==    Hemoglobin  11.6 <<==, 11.8 <<==, 12.4 <<==    Hematocrit  34.1 <==, 32.9 <==, 35.7 <==    Platelets  194 <==, 196 <==, 196 <==      125<L>  |  91<L>  |  13  ----------------------------<  127<H>    02-20  3.2<L>   |  19<L>  |  0.85      LYTES    sodium  125 <==, 124 <==, 122 <==, 121 <==, 124 <==    potassium   3.2 <==, 4.0 <==, 3.8 <==, 3.2 <==, 3.6 <==    chloride  91 <==, 89 <==, 88 <==, 86 <==, 85 <==    carbon dioxide  19 <==, 20 <==, 21 <==, 22 <==, 20 <==    =============================================================================================  RENAL FUNCTION:    Creatinine:   0.85  <<==, 0.91  <<==, 0.95  <<==, 1.07  <<==, 1.38  <<==    BUN:   13 <==, 13 <==, 14 <==, 16 <==, 21 <==    ============================================================================================    calcium   7.5 <==, 7.5 <==, 7.6 <==, 7.5 <==, 7.8 <==    phos   2.3 <==    mag   1.9 <==, 1.9 <==, 1.9 <==    ============================================================================================  LFTs    AST:   70 <== , 48 <==     ALT:  24  <== , 21  <==     AP:  46  <=, 50  <=    Bili:  0.3  <=, 0.5  <=    Procalcitonin, Serum (02.21.20 @ 07:45)    Procalcitonin, Serum: 0.69    ABG - ( 21 Feb 2020 06:24 )  pH: 7.49  /  pCO2: 29    /  pO2: 53    / HCO3: 22    / Base Excess: -.1   /  SaO2: 89        ABG - ( 21 Feb 2020 00:03 )  pH: 7.46  /  pCO2: 31    /  pO2: 58    / HCO3: 22    / Base Excess: -1.0  /  SaO2: 91    ABG - ( 20 Feb 2020 11:46 )  pH: 7.49  /  pCO2: 29    /  pO2: 58    / HCO3: 22    / Base Excess: -.3   /  SaO2: 91        Serum Pro-Brain Natriuretic Peptide: 364 pg/mL (02-19 @ 22:30)    CARDIAC MARKERS ( 20 Feb 2020 00:57 )  CPK x     /CKMB x     /CKMB Units x        troponin 17 ng/L  CARDIAC MARKERS ( 19 Feb 2020 22:30 )  CPK x     /CKMB x     /CKMB Units x        troponin 19 ng/L      MICROBIOLOGY:     FLU A B RSV Detection by PCR (02.19.20 @ 22:30)    Flu A Result: Detected    Flu B Result: NotDetec    RSV Result: NotDetec    Legionella pneumophila Antigen, Urine (02.20.20 @ 09:50)    Legionella Antigen, Urine: Negative    RADIOLOGY:  [x] Chest radiographs reviewed and interpreted by me    EXAM:  XR CHEST AP OR PA 1V                          PROCEDURE DATE:  02/19/2020      INTERPRETATION:  CLINICAL INFORMATION: Chest pain.    EXAM: 1 view of the chest.    COMPARISON: None.    FINDINGS:    Bilateral midlung linear opacities. No pneumothorax. The cardiomediastinal silhouette is poorly evaluated on this projection. No acute osseous findings.    IMPRESSION:    Bilateral midlung linear opacities, which are of uncertain etiology.      RAMONA REDDY M.D., RADIOLOGY RESIDENT  This document has been electronically signed.  ES VELAZQUEZ M.D., ATTENDING RADIOLOGIST  This document has been electronically signed. Feb 20 2020  9:42AM    ---------------------------------------------------------------------------------------------------------------    EXAM:  CT ANGIO CHEST (W)AW IC                          PROCEDURE DATE:  02/20/2020      INTERPRETATION:  CLINICAL INFORMATION: Shortness of breath and fatigue. History of lung disease.    COMPARISON: Chest radiograph 2/19/2020    PROCEDURE:   CT Angiography of the Chest.  90 ml of Omnipaque 350 was injected intravenously. 10 ml were discarded.  Sagittal and coronal reformats were performed as well as 3D (MIP) reconstructions.    FINDINGS:    LUNGS AND AIRWAYS: Patent central airways. Areas of architectural distortion, mild traction bronchiectasis and linear/reticular opacities. Nonspecific groundglass/patchy opacities in both lungs.      PLEURA: No pleural effusion or pneumothorax.    MEDIASTINUM AND CHAVO: Subcentimeter mediastinal and hilar lymph nodes without lymphadenopathy.    VESSELS: Adequate contrast opacification of the pulmonary arteries. No obvious pulmonary embolus.    HEART: Normal heart size. No pericardial effusion.    CHEST WALL AND LOWER NECK: Heterogeneous thyroid gland, obscured by artifact.    VISUALIZED UPPER ABDOMEN: Colon diverticulosis.    BONES: Degenerative changes of the spine.    IMPRESSION:     No obvious pulmonary embolus.    Findings reflecting known pulmonary fibrosis. Recommend clinical correlation to assess underlying pneumonia. Recommend comparison to previous outside study and follow-up to exclude potential underlying neoplasm.    Heterogeneous thyroid gland, which is difficult to assess due to artifact and can be further characterized on a nonemergent ultrasound as clinically indicated.     Additional findings as described.    RAMONA REDDY M.D., RADIOLOGY RESIDENT  This document has been electronically signed.  MIAH CHAUDHRY M.D., ATTENDING RADIOLOGIST  This document has been electronically signed. Feb 20 2020  1:49AM  --------------------------------------------------------------------------------------------------------------- [facility] - [facility] - PROGRESS NOTE    CHIEF COMPLAINT: hypoxic respiratory failure; influenza; pneumonia; bronchospasm; pulmonary fibrosis;     INTERVAL HISTORY: placed on BIPAP overnight which the patient tolerated poorly; awake and alert on high flow nasal canula with borderline oxygenation (88% - 92%); low grade fever without chills or sweats; occasional cough productive of scant sputum; mild chest congestion and wheeze; less fatigue, malaise and weakness; good appetite; urinary retention -> loza    REVIEW OF SYSTEMS:  Constitutional: As per interval history  HEENT: Within normal limits  CV: As per interval history  Resp: As per interval history  GI: Within normal limits   : urinary retention  Musculoskeletal: Within normal limits  Skin: Within normal limits  Neurological: Within normal limits  Psychiatric: anxiety type depression  Endocrine: Within normal limits  Hematologic/Lymphatic: Within normal limits  Allergic/Immunologic: Within normal limits      MEDICATIONS:     Pulmonary "  albuterol/ipratropium for Nebulization 3 milliLiter(s) Nebulizer every 4 hours  benzonatate 200 milliGRAM(s) Oral three times a day  buDESOnide    Inhalation Suspension 0.5 milliGRAM(s) Inhalation every 12 hours  guaifenesin/dextromethorphan  Syrup 10 milliLiter(s) Oral every 6 hours  hydrocodone/homatropine Syrup 5 milliLiter(s) Oral <User Schedule>    Anti-microbials:  azithromycin  IVPB      azithromycin  IVPB 500 milliGRAM(s) IV Intermittent every 24 hours  cefTRIAXone   IVPB 1000 milliGRAM(s) IV Intermittent every 24 hours  oseltamivir 75 milliGRAM(s) Oral two times a day    Cardiovascular:  diltiazem    milliGRAM(s) Oral daily  doxazosin 2 milliGRAM(s) Oral at bedtime    Other:  atorvastatin 10 milliGRAM(s) Oral at bedtime  busPIRone 15 milliGRAM(s) Oral three times a day  enoxaparin Injectable 40 milliGRAM(s) SubCutaneous daily  methylPREDNISolone sodium succinate Injectable 40 milliGRAM(s) IV Push every 6 hours  pantoprazole  Injectable 40 milliGRAM(s) IV Push daily  PARoxetine 30 milliGRAM(s) Oral daily  potassium chloride    Tablet ER 40 milliEquivalent(s) Oral once  potassium phosphate IVPB 15 milliMole(s) IV Intermittent once    MEDICATIONS  (PRN):  acetaminophen   Tablet .. 650 milliGRAM(s) Oral every 6 hours PRN Temp greater or equal to 38C (100.4F)  clonazePAM  Tablet 1 milliGRAM(s) Oral three times a day PRN anxiety    OBJECTIVE:    I&O's Detail    20 Feb 2020 07:01  -  21 Feb 2020 07:00  --------------------------------------------------------  IN:    Oral Fluid: 280 mL  Total IN: 280 mL    OUT:    Intermittent Catheterization - Urethral: 850 mL    Voided: 800 mL  Total OUT: 1650 mL    Total NET: -1370 mL    Daily Height in cm: 182.88 (20 Feb 2020 16:53)      PHYSICAL EXAM:       ICU Vital Signs Last 24 Hrs  T(C): 36.6 (21 Feb 2020 05:10), Max: 38.2 (20 Feb 2020 20:49)  T(F): 97.8 (21 Feb 2020 05:10), Max: 100.7 (20 Feb 2020 20:49)  HR: 69 (21 Feb 2020 06:39) (66 - 98)  BP: 126/62 (21 Feb 2020 05:10) (126/62 - 164/78)  BP(mean): --  ABP: --  ABP(mean): --  RR: 20 (21 Feb 2020 06:39) (19 - 34)  SpO2: 92% (21 Feb 2020 06:39) (88% - 92% on high flow 100% @ 50lpm)     General: Awake. Alert. Cooperative. No distress. Appears stated age. 	  HEENT: Atraumatic. Normocephalic. Anicteric. Normal oral mucosa. PERRL. EOMI.  Neck: Supple. Trachea midline. Thyroid without enlargement/tenderness/nodules. No carotid bruit. No JVD.	  Cardiovascular: Regular rate and rhythm. S1 S2 normal. No murmurs, rubs or gallops.  Respiratory: Respirations unlabored. Cough with inspiration. Mild bilateral rhonchi and wheeze. Scattered rales. No curvature.  Abdomen: Soft. Non-tender. Non-distended. No organomegaly. No masses. Normal bowel sounds. Obese.  Extremities: Warm to touch. No clubbing or cyanosis. No pedal edema.  Pulses: 2+ peripheral pulses all extremities.	  Skin: Normal skin color. No rashes or lesions. No ecchymoses. No cyanosis. Warm to touch.  Lymph Nodes: Cervical, supraclavicular and axillary nodes normal  Neurological: Motor and sensory examination equal and normal. A and O x 3  Psychiatry: Appropriate mood and affect.    LABS:                          11.6   5.46  )-----------( 194      ( 21 Feb 2020 07:45 )             34.1     CBC    WBC  5.46 <==, 7.28 <==, 8.58 <==    Hemoglobin  11.6 <<==, 11.8 <<==, 12.4 <<==    Hematocrit  34.1 <==, 32.9 <==, 35.7 <==    Platelets  194 <==, 196 <==, 196 <==      125<L>  |  91<L>  |  13  ----------------------------<  127<H>    02-20  3.2<L>   |  19<L>  |  0.85      LYTES    sodium  125 <==, 124 <==, 122 <==, 121 <==, 124 <==    potassium   3.2 <==, 4.0 <==, 3.8 <==, 3.2 <==, 3.6 <==    chloride  91 <==, 89 <==, 88 <==, 86 <==, 85 <==    carbon dioxide  19 <==, 20 <==, 21 <==, 22 <==, 20 <==    =============================================================================================  RENAL FUNCTION:    Creatinine:   0.85  <<==, 0.91  <<==, 0.95  <<==, 1.07  <<==, 1.38  <<==    BUN:   13 <==, 13 <==, 14 <==, 16 <==, 21 <==    ============================================================================================    calcium   7.5 <==, 7.5 <==, 7.6 <==, 7.5 <==, 7.8 <==    phos   2.3 <==    mag   1.9 <==, 1.9 <==, 1.9 <==    ============================================================================================  LFTs    AST:   70 <== , 48 <==     ALT:  24  <== , 21  <==     AP:  46  <=, 50  <=    Bili:  0.3  <=, 0.5  <=    Procalcitonin, Serum (02.21.20 @ 07:45)    Procalcitonin, Serum: 0.69    ABG - ( 21 Feb 2020 06:24 )  pH: 7.49  /  pCO2: 29    /  pO2: 53    / HCO3: 22    / Base Excess: -.1   /  SaO2: 89        ABG - ( 21 Feb 2020 00:03 )  pH: 7.46  /  pCO2: 31    /  pO2: 58    / HCO3: 22    / Base Excess: -1.0  /  SaO2: 91    ABG - ( 20 Feb 2020 11:46 )  pH: 7.49  /  pCO2: 29    /  pO2: 58    / HCO3: 22    / Base Excess: -.3   /  SaO2: 91        Serum Pro-Brain Natriuretic Peptide: 364 pg/mL (02-19 @ 22:30)    CARDIAC MARKERS ( 20 Feb 2020 00:57 )  CPK x     /CKMB x     /CKMB Units x        troponin 17 ng/L  CARDIAC MARKERS ( 19 Feb 2020 22:30 )  CPK x     /CKMB x     /CKMB Units x        troponin 19 ng/L      MICROBIOLOGY:     FLU A B RSV Detection by PCR (02.19.20 @ 22:30)    Flu A Result: Detected    Flu B Result: NotDetec    RSV Result: NotDetec    Legionella pneumophila Antigen, Urine (02.20.20 @ 09:50)    Legionella Antigen, Urine: Negative    RADIOLOGY:  [x] Chest radiographs reviewed and interpreted by me    EXAM:  XR CHEST AP OR PA 1V                          PROCEDURE DATE:  02/19/2020      INTERPRETATION:  CLINICAL INFORMATION: Chest pain.    EXAM: 1 view of the chest.    COMPARISON: None.    FINDINGS:    Bilateral midlung linear opacities. No pneumothorax. The cardiomediastinal silhouette is poorly evaluated on this projection. No acute osseous findings.    IMPRESSION:    Bilateral midlung linear opacities, which are of uncertain etiology.      RAMONA REDDY M.D., RADIOLOGY RESIDENT  This document has been electronically signed.  ES VELAZQUEZ M.D., ATTENDING RADIOLOGIST  This document has been electronically signed. Feb 20 2020  9:42AM    ---------------------------------------------------------------------------------------------------------------    EXAM:  CT ANGIO CHEST (W)AW IC                          PROCEDURE DATE:  02/20/2020      INTERPRETATION:  CLINICAL INFORMATION: Shortness of breath and fatigue. History of lung disease.    COMPARISON: Chest radiograph 2/19/2020    PROCEDURE:   CT Angiography of the Chest.  90 ml of Omnipaque 350 was injected intravenously. 10 ml were discarded.  Sagittal and coronal reformats were performed as well as 3D (MIP) reconstructions.    FINDINGS:    LUNGS AND AIRWAYS: Patent central airways. Areas of architectural distortion, mild traction bronchiectasis and linear/reticular opacities. Nonspecific groundglass/patchy opacities in both lungs.      PLEURA: No pleural effusion or pneumothorax.    MEDIASTINUM AND CHAVO: Subcentimeter mediastinal and hilar lymph nodes without lymphadenopathy.    VESSELS: Adequate contrast opacification of the pulmonary arteries. No obvious pulmonary embolus.    HEART: Normal heart size. No pericardial effusion.    CHEST WALL AND LOWER NECK: Heterogeneous thyroid gland, obscured by artifact.    VISUALIZED UPPER ABDOMEN: Colon diverticulosis.    BONES: Degenerative changes of the spine.    IMPRESSION:     No obvious pulmonary embolus.    Findings reflecting known pulmonary fibrosis. Recommend clinical correlation to assess underlying pneumonia. Recommend comparison to previous outside study and follow-up to exclude potential underlying neoplasm.    Heterogeneous thyroid gland, which is difficult to assess due to artifact and can be further characterized on a nonemergent ultrasound as clinically indicated.     Additional findings as described.    RAMONA REDDY M.D., RADIOLOGY RESIDENT  This document has been electronically signed.  MIAH CHAUDHRY M.D., ATTENDING RADIOLOGIST  This document has been electronically signed. Feb 20 2020  1:49AM  ---------------------------------------------------------------------------------------------------------------

## 2020-02-21 NOTE — PROGRESS NOTE ADULT - SUBJECTIVE AND OBJECTIVE BOX
HPI: 68yo male h/o HTN, pulmonary fibrosis, depression/anxiety admitted 2/20 with hypoxia/respiratory failure, found to have +flu and pneumonia. In ED unable to urinate and was straight cath'd 3 times prior to loza being placed. Pt states he noticed dribbling leading up to coming to hospital but typically does not have difficulty voiding. He does have h/o constipation but take metamucil and last BM was last night. Has not been ambulating lately because feeling unwell. Denies any hematuria, dysuria, frequency. Has never seen urologist before.     PAST MEDICAL & SURGICAL HISTORY:  HTN (hypertension)  Depression  Pulmonary fibrosis  H/O inguinal hernia repair    FAMILY HISTORY:  No pertinent family history in first degree relatives    SOCIAL HISTORY:   Tobacco hx:    MEDICATIONS  (STANDING):  albuterol/ipratropium for Nebulization 3 milliLiter(s) Nebulizer every 6 hours  atorvastatin 10 milliGRAM(s) Oral at bedtime  azithromycin  IVPB      azithromycin  IVPB 500 milliGRAM(s) IV Intermittent every 24 hours  benzonatate 200 milliGRAM(s) Oral three times a day  buDESOnide    Inhalation Suspension 0.5 milliGRAM(s) Inhalation every 12 hours  busPIRone 15 milliGRAM(s) Oral three times a day  cefTRIAXone   IVPB 1000 milliGRAM(s) IV Intermittent every 24 hours  diltiazem    milliGRAM(s) Oral daily  doxazosin 2 milliGRAM(s) Oral at bedtime  enoxaparin Injectable 40 milliGRAM(s) SubCutaneous daily  guaifenesin/dextromethorphan  Syrup 10 milliLiter(s) Oral every 6 hours  hydrocodone/homatropine Syrup 5 milliLiter(s) Oral <User Schedule>  methylPREDNISolone sodium succinate Injectable 40 milliGRAM(s) IV Push every 6 hours  oseltamivir 75 milliGRAM(s) Oral two times a day  pantoprazole  Injectable 40 milliGRAM(s) IV Push daily  PARoxetine 30 milliGRAM(s) Oral daily  potassium chloride    Tablet ER 40 milliEquivalent(s) Oral once  potassium phosphate IVPB 15 milliMole(s) IV Intermittent once    MEDICATIONS  (PRN):  acetaminophen   Tablet .. 650 milliGRAM(s) Oral every 6 hours PRN Temp greater or equal to 38C (100.4F)  clonazePAM  Tablet 1 milliGRAM(s) Oral three times a day PRN anxiety    Allergies    penicillin (Anaphylaxis)    Intolerances        REVIEW OF SYSTEMS: Pertinent positives and negatives as stated in HPI, otherwise negative    Vital signs  T(C): 36.8 (02-21-20 @ 12:49), Max: 38.2 (02-20-20 @ 20:49)  HR: 80 (02-21-20 @ 12:49)  BP: 138/64 (02-21-20 @ 12:49)  SpO2: 90% (02-21-20 @ 12:49)  Wt(kg): --    Output    UOP    Physical Exam  Gen: NAD  Pulm: No respiratory distress, no subcostal retractions  CV: RRR, no JVD  Abd: Soft, NT, ND  : Circumcised, no lesions.  No discharge or blood at urethral meatus.  Testes descended bilaterally.  Testes and epididymis nontender bilaterally.    NIKHIL: nontender, non boggy prostate, did not appreciate any nodules.     LABS:          02-21 @ 07:45    WBC 5.46  / Hct 34.1  / SCr --       02-21 @ 07:42    WBC --    / Hct --    / SCr 0.79     02-21    128<L>  |  92<L>  |  14  ----------------------------<  180<H>  3.7   |  21<L>  |  0.79    Ca    7.6<L>      21 Feb 2020 07:42  Phos  2.8     02-21  Mg     2.3     02-21    TPro  5.9<L>  /  Alb  2.8<L>  /  TBili  0.3  /  DBili  <0.1  /  AST  70<H>  /  ALT  24  /  AlkPhos  46  02-20          Urine Cx:   Blood Cx:    RADIOLOGY:

## 2020-02-21 NOTE — PROGRESS NOTE ADULT - SUBJECTIVE AND OBJECTIVE BOX
Patient seen in follow up for hyponatremia. Sodium improved to 128 this morning but went down again to 125. The patient is on high flow O2 and has been drinking water at [facility], states that he does not recall to be told about fluid restriction. Seen by BIRGIT With Scruggs catheter.    HTN (hypertension)  Depression  Pulmonary fibrosis  BPH (benign prostatic hyperplasia)  HTN    MEDICATIONS  (STANDING):  albuterol/ipratropium for Nebulization 3 milliLiter(s) Nebulizer every 6 hours  atorvastatin 10 milliGRAM(s) Oral at bedtime  azithromycin  IVPB      azithromycin  IVPB 500 milliGRAM(s) IV Intermittent every 24 hours  benzonatate 200 milliGRAM(s) Oral three times a day  buDESOnide    Inhalation Suspension 0.5 milliGRAM(s) Inhalation every 12 hours  busPIRone 15 milliGRAM(s) Oral three times a day  cefTRIAXone   IVPB 1000 milliGRAM(s) IV Intermittent every 24 hours  diltiazem    milliGRAM(s) Oral daily  doxazosin 4 milliGRAM(s) Oral at bedtime  enoxaparin Injectable 40 milliGRAM(s) SubCutaneous daily  guaifenesin/dextromethorphan  Syrup 10 milliLiter(s) Oral every 6 hours  hydrocodone/homatropine Syrup 5 milliLiter(s) Oral <User Schedule>  methylPREDNISolone sodium succinate Injectable 40 milliGRAM(s) IV Push every 6 hours  oseltamivir 75 milliGRAM(s) Oral two times a day  pantoprazole  Injectable 40 milliGRAM(s) IV Push daily  PARoxetine 30 milliGRAM(s) Oral daily  potassium chloride    Tablet ER 40 milliEquivalent(s) Oral once  potassium phosphate IVPB 15 milliMole(s) IV Intermittent once    MEDICATIONS  (PRN):  acetaminophen   Tablet .. 650 milliGRAM(s) Oral every 6 hours PRN Temp greater or equal to 38C (100.4F)  clonazePAM  Tablet 1 milliGRAM(s) Oral three times a day PRN anxiety    T(C): 37.1 (02-21-20 @ 16:24), Max: 38.2 (02-20-20 @ 20:49)  HR: 70 (02-21-20 @ 18:13) (66 - 98)  BP: 128/67 (02-21-20 @ 16:24) (126/62 - 164/78)  RR: 20 (02-21-20 @ 18:13) (18 - 37)  SpO2: 89% (02-21-20 @ 18:13) (76% - 98%)  Wt(kg): --  I&O's Detail    20 Feb 2020 07:01  -  21 Feb 2020 07:00  --------------------------------------------------------  IN:    Oral Fluid: 280 mL  Total IN: 280 mL    OUT:    Intermittent Catheterization - Urethral: 850 mL    Voided: 800 mL  Total OUT: 1650 mL    Total NET: -1370 mL      21 Feb 2020 07:01  -  21 Feb 2020 18:32  --------------------------------------------------------  IN:  Total IN: 0 mL    OUT:    Intermittent Catheterization - Urethral: 500 mL  Total OUT: 500 mL    Total NET: -500 mL        PHYSICAL EXAM:  General: NAD, AAO x3, conversant  No JVD  Respiratory: b/l air entry, scattered crackles  Cardiovascular: S1 S2 reg  Gastrointestinal: soft, obese, NT  Extremities: no edema  Neuro: nonfocal      CBC Full  -  ( 21 Feb 2020 07:45 )  WBC Count : 5.46 K/uL  RBC Count : 3.87 M/uL  Hemoglobin : 11.6 g/dL  Hematocrit : 34.1 %  Platelet Count - Automated : 194 K/uL  Mean Cell Volume : 88.1 fl  Mean Cell Hemoglobin : 30.0 pg  Mean Cell Hemoglobin Concentration : 34.0 gm/dL  Auto Neutrophil # : x  Auto Lymphocyte # : x  Auto Monocyte # : x  Auto Eosinophil # : x  Auto Basophil # : x  Auto Neutrophil % : x  Auto Lymphocyte % : x  Auto Monocyte % : x  Auto Eosinophil % : x  Auto Basophil % : x    02-21    125<L>  |  91<L>  |  14  ----------------------------<  283<H>  3.6   |  21<L>  |  0.80    Ca    7.6<L>      21 Feb 2020 16:08  Phos  2.8     02-21  Mg     2.3     02-21    TPro  5.9<L>  /  Alb  2.8<L>  /  TBili  0.3  /  DBili  <0.1  /  AST  70<H>  /  ALT  24  /  AlkPhos  46  02-20    ABG - ( 21 Feb 2020 06:24 )  pH, Arterial: 7.49  pH, Blood: x     /  pCO2: 29    /  pO2: 53    / HCO3: 22    / Base Excess: -.1   /  SaO2: 89                  Sodium, Serum: 125 (02-21 @ 16:08)  Sodium, Serum: 128 (02-21 @ 07:42)  Sodium, Serum: 125 (02-20 @ 22:02)  Sodium, Serum: 124 (02-20 @ 16:24)  Sodium, Serum: 122 (02-20 @ 11:45)  Sodium, Serum: 121 (02-20 @ 04:46)  Sodium, Serum: 124 (02-19 @ 22:30)    Creatinine, Serum: 0.80 (02-21 @ 16:08)  Creatinine, Serum: 0.79 (02-21 @ 07:42)  Creatinine, Serum: 0.85 (02-20 @ 22:02)  Creatinine, Serum: 0.91 (02-20 @ 16:24)  Creatinine, Serum: 0.95 (02-20 @ 11:45)  Creatinine, Serum: 1.07 (02-20 @ 04:46)  Creatinine, Serum: 1.38 (02-19 @ 22:30)    Potassium, Serum: 3.6 (02-21 @ 16:08)  Potassium, Serum: 3.7 (02-21 @ 07:42)  Potassium, Serum: 3.2 (02-20 @ 22:02)  Potassium, Serum: 4.0 (02-20 @ 16:24)  Potassium, Serum: 3.8 (02-20 @ 11:45)  Potassium, Serum: 3.2 (02-20 @ 04:46)  Potassium, Serum: 3.6 (02-19 @ 22:30)    Hemoglobin: 11.6 (02-21 @ 07:45)  Hemoglobin: 11.8 (02-20 @ 04:46)  Hemoglobin: 12.4 (02-19 @ 22:30)

## 2020-02-21 NOTE — PROGRESS NOTE ADULT - SUBJECTIVE AND OBJECTIVE BOX
CC: f/u for respiratory distress and influenza    Patient reports: he is more comfortable, breathing on BIPAP,no fever, minimal sputum, he required loza for retention    REVIEW OF SYSTEMS:  All other review of systems negative (Comprehensive ROS)    Antimicrobials Day #  :day 3  azithromycin  IVPB      azithromycin  IVPB 500 milliGRAM(s) IV Intermittent every 24 hours  cefTRIAXone   IVPB 1000 milliGRAM(s) IV Intermittent every 24 hours  oseltamivir 75 milliGRAM(s) Oral two times a day day ?5    Other Medications Reviewed    T(F): 97.8 (02-21-20 @ 05:10), Max: 100.7 (02-20-20 @ 20:49)  HR: 69 (02-21-20 @ 06:39)  BP: 126/62 (02-21-20 @ 05:10)  RR: 20 (02-21-20 @ 06:39)  SpO2: 92% (02-21-20 @ 06:39)  Wt(kg): --    PHYSICAL EXAM:  General: alert, no acute distress, on BIPAP  Eyes:  anicteric, no conjunctival injection, no discharge  Oropharynx: no lesions or injection 	  Neck: supple, without adenopathy  Lungs: scattered wheezes and distant BS  Heart: regular rate and rhythm; no murmur, rubs or gallops  Abdomen: soft, nondistended, nontender, without mass or organomegaly  Skin: no lesions  Extremities: no clubbing, cyanosis, or edema  Neurologic: alert, oriented, moves all extremities    LAB RESULTS:                        11.8   7.28  )-----------( 196      ( 20 Feb 2020 04:46 )             32.9     02-20    125<L>  |  91<L>  |  13  ----------------------------<  127<H>  3.2<L>   |  19<L>  |  0.85    Ca    7.5<L>      20 Feb 2020 22:02  Phos  2.3     02-20  Mg     1.9     02-20    TPro  5.9<L>  /  Alb  2.8<L>  /  TBili  0.3  /  DBili  <0.1  /  AST  70<H>  /  ALT  24  /  AlkPhos  46  02-20    LIVER FUNCTIONS - ( 20 Feb 2020 16:24 )  Alb: 2.8 g/dL / Pro: 5.9 g/dL / ALK PHOS: 46 U/L / ALT: 24 U/L / AST: 70 U/L / GGT: x             MICROBIOLOGY:  RECENT CULTURES:    legionella urine antigen negative  RADIOLOGY REVIEWED:    < from: CT Angio Chest w/ IV Cont (02.20.20 @ 00:14) >    INTERPRETATION:  CLINICAL INFORMATION: Shortness of breath and fatigue. History of lung disease.    COMPARISON: Chest radiograph 2/19/2020    PROCEDURE:   CT Angiography of the Chest.  90 ml of Omnipaque 350 was injected intravenously. 10 ml were discarded.  Sagittal and coronal reformats were performed as well as 3D (MIP) reconstructions.    FINDINGS:    LUNGS AND AIRWAYS: Patent central airways. Areas of architectural distortion, mild traction bronchiectasis and linear/reticular opacities. Nonspecific groundglass/patchy opacities in both lungs.      PLEURA: No pleural effusion or pneumothorax.    MEDIASTINUM AND CHAVO: Subcentimeter mediastinaland hilar lymph nodes without lymphadenopathy.    VESSELS: Adequate contrast opacification of the pulmonary arteries. No obvious pulmonary embolus.    HEART: Normal heart size. No pericardial effusion.    CHEST WALL AND LOWER NECK: Heterogeneous thyroid gland, obscured by artifact.    VISUALIZED UPPER ABDOMEN: Colon diverticulosis.    BONES: Degenerative changes of the spine.    IMPRESSION:     No obvious pulmonary embolus.    Findings reflecting known pulmonary fibrosis. Recommend clinical correlation to assess underlying pneumonia. Recommend comparison to previous outside study and follow-up to exclude potential underlying neoplasm.    Heterogeneous thyroid gland, which is difficult to assess due to artifact and can be further characterizedon a nonemergent ultrasound as clinically indicated.     Additional findings as described.    < end of copied text >  < from: CT Angio Chest w/ IV Cont (02.20.20 @ 00:14) >    INTERPRETATION:  CLINICAL INFORMATION: Shortness of breath and fatigue. History of lung disease.    COMPARISON: Chest radiograph 2/19/2020    PROCEDURE:   CT Angiography of the Chest.  90 ml of Omnipaque 350 was injected intravenously. 10 ml were discarded.  Sagittal and coronal reformats were performed as well as 3D (MIP) reconstructions.    FINDINGS:    LUNGS AND AIRWAYS: Patent central airways. Areas of architectural distortion, mild traction bronchiectasis and linear/reticular opacities. Nonspecific groundglass/patchy opacities in both lungs.      PLEURA: No pleural effusion or pneumothorax.    MEDIASTINUM AND CHAVO: Subcentimeter mediastinaland hilar lymph nodes without lymphadenopathy.    VESSELS: Adequate contrast opacification of the pulmonary arteries. No obvious pulmonary embolus.    HEART: Normal heart size. No pericardial effusion.    CHEST WALL AND LOWER NECK: Heterogeneous thyroid gland, obscured by artifact.    VISUALIZED UPPER ABDOMEN: Colon diverticulosis.    BONES: Degenerative changes of the spine.    IMPRESSION:     No obvious pulmonary embolus.    Findings reflecting known pulmonary fibrosis. Recommend clinical correlation to assess underlying pneumonia. Recommend comparison to previous outside study and follow-up to exclude potential underlying neoplasm.    Heterogeneous thyroid gland, which is difficult to assess due to artifact and can be further characterizedon a nonemergent ultrasound as clinically indicated.     Additional findings as described.    < end of copied text >

## 2020-02-21 NOTE — PROGRESS NOTE ADULT - ASSESSMENT
70yo male with urinary retention  - increase cardura to 4mg daily  - keep loza for now  - encourage ambulation  - continue bowel regimen (takes metamucil at home)  - will discuss with attending

## 2020-02-21 NOTE — PROGRESS NOTE ADULT - ASSESSMENT
diarrhea  anemia    no further diarrhea  denies melena/hematochezia  h/h stable   diet as tolerated  colonoscopy 3 years ago, no need to repeat as inpatient  monitor for gi bleed  will follow

## 2020-02-21 NOTE — PROGRESS NOTE ADULT - ASSESSMENT
70 yo male with HTN, anxiety disorder, and pulmonary fibrosis admitted with hypoxic respiratory failure.  Influenza A diagnosed 2/16 with a rapid test.  Lack of improvement with tamiflu documented and raises concern of both viral pneumonia and a secondary process such as a bacterial pneumonia  CTA is negative for PE.  No baseline CT in  system to compare yesterdays scan to.  Agree with CTX and azithro pending w/u  Additional history may be available later from his pulmonary physician and he and his wife have been reluctant to talk to me, they are to upset.  He reportedly self medicated with some clarithromycin last week, not clear how many doses  No blood cultures sent in ER.Legionella urine antigen negative  Suggest:  1.Tamiflu, perhaps would complete 5 days from time of admission at hospital  2,CTX,azithro given lack of response to tamiflu-perhaps 5 days  3,Sputum culture, MRSA screen, PC level ordered, pending  4.Steroids per pulmonary  5.Even if PC level is low I still might opt for 3-5 days of antibiotics here.  6,Will advise blood cultures if fever persists

## 2020-02-22 LAB
ALBUMIN SERPL ELPH-MCNC: 2.9 G/DL — LOW (ref 3.3–5)
ANION GAP SERPL CALC-SCNC: 12 MMOL/L — SIGNIFICANT CHANGE UP (ref 5–17)
ANION GAP SERPL CALC-SCNC: 13 MMOL/L — SIGNIFICANT CHANGE UP (ref 5–17)
ANION GAP SERPL CALC-SCNC: 15 MMOL/L — SIGNIFICANT CHANGE UP (ref 5–17)
BUN SERPL-MCNC: 17 MG/DL — SIGNIFICANT CHANGE UP (ref 7–23)
BUN SERPL-MCNC: 21 MG/DL — SIGNIFICANT CHANGE UP (ref 7–23)
BUN SERPL-MCNC: 21 MG/DL — SIGNIFICANT CHANGE UP (ref 7–23)
BUN SERPL-MCNC: 22 MG/DL — SIGNIFICANT CHANGE UP (ref 7–23)
BUN SERPL-MCNC: 24 MG/DL — HIGH (ref 7–23)
CALCIUM SERPL-MCNC: 7.5 MG/DL — LOW (ref 8.4–10.5)
CALCIUM SERPL-MCNC: 7.6 MG/DL — LOW (ref 8.4–10.5)
CALCIUM SERPL-MCNC: 7.6 MG/DL — LOW (ref 8.4–10.5)
CALCIUM SERPL-MCNC: 7.7 MG/DL — LOW (ref 8.4–10.5)
CALCIUM SERPL-MCNC: 7.7 MG/DL — LOW (ref 8.4–10.5)
CHLORIDE SERPL-SCNC: 88 MMOL/L — LOW (ref 96–108)
CHLORIDE SERPL-SCNC: 90 MMOL/L — LOW (ref 96–108)
CHLORIDE SERPL-SCNC: 91 MMOL/L — LOW (ref 96–108)
CO2 SERPL-SCNC: 21 MMOL/L — LOW (ref 22–31)
CO2 SERPL-SCNC: 21 MMOL/L — LOW (ref 22–31)
CO2 SERPL-SCNC: 22 MMOL/L — SIGNIFICANT CHANGE UP (ref 22–31)
CREAT SERPL-MCNC: 0.87 MG/DL — SIGNIFICANT CHANGE UP (ref 0.5–1.3)
CREAT SERPL-MCNC: 0.88 MG/DL — SIGNIFICANT CHANGE UP (ref 0.5–1.3)
CREAT SERPL-MCNC: 0.9 MG/DL — SIGNIFICANT CHANGE UP (ref 0.5–1.3)
GLUCOSE SERPL-MCNC: 248 MG/DL — HIGH (ref 70–99)
GLUCOSE SERPL-MCNC: 248 MG/DL — HIGH (ref 70–99)
GLUCOSE SERPL-MCNC: 269 MG/DL — HIGH (ref 70–99)
GLUCOSE SERPL-MCNC: 273 MG/DL — HIGH (ref 70–99)
GLUCOSE SERPL-MCNC: 321 MG/DL — HIGH (ref 70–99)
HCT VFR BLD CALC: 34.3 % — LOW (ref 39–50)
HGB BLD-MCNC: 11.8 G/DL — LOW (ref 13–17)
MAGNESIUM SERPL-MCNC: 2.6 MG/DL — SIGNIFICANT CHANGE UP (ref 1.6–2.6)
MCHC RBC-ENTMCNC: 30.2 PG — SIGNIFICANT CHANGE UP (ref 27–34)
MCHC RBC-ENTMCNC: 34.4 GM/DL — SIGNIFICANT CHANGE UP (ref 32–36)
MCV RBC AUTO: 87.7 FL — SIGNIFICANT CHANGE UP (ref 80–100)
MRSA PCR RESULT.: SIGNIFICANT CHANGE UP
NRBC # BLD: 0 /100 WBCS — SIGNIFICANT CHANGE UP (ref 0–0)
PHOSPHATE SERPL-MCNC: 2.2 MG/DL — LOW (ref 2.5–4.5)
PLATELET # BLD AUTO: 186 K/UL — SIGNIFICANT CHANGE UP (ref 150–400)
POTASSIUM SERPL-MCNC: 3.8 MMOL/L — SIGNIFICANT CHANGE UP (ref 3.5–5.3)
POTASSIUM SERPL-MCNC: 3.9 MMOL/L — SIGNIFICANT CHANGE UP (ref 3.5–5.3)
POTASSIUM SERPL-MCNC: 4 MMOL/L — SIGNIFICANT CHANGE UP (ref 3.5–5.3)
POTASSIUM SERPL-MCNC: 4 MMOL/L — SIGNIFICANT CHANGE UP (ref 3.5–5.3)
POTASSIUM SERPL-MCNC: 4.1 MMOL/L — SIGNIFICANT CHANGE UP (ref 3.5–5.3)
POTASSIUM SERPL-SCNC: 3.8 MMOL/L — SIGNIFICANT CHANGE UP (ref 3.5–5.3)
POTASSIUM SERPL-SCNC: 3.9 MMOL/L — SIGNIFICANT CHANGE UP (ref 3.5–5.3)
POTASSIUM SERPL-SCNC: 4 MMOL/L — SIGNIFICANT CHANGE UP (ref 3.5–5.3)
POTASSIUM SERPL-SCNC: 4 MMOL/L — SIGNIFICANT CHANGE UP (ref 3.5–5.3)
POTASSIUM SERPL-SCNC: 4.1 MMOL/L — SIGNIFICANT CHANGE UP (ref 3.5–5.3)
RBC # BLD: 3.91 M/UL — LOW (ref 4.2–5.8)
RBC # FLD: 13.9 % — SIGNIFICANT CHANGE UP (ref 10.3–14.5)
S AUREUS DNA NOSE QL NAA+PROBE: SIGNIFICANT CHANGE UP
SODIUM SERPL-SCNC: 123 MMOL/L — LOW (ref 135–145)
SODIUM SERPL-SCNC: 123 MMOL/L — LOW (ref 135–145)
SODIUM SERPL-SCNC: 124 MMOL/L — LOW (ref 135–145)
SODIUM SERPL-SCNC: 125 MMOL/L — LOW (ref 135–145)
SODIUM SERPL-SCNC: 126 MMOL/L — LOW (ref 135–145)
WBC # BLD: 15.96 K/UL — HIGH (ref 3.8–10.5)
WBC # FLD AUTO: 15.96 K/UL — HIGH (ref 3.8–10.5)

## 2020-02-22 PROCEDURE — 99233 SBSQ HOSP IP/OBS HIGH 50: CPT

## 2020-02-22 PROCEDURE — 71045 X-RAY EXAM CHEST 1 VIEW: CPT | Mod: 26

## 2020-02-22 RX ORDER — FUROSEMIDE 40 MG
20 TABLET ORAL ONCE
Refills: 0 | Status: DISCONTINUED | OUTPATIENT
Start: 2020-02-22 | End: 2020-02-22

## 2020-02-22 RX ORDER — ACETAMINOPHEN 500 MG
650 TABLET ORAL EVERY 6 HOURS
Refills: 0 | Status: DISCONTINUED | OUTPATIENT
Start: 2020-02-22 | End: 2020-02-25

## 2020-02-22 RX ORDER — FUROSEMIDE 40 MG
20 TABLET ORAL ONCE
Refills: 0 | Status: COMPLETED | OUTPATIENT
Start: 2020-02-22 | End: 2020-02-22

## 2020-02-22 RX ADMIN — Medication 10 MILLILITER(S): at 18:08

## 2020-02-22 RX ADMIN — Medication 3 MILLILITER(S): at 05:18

## 2020-02-22 RX ADMIN — Medication 0.5 MILLIGRAM(S): at 05:19

## 2020-02-22 RX ADMIN — Medication 3 MILLILITER(S): at 18:07

## 2020-02-22 RX ADMIN — Medication 75 MILLIGRAM(S): at 18:07

## 2020-02-22 RX ADMIN — Medication 200 MILLIGRAM(S): at 21:43

## 2020-02-22 RX ADMIN — Medication 10 MILLILITER(S): at 05:17

## 2020-02-22 RX ADMIN — Medication 40 MILLIGRAM(S): at 22:23

## 2020-02-22 RX ADMIN — Medication 10 MILLILITER(S): at 22:23

## 2020-02-22 RX ADMIN — ATORVASTATIN CALCIUM 10 MILLIGRAM(S): 80 TABLET, FILM COATED ORAL at 21:43

## 2020-02-22 RX ADMIN — Medication 40 MILLIGRAM(S): at 05:17

## 2020-02-22 RX ADMIN — Medication 15 MILLIGRAM(S): at 14:17

## 2020-02-22 RX ADMIN — Medication 3 MILLILITER(S): at 11:06

## 2020-02-22 RX ADMIN — CEFTRIAXONE 100 MILLIGRAM(S): 500 INJECTION, POWDER, FOR SOLUTION INTRAMUSCULAR; INTRAVENOUS at 05:20

## 2020-02-22 RX ADMIN — Medication 75 MILLIGRAM(S): at 05:18

## 2020-02-22 RX ADMIN — Medication 1 MILLIGRAM(S): at 11:05

## 2020-02-22 RX ADMIN — Medication 3 MILLILITER(S): at 22:26

## 2020-02-22 RX ADMIN — Medication 0.5 MILLIGRAM(S): at 18:07

## 2020-02-22 RX ADMIN — ENOXAPARIN SODIUM 40 MILLIGRAM(S): 100 INJECTION SUBCUTANEOUS at 11:05

## 2020-02-22 RX ADMIN — Medication 20 MILLIGRAM(S): at 22:22

## 2020-02-22 RX ADMIN — Medication 240 MILLIGRAM(S): at 05:17

## 2020-02-22 RX ADMIN — Medication 200 MILLIGRAM(S): at 14:17

## 2020-02-22 RX ADMIN — Medication 15 MILLIGRAM(S): at 21:43

## 2020-02-22 RX ADMIN — Medication 200 MILLIGRAM(S): at 05:17

## 2020-02-22 RX ADMIN — Medication 40 MILLIGRAM(S): at 18:08

## 2020-02-22 RX ADMIN — Medication 40 MILLIGRAM(S): at 11:06

## 2020-02-22 RX ADMIN — AZITHROMYCIN 250 MILLIGRAM(S): 500 TABLET, FILM COATED ORAL at 21:44

## 2020-02-22 RX ADMIN — PANTOPRAZOLE SODIUM 40 MILLIGRAM(S): 20 TABLET, DELAYED RELEASE ORAL at 05:17

## 2020-02-22 RX ADMIN — Medication 15 MILLIGRAM(S): at 05:18

## 2020-02-22 RX ADMIN — Medication 30 MILLIGRAM(S): at 11:05

## 2020-02-22 RX ADMIN — Medication 4 MILLIGRAM(S): at 21:43

## 2020-02-22 RX ADMIN — Medication 10 MILLILITER(S): at 11:06

## 2020-02-22 NOTE — PATIENT PROFILE ADULT - [facility] [employer]
Patient Name: Vita Carrillo  Specialist or PCP: zach  Symptoms: stomach pain  Best Availability: anytime  Callback Number: [phone]      Thank you,  Denice Leslie    
See Triage Call   
(3) slightly limited

## 2020-02-22 NOTE — PROGRESS NOTE ADULT - SUBJECTIVE AND OBJECTIVE BOX
Patient seen in follow up for hyponatremia, remains on high flow O2, receiving large amount of water with I.V. antibiotics, states adherence to oral fluid restriction.    MEDICATIONS  (STANDING):  albuterol/ipratropium for Nebulization 3 milliLiter(s) Nebulizer every 6 hours  atorvastatin 10 milliGRAM(s) Oral at bedtime  azithromycin  IVPB      azithromycin  IVPB 500 milliGRAM(s) IV Intermittent every 24 hours  benzonatate 200 milliGRAM(s) Oral three times a day  buDESOnide    Inhalation Suspension 0.5 milliGRAM(s) Inhalation every 12 hours  busPIRone 15 milliGRAM(s) Oral three times a day  cefTRIAXone   IVPB 1000 milliGRAM(s) IV Intermittent every 24 hours  diltiazem    milliGRAM(s) Oral daily  doxazosin 4 milliGRAM(s) Oral at bedtime  enoxaparin Injectable 40 milliGRAM(s) SubCutaneous daily  furosemide   Injectable 20 milliGRAM(s) IV Push once  furosemide   Injectable 20 milliGRAM(s) IV Push once  guaifenesin/dextromethorphan  Syrup 10 milliLiter(s) Oral every 6 hours  hydrocodone/homatropine Syrup 5 milliLiter(s) Oral <User Schedule>  methylPREDNISolone sodium succinate Injectable 40 milliGRAM(s) IV Push every 6 hours  oseltamivir 75 milliGRAM(s) Oral two times a day  pantoprazole  Injectable 40 milliGRAM(s) IV Push daily  PARoxetine 30 milliGRAM(s) Oral daily  potassium chloride    Tablet ER 40 milliEquivalent(s) Oral once  potassium phosphate IVPB 15 milliMole(s) IV Intermittent once    MEDICATIONS  (PRN):  acetaminophen   Tablet .. 650 milliGRAM(s) Oral every 6 hours PRN Temp greater or equal to 38C (100.4F), Mild Pain (1 - 3), Moderate Pain (4 - 6)  clonazePAM  Tablet 1 milliGRAM(s) Oral three times a day PRN anxiety    T(C): 36.7 (02-22-20 @ 21:28), Max: 37.1 (02-21-20 @ 16:24)  HR: 62 (02-22-20 @ 21:28) (59 - 82)  BP: 156/79 (02-22-20 @ 21:28) (122/64 - 156/79)  RR: 20 (02-22-20 @ 21:28) (18 - 22)  SpO2: 84% (02-22-20 @ 21:28) (84% - 90%)  Wt(kg): --  I&O's Detail    21 Feb 2020 07:01  -  22 Feb 2020 07:00  --------------------------------------------------------  IN:    IV PiggyBack: 300 mL    Oral Fluid: 700 mL  Total IN: 1000 mL    OUT:    Indwelling Catheter - Urethral: 350 mL    Intermittent Catheterization - Urethral: 500 mL    Voided: 500 mL  Total OUT: 1350 mL    Total NET: -350 mL      22 Feb 2020 07:01  -  22 Feb 2020 22:18  --------------------------------------------------------  IN:  Total IN: 0 mL    OUT:    Indwelling Catheter - Urethral: 1400 mL  Total OUT: 1400 mL    Total NET: -1400 mL      PHYSICAL EXAM:  General: NAD, AAO x3  No JVD  Respiratory: b/l air entry, bilateral crackles  Cardiovascular: S1 S2  Gastrointestinal: soft, obese, NT  Extremities: no edema    CBC Full  -  ( 22 Feb 2020 06:47 )  WBC Count : 15.96 K/uL  RBC Count : 3.91 M/uL  Hemoglobin : 11.8 g/dL  Hematocrit : 34.3 %  Platelet Count - Automated : 186 K/uL  Mean Cell Volume : 87.7 fl  Mean Cell Hemoglobin : 30.2 pg  Mean Cell Hemoglobin Concentration : 34.4 gm/dL  Auto Neutrophil # : x  Auto Lymphocyte # : x  Auto Monocyte # : x  Auto Eosinophil # : x  Auto Basophil # : x  Auto Neutrophil % : x  Auto Lymphocyte % : x  Auto Monocyte % : x  Auto Eosinophil % : x  Auto Basophil % : x    02-22    126<L>  |  91<L>  |  24<H>  ----------------------------<  273<H>  4.1   |  22  |  0.90    Ca    7.5<L>      22 Feb 2020 18:24  Phos  2.2     02-22  Mg     2.6     02-22    TPro  x   /  Alb  2.9<L>  /  TBili  x   /  DBili  x   /  AST  x   /  ALT  x   /  AlkPhos  x   02-22    ABG - ( 21 Feb 2020 06:24 )  pH, Arterial: 7.49  pH, Blood: x     /  pCO2: 29    /  pO2: 53    / HCO3: 22    / Base Excess: -.1   /  SaO2: 89            Sodium, Serum: 126 (02-22 @ 18:24)  Sodium, Serum: 125 (02-22 @ 12:51)  Sodium, Serum: 123 (02-22 @ 06:48)  Sodium, Serum: 124 (02-21 @ 23:32)  Sodium, Serum: 125 (02-21 @ 16:08)  Sodium, Serum: 128 (02-21 @ 07:42)  Sodium, Serum: 125 (02-20 @ 22:02)  Sodium, Serum: 124 (02-20 @ 16:24)  Sodium, Serum: 122 (02-20 @ 11:45)  Sodium, Serum: 121 (02-20 @ 04:46)  Sodium, Serum: 124 (02-19 @ 22:30)    Creatinine, Serum: 0.90 (02-22 @ 18:24)  Creatinine, Serum: 0.87 (02-22 @ 12:51)  Creatinine, Serum: 0.90 (02-22 @ 06:48)  Creatinine, Serum: 0.88 (02-21 @ 23:32)  Creatinine, Serum: 0.80 (02-21 @ 16:08)  Creatinine, Serum: 0.79 (02-21 @ 07:42)  Creatinine, Serum: 0.85 (02-20 @ 22:02)  Creatinine, Serum: 0.91 (02-20 @ 16:24)  Creatinine, Serum: 0.95 (02-20 @ 11:45)  Creatinine, Serum: 1.07 (02-20 @ 04:46)  Creatinine, Serum: 1.38 (02-19 @ 22:30)    Potassium, Serum: 4.1 (02-22 @ 18:24)  Potassium, Serum: 3.9 (02-22 @ 12:51)  Potassium, Serum: 4.0 (02-22 @ 06:48)  Potassium, Serum: 3.8 (02-21 @ 23:32)  Potassium, Serum: 3.6 (02-21 @ 16:08)  Potassium, Serum: 3.7 (02-21 @ 07:42)  Potassium, Serum: 3.2 (02-20 @ 22:02)  Potassium, Serum: 4.0 (02-20 @ 16:24)  Potassium, Serum: 3.8 (02-20 @ 11:45)  Potassium, Serum: 3.2 (02-20 @ 04:46)  Potassium, Serum: 3.6 (02-19 @ 22:30)    Hemoglobin: 11.8 (02-22 @ 06:47)  Hemoglobin: 11.6 (02-21 @ 07:45)  Hemoglobin: 11.8 (02-20 @ 04:46)  Hemoglobin: 12.4 (02-19 @ 22:30)

## 2020-02-22 NOTE — PROGRESS NOTE ADULT - SUBJECTIVE AND OBJECTIVE BOX
GASTROENTEROLOGY    Patient seen and examined earlier this am  Awake, alert, responsive  On Hi flow nasal cannula  Last BM on Thursday-soft brown stool  Hgb/Hct stable        MEDICATIONS  (STANDING):  albuterol/ipratropium for Nebulization 3 milliLiter(s) Nebulizer every 6 hours  atorvastatin 10 milliGRAM(s) Oral at bedtime  azithromycin  IVPB      azithromycin  IVPB 500 milliGRAM(s) IV Intermittent every 24 hours  benzonatate 200 milliGRAM(s) Oral three times a day  buDESOnide    Inhalation Suspension 0.5 milliGRAM(s) Inhalation every 12 hours  busPIRone 15 milliGRAM(s) Oral three times a day  cefTRIAXone   IVPB 1000 milliGRAM(s) IV Intermittent every 24 hours  diltiazem    milliGRAM(s) Oral daily  doxazosin 4 milliGRAM(s) Oral at bedtime  enoxaparin Injectable 40 milliGRAM(s) SubCutaneous daily  guaifenesin/dextromethorphan  Syrup 10 milliLiter(s) Oral every 6 hours  hydrocodone/homatropine Syrup 5 milliLiter(s) Oral <User Schedule>  methylPREDNISolone sodium succinate Injectable 40 milliGRAM(s) IV Push every 6 hours  oseltamivir 75 milliGRAM(s) Oral two times a day  pantoprazole  Injectable 40 milliGRAM(s) IV Push daily  PARoxetine 30 milliGRAM(s) Oral daily  potassium chloride    Tablet ER 40 milliEquivalent(s) Oral once  potassium phosphate IVPB 15 milliMole(s) IV Intermittent once        T(F): 98.1 (02-22-20 @ 05:27), Max: 98.8 (02-21-20 @ 16:24)  HR: 70 (02-22-20 @ 06:10) (66 - 82)  BP: 122/64 (02-22-20 @ 05:27) (122/64 - 138/64)  RR: 20 (02-22-20 @ 06:10) (18 - 22)  SpO2: 90% (02-22-20 @ 06:10) (88% - 90%)  Wt(kg): --    PHYSICAL EXAM  GENERAL:   NAD  HEENT:  NC/AT, no JVD, sclera-anicteric  CHEST:  coarse breath sounds bilaterally, respirations nonlabored  HEART:  +S1+S2   ABDOMEN:  Soft, non-tender, non-distended, + bowel sounds  EXTREMITIES:  no cyanosis, clubbing or edema  NEURO:  Alert, responsive  SKIN:  No rash/warm/dry      LABS:                        11.8<L>  15.96<H> )-----------( 186      ( 22 Feb 2020 06:47 )             34.3<L>  22 Feb 2020 06:47    02-22    123<L>  |  90<L>  |  21  ----------------------------<  248<H>  4.0   |  21<L>  |  0.90    Ca    7.6<L>      22 Feb 2020 06:48  Phos  2.2     02-22  Mg     2.6     02-22    TPro  x   /  Alb  2.9<L>  /  TBili  x   /  DBili  x   /  AST  x   /  ALT  x   /  AlkPhos  x   02-22    LIVER FUNCTIONS - ( 22 Feb 2020 06:48 )  Alb: 2.9 g/dL / Pro: x     / ALK PHOS: x     / ALT: x     / AST: x     / GGT: x             I&O's Detail    21 Feb 2020 07:01  -  22 Feb 2020 07:00  --------------------------------------------------------  IN:    IV PiggyBack: 300 mL    Oral Fluid: 700 mL  Total IN: 1000 mL    OUT:    Indwelling Catheter - Urethral: 350 mL    Intermittent Catheterization - Urethral: 500 mL    Voided: 500 mL  Total OUT: 1350 mL    Total NET: -350 mL          Culture - Blood (collected 20 Feb 2020 10:14)  Source: .Blood Blood-Venous  Preliminary Report (21 Feb 2020 11:01):    No growth to date.    Culture - Blood (collected 20 Feb 2020 10:14)  Source: .Blood Blood-Peripheral  Preliminary Report (21 Feb 2020 11:01):    No growth to date.

## 2020-02-22 NOTE — PROGRESS NOTE ADULT - SUBJECTIVE AND OBJECTIVE BOX
[facility] - PROGRESS NOTE    CHIEF COMPLAINT: hypoxic respiratory failure; influenza; pneumonia; bronchospasm; pulmonary fibrosis;     INTERVAL HISTORY: hypoxic on maximal dose of high flow nasal canula - refusing BIPAP; short of breath with minimal exertion; improved cough with minimal sputum production; no chest congestion or wheeze; no fevers, chills or sweats; no chest pain/pressure or palpitations;  less fatigue, malaise and weakness; good appetite; urinary retention -> loza    REVIEW OF SYSTEMS:  Constitutional: As per interval history  HEENT: Within normal limits  CV: As per interval history  Resp: As per interval history  GI: Within normal limits   : urinary retention  Musculoskeletal: Within normal limits  Skin: Within normal limits  Neurological: Within normal limits  Psychiatric: anxiety type depression  Endocrine: Within normal limits  Hematologic/Lymphatic: Within normal limits  Allergic/Immunologic: Within normal limits    MEDICATIONS:     Pulmonary "  albuterol/ipratropium for Nebulization 3 milliLiter(s) Nebulizer every 6 hours  benzonatate 200 milliGRAM(s) Oral three times a day  buDESOnide    Inhalation Suspension 0.5 milliGRAM(s) Inhalation every 12 hours  guaifenesin/dextromethorphan  Syrup 10 milliLiter(s) Oral every 6 hours  hydrocodone/homatropine Syrup 5 milliLiter(s) Oral <User Schedule>    Anti-microbials:  azithromycin  IVPB      azithromycin  IVPB 500 milliGRAM(s) IV Intermittent every 24 hours  cefTRIAXone   IVPB 1000 milliGRAM(s) IV Intermittent every 24 hours  oseltamivir 75 milliGRAM(s) Oral two times a day    Cardiovascular:  diltiazem    milliGRAM(s) Oral daily  doxazosin 4 milliGRAM(s) Oral at bedtime    Other:  atorvastatin 10 milliGRAM(s) Oral at bedtime  busPIRone 15 milliGRAM(s) Oral three times a day  enoxaparin Injectable 40 milliGRAM(s) SubCutaneous daily  methylPREDNISolone sodium succinate Injectable 40 milliGRAM(s) IV Push every 6 hours  pantoprazole  Injectable 40 milliGRAM(s) IV Push daily  PARoxetine 30 milliGRAM(s) Oral daily  potassium chloride    Tablet ER 40 milliEquivalent(s) Oral once  potassium phosphate IVPB 15 milliMole(s) IV Intermittent once    MEDICATIONS  (PRN):  acetaminophen   Tablet .. 650 milliGRAM(s) Oral every 6 hours PRN Temp greater or equal to 38C (100.4F)  clonazePAM  Tablet 1 milliGRAM(s) Oral three times a day PRN anxiety        OBJECTIVE:    I&O's Detail    21 Feb 2020 07:01  -  22 Feb 2020 07:00  --------------------------------------------------------  IN:    IV PiggyBack: 300 mL    Oral Fluid: 700 mL  Total IN: 1000 mL    OUT:    Indwelling Catheter - Urethral: 350 mL    Intermittent Catheterization - Urethral: 500 mL    Voided: 500 mL  Total OUT: 1350 mL    Total NET: -350 mL    PHYSICAL EXAM:       ICU Vital Signs Last 24 Hrs  T(C): 36.7 (22 Feb 2020 05:27), Max: 37.1 (21 Feb 2020 16:24)  T(F): 98.1 (22 Feb 2020 05:27), Max: 98.8 (21 Feb 2020 16:24)  HR: 70 (22 Feb 2020 06:10) (66 - 82)  BP: 122/64 (22 Feb 2020 05:27) (122/64 - 138/64)  BP(mean): --  ABP: --  ABP(mean): --  RR: 20 (22 Feb 2020 06:10) (18 - 22)  SpO2: 90% (22 Feb 2020 06:10) (88% - 90%) on high flow 100% FiO2 @ 50lpm     General: Awake. Alert. Cooperative. No distress. Appears stated age. 	  HEENT: Atraumatic. Normocephalic. Anicteric. Normal oral mucosa. PERRL. EOMI.  Neck: Supple. Trachea midline. Thyroid without enlargement/tenderness/nodules. No carotid bruit. No JVD.	  Cardiovascular: Regular rate and rhythm. S1 S2 normal. No murmurs, rubs or gallops.  Respiratory: Respirations unlabored. Cough with inspiration. Clear to auscultation and percussion. No curvature.  Abdomen: Soft. Non-tender. Non-distended. No organomegaly. No masses. Normal bowel sounds. Obese.  Extremities: Warm to touch. No clubbing or cyanosis. No pedal edema.  Pulses: 2+ peripheral pulses all extremities.	  Skin: Normal skin color. No rashes or lesions. No ecchymoses. No cyanosis. Warm to touch.  Lymph Nodes: Cervical, supraclavicular and axillary nodes normal  Neurological: Motor and sensory examination equal and normal. A and O x 3  Psychiatry: Appropriate mood and affect.    LABS:                          11.8   15.96 )-----------( 186      ( 22 Feb 2020 06:47 )             34.3     CBC    WBC  15.96 <==, 5.46 <==, 7.28 <==, 8.58 <==    Hemoglobin  11.8 <<==, 11.6 <<==, 11.8 <<==, 12.4 <<==    Hematocrit  34.3 <==, 34.1 <==, 32.9 <==, 35.7 <==    Platelets  186 <==, 194 <==, 196 <==, 196 <==      123<L>  |  90<L>  |  21  ----------------------------<  248<H>    02-22  4.0   |  21<L>  |  0.90      LYTES    sodium  123 <==, 124 <==, 125 <==, 128 <==, 125 <==, 124 <==, 122 <==    potassium   4.0 <==, 3.8 <==, 3.6 <==, 3.7 <==, 3.2 <==, 4.0 <==, 3.8 <==    chloride  90 <==, 90 <==, 91 <==, 92 <==, 91 <==, 89 <==, 88 <==    carbon dioxide  21 <==, 22 <==, 21 <==, 21 <==, 19 <==, 20 <==, 21 <==    =============================================================================================  RENAL FUNCTION:    Creatinine:   0.90  <<==, 0.88  <<==, 0.80  <<==, 0.79  <<==, 0.85  <<==, 0.91  <<==, 0.95  <<==    BUN:   21 <==, 17 <==, 14 <==, 14 <==, 13 <==, 13 <==, 14 <==    ============================================================================================    calcium   7.6 <==, 7.7 <==, 7.6 <==, 7.6 <==, 7.5 <==, 7.5 <==, 7.6 <==    phos   2.2 <==, 2.8 <==, 2.3 <==    mag   2.6 <==, 2.3 <==, 1.9 <==, 1.9 <==, 1.9 <==    ============================================================================================  LFTs    AST:   70 <== , 48 <==     ALT:  24  <== , 21  <==     AP:  46  <=, 50  <=    Bili:  0.3  <=, 0.5  <=    ABG - ( 21 Feb 2020 06:24 )  pH: 7.49  /  pCO2: 29    /  pO2: 53    / HCO3: 22    / Base Excess: -.1   /  SaO2: 89        ABG - ( 21 Feb 2020 00:03 )  pH: 7.46  /  pCO2: 31    /  pO2: 58    / HCO3: 22    / Base Excess: -1.0  /  SaO2: 91        ABG - ( 20 Feb 2020 11:46 )  pH: 7.49  /  pCO2: 29    /  pO2: 58    / HCO3: 22    / Base Excess: -.3   /  SaO2: 91        Procalcitonin, Serum: 0.69 ng/mL (02-21 @ 07:45)    Serum Pro-Brain Natriuretic Peptide: 364 pg/mL (02-19 @ 22:30)    CARDIAC MARKERS ( 20 Feb 2020 00:57 )  CPK x     /CKMB x     /CKMB Units x        troponin 17 ng/L    CARDIAC MARKERS ( 19 Feb 2020 22:30 )  CPK x     /CKMB x     /CKMB Units x        troponin 19 ng/L    MICROBIOLOGY:     FLU A B RSV Detection by PCR (02.19.20 @ 22:30)    Flu A Result: Detected    Flu B Result: NotDetec    RSV Result: NotDetec    Legionella pneumophila Antigen, Urine (02.20.20 @ 09:50)    Legionella Antigen, Urine: Negative    Culture - Blood (02.20.20 @ 10:14)    Specimen Source: .Blood Blood-Venous    Culture Results:   No growth to date.    Culture - Blood (02.20.20 @ 10:14)    Specimen Source: .Blood Blood-Peripheral    Culture Results:   No growth to date.    RADIOLOGY:  [x] Chest radiographs reviewed and interpreted by me    EXAM:  XR CHEST AP OR PA 1V                          PROCEDURE DATE:  02/19/2020      INTERPRETATION:  CLINICAL INFORMATION: Chest pain.    EXAM: 1 view of the chest.    COMPARISON: None.    FINDINGS:    Bilateral midlung linear opacities. No pneumothorax. The cardiomediastinal silhouette is poorly evaluated on this projection. No acute osseous findings.    IMPRESSION:    Bilateral midlung linear opacities, which are of uncertain etiology.      RAMONA REDDY M.D., RADIOLOGY RESIDENT  This document has been electronically signed.  ES VELAZQUEZ M.D., ATTENDING RADIOLOGIST  This document has been electronically signed. Feb 20 2020  9:42AM    ---------------------------------------------------------------------------------------------------------------    EXAM:  CT ANGIO CHEST (W)AW IC                          PROCEDURE DATE:  02/20/2020      INTERPRETATION:  CLINICAL INFORMATION: Shortness of breath and fatigue. History of lung disease.    COMPARISON: Chest radiograph 2/19/2020    PROCEDURE:   CT Angiography of the Chest.  90 ml of Omnipaque 350 was injected intravenously. 10 ml were discarded.  Sagittal and coronal reformats were performed as well as 3D (MIP) reconstructions.    FINDINGS:    LUNGS AND AIRWAYS: Patent central airways. Areas of architectural distortion, mild traction bronchiectasis and linear/reticular opacities. Nonspecific groundglass/patchy opacities in both lungs.      PLEURA: No pleural effusion or pneumothorax.    MEDIASTINUM AND CHAVO: Subcentimeter mediastinal and hilar lymph nodes without lymphadenopathy.    VESSELS: Adequate contrast opacification of the pulmonary arteries. No obvious pulmonary embolus.    HEART: Normal heart size. No pericardial effusion.    CHEST WALL AND LOWER NECK: Heterogeneous thyroid gland, obscured by artifact.    VISUALIZED UPPER ABDOMEN: Colon diverticulosis.    BONES: Degenerative changes of the spine.    IMPRESSION:     No obvious pulmonary embolus.    Findings reflecting known pulmonary fibrosis. Recommend clinical correlation to assess underlying pneumonia. Recommend comparison to previous outside study and follow-up to exclude potential underlying neoplasm.    Heterogeneous thyroid gland, which is difficult to assess due to artifact and can be further characterized on a nonemergent ultrasound as clinically indicated.     Additional findings as described.    RAMONA REDDY M.D., RADIOLOGY RESIDENT  This document has been electronically signed.  MIAH CHAUDHRY M.D., ATTENDING RADIOLOGIST  This document has been electronically signed. Feb 20 2020  1:49AM  ---------------------------------------------------------------------------------------------------------------

## 2020-02-22 NOTE — PROGRESS NOTE ADULT - ASSESSMENT
ASSESSMENT:    hypoxic respiratory failure - multifactorial - no evidence of hypercapnic respiratory failure - ABG reveals an acute respiratory alkalosis due to hyperventilatioin    1) influenza infection complicated by bronchospasm and pneumonia (likely viral infection in the absence of a leukocytosis although the procalcitonin level is somewhat elevated)  2) underlying pulmonary fibrosis    PLAN/RECOMMENDATIONS:    high flow nasal canula 100% FiO2 @ 50lpm - keep saturation greater than 88% as able  restart BIPAP only for increased work of breathing, resistant hypoxemia or respiratory acidosis  tamiflu x 5 days  continue ceftriaxone/azithromycin  continue solumedrol 40mg IVP q6h  albuterol/atrovent nebs to q6h  pulmicort 0.5mg nebs q12h - give after duoneb - rinse mouth after use  robitussin DM/tessalon/hycodan  buspar/klonopin/paxil  DVT prophylaxis - SQ lovenox  urology consult noted - loza catheter/doxazosin    Will follow with you. Plan of care discussed with the patient at bedside, respiratory therapy and the dedicated floor NP.    Galileo Pop MD, [facility]  [phone]  Pulmonary Medicine

## 2020-02-22 NOTE — PROGRESS NOTE ADULT - SUBJECTIVE AND OBJECTIVE BOX
CC: f/u for hyoxic respiratory failure    Patient reports: he is on high flow nasal oxygen, desaturates to 80"s with any activity or even speaking.    REVIEW OF SYSTEMS:  All other review of systems negative (Comprehensive ROS): loza for retention, no GI symptoms, minimal sputum production    Antimicrobials Day #  :day 3  azithromycin  IVPB      azithromycin  IVPB 500 milliGRAM(s) IV Intermittent every 24 hours  cefTRIAXone   IVPB 1000 milliGRAM(s) IV Intermittent every 24 hours  oseltamivir 75 milliGRAM(s) Oral two times a day    Other Medications Reviewed    T(F): 98.1 (02-22-20 @ 05:27), Max: 98.8 (02-21-20 @ 16:24)  HR: 70 (02-22-20 @ 06:10)  BP: 122/64 (02-22-20 @ 05:27)  RR: 20 (02-22-20 @ 06:10)  SpO2: 90% (02-22-20 @ 06:10)  Wt(kg): --    PHYSICAL EXAM:  General: alert, no acute distress  Eyes:  anicteric, no conjunctival injection, no discharge  Oropharynx: no lesions or injection 	  Neck: supple, without adenopathy  Lungs: wheezes and ronchi  Heart: regular rate and rhythm; no murmur, rubs or gallops  Abdomen: soft, nondistended, nontender, without mass or organomegaly  Skin: no lesions  Extremities: no clubbing, cyanosis, or edema  Neurologic: alert, oriented, moves all extremities    LAB RESULTS:                        11.8   15.96 )-----------( 186      ( 22 Feb 2020 06:47 )             34.3     02-22    123<L>  |  90<L>  |  21  ----------------------------<  248<H>  4.0   |  21<L>  |  0.90    Ca    7.6<L>      22 Feb 2020 06:48  Phos  2.2     02-22  Mg     2.6     02-22    TPro  x   /  Alb  2.9<L>  /  TBili  x   /  DBili  x   /  AST  x   /  ALT  x   /  AlkPhos  x   02-22    LIVER FUNCTIONS - ( 22 Feb 2020 06:48 )  Alb: 2.9 g/dL / Pro: x     / ALK PHOS: x     / ALT: x     / AST: x     / GGT: x             MICROBIOLOGY:  RECENT CULTURES:  02-20 @ 10:14 .Blood Blood-Peripheral     No growth to date.    legionella urine antigen negative      RADIOLOGY REVIEWED:    < from: CT Angio Chest w/ IV Cont (02.20.20 @ 00:14) >  IMPRESSION:     No obvious pulmonary embolus.    Findings reflecting known pulmonary fibrosis. Recommend clinical correlation to assess underlying pneumonia. Recommend comparison to previous outside study and follow-up to exclude potential underlying neoplasm.    Heterogeneous thyroid gland, which is difficult to assess due to artifact and can be further characterizedon a nonemergent ultrasound as clinically indicated.     Additional findings as described.    < end of copied text >

## 2020-02-22 NOTE — PROGRESS NOTE ADULT - ASSESSMENT
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  Urinary retention may be caused by/contributing to hyponatremia.  Hyponatremia persists, at this time it is likely all SIADH due to pulmonary process.    Recommend:  I re-educated the patient on 1000 ml fluid restriction, I spoke with patient's nurse and NP.  Lasix 20 mg I.V. for aquaresis.  Will follow.  Thank you.

## 2020-02-22 NOTE — PROGRESS NOTE ADULT - ASSESSMENT
69M with        PMH of HTN,  HLD, , pulmonary fibrosis,  not on home  O 2.   depression/anxiety , on  Klonopin          p/w SOB and cough/  acute resp failure with O2  sat of  80 on arrival to  er  O2  sat is  92  to 99 %,  earlier  and  now  lower  again    Iinfluenza A positive, with  bronchospasm   on Tamiflu. / nebs  and   solumedrol    mild anemia/  GI eval  Hyponatremia from   poor po intake/ dehydration/ on iv fluids/  stop  hctz  ct chest,    b/l patchy opacities/   traction bronchiectasis/   pulm fibrosis  on rocephin/  zmax/  CAP/   pulm    dr gusman,  for  hypoxia  on  buspar/ paxil  and klonopin/ home meds, on  dvt ppx   hyponatremia,  renal dr bah  ok to  restart  cardizem/  and  will   hold  olmesartan/ hctz  ID eval    noted , continue /    iv  ab/ afebrile, normal wbc/   sodium of  123/  await renal f/p   pe r  urplogy, TOV in 2  days   bmp in am     < from: CT Angio Chest w/ IV Cont (02.20.20 @ 00:14) >  IMPRESSION:   No obvious pulmonary embolus.  Findings reflecting known pulmonary fibrosis. Recommend clinical correlation to assess underlying pneumonia. Recommend comparison to previous outside study and follow-up to exclude potential underlying neoplasm.  Heterogeneous thyroid gland, which is difficult to assess due to artifact and can be further characterizedon a nonemergent ultrasound as clinically indicated.   Additional findings as described  < end of copied text >

## 2020-02-22 NOTE — PROGRESS NOTE ADULT - SUBJECTIVE AND OBJECTIVE BOX
UROLOGY DAILY PROGRESS NOTE:     Subjective:    Patient feels okay. Tolerating loza.  Had GI function. Minimally ambulatory.    Objective:    NAD, awake and alert  Respirations nonlabored  Abdomen soft, nontender, nondistended  Loza clear yellow    MEDICATIONS  (STANDING):  albuterol/ipratropium for Nebulization 3 milliLiter(s) Nebulizer every 6 hours  atorvastatin 10 milliGRAM(s) Oral at bedtime  azithromycin  IVPB      azithromycin  IVPB 500 milliGRAM(s) IV Intermittent every 24 hours  benzonatate 200 milliGRAM(s) Oral three times a day  buDESOnide    Inhalation Suspension 0.5 milliGRAM(s) Inhalation every 12 hours  busPIRone 15 milliGRAM(s) Oral three times a day  cefTRIAXone   IVPB 1000 milliGRAM(s) IV Intermittent every 24 hours  diltiazem    milliGRAM(s) Oral daily  doxazosin 4 milliGRAM(s) Oral at bedtime  enoxaparin Injectable 40 milliGRAM(s) SubCutaneous daily  guaifenesin/dextromethorphan  Syrup 10 milliLiter(s) Oral every 6 hours  hydrocodone/homatropine Syrup 5 milliLiter(s) Oral <User Schedule>  methylPREDNISolone sodium succinate Injectable 40 milliGRAM(s) IV Push every 6 hours  oseltamivir 75 milliGRAM(s) Oral two times a day  pantoprazole  Injectable 40 milliGRAM(s) IV Push daily  PARoxetine 30 milliGRAM(s) Oral daily  potassium chloride    Tablet ER 40 milliEquivalent(s) Oral once  potassium phosphate IVPB 15 milliMole(s) IV Intermittent once    MEDICATIONS  (PRN):  acetaminophen   Tablet .. 650 milliGRAM(s) Oral every 6 hours PRN Temp greater or equal to 38C (100.4F)  clonazePAM  Tablet 1 milliGRAM(s) Oral three times a day PRN anxiety      Vital Signs Last 24 Hrs  T(C): 36.8 (22 Feb 2020 10:15), Max: 37.1 (21 Feb 2020 16:24)  T(F): 98.3 (22 Feb 2020 10:15), Max: 98.8 (21 Feb 2020 16:24)  HR: 62 (22 Feb 2020 10:15) (62 - 82)  BP: 123/61 (22 Feb 2020 10:15) (122/64 - 138/64)  BP(mean): --  RR: 20 (22 Feb 2020 10:15) (18 - 22)  SpO2: 85% (22 Feb 2020 10:15) (85% - 90%)    I&O's Detail    21 Feb 2020 07:01  -  22 Feb 2020 07:00  --------------------------------------------------------  IN:    IV PiggyBack: 300 mL    Oral Fluid: 700 mL  Total IN: 1000 mL    OUT:    Indwelling Catheter - Urethral: 350 mL    Intermittent Catheterization - Urethral: 500 mL    Voided: 500 mL  Total OUT: 1350 mL    Total NET: -350 mL          Daily     Daily     LABS:                        11.8   15.96 )-----------( 186      ( 22 Feb 2020 06:47 )             34.3     02-22    123<L>  |  90<L>  |  21  ----------------------------<  248<H>  4.0   |  21<L>  |  0.90    Ca    7.6<L>      22 Feb 2020 06:48  Phos  2.2     02-22  Mg     2.6     02-22    TPro  x   /  Alb  2.9<L>  /  TBili  x   /  DBili  x   /  AST  x   /  ALT  x   /  AlkPhos  x   02-22

## 2020-02-22 NOTE — CHART NOTE - NSCHARTNOTEFT_GEN_A_CORE
Medicine NP Episodic note    CC: Hypoxia, Destauration  O2sat 84 to 86% on High flow O2NC  Evaluated the patient at bedside. Patient in no acute distress. mildly tachypneic. SOB on exertion  Denies HA, dizziness, CP    Vital Signs Last 24 Hrs  T(C): 36.7 (22 Feb 2020 21:28), Max: 36.8 (22 Feb 2020 10:15)  T(F): 98 (22 Feb 2020 21:28), Max: 98.3 (22 Feb 2020 10:15)  HR: 62 (22 Feb 2020 21:28) (59 - 77)  BP: 156/79 (22 Feb 2020 21:28) (122/64 - 156/79)  BP(mean): --  RR: 20 (22 Feb 2020 21:28) (20 - 20)  SpO2: 84% (22 Feb 2020 21:28) (84% - 90%)            68 Y/O male with PMH of HTN,  HLD, , pulmonary fibrosis,  not on home  O 2.   depression/anxiety , on  Klonopin   p/w SOB and cough, Admitted with acute hypoxic respiratory failure r/t Flu, bronchospasm, , PNA.    On high flow with optimization of fio2 100% and Flow 50L/MT with target o2sat >88%.  Hypoxic respiratory failure, coughing  Patient mildly tachypneic, desaturating to 84%  Duoneb stat now  Hycodan due dose now Medicine NP Episodic note    CC: Hypoxia, Desaturation  O2sat 84 to 86% on High flow O2NC  Evaluated the patient at bedside. Patient in no acute distress. mildly tachypneic. SOB on exertion  Denies HA, dizziness, CP    Vital Signs Last 24 Hrs  T(C): 36.7 (22 Feb 2020 21:28), Max: 36.8 (22 Feb 2020 10:15)  T(F): 98 (22 Feb 2020 21:28), Max: 98.3 (22 Feb 2020 10:15)  HR: 62 (22 Feb 2020 21:28) (59 - 77)  BP: 156/79 (22 Feb 2020 21:28) (122/64 - 156/79)  BP(mean): --  RR: 20 (22 Feb 2020 21:28) (20 - 20)  SpO2: 84% (22 Feb 2020 21:28) (84% - 90%)    General: Awake. Alert. mildly tachypneic	  Cardiovascular: Regular rate and rhythm. S1 S2 normal. No murmurs, rubs or gallops.  Respiratory: CTA B/L, No wheeze, Diminished BS+ LLL  Abdomen: Soft. Non-tender. Non-distended.   Extremities: Warm to touch. No clubbing or cyanosis. No pedal edema.  Psychiatry: Appropriate mood and affect.  Neuro: A&Ox3     68 Y/O male with PMH of HTN,  HLD, , pulmonary fibrosis,  not on home  O 2.   depression/anxiety , on  Klonopin   p/w SOB and cough, Admitted with acute hypoxic respiratory failure r/t Flu, bronchospasm, , PNA.   On high flow with optimization of fio2 100% and Flow 50L/MT with target o2sat >88%.  Hypoxic respiratory failure, coughing, patient appears hypervolemic  Patient mildly tachypneic, desaturating to 84%  Duoneb stat now  Hycodan due dose now  Lasix 20mg IVP  Nephrology Attending at bedside also  Patient refusing BIPAP, if o2sat not improving post treatment, will encourage the pt again for BIPAP  Will consider MICU consult if pt worsening hypoxic resp failure  Will closely monitor    Landon Joyner [facility] [place]  [id number] Medicine NP Episodic note    CC: Hypoxia, Desaturation  O2sat 84 to 86% on High flow O2NC  Evaluated the patient at bedside. Patient in no acute distress. mildly tachypneic. SOB on exertion  Denies HA, dizziness, CP    Vital Signs Last 24 Hrs  T(C): 36.7 (22 Feb 2020 21:28), Max: 36.8 (22 Feb 2020 10:15)  T(F): 98 (22 Feb 2020 21:28), Max: 98.3 (22 Feb 2020 10:15)  HR: 62 (22 Feb 2020 21:28) (59 - 77)  BP: 156/79 (22 Feb 2020 21:28) (122/64 - 156/79)  BP(mean): --  RR: 20 (22 Feb 2020 21:28) (20 - 20)  SpO2: 84% (22 Feb 2020 21:28) (84% - 90%)    General: Awake. Alert. mildly tachypneic	  Cardiovascular: Regular rate and rhythm. S1 S2 normal. No murmurs, rubs or gallops.  Respiratory: CTA B/L, No wheeze, Diminished BS+ LLL  Abdomen: Soft. Non-tender. Non-distended.   Extremities: Warm to touch. No clubbing or cyanosis. No pedal edema.  Psychiatry: Appropriate mood and affect.  Neuro: A&Ox3     70 Y/O male with PMH of HTN,  HLD, , pulmonary fibrosis,  not on home  O 2.   depression/anxiety , on  Klonopin   p/w SOB and cough, Admitted with acute hypoxic respiratory failure r/t Flu, bronchospasm, , PNA.   On high flow with optimization of fio2 100% and Flow 50L/MT with target o2sat >88%.  Hypoxic respiratory failure, coughing, patient appears hypervolemic  Patient mildly tachypneic, desaturating to 84%  Duoneb stat now  Hycodan due dose now  Lasix 20mg IVP  Nephrology Attending at bedside also  Patient refusing BIPAP, if o2sat not improving post treatment, will encourage the pt again for BIPAP  Will consider MICU consult if pt worsening hypoxic resp failure    Steroid Induced Hyperglycemia  F/U AM a1c  Started on correctional insulin coverage  Trend F/S and add basal insulin coverage am, patient with inadequate solid intake  Will closely monitor    Landon Joyner [facility] [place]  [id number]

## 2020-02-22 NOTE — PROGRESS NOTE ADULT - ASSESSMENT
70 yo male with HTN, anxiety disorder, and pulmonary fibrosis admitted with hypoxic respiratory failure.  Influenza A diagnosed 2/16 with a rapid test.  Lack of improvement with tamiflu documented and raises concern of both viral pneumonia and a secondary process such as a bacterial pneumonia  CTA is negative for PE.  No baseline CT in  system to compare yesterdays scan to.  Agree with CTX and azithro pending w/u  Additional history may be available later from his pulmonary physician and he and his wife have been reluctant to talk to me, they are to upset.  He reportedly self medicated with some clarithromycin last week, not clear how many doses  No blood cultures sent in ER.Legionella urine antigen negative, MRSA screen is pending and his PC is .69.  The elevated PC is suggestive of a bacterial infection  Suggest:  1.Tamiflu, perhaps would complete 5 days from time of admission at hospital, day 3/5  2,CTX,azithro given lack of response to tamiflu-perhaps 5 days, day 3  3.Steroids per pulmonary  4,Will advise blood cultures if fever returns  5.Suspect infection superimposed on chronic lung disease, however I do not see any evidence of uncontrolled infection.

## 2020-02-22 NOTE — PROGRESS NOTE ADULT - SUBJECTIVE AND OBJECTIVE BOX
less  sob/   no cp    REVIEW OF SYSTEMS:  GEN: no fever,    no chills  RESP: no SOB,   no cough  CVS: no chest pain,   no palpitations  GI: no abdominal pain,   no nausea,   no vomiting,   no constipation,   no diarrhea  : no dysuria,   no frequency  NEURO: no headache,   no dizziness  PSYCH: no depression,   not anxious  Derm : no rash    MEDICATIONS  (STANDING):  albuterol/ipratropium for Nebulization 3 milliLiter(s) Nebulizer every 6 hours  atorvastatin 10 milliGRAM(s) Oral at bedtime  azithromycin  IVPB      azithromycin  IVPB 500 milliGRAM(s) IV Intermittent every 24 hours  benzonatate 200 milliGRAM(s) Oral three times a day  buDESOnide    Inhalation Suspension 0.5 milliGRAM(s) Inhalation every 12 hours  busPIRone 15 milliGRAM(s) Oral three times a day  cefTRIAXone   IVPB 1000 milliGRAM(s) IV Intermittent every 24 hours  diltiazem    milliGRAM(s) Oral daily  doxazosin 4 milliGRAM(s) Oral at bedtime  enoxaparin Injectable 40 milliGRAM(s) SubCutaneous daily  guaifenesin/dextromethorphan  Syrup 10 milliLiter(s) Oral every 6 hours  hydrocodone/homatropine Syrup 5 milliLiter(s) Oral <User Schedule>  methylPREDNISolone sodium succinate Injectable 40 milliGRAM(s) IV Push every 6 hours  oseltamivir 75 milliGRAM(s) Oral two times a day  pantoprazole  Injectable 40 milliGRAM(s) IV Push daily  PARoxetine 30 milliGRAM(s) Oral daily  potassium chloride    Tablet ER 40 milliEquivalent(s) Oral once  potassium phosphate IVPB 15 milliMole(s) IV Intermittent once    MEDICATIONS  (PRN):  acetaminophen   Tablet .. 650 milliGRAM(s) Oral every 6 hours PRN Temp greater or equal to 38C (100.4F)  clonazePAM  Tablet 1 milliGRAM(s) Oral three times a day PRN anxiety      Vital Signs Last 24 Hrs  T(C): 36.7 (22 Feb 2020 05:27), Max: 37.1 (21 Feb 2020 16:24)  T(F): 98.1 (22 Feb 2020 05:27), Max: 98.8 (21 Feb 2020 16:24)  HR: 70 (22 Feb 2020 06:10) (66 - 82)  BP: 122/64 (22 Feb 2020 05:27) (122/64 - 138/64)  BP(mean): --  RR: 20 (22 Feb 2020 06:10) (18 - 22)  SpO2: 90% (22 Feb 2020 06:10) (88% - 90%)  CAPILLARY BLOOD GLUCOSE        I&O's Summary    21 Feb 2020 07:01  -  22 Feb 2020 07:00  --------------------------------------------------------  IN: 1000 mL / OUT: 1350 mL / NET: -350 mL        PHYSICAL EXAM:  HEAD:  Atraumatic, Normocephalic  NECK: Supple, No   JVD  CHEST/LUNG:   no     rales,  less   rhonchi  HEART: Regular rate and rhythm;         murmur  ABDOMEN: Soft, Nontender, ;   EXTREMITIES:    no    edema  NEUROLOGY:  alert    LABS:                        11.8   15.96 )-----------( 186      ( 22 Feb 2020 06:47 )             34.3     02-22    123<L>  |  90<L>  |  21  ----------------------------<  248<H>  4.0   |  21<L>  |  0.90    Ca    7.6<L>      22 Feb 2020 06:48  Phos  2.2     02-22  Mg     2.6     02-22    TPro  x   /  Alb  2.9<L>  /  TBili  x   /  DBili  x   /  AST  x   /  ALT  x   /  AlkPhos  x   02-22              ABG - ( 21 Feb 2020 06:24 )  pH, Arterial: 7.49  pH, Blood: x     /  pCO2: 29    /  pO2: 53    / HCO3: 22    / Base Excess: -.1   /  SaO2: 89                    Thyroid Stimulating Hormone, Serum: 0.39 uIU/mL (02-21 @ 09:19)          Consultant(s) Notes Reviewed:      Care Discussed with Consultants/Other Providers:

## 2020-02-22 NOTE — PROGRESS NOTE ADULT - ASSESSMENT
Diarrhea  anemia    no further diarrhea  denies melena/hematochezia  h/h remains stable   diet as tolerated  colonoscopy 3 years ago, no need to repeat as inpatient  monitor for GI bleeding  will follow

## 2020-02-22 NOTE — PROGRESS NOTE ADULT - ASSESSMENT
70yo male admitted with influenza and found to have urinary retention. Now with indwelling catheter    - Please send UA and urine culture  - Continue doxazosin 4mg daily  - Keep loza for now  - Encourage ambulation  - Bowel regimen  - Repeat TOV in ~2 days

## 2020-02-23 LAB
ALBUMIN SERPL ELPH-MCNC: 3 G/DL — LOW (ref 3.3–5)
ALP SERPL-CCNC: 101 U/L — SIGNIFICANT CHANGE UP (ref 40–120)
ALT FLD-CCNC: 40 U/L — SIGNIFICANT CHANGE UP (ref 10–45)
ANION GAP SERPL CALC-SCNC: 13 MMOL/L — SIGNIFICANT CHANGE UP (ref 5–17)
ANION GAP SERPL CALC-SCNC: 15 MMOL/L — SIGNIFICANT CHANGE UP (ref 5–17)
ANION GAP SERPL CALC-SCNC: 16 MMOL/L — SIGNIFICANT CHANGE UP (ref 5–17)
ANION GAP SERPL CALC-SCNC: 17 MMOL/L — SIGNIFICANT CHANGE UP (ref 5–17)
ANION GAP SERPL CALC-SCNC: 17 MMOL/L — SIGNIFICANT CHANGE UP (ref 5–17)
APTT BLD: 22.3 SEC — LOW (ref 27.5–36.3)
AST SERPL-CCNC: 41 U/L — HIGH (ref 10–40)
BASE EXCESS BLDA CALC-SCNC: 3.2 MMOL/L — HIGH (ref -2–2)
BASE EXCESS BLDA CALC-SCNC: 4.4 MMOL/L — HIGH (ref -2–2)
BILIRUB SERPL-MCNC: 0.4 MG/DL — SIGNIFICANT CHANGE UP (ref 0.2–1.2)
BUN SERPL-MCNC: 25 MG/DL — HIGH (ref 7–23)
BUN SERPL-MCNC: 26 MG/DL — HIGH (ref 7–23)
BUN SERPL-MCNC: 27 MG/DL — HIGH (ref 7–23)
BUN SERPL-MCNC: 28 MG/DL — HIGH (ref 7–23)
BUN SERPL-MCNC: 28 MG/DL — HIGH (ref 7–23)
CALCIUM SERPL-MCNC: 7.6 MG/DL — LOW (ref 8.4–10.5)
CALCIUM SERPL-MCNC: 8 MG/DL — LOW (ref 8.4–10.5)
CALCIUM SERPL-MCNC: 8.1 MG/DL — LOW (ref 8.4–10.5)
CALCIUM SERPL-MCNC: 8.2 MG/DL — LOW (ref 8.4–10.5)
CALCIUM SERPL-MCNC: 8.2 MG/DL — LOW (ref 8.4–10.5)
CHLORIDE SERPL-SCNC: 89 MMOL/L — LOW (ref 96–108)
CHLORIDE SERPL-SCNC: 93 MMOL/L — LOW (ref 96–108)
CHLORIDE SERPL-SCNC: 94 MMOL/L — LOW (ref 96–108)
CHLORIDE SERPL-SCNC: 95 MMOL/L — LOW (ref 96–108)
CHLORIDE SERPL-SCNC: 95 MMOL/L — LOW (ref 96–108)
CO2 BLDA-SCNC: 27 MMOL/L — SIGNIFICANT CHANGE UP (ref 22–30)
CO2 BLDA-SCNC: 30 MMOL/L — SIGNIFICANT CHANGE UP (ref 22–30)
CO2 SERPL-SCNC: 20 MMOL/L — LOW (ref 22–31)
CO2 SERPL-SCNC: 21 MMOL/L — LOW (ref 22–31)
CO2 SERPL-SCNC: 21 MMOL/L — LOW (ref 22–31)
CO2 SERPL-SCNC: 24 MMOL/L — SIGNIFICANT CHANGE UP (ref 22–31)
CO2 SERPL-SCNC: 25 MMOL/L — SIGNIFICANT CHANGE UP (ref 22–31)
CREAT SERPL-MCNC: 0.82 MG/DL — SIGNIFICANT CHANGE UP (ref 0.5–1.3)
CREAT SERPL-MCNC: 0.84 MG/DL — SIGNIFICANT CHANGE UP (ref 0.5–1.3)
CREAT SERPL-MCNC: 0.85 MG/DL — SIGNIFICANT CHANGE UP (ref 0.5–1.3)
CREAT SERPL-MCNC: 0.87 MG/DL — SIGNIFICANT CHANGE UP (ref 0.5–1.3)
CREAT SERPL-MCNC: 0.87 MG/DL — SIGNIFICANT CHANGE UP (ref 0.5–1.3)
GLUCOSE BLDC GLUCOMTR-MCNC: 212 MG/DL — HIGH (ref 70–99)
GLUCOSE BLDC GLUCOMTR-MCNC: 228 MG/DL — HIGH (ref 70–99)
GLUCOSE BLDC GLUCOMTR-MCNC: 265 MG/DL — HIGH (ref 70–99)
GLUCOSE BLDC GLUCOMTR-MCNC: 290 MG/DL — HIGH (ref 70–99)
GLUCOSE SERPL-MCNC: 211 MG/DL — HIGH (ref 70–99)
GLUCOSE SERPL-MCNC: 225 MG/DL — HIGH (ref 70–99)
GLUCOSE SERPL-MCNC: 239 MG/DL — HIGH (ref 70–99)
GLUCOSE SERPL-MCNC: 277 MG/DL — HIGH (ref 70–99)
GLUCOSE SERPL-MCNC: 294 MG/DL — HIGH (ref 70–99)
HCO3 BLDA-SCNC: 26 MMOL/L — SIGNIFICANT CHANGE UP (ref 21–29)
HCO3 BLDA-SCNC: 28 MMOL/L — SIGNIFICANT CHANGE UP (ref 21–29)
HCT VFR BLD CALC: 33.8 % — LOW (ref 39–50)
HCT VFR BLD CALC: 35.8 % — LOW (ref 39–50)
HGB BLD-MCNC: 11.9 G/DL — LOW (ref 13–17)
HGB BLD-MCNC: 12.8 G/DL — LOW (ref 13–17)
HOROWITZ INDEX BLDA+IHG-RTO: 80 — SIGNIFICANT CHANGE UP
INR BLD: 1.09 RATIO — SIGNIFICANT CHANGE UP (ref 0.88–1.16)
MAGNESIUM SERPL-MCNC: 2.8 MG/DL — HIGH (ref 1.6–2.6)
MCHC RBC-ENTMCNC: 30.6 PG — SIGNIFICANT CHANGE UP (ref 27–34)
MCHC RBC-ENTMCNC: 31.1 PG — SIGNIFICANT CHANGE UP (ref 27–34)
MCHC RBC-ENTMCNC: 35.2 GM/DL — SIGNIFICANT CHANGE UP (ref 32–36)
MCHC RBC-ENTMCNC: 35.8 GM/DL — SIGNIFICANT CHANGE UP (ref 32–36)
MCV RBC AUTO: 86.9 FL — SIGNIFICANT CHANGE UP (ref 80–100)
MCV RBC AUTO: 87.1 FL — SIGNIFICANT CHANGE UP (ref 80–100)
NRBC # BLD: 0 /100 WBCS — SIGNIFICANT CHANGE UP (ref 0–0)
NRBC # BLD: 0 /100 WBCS — SIGNIFICANT CHANGE UP (ref 0–0)
PCO2 BLDA: 36 MMHG — SIGNIFICANT CHANGE UP (ref 32–46)
PCO2 BLDA: 42 MMHG — SIGNIFICANT CHANGE UP (ref 32–46)
PH BLDA: 7.45 — SIGNIFICANT CHANGE UP (ref 7.35–7.45)
PH BLDA: 7.47 — HIGH (ref 7.35–7.45)
PHOSPHATE SERPL-MCNC: 2.2 MG/DL — LOW (ref 2.5–4.5)
PLATELET # BLD AUTO: 118 K/UL — LOW (ref 150–400)
PLATELET # BLD AUTO: 139 K/UL — LOW (ref 150–400)
PO2 BLDA: 49 MMHG — CRITICAL LOW (ref 74–108)
PO2 BLDA: 56 MMHG — LOW (ref 74–108)
POTASSIUM SERPL-MCNC: 4.2 MMOL/L — SIGNIFICANT CHANGE UP (ref 3.5–5.3)
POTASSIUM SERPL-MCNC: 4.4 MMOL/L — SIGNIFICANT CHANGE UP (ref 3.5–5.3)
POTASSIUM SERPL-MCNC: 4.4 MMOL/L — SIGNIFICANT CHANGE UP (ref 3.5–5.3)
POTASSIUM SERPL-MCNC: 4.5 MMOL/L — SIGNIFICANT CHANGE UP (ref 3.5–5.3)
POTASSIUM SERPL-MCNC: 4.6 MMOL/L — SIGNIFICANT CHANGE UP (ref 3.5–5.3)
POTASSIUM SERPL-SCNC: 4.2 MMOL/L — SIGNIFICANT CHANGE UP (ref 3.5–5.3)
POTASSIUM SERPL-SCNC: 4.4 MMOL/L — SIGNIFICANT CHANGE UP (ref 3.5–5.3)
POTASSIUM SERPL-SCNC: 4.4 MMOL/L — SIGNIFICANT CHANGE UP (ref 3.5–5.3)
POTASSIUM SERPL-SCNC: 4.5 MMOL/L — SIGNIFICANT CHANGE UP (ref 3.5–5.3)
POTASSIUM SERPL-SCNC: 4.6 MMOL/L — SIGNIFICANT CHANGE UP (ref 3.5–5.3)
PROT SERPL-MCNC: 6.3 G/DL — SIGNIFICANT CHANGE UP (ref 6–8.3)
PROTHROM AB SERPL-ACNC: 12.5 SEC — SIGNIFICANT CHANGE UP (ref 10–12.9)
RBC # BLD: 3.89 M/UL — LOW (ref 4.2–5.8)
RBC # BLD: 4.11 M/UL — LOW (ref 4.2–5.8)
RBC # FLD: 13.7 % — SIGNIFICANT CHANGE UP (ref 10.3–14.5)
RBC # FLD: 13.7 % — SIGNIFICANT CHANGE UP (ref 10.3–14.5)
SAO2 % BLDA: 83 % — LOW (ref 92–96)
SAO2 % BLDA: 89 % — LOW (ref 92–96)
SODIUM SERPL-SCNC: 125 MMOL/L — LOW (ref 135–145)
SODIUM SERPL-SCNC: 130 MMOL/L — LOW (ref 135–145)
SODIUM SERPL-SCNC: 132 MMOL/L — LOW (ref 135–145)
SODIUM SERPL-SCNC: 133 MMOL/L — LOW (ref 135–145)
SODIUM SERPL-SCNC: 135 MMOL/L — SIGNIFICANT CHANGE UP (ref 135–145)
WBC # BLD: 18.56 K/UL — HIGH (ref 3.8–10.5)
WBC # BLD: 21.59 K/UL — HIGH (ref 3.8–10.5)
WBC # FLD AUTO: 18.56 K/UL — HIGH (ref 3.8–10.5)
WBC # FLD AUTO: 21.59 K/UL — HIGH (ref 3.8–10.5)

## 2020-02-23 PROCEDURE — 99233 SBSQ HOSP IP/OBS HIGH 50: CPT

## 2020-02-23 PROCEDURE — 99231 SBSQ HOSP IP/OBS SF/LOW 25: CPT

## 2020-02-23 RX ORDER — DEXTROSE 50 % IN WATER 50 %
25 SYRINGE (ML) INTRAVENOUS ONCE
Refills: 0 | Status: DISCONTINUED | OUTPATIENT
Start: 2020-02-23 | End: 2020-02-25

## 2020-02-23 RX ORDER — POLYETHYLENE GLYCOL 3350 17 G/17G
17 POWDER, FOR SOLUTION ORAL DAILY
Refills: 0 | Status: DISCONTINUED | OUTPATIENT
Start: 2020-02-23 | End: 2020-02-25

## 2020-02-23 RX ORDER — DEXTROSE 50 % IN WATER 50 %
12.5 SYRINGE (ML) INTRAVENOUS ONCE
Refills: 0 | Status: DISCONTINUED | OUTPATIENT
Start: 2020-02-23 | End: 2020-02-25

## 2020-02-23 RX ORDER — SODIUM CHLORIDE 9 MG/ML
1000 INJECTION, SOLUTION INTRAVENOUS
Refills: 0 | Status: DISCONTINUED | OUTPATIENT
Start: 2020-02-23 | End: 2020-02-25

## 2020-02-23 RX ORDER — DEXTROSE 50 % IN WATER 50 %
15 SYRINGE (ML) INTRAVENOUS ONCE
Refills: 0 | Status: DISCONTINUED | OUTPATIENT
Start: 2020-02-23 | End: 2020-02-25

## 2020-02-23 RX ORDER — POTASSIUM PHOSPHATE, MONOBASIC POTASSIUM PHOSPHATE, DIBASIC 236; 224 MG/ML; MG/ML
15 INJECTION, SOLUTION INTRAVENOUS ONCE
Refills: 0 | Status: COMPLETED | OUTPATIENT
Start: 2020-02-23 | End: 2020-02-24

## 2020-02-23 RX ORDER — INSULIN LISPRO 100/ML
VIAL (ML) SUBCUTANEOUS AT BEDTIME
Refills: 0 | Status: DISCONTINUED | OUTPATIENT
Start: 2020-02-23 | End: 2020-02-25

## 2020-02-23 RX ORDER — GLUCAGON INJECTION, SOLUTION 0.5 MG/.1ML
1 INJECTION, SOLUTION SUBCUTANEOUS ONCE
Refills: 0 | Status: DISCONTINUED | OUTPATIENT
Start: 2020-02-23 | End: 2020-02-25

## 2020-02-23 RX ORDER — INSULIN LISPRO 100/ML
VIAL (ML) SUBCUTANEOUS
Refills: 0 | Status: DISCONTINUED | OUTPATIENT
Start: 2020-02-23 | End: 2020-02-25

## 2020-02-23 RX ADMIN — Medication 15 MILLIGRAM(S): at 14:30

## 2020-02-23 RX ADMIN — Medication 200 MILLIGRAM(S): at 05:16

## 2020-02-23 RX ADMIN — Medication 40 MILLIGRAM(S): at 20:26

## 2020-02-23 RX ADMIN — Medication 75 MILLIGRAM(S): at 18:51

## 2020-02-23 RX ADMIN — Medication 3 MILLILITER(S): at 05:17

## 2020-02-23 RX ADMIN — Medication 6: at 12:30

## 2020-02-23 RX ADMIN — Medication 0.5 MILLIGRAM(S): at 18:50

## 2020-02-23 RX ADMIN — Medication 240 MILLIGRAM(S): at 05:16

## 2020-02-23 RX ADMIN — Medication 200 MILLIGRAM(S): at 14:30

## 2020-02-23 RX ADMIN — Medication 4: at 18:51

## 2020-02-23 RX ADMIN — Medication 30 MILLIGRAM(S): at 12:34

## 2020-02-23 RX ADMIN — Medication 40 MILLIGRAM(S): at 05:16

## 2020-02-23 RX ADMIN — Medication 3 MILLILITER(S): at 12:34

## 2020-02-23 RX ADMIN — Medication 15 MILLIGRAM(S): at 05:16

## 2020-02-23 RX ADMIN — CEFTRIAXONE 100 MILLIGRAM(S): 500 INJECTION, POWDER, FOR SOLUTION INTRAMUSCULAR; INTRAVENOUS at 06:08

## 2020-02-23 RX ADMIN — Medication 75 MILLIGRAM(S): at 05:16

## 2020-02-23 RX ADMIN — Medication 6: at 08:30

## 2020-02-23 RX ADMIN — Medication 10 MILLILITER(S): at 12:29

## 2020-02-23 RX ADMIN — POLYETHYLENE GLYCOL 3350 17 GRAM(S): 17 POWDER, FOR SOLUTION ORAL at 14:32

## 2020-02-23 RX ADMIN — PANTOPRAZOLE SODIUM 40 MILLIGRAM(S): 20 TABLET, DELAYED RELEASE ORAL at 05:16

## 2020-02-23 RX ADMIN — Medication 0.5 MILLIGRAM(S): at 05:17

## 2020-02-23 RX ADMIN — Medication 1 MILLIGRAM(S): at 00:07

## 2020-02-23 RX ADMIN — ENOXAPARIN SODIUM 40 MILLIGRAM(S): 100 INJECTION SUBCUTANEOUS at 12:29

## 2020-02-23 RX ADMIN — Medication 10 MILLILITER(S): at 18:55

## 2020-02-23 RX ADMIN — AZITHROMYCIN 250 MILLIGRAM(S): 500 TABLET, FILM COATED ORAL at 21:32

## 2020-02-23 RX ADMIN — Medication 10 MILLILITER(S): at 05:16

## 2020-02-23 RX ADMIN — Medication 3 MILLILITER(S): at 18:50

## 2020-02-23 NOTE — PROGRESS NOTE ADULT - ASSESSMENT
Diarrhea  anemia    no further diarrhea  denies melena/hematochezia  h/h remains stable   diet as tolerated  start miralax 17g orally daily  colonoscopy 3 years ago, no need to repeat as inpatient  monitor for signs of GI bleeding  will follow

## 2020-02-23 NOTE — CHART NOTE - NSCHARTNOTEFT_GEN_A_CORE
MEDICINE NP-EPISODIC NOTE    Received from day report patient desat to 70% while on Hi-flow w/ FiO2 100% approx. 5:30pm and placed back on BiPAP. Per pulmonary note, recommend to c/w Solu-medrol 40mg q6h however was switched to prednisone PO today. Last dose of steroids given at 5am. Paged Dr. Sommer, awaiting callback. Placed patient back on IV regimen given episode of hypoxia earlier. Continue to monitor patient overnight.    Natacha Sequeira, [facility]-BC  [id number]

## 2020-02-23 NOTE — RAPID RESPONSE TEAM SUMMARY - NSADDTLFINDINGSRRT_GEN_ALL_CORE
Upon arrival to RRT the patient was already placed on bi pap SPO 95% NIVMV settings 12/7 60%.   No notable labored breathing or auditable wheezing / rhonchi noted. Hemodynamics remain stable Upon arrival to RRT the patient was already placed on bi pap SPO 95% NIVMV settings 12/8  80%.   No notable labored breathing or auditable wheezing / rhonchi noted. Hemodynamics remain stable

## 2020-02-23 NOTE — CHART NOTE - NSCHARTNOTEFT_GEN_A_CORE
MEDICINE NP- EPISODIC NOTE    Received call from RN patient is s/p RRT for hypoxia 79% on BiPAP, ordered ABG and went to next RR. Seen patient, AOx3, sat 96%, BiPAP settings unchanged.     ABG Blood Gas Profile - Arterial (02.23.20 @ 22:13)    pH, Arterial: 7.45    pCO2, Arterial: 42 mmHg    pO2, Arterial: 49 mmHg    HCO3, Arterial: 28 mmol/L    Base Excess, Arterial: 4.4 mmol/L    Oxygen Saturation, Arterial: 83 %    Total CO2, Arterial: 30 mmoL/L MEDICINE NP- EPISODIC NOTE    Received call from RN patient is s/p RRT for hypoxia 79% on BiPAP, ordered ABG and went to next RR. Seen patient, AOx3, sat 96%, BiPAP settings unchanged; c/o mouth dryness. Denies SOB.     ABG Blood Gas Profile - Arterial (02.23.20 @ 22:13)    pH, Arterial: 7.45    pCO2, Arterial: 42 mmHg    pO2, Arterial: 49 mmHg    HCO3, Arterial: 28 mmol/L    Base Excess, Arterial: 4.4 mmol/L    Oxygen Saturation, Arterial: 83 %    Total CO2, Arterial: 30 mmoL/L    Natacha Sequeira, [facility]-BC  [id number] MEDICINE NP- EPISODIC NOTE    Received call from RN patient is s/p RRT for hypoxia 79% on BiPAP, ordered ABG and went to next RR. Seen patient, AOx3, sat 96%, BiPAP settings unchanged; c/o mouth dryness. Denies SOB. Following ABG results, per RN ABG drawn looked venous during RR. Will repeat.    ABG Blood Gas Profile - Arterial (02.23.20 @ 22:13)    pH, Arterial: 7.45    pCO2, Arterial: 42 mmHg    pO2, Arterial: 49 mmHg    HCO3, Arterial: 28 mmol/L    Base Excess, Arterial: 4.4 mmol/L    Oxygen Saturation, Arterial: 83 %    Total CO2, Arterial: 30 mmoL/L    Natacha Sequeira, [facility]-BC  [id number] MEDICINE NP- EPISODIC NOTE    Received call from RN patient is s/p RRT for hypoxia 79% on BiPAP, ordered ABG and RRT went to next RR. Seen patient, AOx3, sat 96%, BiPAP settings unchanged; c/o mouth dryness. Denies SOB. Following ABG results, per RN ABG drawn looked venous during RR. Will repeat.    ABG Blood Gas Profile - Arterial (02.23.20 @ 22:13)    pH, Arterial: 7.45    pCO2, Arterial: 42 mmHg    pO2, Arterial: 49 mmHg    HCO3, Arterial: 28 mmol/L    Base Excess, Arterial: 4.4 mmol/L    Oxygen Saturation, Arterial: 83 %    Total CO2, Arterial: 30 mmoL/L    Natacha Sequeira, [facility]-BC  [id number] MEDICINE NP- EPISODIC NOTE    Received call from RN patient is s/p RRT for hypoxia 79% on BiPAP, ordered ABG and RRT went to next RR. Seen patient, AOx3, sat 96%, BiPAP settings unchanged; c/o mouth dryness. Denies SOB. Following ABG results, per RN ABG drawn looked venous during RR. Will repeat. RN spoke with wife.    ABG Blood Gas Profile - Arterial (02.23.20 @ 22:13)    pH, Arterial: 7.45    pCO2, Arterial: 42 mmHg    pO2, Arterial: 49 mmHg    HCO3, Arterial: 28 mmol/L    Base Excess, Arterial: 4.4 mmol/L    Oxygen Saturation, Arterial: 83 %    Total CO2, Arterial: 30 mmoL/L    Natacha Sequeira, [facility]-BC  [phone]

## 2020-02-23 NOTE — PROGRESS NOTE ADULT - SUBJECTIVE AND OBJECTIVE BOX
no cp/  less sob    REVIEW OF SYSTEMS:  GEN: no fever,    no chills  RESP: no SOB,   no cough  CVS: no chest pain,   no palpitations  GI: no abdominal pain,   no nausea,   no vomiting,   no constipation,   no diarrhea  : no dysuria,   no frequency  NEURO: no headache,   no dizziness  PSYCH: no depression,   not anxious  Derm : no rash    MEDICATIONS  (STANDING):  albuterol/ipratropium for Nebulization 3 milliLiter(s) Nebulizer every 6 hours  atorvastatin 10 milliGRAM(s) Oral at bedtime  azithromycin  IVPB      azithromycin  IVPB 500 milliGRAM(s) IV Intermittent every 24 hours  benzonatate 200 milliGRAM(s) Oral three times a day  buDESOnide    Inhalation Suspension 0.5 milliGRAM(s) Inhalation every 12 hours  busPIRone 15 milliGRAM(s) Oral three times a day  cefTRIAXone   IVPB 1000 milliGRAM(s) IV Intermittent every 24 hours  dextrose 5%. 1000 milliLiter(s) (50 mL/Hr) IV Continuous <Continuous>  dextrose 50% Injectable 12.5 Gram(s) IV Push once  dextrose 50% Injectable 25 Gram(s) IV Push once  dextrose 50% Injectable 25 Gram(s) IV Push once  diltiazem    milliGRAM(s) Oral daily  doxazosin 4 milliGRAM(s) Oral at bedtime  enoxaparin Injectable 40 milliGRAM(s) SubCutaneous daily  guaifenesin/dextromethorphan  Syrup 10 milliLiter(s) Oral every 6 hours  hydrocodone/homatropine Syrup 5 milliLiter(s) Oral <User Schedule>  insulin lispro (HumaLOG) corrective regimen sliding scale   SubCutaneous three times a day before meals  insulin lispro (HumaLOG) corrective regimen sliding scale   SubCutaneous at bedtime  methylPREDNISolone sodium succinate Injectable 40 milliGRAM(s) IV Push every 6 hours  oseltamivir 75 milliGRAM(s) Oral two times a day  pantoprazole  Injectable 40 milliGRAM(s) IV Push daily  PARoxetine 30 milliGRAM(s) Oral daily  potassium chloride    Tablet ER 40 milliEquivalent(s) Oral once  potassium phosphate IVPB 15 milliMole(s) IV Intermittent once    MEDICATIONS  (PRN):  acetaminophen   Tablet .. 650 milliGRAM(s) Oral every 6 hours PRN Temp greater or equal to 38C (100.4F), Mild Pain (1 - 3), Moderate Pain (4 - 6)  clonazePAM  Tablet 1 milliGRAM(s) Oral three times a day PRN anxiety  dextrose 40% Gel 15 Gram(s) Oral once PRN Blood Glucose LESS THAN 70 milliGRAM(s)/deciliter  glucagon  Injectable 1 milliGRAM(s) IntraMuscular once PRN Glucose LESS THAN 70 milligrams/deciliter      Vital Signs Last 24 Hrs  T(C): 36.4 (23 Feb 2020 05:03), Max: 36.8 (22 Feb 2020 10:15)  T(F): 97.6 (23 Feb 2020 05:03), Max: 98.3 (22 Feb 2020 10:15)  HR: 66 (23 Feb 2020 09:03) (59 - 77)  BP: 137/76 (23 Feb 2020 08:27) (119/68 - 156/79)  BP(mean): --  RR: 22 (23 Feb 2020 08:27) (20 - 22)  SpO2: 91% (23 Feb 2020 09:03) (84% - 98%)  CAPILLARY BLOOD GLUCOSE      POCT Blood Glucose.: 265 mg/dL (23 Feb 2020 07:57)    I&O's Summary    22 Feb 2020 07:01  -  23 Feb 2020 07:00  --------------------------------------------------------  IN: 780 mL / OUT: 2400 mL / NET: -1620 mL        PHYSICAL EXAM:  HEAD:  Atraumatic, Normocephalic  NECK: Supple, No   JVD  CHEST/LUNG:   no     rales,   less   rhonchi  HEART: Regular rate and rhythm;         murmur  ABDOMEN: Soft, Nontender, ;   EXTREMITIES:     no   edema  NEUROLOGY:  alert    LABS:                        11.9   18.56 )-----------( 139      ( 23 Feb 2020 05:36 )             33.8     02-23    130<L>  |  93<L>  |  27<H>  ----------------------------<  239<H>  4.4   |  24  |  0.87    Ca    8.1<L>      23 Feb 2020 05:36  Phos  2.2     02-22  Mg     2.6     02-22    TPro  x   /  Alb  2.9<L>  /  TBili  x   /  DBili  x   /  AST  x   /  ALT  x   /  AlkPhos  x   02-22              ABG - ( 23 Feb 2020 03:16 )  pH, Arterial: 7.47  pH, Blood: x     /  pCO2: 36    /  pO2: 56    / HCO3: 26    / Base Excess: 3.2   /  SaO2: 89                    Thyroid Stimulating Hormone, Serum: 0.39 uIU/mL (02-21 @ 09:19)          Consultant(s) Notes Reviewed:      Care Discussed with Consultants/Other Providers:

## 2020-02-23 NOTE — PROGRESS NOTE ADULT - SUBJECTIVE AND OBJECTIVE BOX
Patient seen in follow up for hyponatremia, less dyspneic, on high flow nasal O2    HTN (hypertension)  Depression  Pulmonary fibrosis  BPH (benign prostatic hyperplasia)  HTN    MEDICATIONS  (STANDING):  albuterol/ipratropium for Nebulization 3 milliLiter(s) Nebulizer every 6 hours  atorvastatin 10 milliGRAM(s) Oral at bedtime  azithromycin  IVPB      azithromycin  IVPB 500 milliGRAM(s) IV Intermittent every 24 hours  benzonatate 200 milliGRAM(s) Oral three times a day  buDESOnide    Inhalation Suspension 0.5 milliGRAM(s) Inhalation every 12 hours  busPIRone 15 milliGRAM(s) Oral three times a day  cefTRIAXone   IVPB 1000 milliGRAM(s) IV Intermittent every 24 hours  dextrose 5%. 1000 milliLiter(s) (50 mL/Hr) IV Continuous <Continuous>  dextrose 50% Injectable 12.5 Gram(s) IV Push once  dextrose 50% Injectable 25 Gram(s) IV Push once  dextrose 50% Injectable 25 Gram(s) IV Push once  diltiazem    milliGRAM(s) Oral daily  doxazosin 4 milliGRAM(s) Oral at bedtime  enoxaparin Injectable 40 milliGRAM(s) SubCutaneous daily  guaifenesin/dextromethorphan  Syrup 10 milliLiter(s) Oral every 6 hours  insulin lispro (HumaLOG) corrective regimen sliding scale   SubCutaneous three times a day before meals  insulin lispro (HumaLOG) corrective regimen sliding scale   SubCutaneous at bedtime  oseltamivir 75 milliGRAM(s) Oral two times a day  pantoprazole  Injectable 40 milliGRAM(s) IV Push daily  PARoxetine 30 milliGRAM(s) Oral daily  polyethylene glycol 3350 17 Gram(s) Oral daily  potassium chloride    Tablet ER 40 milliEquivalent(s) Oral once  potassium phosphate IVPB 15 milliMole(s) IV Intermittent once  predniSONE   Tablet 20 milliGRAM(s) Oral daily    MEDICATIONS  (PRN):  acetaminophen   Tablet .. 650 milliGRAM(s) Oral every 6 hours PRN Temp greater or equal to 38C (100.4F), Mild Pain (1 - 3), Moderate Pain (4 - 6)  clonazePAM  Tablet 1 milliGRAM(s) Oral three times a day PRN anxiety  dextrose 40% Gel 15 Gram(s) Oral once PRN Blood Glucose LESS THAN 70 milliGRAM(s)/deciliter  glucagon  Injectable 1 milliGRAM(s) IntraMuscular once PRN Glucose LESS THAN 70 milligrams/deciliter    T(C): 36.4 (02-23-20 @ 11:17), Max: 36.8 (02-22-20 @ 10:15)  HR: 78 (02-23-20 @ 17:44) (59 - 78)  BP: 137/76 (02-23-20 @ 08:27) (119/68 - 156/79)  RR: 22 (02-23-20 @ 08:27) (20 - 22)  SpO2: 93% (02-23-20 @ 17:44) (84% - 98%)  Wt(kg): --  I&O's Detail    22 Feb 2020 07:01  -  23 Feb 2020 07:00  --------------------------------------------------------  IN:    Oral Fluid: 480 mL    Solution: 250 mL    Solution: 50 mL  Total IN: 780 mL    OUT:    Indwelling Catheter - Urethral: 2400 mL  Total OUT: 2400 mL    Total NET: -1620 mL      23 Feb 2020 07:01  -  23 Feb 2020 18:15  --------------------------------------------------------  IN:    Oral Fluid: 360 mL  Total IN: 360 mL    OUT:    Indwelling Catheter - Urethral: 1000 mL  Total OUT: 1000 mL    Total NET: -640 mL      PHYSICAL EXAM:  General: NAD  No JVD  Respiratory: b/l air entry, bilateral crackles  Cardiovascular: S1 S2  Gastrointestinal: soft, obese, NT  Extremities: no edema      CBC Full  -  ( 23 Feb 2020 05:36 )  WBC Count : 18.56 K/uL  RBC Count : 3.89 M/uL  Hemoglobin : 11.9 g/dL  Hematocrit : 33.8 %  Platelet Count - Automated : 139 K/uL  Mean Cell Volume : 86.9 fl  Mean Cell Hemoglobin : 30.6 pg  Mean Cell Hemoglobin Concentration : 35.2 gm/dL  Auto Neutrophil # : x  Auto Lymphocyte # : x  Auto Monocyte # : x  Auto Eosinophil # : x  Auto Basophil # : x  Auto Neutrophil % : x  Auto Lymphocyte % : x  Auto Monocyte % : x  Auto Eosinophil % : x  Auto Basophil % : x    02-23    132<L>  |  94<L>  |  26<H>  ----------------------------<  277<H>  4.6   |  21<L>  |  0.82    Ca    8.2<L>      23 Feb 2020 14:07  Phos  2.2     02-22  Mg     2.6     02-22    TPro  x   /  Alb  2.9<L>  /  TBili  x   /  DBili  x   /  AST  x   /  ALT  x   /  AlkPhos  x   02-22    ABG - ( 23 Feb 2020 03:16 )  pH, Arterial: 7.47  pH, Blood: x     /  pCO2: 36    /  pO2: 56    / HCO3: 26    / Base Excess: 3.2   /  SaO2: 89                  Sodium, Serum: 132 (02-23 @ 14:07)  Sodium, Serum: 130 (02-23 @ 05:36)  Sodium, Serum: 125 (02-23 @ 00:11)  Sodium, Serum: 126 (02-22 @ 18:24)  Sodium, Serum: 125 (02-22 @ 12:51)  Sodium, Serum: 123 (02-22 @ 06:48)  Sodium, Serum: 124 (02-21 @ 23:32)  Sodium, Serum: 125 (02-21 @ 16:08)  Sodium, Serum: 128 (02-21 @ 07:42)  Sodium, Serum: 125 (02-20 @ 22:02)    Creatinine, Serum: 0.82 (02-23 @ 14:07)  Creatinine, Serum: 0.87 (02-23 @ 05:36)  Creatinine, Serum: 0.84 (02-23 @ 00:11)  Creatinine, Serum: 0.90 (02-22 @ 18:24)  Creatinine, Serum: 0.87 (02-22 @ 12:51)  Creatinine, Serum: 0.90 (02-22 @ 06:48)  Creatinine, Serum: 0.88 (02-21 @ 23:32)  Creatinine, Serum: 0.80 (02-21 @ 16:08)  Creatinine, Serum: 0.79 (02-21 @ 07:42)  Creatinine, Serum: 0.85 (02-20 @ 22:02)    Potassium, Serum: 4.6 (02-23 @ 14:07)  Potassium, Serum: 4.4 (02-23 @ 05:36)  Potassium, Serum: 4.2 (02-23 @ 00:11)  Potassium, Serum: 4.1 (02-22 @ 18:24)  Potassium, Serum: 3.9 (02-22 @ 12:51)  Potassium, Serum: 4.0 (02-22 @ 06:48)  Potassium, Serum: 3.8 (02-21 @ 23:32)  Potassium, Serum: 3.6 (02-21 @ 16:08)  Potassium, Serum: 3.7 (02-21 @ 07:42)  Potassium, Serum: 3.2 (02-20 @ 22:02)    Hemoglobin: 11.9 (02-23 @ 05:36)  Hemoglobin: 11.8 (02-22 @ 06:47)  Hemoglobin: 11.6 (02-21 @ 07:45)

## 2020-02-23 NOTE — RAPID RESPONSE TEAM SUMMARY - NSOTHERINTERVENTIONSRRT_GEN_ALL_CORE
Please continue with Current management   f/u with Pulmonary team in the am   Please consider qhs Bi PAP for pulm fibrosis / probable AMANDA   continue nebs and Steroid therapy per Pulmonary team   Primary team to f/u with repeat Blood gas

## 2020-02-23 NOTE — PROGRESS NOTE ADULT - ASSESSMENT
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  Urinary retention may be caused by/contributing to hyponatremia.  On 100o ml fluid restriction, received 20 mg Lasix yesterday for aquaresis.  Hyponatremia resolving.  PO4 supplementd    Recommend:  Maintain 1000 ml fluid restriction  Monitor lytes

## 2020-02-23 NOTE — PROGRESS NOTE ADULT - SUBJECTIVE AND OBJECTIVE BOX
CC: f/u for hypoxic respiratory failure    Patient reports: he has minimal sputum, still with borderline sats despite nasal higg flow    REVIEW OF SYSTEMS:  All other review of systems negative (Comprehensive ROS)    Antimicrobials Day #  :day 4  azithromycin  IVPB      azithromycin  IVPB 500 milliGRAM(s) IV Intermittent every 24 hours  cefTRIAXone   IVPB 1000 milliGRAM(s) IV Intermittent every 24 hours  oseltamivir 75 milliGRAM(s) Oral two times a day    Other Medications Reviewed    T(F): 97.6 (02-23-20 @ 05:03), Max: 98.3 (02-22-20 @ 10:15)  HR: 63 (02-23-20 @ 05:03)  BP: 119/68 (02-23-20 @ 05:03)  RR: 20 (02-23-20 @ 05:03)  SpO2: 93% (02-23-20 @ 05:03)  Wt(kg): --    PHYSICAL EXAM:  General: alert, no acute distress, on high flow  Eyes:  anicteric, no conjunctival injection, no discharge  Oropharynx: no lesions or injection 	  Neck: supple, without adenopathy  Lungs: distant bs  Heart: regular rate and rhythm; no murmur, rubs or gallops  Abdomen: soft, nondistended, nontender, without mass or organomegaly  Skin: no lesions  Extremities: no clubbing, cyanosis, or edema  Neurologic: alert, oriented, moves all extremities  :loza  LAB RESULTS:                        11.9   18.56 )-----------( 139      ( 23 Feb 2020 05:36 )             33.8     02-23    130<L>  |  93<L>  |  27<H>  ----------------------------<  239<H>  4.4   |  24  |  0.87    Ca    8.1<L>      23 Feb 2020 05:36  Phos  2.2     02-22  Mg     2.6     02-22    TPro  x   /  Alb  2.9<L>  /  TBili  x   /  DBili  x   /  AST  x   /  ALT  x   /  AlkPhos  x   02-22    LIVER FUNCTIONS - ( 22 Feb 2020 06:48 )  Alb: 2.9 g/dL / Pro: x     / ALK PHOS: x     / ALT: x     / AST: x     / GGT: x             MICROBIOLOGY:  RECENT CULTURES:  02-20 @ 10:14 .Blood Blood-Peripheral     No growth to date.          RADIOLOGY REVIEWED:  < from: Xray Chest 1 View- PORTABLE-Urgent (02.22.20 @ 11:22) >  FINDINGS AND   IMPRESSION:  Heart size is difficult to assess in this projection.  Bilateral linear hazy opacities, more prominent in the left lung, appear increased since previous chest radiograph on 2/19/2020. No pleural effusion or pneumothorax.  The visualized osseous structures demonstrate no acute pathology.    >

## 2020-02-23 NOTE — PROGRESS NOTE ADULT - SUBJECTIVE AND OBJECTIVE BOX
Subjective    Seen and examined. Remains on high flow O2.  Not yet OOB or ambulating.    Objective    Vital signs  T(F): , Max: 98 (02-22-20 @ 21:28)  HR: 66 (02-23-20 @ 09:03)  BP: 137/76 (02-23-20 @ 08:27)  SpO2: 91% (02-23-20 @ 09:03)  Wt(kg): --    Output     OUT:    Indwelling Catheter - Urethral: 2400 mL  Total OUT: 2400 mL    Total NET: -2400 mL          Physical Exam  Gen on high flow O2  Abd soft NT ND   loza draining clear yellow urine    Labs      02-23 @ 05:36    WBC 18.56 / Hct 33.8  / SCr 0.87     02-23 @ 00:11    WBC --    / Hct --    / SCr 0.84       Culture - Blood (02.20.20 @ 10:14)    Specimen Source: .Blood Blood-Venous    Culture Results:   No growth to date.      Imaging

## 2020-02-23 NOTE — PROGRESS NOTE ADULT - ASSESSMENT
69M with        PMH of HTN,  HLD, , pulmonary fibrosis,  not on home  O 2.   depression/anxiety , on  Klonopin          p/w SOB and cough/  acute resp failure with O2  sat of  80 on arrival to  er  O2  sat is  92  to 99 %,  earlier  and  now  lower  again    Iinfluenza A positive, with  bronchospasm   on Tamiflu. / nebs  and   solumedrol    mild anemia/  GI eval  Hyponatremia from   poor po intake/ dehydration/ on iv fluids/  stop  hctz  ct chest,    b/l patchy opacities/   traction bronchiectasis/   pulm fibrosis  on rocephin/  zmax/  CAP/   pulm    dr gusman,  for  hypoxia  on  buspar/ paxil  and klonopin/ home meds, on  dvt ppx   hyponatremia,  improved to 130/  seen by renal  ok to  restart  cardizem/  and  will   hold  olmesartan/ hctz  ID eval    noted , continue /    iv  ab/ afebrile,/  elevtaed wbc   from iv steroid/ may  taper  it  per  urology  TOV in 2  days/  wife  aware    bmp in am     < from: CT Angio Chest w/ IV Cont (02.20.20 @ 00:14) >  IMPRESSION:   No obvious pulmonary embolus.  Findings reflecting known pulmonary fibrosis. Recommend clinical correlation to assess underlying pneumonia. Recommend comparison to previous outside study and follow-up to exclude potential underlying neoplasm.  Heterogeneous thyroid gland, which is difficult to assess due to artifact and can be further characterizedon a nonemergent ultrasound as clinically indicated.   Additional findings as described  < end of copied text > 69M with        PMH of HTN,  HLD, , pulmonary fibrosis,  not on home  O 2.   depression/anxiety , on  Klonopin          p/w SOB and cough/  acute resp failure with O2  sat of  80 on arrival to  er  O2  sat is  92  to 99 %,  earlier  and  now  lower  again    Iinfluenza A positive, with  bronchospasm   on Tamiflu. / nebs  and   solumedrol    mild anemia/  GI eval  Hyponatremia from   poor po intake/ dehydration/ on iv fluids/  stop  hctz  ct chest,    b/l patchy opacities/   traction bronchiectasis/   pulm fibrosis  on rocephin/  zmax/  CAP/   pulm    dr gusman,  for  hypoxia  on  buspar/ paxil  and klonopin/ home meds, on  dvt ppx   hyponatremia,  improved to 130/  seen by renal  ok to  restart  cardizem/  and  will   hold  olmesartan/ hctz  ID eval    noted , continue /    iv  ab/ afebrile,/  elevtaed wbc   from iv steroid/ may  taper  it  per  urology  TOV in 2  days/  wife  aware /  upset about highfs/ on prednisone now.  breathing has impeoved   bmp in am     < from: CT Angio Chest w/ IV Cont (02.20.20 @ 00:14) >  IMPRESSION:   No obvious pulmonary embolus.  Findings reflecting known pulmonary fibrosis. Recommend clinical correlation to assess underlying pneumonia. Recommend comparison to previous outside study and follow-up to exclude potential underlying neoplasm.  Heterogeneous thyroid gland, which is difficult to assess due to artifact and can be further characterizedon a nonemergent ultrasound as clinically indicated.   Additional findings as described  < end of copied text >

## 2020-02-23 NOTE — RAPID RESPONSE TEAM SUMMARY - NSSITUATIONBACKGROUNDRRT_GEN_ALL_CORE
69M with PMH of HTN, pulmonary fibrosis, depression/anxiety p/w SOB and cough.  RRT called today (+) hypoxia.

## 2020-02-23 NOTE — PROGRESS NOTE ADULT - SUBJECTIVE AND OBJECTIVE BOX
GASTROENTEROLOGY    Patient seen and examined at bedside  Events noted  On Hiflo nasal cannula  He denies nausea/vomiting/abdominal pain  Tolerating diet  No BM since thursday      MEDICATIONS  (STANDING):  albuterol/ipratropium for Nebulization 3 milliLiter(s) Nebulizer every 6 hours  atorvastatin 10 milliGRAM(s) Oral at bedtime  azithromycin  IVPB      azithromycin  IVPB 500 milliGRAM(s) IV Intermittent every 24 hours  benzonatate 200 milliGRAM(s) Oral three times a day  buDESOnide    Inhalation Suspension 0.5 milliGRAM(s) Inhalation every 12 hours  busPIRone 15 milliGRAM(s) Oral three times a day  cefTRIAXone   IVPB 1000 milliGRAM(s) IV Intermittent every 24 hours  dextrose 5%. 1000 milliLiter(s) (50 mL/Hr) IV Continuous <Continuous>  dextrose 50% Injectable 12.5 Gram(s) IV Push once  dextrose 50% Injectable 25 Gram(s) IV Push once  dextrose 50% Injectable 25 Gram(s) IV Push once  diltiazem    milliGRAM(s) Oral daily  doxazosin 4 milliGRAM(s) Oral at bedtime  enoxaparin Injectable 40 milliGRAM(s) SubCutaneous daily  guaifenesin/dextromethorphan  Syrup 10 milliLiter(s) Oral every 6 hours  insulin lispro (HumaLOG) corrective regimen sliding scale   SubCutaneous three times a day before meals  insulin lispro (HumaLOG) corrective regimen sliding scale   SubCutaneous at bedtime  methylPREDNISolone sodium succinate Injectable 40 milliGRAM(s) IV Push every 6 hours  oseltamivir 75 milliGRAM(s) Oral two times a day  pantoprazole  Injectable 40 milliGRAM(s) IV Push daily  PARoxetine 30 milliGRAM(s) Oral daily  potassium chloride    Tablet ER 40 milliEquivalent(s) Oral once  potassium phosphate IVPB 15 milliMole(s) IV Intermittent once        T(F): 97.6 (02-23-20 @ 11:17), Max: 98 (02-22-20 @ 21:28)  HR: 66 (02-23-20 @ 09:03) (59 - 77)  BP: 137/76 (02-23-20 @ 08:27) (119/68 - 156/79)  RR: 22 (02-23-20 @ 08:27) (20 - 22)  SpO2: 91% (02-23-20 @ 09:03) (84% - 98%)  Wt(kg): --    PHYSICAL EXAM  GENERAL:   NAD  HEENT:  NC/AT, no JVD, sclera-anicteric  CHEST:  coarse b/l breath sounds, Hi flow oxygen   HEART:  +S1+S2  ABDOMEN:  Soft, non-tender, non-distended, + bowel sounds  EXTREMITIES:  no cyanosis, clubbing or edema  NEURO:  Alert, oriented  SKIN:  No rash/warm/dry      LABS:                        11.9<L>  18.56<H> )-----------( 139<L>    ( 23 Feb 2020 05:36 )             33.8<L>  23 Feb 2020 05:36    02-23    130<L>  |  93<L>  |  27<H>  ----------------------------<  239<H>  4.4   |  24  |  0.87    Ca    8.1<L>      23 Feb 2020 05:36  Phos  2.2     02-22  Mg     2.6     02-22    TPro  x   /  Alb  2.9<L>  /  TBili  x   /  DBili  x   /  AST  x   /  ALT  x   /  AlkPhos  x   02-22    LIVER FUNCTIONS - ( 22 Feb 2020 06:48 )  Alb: 2.9 g/dL / Pro: x     / ALK PHOS: x     / ALT: x     / AST: x     / GGT: x             I&O's Detail    22 Feb 2020 07:01  -  23 Feb 2020 07:00  --------------------------------------------------------  IN:    Oral Fluid: 480 mL    Solution: 250 mL    Solution: 50 mL  Total IN: 780 mL    OUT:    Indwelling Catheter - Urethral: 2400 mL  Total OUT: 2400 mL    Total NET: -1620 mL      23 Feb 2020 07:01  -  23 Feb 2020 11:47  --------------------------------------------------------  IN:    Oral Fluid: 120 mL  Total IN: 120 mL    OUT:  Total OUT: 0 mL    Total NET: 120 mL

## 2020-02-23 NOTE — PROGRESS NOTE ADULT - SUBJECTIVE AND OBJECTIVE BOX
[facility] - PROGRESS NOTE    CHIEF COMPLAINT: hypoxic respiratory failure; influenza; pneumonia; bronchospasm; pulmonary fibrosis;     INTERVAL HISTORY: placed on BIPAP for several hours overnight and for several minutes this AM due to hypoxemia - no with saturation > 90% on maximal dose of high flow nasal oxygen; short of breath and hypoxic with minimal exertion; improved cough with minimal sputum production; no chest congestion or wheeze; no fevers, chills or sweats; no chest pain/pressure or palpitations;  less fatigue, malaise and weakness; good appetite; urinary retention -> loza    REVIEW OF SYSTEMS:  Constitutional: As per interval history  HEENT: Within normal limits  CV: As per interval history  Resp: As per interval history  GI: Within normal limits   : urinary retention  Musculoskeletal: Within normal limits  Skin: Within normal limits  Neurological: Within normal limits  Psychiatric: anxiety type depression  Endocrine: Within normal limits  Hematologic/Lymphatic: Within normal limits  Allergic/Immunologic: Within normal limits    MEDICATIONS:     Pulmonary "  albuterol/ipratropium for Nebulization 3 milliLiter(s) Nebulizer every 6 hours  benzonatate 200 milliGRAM(s) Oral three times a day  buDESOnide    Inhalation Suspension 0.5 milliGRAM(s) Inhalation every 12 hours  guaifenesin/dextromethorphan  Syrup 10 milliLiter(s) Oral every 6 hours  hydrocodone/homatropine Syrup 5 milliLiter(s) Oral <User Schedule>    Anti-microbials:  azithromycin  IVPB      azithromycin  IVPB 500 milliGRAM(s) IV Intermittent every 24 hours  cefTRIAXone   IVPB 1000 milliGRAM(s) IV Intermittent every 24 hours  oseltamivir 75 milliGRAM(s) Oral two times a day    Cardiovascular:  diltiazem    milliGRAM(s) Oral daily  doxazosin 4 milliGRAM(s) Oral at bedtime    Other:  atorvastatin 10 milliGRAM(s) Oral at bedtime  busPIRone 15 milliGRAM(s) Oral three times a day  dextrose 5%. 1000 milliLiter(s) IV Continuous <Continuous>  dextrose 50% Injectable 12.5 Gram(s) IV Push once  dextrose 50% Injectable 25 Gram(s) IV Push once  dextrose 50% Injectable 25 Gram(s) IV Push once  enoxaparin Injectable 40 milliGRAM(s) SubCutaneous daily  insulin lispro (HumaLOG) corrective regimen sliding scale   SubCutaneous three times a day before meals  insulin lispro (HumaLOG) corrective regimen sliding scale   SubCutaneous at bedtime  methylPREDNISolone sodium succinate Injectable 40 milliGRAM(s) IV Push every 6 hours  pantoprazole  Injectable 40 milliGRAM(s) IV Push daily  PARoxetine 30 milliGRAM(s) Oral daily  potassium chloride    Tablet ER 40 milliEquivalent(s) Oral once  potassium phosphate IVPB 15 milliMole(s) IV Intermittent once    MEDICATIONS  (PRN):  acetaminophen   Tablet .. 650 milliGRAM(s) Oral every 6 hours PRN Temp greater or equal to 38C (100.4F), Mild Pain (1 - 3), Moderate Pain (4 - 6)  clonazePAM  Tablet 1 milliGRAM(s) Oral three times a day PRN anxiety  dextrose 40% Gel 15 Gram(s) Oral once PRN Blood Glucose LESS THAN 70 milliGRAM(s)/deciliter  glucagon  Injectable 1 milliGRAM(s) IntraMuscular once PRN Glucose LESS THAN 70 milligrams/deciliter        OBJECTIVE:    I&O's Detail    22 Feb 2020 07:01  -  23 Feb 2020 07:00  --------------------------------------------------------  IN:    Oral Fluid: 480 mL    Solution: 250 mL    Solution: 50 mL  Total IN: 780 mL    OUT:    Indwelling Catheter - Urethral: 2400 mL  Total OUT: 2400 mL    Total NET: -1620 mL      23 Feb 2020 07:01  -  23 Feb 2020 10:31  --------------------------------------------------------  IN:    Oral Fluid: 120 mL  Total IN: 120 mL    OUT:  Total OUT: 0 mL    Total NET: 120 mL    POCT Blood Glucose.: 265 mg/dL (23 Feb 2020 07:57)      PHYSICAL EXAM:       ICU Vital Signs Last 24 Hrs  T(C): 36.4 (23 Feb 2020 05:03), Max: 36.7 (22 Feb 2020 21:28)  T(F): 97.6 (23 Feb 2020 05:03), Max: 98 (22 Feb 2020 21:28)  HR: 66 (23 Feb 2020 09:03) (59 - 77)  BP: 137/76 (23 Feb 2020 08:27) (119/68 - 156/79)  BP(mean): --  ABP: --  ABP(mean): --  RR: 22 (23 Feb 2020 08:27) (20 - 22)  SpO2: 91% (23 Feb 2020 09:03) (84% - 98%) on high flow 100% FiO2 @ 50lpm -> BIPAP @ 80% FiO2    General: Awake. Alert. Cooperative. No distress. Appears stated age. 	  HEENT: Atraumatic. Normocephalic. Anicteric. Normal oral mucosa. PERRL. EOMI.  Neck: Supple. Trachea midline. Thyroid without enlargement/tenderness/nodules. No carotid bruit. No JVD.	  Cardiovascular: Regular rate and rhythm. S1 S2 normal. No murmurs, rubs or gallops.  Respiratory: Respirations unlabored. Clear to auscultation and percussion. No curvature.  Abdomen: Soft. Non-tender. Non-distended. No organomegaly. No masses. Normal bowel sounds. Obese.  Extremities: Warm to touch. No clubbing or cyanosis. No pedal edema.  Pulses: 2+ peripheral pulses all extremities.	  Skin: Normal skin color. No rashes or lesions. No ecchymoses. No cyanosis. Warm to touch.  Lymph Nodes: Cervical, supraclavicular and axillary nodes normal  Neurological: Motor and sensory examination equal and normal. A and O x 3  Psychiatry: Appropriate mood and affect.    LABS:                          11.9   18.56 )-----------( 139      ( 23 Feb 2020 05:36 )             33.8     CBC    WBC  18.56 <==, 15.96 <==, 5.46 <==, 7.28 <==, 8.58 <==    Hemoglobin  11.9 <<==, 11.8 <<==, 11.6 <<==, 11.8 <<==, 12.4 <<==    Hematocrit  33.8 <==, 34.3 <==, 34.1 <==, 32.9 <==, 35.7 <==    Platelets  139 <==, 186 <==, 194 <==, 196 <==, 196 <==      130<L>  |  93<L>  |  27<H>  ----------------------------<  239<H>    02-23  4.4   |  24  |  0.87      LYTES    sodium  130 <==, 125 <==, 126 <==, 125 <==, 123 <==, 124 <==, 125 <==    potassium   4.4 <==, 4.2 <==, 4.1 <==, 3.9 <==, 4.0 <==, 3.8 <==, 3.6 <==    chloride  93 <==, 89 <==, 91 <==, 88 <==, 90 <==, 90 <==, 91 <==    carbon dioxide  24 <==, 20 <==, 22 <==, 22 <==, 21 <==, 22 <==, 21 <==    =============================================================================================  RENAL FUNCTION:    Creatinine:   0.87  <<==, 0.84  <<==, 0.90  <<==, 0.87  <<==, 0.90  <<==, 0.88  <<==, 0.80  <<==    BUN:   27 <==, 25 <==, 24 <==, 22 <==, 21 <==, 17 <==, 14 <==    ============================================================================================    calcium   8.1 <==, 7.6 <==, 7.5 <==, 7.7 <==, 7.6 <==, 7.7 <==, 7.6 <==    phos   2.2 <==, 2.8 <==, 2.3 <==    mag   2.6 <==, 2.3 <==, 1.9 <==, 1.9 <==, 1.9 <==    ============================================================================================  LFTs    AST:   70 <== , 48 <==     ALT:  24  <== , 21  <==     AP:  46  <=, 50  <=    Bili:  0.3  <=, 0.5  <=    ABG - ( 23 Feb 2020 03:16 )  pH: 7.47  /  pCO2: 36    /  pO2: 56    / HCO3: 26    / Base Excess: 3.2   /  SaO2: 89        ABG - ( 21 Feb 2020 06:24 )  pH: 7.49  /  pCO2: 29    /  pO2: 53    / HCO3: 22    / Base Excess: -.1   /  SaO2: 89        ABG - ( 21 Feb 2020 00:03 )  pH: 7.46  /  pCO2: 31    /  pO2: 58    / HCO3: 22    / Base Excess: -1.0  /  SaO2: 91        ABG - ( 20 Feb 2020 11:46 )  pH: 7.49  /  pCO2: 29    /  pO2: 58    / HCO3: 22    / Base Excess: -.3   /  SaO2: 91        Procalcitonin, Serum: 0.69 ng/mL (02-21 @ 07:45)    Serum Pro-Brain Natriuretic Peptide: 364 pg/mL (02-19 @ 22:30)    CARDIAC MARKERS ( 20 Feb 2020 00:57 )  CPK x     /CKMB x     /CKMB Units x        troponin 17 ng/L    CARDIAC MARKERS ( 19 Feb 2020 22:30 )  CPK x     /CKMB x     /CKMB Units x        troponin 19 ng/L    MICROBIOLOGY:     FLU A B RSV Detection by PCR (02.19.20 @ 22:30)    Flu A Result: Detected    Flu B Result: NotDetec    RSV Result: NotDetec    Legionella pneumophila Antigen, Urine (02.20.20 @ 09:50)    Legionella Antigen, Urine: Negative    Culture - Blood (02.20.20 @ 10:14)    Specimen Source: .Blood Blood-Venous    Culture Results:   No growth to date.    Culture - Blood (02.20.20 @ 10:14)    Specimen Source: .Blood Blood-Peripheral    Culture Results:   No growth to date.    RADIOLOGY:  [x] Chest radiographs reviewed and interpreted by me    EXAM:  XR CHEST PORTABLE URGENT 1V                          PROCEDURE DATE:  02/22/2020      INTERPRETATION:  CLINICAL INFORMATION: Hypoxic respiratory failure.    EXAM: AP chest radiograph.    COMPARISON: Chest radiograph from 2/19/2020.    FINDINGS AND   IMPRESSION:  Heart size is difficult to assess in this projection.  Bilateral linear hazy opacities, more prominent in the left lung, appear increased since previous chest radiograph on 2/19/2020. No pleural effusion or pneumothorax.  The visualized osseous structures demonstrate no acute pathology.    GALILEO AGUILAR M.D., RADIOLOGY RESIDENT  This document has been electronically signed.  ES VELAZQUEZ M.D., ATTENDING RADIOLOGIST  This document has been electronically signed. Feb 22 2020 11:42AM  ---------------------------------------------------------------------------------------------------------------    EXAM:  XR CHEST AP OR PA 1V                          PROCEDURE DATE:  02/19/2020      INTERPRETATION:  CLINICAL INFORMATION: Chest pain.    EXAM: 1 view of the chest.    COMPARISON: None.    FINDINGS:    Bilateral midlung linear opacities. No pneumothorax. The cardiomediastinal silhouette is poorly evaluated on this projection. No acute osseous findings.    IMPRESSION:    Bilateral midlung linear opacities, which are of uncertain etiology.      RAMONA REDDY M.D., RADIOLOGY RESIDENT  This document has been electronically signed.  ES VELAZQUEZ M.D., ATTENDING RADIOLOGIST  This document has been electronically signed. Feb 20 2020  9:42AM    ---------------------------------------------------------------------------------------------------------------    EXAM:  CT ANGIO CHEST (W)AW IC                          PROCEDURE DATE:  02/20/2020      INTERPRETATION:  CLINICAL INFORMATION: Shortness of breath and fatigue. History of lung disease.    COMPARISON: Chest radiograph 2/19/2020    PROCEDURE:   CT Angiography of the Chest.  90 ml of Omnipaque 350 was injected intravenously. 10 ml were discarded.  Sagittal and coronal reformats were performed as well as 3D (MIP) reconstructions.    FINDINGS:    LUNGS AND AIRWAYS: Patent central airways. Areas of architectural distortion, mild traction bronchiectasis and linear/reticular opacities. Nonspecific groundglass/patchy opacities in both lungs.      PLEURA: No pleural effusion or pneumothorax.    MEDIASTINUM AND CHAVO: Subcentimeter mediastinal and hilar lymph nodes without lymphadenopathy.    VESSELS: Adequate contrast opacification of the pulmonary arteries. No obvious pulmonary embolus.    HEART: Normal heart size. No pericardial effusion.    CHEST WALL AND LOWER NECK: Heterogeneous thyroid gland, obscured by artifact.    VISUALIZED UPPER ABDOMEN: Colon diverticulosis.    BONES: Degenerative changes of the spine.    IMPRESSION:     No obvious pulmonary embolus.    Findings reflecting known pulmonary fibrosis. Recommend clinical correlation to assess underlying pneumonia. Recommend comparison to previous outside study and follow-up to exclude potential underlying neoplasm.    Heterogeneous thyroid gland, which is difficult to assess due to artifact and can be further characterized on a nonemergent ultrasound as clinically indicated.     Additional findings as described.    RAMONA REDDY M.D., RADIOLOGY RESIDENT  This document has been electronically signed.  MIAH CHAUDHRY M.D., ATTENDING RADIOLOGIST  This document has been electronically signed. Feb 20 2020  1:49AM  ---------------------------------------------------------------------------------------------------------------

## 2020-02-23 NOTE — PROGRESS NOTE ADULT - ASSESSMENT
68 yo male with HTN, anxiety disorder, and pulmonary fibrosis admitted with hypoxic respiratory failure.  Influenza A diagnosed 2/16 with a rapid test.  Lack of improvement with tamiflu documented and raises concern of both viral pneumonia and a secondary process such as a bacterial pneumonia  CTA is negative for PE.  No baseline CT in  system to compare yesterdays scan to.  Agree with CTX and azithro pending w/u  Additional history may be available later from his pulmonary physician and he and his wife have been reluctant to talk to me, they are to upset.  He reportedly self medicated with some clarithromycin last week, not clear how many doses  No blood cultures sent in ER.Legionella urine antigen negative, MRSA screen is negative  and his PC is .69.  The elevated PC is suggestive of a bacterial infection  Suggest:  1.Tamiflu, perhaps would complete 5 days from time of admission at hospital, day 4/5  2,CTX,azithro given lack of response to tamiflu-perhaps 5 days, day 4/5  3.Steroids per pulmonary  4,Will advise blood cultures if fever returns  5.Suspect infection superimposed on chronic lung disease, however I do not see any evidence of uncontrolled infection.Expect slow improvement

## 2020-02-23 NOTE — PROGRESS NOTE ADULT - ASSESSMENT
70yo male admitted with influenza and found to have urinary retention. Now with indwelling catheter    - Will continue to recommend urine culture  - Continue doxazosin 4mg daily  - Keep loza for now  - Encourage ambulation  - Bowel regimen  - May proceed with TOV tomorrow (2/24). If failed he will need loza replaced until he is ambulating

## 2020-02-23 NOTE — PROGRESS NOTE ADULT - ASSESSMENT
ASSESSMENT:    hypoxic respiratory failure - multifactorial - no evidence of hypercapnic respiratory failure - ABG reveals an acute respiratory alkalosis due to hyperventilatioin    1) influenza infection complicated by bronchospasm and pneumonia (likely viral infection in the absence of a leukocytosis although the procalcitonin level is somewhat elevated)  2) underlying pulmonary fibrosis    PLAN/RECOMMENDATIONS:    high flow nasal canula 100% FiO2 @ 50lpm - keep saturation greater than 88% as able  restart BIPAP only for increased work of breathing, resistant hypoxemia or respiratory acidosis  would continue tamiflu past 5 days given ongoing pneumonia  continue ceftriaxone/azithromycin  continue solumedrol 40mg IVP q6h  albuterol/atrovent nebs to q6h  pulmicort 0.5mg nebs q12h - give after duoneb - rinse mouth after use  robitussin DM/tessalon - discontinue hycodan  buspar/klonopin/paxil  DVT prophylaxis - SQ lovenox  urology consult noted - loza catheter/doxazosin    Will follow with you. Plan of care discussed with the patient at bedside, respiratory therapy and the dedicated floor NP.    Galileo Pop MD, [facility]  [phone]  Pulmonary Medicine

## 2020-02-24 LAB
ANION GAP SERPL CALC-SCNC: 16 MMOL/L — SIGNIFICANT CHANGE UP (ref 5–17)
BASE EXCESS BLDA CALC-SCNC: 4.8 MMOL/L — HIGH (ref -2–2)
BASOPHILS # BLD AUTO: 0.07 K/UL — SIGNIFICANT CHANGE UP (ref 0–0.2)
BASOPHILS NFR BLD AUTO: 0.3 % — SIGNIFICANT CHANGE UP (ref 0–2)
BUN SERPL-MCNC: 29 MG/DL — HIGH (ref 7–23)
CALCIUM SERPL-MCNC: 8 MG/DL — LOW (ref 8.4–10.5)
CHLORIDE SERPL-SCNC: 95 MMOL/L — LOW (ref 96–108)
CO2 BLDA-SCNC: 29 MMOL/L — SIGNIFICANT CHANGE UP (ref 22–30)
CO2 SERPL-SCNC: 23 MMOL/L — SIGNIFICANT CHANGE UP (ref 22–31)
CREAT SERPL-MCNC: 0.87 MG/DL — SIGNIFICANT CHANGE UP (ref 0.5–1.3)
EOSINOPHIL # BLD AUTO: 0.09 K/UL — SIGNIFICANT CHANGE UP (ref 0–0.5)
EOSINOPHIL NFR BLD AUTO: 0.4 % — SIGNIFICANT CHANGE UP (ref 0–6)
GAS PNL BLDA: SIGNIFICANT CHANGE UP
GLUCOSE BLDC GLUCOMTR-MCNC: 207 MG/DL — HIGH (ref 70–99)
GLUCOSE BLDC GLUCOMTR-MCNC: 235 MG/DL — HIGH (ref 70–99)
GLUCOSE BLDC GLUCOMTR-MCNC: 238 MG/DL — HIGH (ref 70–99)
GLUCOSE BLDC GLUCOMTR-MCNC: 246 MG/DL — HIGH (ref 70–99)
GLUCOSE BLDC GLUCOMTR-MCNC: 251 MG/DL — HIGH (ref 70–99)
GLUCOSE SERPL-MCNC: 222 MG/DL — HIGH (ref 70–99)
HBA1C BLD-MCNC: 6.6 % — HIGH (ref 4–5.6)
HCO3 BLDA-SCNC: 28 MMOL/L — SIGNIFICANT CHANGE UP (ref 21–29)
HCT VFR BLD CALC: 37.2 % — LOW (ref 39–50)
HCT VFR BLD CALC: 37.3 % — LOW (ref 39–50)
HGB BLD-MCNC: 12.7 G/DL — LOW (ref 13–17)
HGB BLD-MCNC: 12.7 G/DL — LOW (ref 13–17)
HOROWITZ INDEX BLDA+IHG-RTO: 80 — SIGNIFICANT CHANGE UP
IMM GRANULOCYTES NFR BLD AUTO: 4 % — HIGH (ref 0–1.5)
LACTATE SERPL-SCNC: 2 MMOL/L — SIGNIFICANT CHANGE UP (ref 0.7–2)
LYMPHOCYTES # BLD AUTO: 0.64 K/UL — LOW (ref 1–3.3)
LYMPHOCYTES # BLD AUTO: 3.1 % — LOW (ref 13–44)
MCHC RBC-ENTMCNC: 29.9 PG — SIGNIFICANT CHANGE UP (ref 27–34)
MCHC RBC-ENTMCNC: 30.2 PG — SIGNIFICANT CHANGE UP (ref 27–34)
MCHC RBC-ENTMCNC: 34 GM/DL — SIGNIFICANT CHANGE UP (ref 32–36)
MCHC RBC-ENTMCNC: 34.1 GM/DL — SIGNIFICANT CHANGE UP (ref 32–36)
MCV RBC AUTO: 87.5 FL — SIGNIFICANT CHANGE UP (ref 80–100)
MCV RBC AUTO: 88.6 FL — SIGNIFICANT CHANGE UP (ref 80–100)
MONOCYTES # BLD AUTO: 1.34 K/UL — HIGH (ref 0–0.9)
MONOCYTES NFR BLD AUTO: 6.6 % — SIGNIFICANT CHANGE UP (ref 2–14)
NEUTROPHILS # BLD AUTO: 17.46 K/UL — HIGH (ref 1.8–7.4)
NEUTROPHILS NFR BLD AUTO: 85.6 % — HIGH (ref 43–77)
NRBC # BLD: 0 /100 WBCS — SIGNIFICANT CHANGE UP (ref 0–0)
NRBC # BLD: 0 /100 WBCS — SIGNIFICANT CHANGE UP (ref 0–0)
PCO2 BLDA: 36 MMHG — SIGNIFICANT CHANGE UP (ref 32–46)
PH BLDA: 7.5 — HIGH (ref 7.35–7.45)
PLATELET # BLD AUTO: 112 K/UL — LOW (ref 150–400)
PLATELET # BLD AUTO: 120 K/UL — LOW (ref 150–400)
PO2 BLDA: 72 MMHG — LOW (ref 74–108)
POTASSIUM SERPL-MCNC: 4.8 MMOL/L — SIGNIFICANT CHANGE UP (ref 3.5–5.3)
POTASSIUM SERPL-SCNC: 4.8 MMOL/L — SIGNIFICANT CHANGE UP (ref 3.5–5.3)
RBC # BLD: 4.21 M/UL — SIGNIFICANT CHANGE UP (ref 4.2–5.8)
RBC # BLD: 4.25 M/UL — SIGNIFICANT CHANGE UP (ref 4.2–5.8)
RBC # FLD: 14 % — SIGNIFICANT CHANGE UP (ref 10.3–14.5)
RBC # FLD: 14 % — SIGNIFICANT CHANGE UP (ref 10.3–14.5)
SAO2 % BLDA: 94 % — SIGNIFICANT CHANGE UP (ref 92–96)
SODIUM SERPL-SCNC: 132 MMOL/L — LOW (ref 135–145)
SODIUM SERPL-SCNC: 134 MMOL/L — LOW (ref 135–145)
WBC # BLD: 20.41 K/UL — HIGH (ref 3.8–10.5)
WBC # BLD: 21.64 K/UL — HIGH (ref 3.8–10.5)
WBC # FLD AUTO: 20.41 K/UL — HIGH (ref 3.8–10.5)
WBC # FLD AUTO: 21.64 K/UL — HIGH (ref 3.8–10.5)

## 2020-02-24 PROCEDURE — 71250 CT THORAX DX C-: CPT | Mod: 26

## 2020-02-24 PROCEDURE — 71045 X-RAY EXAM CHEST 1 VIEW: CPT | Mod: 26

## 2020-02-24 RX ORDER — CEFTRIAXONE 500 MG/1
1000 INJECTION, POWDER, FOR SOLUTION INTRAMUSCULAR; INTRAVENOUS EVERY 24 HOURS
Refills: 0 | Status: DISCONTINUED | OUTPATIENT
Start: 2020-02-25 | End: 2020-02-25

## 2020-02-24 RX ORDER — FUROSEMIDE 40 MG
40 TABLET ORAL ONCE
Refills: 0 | Status: DISCONTINUED | OUTPATIENT
Start: 2020-02-24 | End: 2020-02-25

## 2020-02-24 RX ADMIN — Medication 200 MILLIGRAM(S): at 05:46

## 2020-02-24 RX ADMIN — Medication 1 MILLIGRAM(S): at 15:56

## 2020-02-24 RX ADMIN — CEFTRIAXONE 100 MILLIGRAM(S): 500 INJECTION, POWDER, FOR SOLUTION INTRAMUSCULAR; INTRAVENOUS at 05:47

## 2020-02-24 RX ADMIN — Medication 40 MILLIGRAM(S): at 12:52

## 2020-02-24 RX ADMIN — Medication 1 MILLIGRAM(S): at 20:10

## 2020-02-24 RX ADMIN — Medication 650 MILLIGRAM(S): at 16:30

## 2020-02-24 RX ADMIN — Medication 15 MILLIGRAM(S): at 05:46

## 2020-02-24 RX ADMIN — Medication 4: at 08:55

## 2020-02-24 RX ADMIN — Medication 40 MILLIGRAM(S): at 00:27

## 2020-02-24 RX ADMIN — Medication 2: at 21:46

## 2020-02-24 RX ADMIN — Medication 40 MILLIGRAM(S): at 05:46

## 2020-02-24 RX ADMIN — AZITHROMYCIN 250 MILLIGRAM(S): 500 TABLET, FILM COATED ORAL at 21:47

## 2020-02-24 RX ADMIN — Medication 3 MILLILITER(S): at 18:24

## 2020-02-24 RX ADMIN — Medication 15 MILLIGRAM(S): at 21:14

## 2020-02-24 RX ADMIN — Medication 4: at 14:13

## 2020-02-24 RX ADMIN — POTASSIUM PHOSPHATE, MONOBASIC POTASSIUM PHOSPHATE, DIBASIC 62.5 MILLIMOLE(S): 236; 224 INJECTION, SOLUTION INTRAVENOUS at 00:27

## 2020-02-24 RX ADMIN — Medication 4 MILLIGRAM(S): at 21:14

## 2020-02-24 RX ADMIN — Medication 240 MILLIGRAM(S): at 05:46

## 2020-02-24 RX ADMIN — Medication 200 MILLIGRAM(S): at 21:14

## 2020-02-24 RX ADMIN — Medication 40 MILLIGRAM(S): at 23:08

## 2020-02-24 RX ADMIN — Medication 0.5 MILLIGRAM(S): at 18:24

## 2020-02-24 RX ADMIN — Medication 3 MILLILITER(S): at 05:44

## 2020-02-24 RX ADMIN — Medication 3 MILLILITER(S): at 00:27

## 2020-02-24 RX ADMIN — Medication 650 MILLIGRAM(S): at 15:57

## 2020-02-24 RX ADMIN — ATORVASTATIN CALCIUM 10 MILLIGRAM(S): 80 TABLET, FILM COATED ORAL at 21:14

## 2020-02-24 RX ADMIN — ENOXAPARIN SODIUM 40 MILLIGRAM(S): 100 INJECTION SUBCUTANEOUS at 12:52

## 2020-02-24 RX ADMIN — Medication 0.5 MILLIGRAM(S): at 05:45

## 2020-02-24 RX ADMIN — Medication 75 MILLIGRAM(S): at 05:46

## 2020-02-24 RX ADMIN — Medication 1 MILLIGRAM(S): at 06:05

## 2020-02-24 RX ADMIN — PANTOPRAZOLE SODIUM 40 MILLIGRAM(S): 20 TABLET, DELAYED RELEASE ORAL at 05:46

## 2020-02-24 RX ADMIN — Medication 10 MILLILITER(S): at 23:09

## 2020-02-24 RX ADMIN — Medication 40 MILLIGRAM(S): at 18:24

## 2020-02-24 RX ADMIN — Medication 3 MILLILITER(S): at 23:08

## 2020-02-24 RX ADMIN — Medication 10 MILLILITER(S): at 18:24

## 2020-02-24 RX ADMIN — Medication 3 MILLILITER(S): at 10:49

## 2020-02-24 RX ADMIN — Medication 75 MILLIGRAM(S): at 18:24

## 2020-02-24 RX ADMIN — Medication 10 MILLILITER(S): at 05:49

## 2020-02-24 RX ADMIN — Medication 4: at 18:48

## 2020-02-24 NOTE — PROGRESS NOTE ADULT - ASSESSMENT
ASSESSMENT:    hypoxic respiratory failure - multifactorial - no evidence of hypercapnia - ABG reveals an acute respiratory alkalosis due to hyperventilatioin    1) influenza infection complicated by bronchospasm and pneumonia (likely viral infection in the absence of a leukocytosis although the procalcitonin level is somewhat elevated) - leukocytosis now is likely due to high dose steroids  2) underlying pulmonary fibrosis    PLAN/RECOMMENDATIONS:    high flow nasal canula 100% FiO2 @ 50lpm - keep saturation greater than 88% as able  restart BIPAP only for increased work of breathing, resistant hypoxemia or respiratory acidosis  repeat chest CT  would continue tamiflu past 5 days given ongoing pneumonia  continue ceftriaxone/azithromycin  continue solumedrol 40mg IVP q6h  albuterol/atrovent nebs to q6h  pulmicort 0.5mg nebs q12h - give after duoneb - rinse mouth after use  robitussin DM/tessalon - discontinue hycodan  buspar/klonopin/paxil  DVT prophylaxis - SQ lovenox  urology consult noted - loza catheter/doxazosin - trial of void    Will follow with you. Plan of care discussed with the patient at bedside and the dedicated floor NP.     Galileo Pop MD, [facility]  [phone]  Pulmonary Medicine

## 2020-02-24 NOTE — CHART NOTE - NSCHARTNOTEFT_GEN_A_CORE
MEDICINE NP- EPISODIC NOTE    Received call from RN patient is s/p RRT for hypoxia again after patient found with BiPAP off and trying to ambulate. Was notified after team left. Labs sent and reviewed, ABG improved. Seen patient, sleeping w/ O2 sat 90%. CXR ordered. F/U primary team in AM.    ABG - ( 24 Feb 2020 03:20 )  pH, Arterial: 7.49  pH, Blood: x     /  pCO2: 34    /  pO2: 191   / HCO3: 26    / Base Excess: 3.2   /  SaO2: 99        Natacha Sequeira, [facility]  [id number] MEDICINE NP- EPISODIC NOTE    Received call from RN patient is s/p RRT for hypoxia again after patient found with BiPAP off and trying to ambulate. Was notified after team left. Labs sent and reviewed, ABG improved. Lactate 2.4, received Lasix. Continue to trend. Seen patient, sleeping w/ O2 sat 90%. VSS, afebrile. CXR ordered. F/U primary team in AM.    ABG - ( 24 Feb 2020 03:20 )  pH, Arterial: 7.49  pH, Blood: x     /  pCO2: 34    /  pO2: 191   / HCO3: 26    / Base Excess: 3.2   /  SaO2: 99        Natacha Sequeira, [facility]  [id number]

## 2020-02-24 NOTE — PROVIDER CONTACT NOTE (CRITICAL VALUE NOTIFICATION) - ACTION/TREATMENT ORDERED:
Natacha CHINCHILLA notified and aware of situation. no intervention at this time. will continue to monitor.

## 2020-02-24 NOTE — RAPID RESPONSE TEAM SUMMARY - NSADDTLFINDINGSRRT_GEN_ALL_CORE
Per primary team the patient was attempting to ambulate oob and bi pap mask was not secured to face. Then hypoxia occurred.  SBP noted to be 190's.

## 2020-02-24 NOTE — PROGRESS NOTE ADULT - ASSESSMENT
Diarrhea  anemia    no further diarrhea  denies melena/hematochezia  h/h remains stable   diet as tolerated  cont miralax 17g orally daily  colonoscopy 3 years ago, no need to repeat as inpatient  monitor for signs of GI bleeding  will follow

## 2020-02-24 NOTE — PROGRESS NOTE ADULT - SUBJECTIVE AND OBJECTIVE BOX
Patient seen in follow up for hyponatremia, less dyspneic, on high flow nasal O2    Vital Signs Last 24 Hrs  T(C): 36.5 (02-24-20 @ 13:09), Max: 36.8 (02-23-20 @ 20:24)  T(F): 97.7 (02-24-20 @ 13:09), Max: 98.3 (02-23-20 @ 20:24)  HR: 87 (02-24-20 @ 18:50) (71 - 88)  BP: 160/83 (02-24-20 @ 18:50) (158/84 - 168/90)  RR: 19 (02-24-20 @ 18:50) (19 - 23)  SpO2: 90% (02-24-20 @ 18:50) (88% - 98%)    PHYSICAL EXAM:  General: NAD  Respiratory: b/l air entry, scat crackles  Cardiovascular: S1 S2  Gastrointestinal: soft, NT  Extremities: no edema                          12.7   20.41 )-----------( 112      ( 24 Feb 2020 06:35 )             37.3     24 Feb 2020 06:35    132    |  x      |  x      ----------------------------<  x      x       |  x      |  x        Ca    8.0        24 Feb 2020 03:16  Phos  2.2       23 Feb 2020 22:22  Mg     2.8       23 Feb 2020 22:22    TPro  6.3    /  Alb  3.0    /  TBili  0.4    /  DBili  x      /  AST  41     /  ALT  40     /  AlkPhos  101    23 Feb 2020 22:22    LIVER FUNCTIONS - ( 23 Feb 2020 22:22 )  Alb: 3.0 g/dL / Pro: 6.3 g/dL / ALK PHOS: 101 U/L / ALT: 40 U/L / AST: 41 U/L / GGT: x           PT/INR - ( 23 Feb 2020 22:22 )   PT: 12.5 sec;   INR: 1.09 ratio      acetaminophen   Tablet .. 650 milliGRAM(s) Oral every 6 hours PRN  albuterol/ipratropium for Nebulization 3 milliLiter(s) Nebulizer every 6 hours  atorvastatin 10 milliGRAM(s) Oral at bedtime  azithromycin  IVPB      azithromycin  IVPB 500 milliGRAM(s) IV Intermittent every 24 hours  benzonatate 200 milliGRAM(s) Oral three times a day  buDESOnide    Inhalation Suspension 0.5 milliGRAM(s) Inhalation every 12 hours  busPIRone 15 milliGRAM(s) Oral three times a day  clonazePAM  Tablet 1 milliGRAM(s) Oral three times a day PRN  dextrose 40% Gel 15 Gram(s) Oral once PRN  dextrose 5%. 1000 milliLiter(s) IV Continuous <Continuous>  dextrose 50% Injectable 12.5 Gram(s) IV Push once  dextrose 50% Injectable 25 Gram(s) IV Push once  dextrose 50% Injectable 25 Gram(s) IV Push once  diltiazem    milliGRAM(s) Oral daily  doxazosin 4 milliGRAM(s) Oral at bedtime  enoxaparin Injectable 40 milliGRAM(s) SubCutaneous daily  furosemide   Injectable 40 milliGRAM(s) IV Push once  glucagon  Injectable 1 milliGRAM(s) IntraMuscular once PRN  guaifenesin/dextromethorphan  Syrup 10 milliLiter(s) Oral every 6 hours  insulin lispro (HumaLOG) corrective regimen sliding scale   SubCutaneous three times a day before meals  insulin lispro (HumaLOG) corrective regimen sliding scale   SubCutaneous at bedtime  methylPREDNISolone sodium succinate Injectable 40 milliGRAM(s) IV Push every 6 hours  pantoprazole  Injectable 40 milliGRAM(s) IV Push daily  PARoxetine 30 milliGRAM(s) Oral daily  polyethylene glycol 3350 17 Gram(s) Oral daily  potassium chloride    Tablet ER 40 milliEquivalent(s) Oral once  potassium phosphate IVPB 15 milliMole(s) IV Intermittent once      Assessment and Plan:     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/PNA/Pulmonary fibrosis plus effects of HCTZ, NSAIDs, ARB, excessive water intake with poor solutes intake, and urinary retention  Na has improved  Regular diet  Maintain 1000ml/day fluid restriction  BMP in am  Diuresis as needed Patient seen in follow up for hyponatremia, events noted, on high flow nasal O2    Vital Signs Last 24 Hrs  T(C): 36.5 (02-24-20 @ 13:09), Max: 36.8 (02-23-20 @ 20:24)  T(F): 97.7 (02-24-20 @ 13:09), Max: 98.3 (02-23-20 @ 20:24)  HR: 87 (02-24-20 @ 18:50) (71 - 88)  BP: 160/83 (02-24-20 @ 18:50) (158/84 - 168/90)  RR: 19 (02-24-20 @ 18:50) (19 - 23)  SpO2: 90% (02-24-20 @ 18:50) (88% - 98%)    PHYSICAL EXAM:    Respiratory: b/l air entry, scat crackles  Cardiovascular: S1 S2  Gastrointestinal: soft, NT, ND  Extremities: no edema                          12.7   20.41 )-----------( 112      ( 24 Feb 2020 06:35 )             37.3     24 Feb 2020 06:35    132    |  x      |  x      ----------------------------<  x      x       |  x      |  x        Ca    8.0        24 Feb 2020 03:16  Phos  2.2       23 Feb 2020 22:22  Mg     2.8       23 Feb 2020 22:22    TPro  6.3    /  Alb  3.0    /  TBili  0.4    /  DBili  x      /  AST  41     /  ALT  40     /  AlkPhos  101    23 Feb 2020 22:22    LIVER FUNCTIONS - ( 23 Feb 2020 22:22 )  Alb: 3.0 g/dL / Pro: 6.3 g/dL / ALK PHOS: 101 U/L / ALT: 40 U/L / AST: 41 U/L / GGT: x           PT/INR - ( 23 Feb 2020 22:22 )   PT: 12.5 sec;   INR: 1.09 ratio      acetaminophen   Tablet .. 650 milliGRAM(s) Oral every 6 hours PRN  albuterol/ipratropium for Nebulization 3 milliLiter(s) Nebulizer every 6 hours  atorvastatin 10 milliGRAM(s) Oral at bedtime  azithromycin  IVPB      azithromycin  IVPB 500 milliGRAM(s) IV Intermittent every 24 hours  benzonatate 200 milliGRAM(s) Oral three times a day  buDESOnide    Inhalation Suspension 0.5 milliGRAM(s) Inhalation every 12 hours  busPIRone 15 milliGRAM(s) Oral three times a day  clonazePAM  Tablet 1 milliGRAM(s) Oral three times a day PRN  dextrose 40% Gel 15 Gram(s) Oral once PRN  dextrose 5%. 1000 milliLiter(s) IV Continuous <Continuous>  dextrose 50% Injectable 12.5 Gram(s) IV Push once  dextrose 50% Injectable 25 Gram(s) IV Push once  dextrose 50% Injectable 25 Gram(s) IV Push once  diltiazem    milliGRAM(s) Oral daily  doxazosin 4 milliGRAM(s) Oral at bedtime  enoxaparin Injectable 40 milliGRAM(s) SubCutaneous daily  furosemide   Injectable 40 milliGRAM(s) IV Push once  glucagon  Injectable 1 milliGRAM(s) IntraMuscular once PRN  guaifenesin/dextromethorphan  Syrup 10 milliLiter(s) Oral every 6 hours  insulin lispro (HumaLOG) corrective regimen sliding scale   SubCutaneous three times a day before meals  insulin lispro (HumaLOG) corrective regimen sliding scale   SubCutaneous at bedtime  methylPREDNISolone sodium succinate Injectable 40 milliGRAM(s) IV Push every 6 hours  pantoprazole  Injectable 40 milliGRAM(s) IV Push daily  PARoxetine 30 milliGRAM(s) Oral daily  polyethylene glycol 3350 17 Gram(s) Oral daily  potassium chloride    Tablet ER 40 milliEquivalent(s) Oral once  potassium phosphate IVPB 15 milliMole(s) IV Intermittent once      Assessment and Plan:     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

## 2020-02-24 NOTE — RAPID RESPONSE TEAM SUMMARY - NSMEDICATIONSRRT_GEN_ALL_CORE
lasix 40 x 1 for possible flash pulm edema   Please assist patient with mobility   maintain bi pap titrate current settings for a goal Spo2 92 or greater and Ph [phone]

## 2020-02-24 NOTE — PHYSICAL THERAPY INITIAL EVALUATION ADULT - GENERAL OBSERVATIONS, REHAB EVAL
Pt received semisupine in bed with +cardiac monitor, +pulse ox, +BP cuff, +IV and +high flow O2. NAD noted.

## 2020-02-24 NOTE — PHYSICAL THERAPY INITIAL EVALUATION ADULT - ADDITIONAL COMMENTS
Pt lives with his wife in a house with 5 steps to enter, +HR. Pt states he stays on the 1st floor. Pt was independent with all ADLs and ambulation PTA. Pt did not use a AD for ambulation PTA. +drives. +eyeglasses.

## 2020-02-24 NOTE — PHYSICAL THERAPY INITIAL EVALUATION ADULT - PLANNED THERAPY INTERVENTIONS, PT EVAL
GOAL: Stair Negotiation Training: Patient will be able to negotiate up & down 1 flight of stairs with unilateral rail, step to gait pattern, in 4 weeks./balance training/bed mobility training/strengthening/transfer training/gait training

## 2020-02-24 NOTE — PROGRESS NOTE ADULT - SUBJECTIVE AND OBJECTIVE BOX
[facility] - PROGRESS NOTE    CHIEF COMPLAINT: hypoxic respiratory failure; influenza; pneumonia; bronchospasm; pulmonary fibrosis;     INTERVAL HISTORY: placed on BIPAP several times overnight for hypoxemia - now awake and alert and without shortness of breath on maximal dose of high flow nasal canula with saturation ~ 93%; short of breath and hypoxic with minimal exertion; improved cough with minimal sputum production; no chest congestion or wheeze; no fevers, chills or sweats; no chest pain/pressure or palpitations;  less fatigue, malaise and weakness; good appetite; urinary retention -> loza    REVIEW OF SYSTEMS:  Constitutional: As per interval history  HEENT: Within normal limits  CV: As per interval history  Resp: As per interval history  GI: Within normal limits   : urinary retention  Musculoskeletal: Within normal limits  Skin: Within normal limits  Neurological: Within normal limits  Psychiatric: anxiety type depression  Endocrine: Within normal limits  Hematologic/Lymphatic: Within normal limits  Allergic/Immunologic: Within normal limits    MEDICATIONS:     Pulmonary "  albuterol/ipratropium for Nebulization 3 milliLiter(s) Nebulizer every 6 hours  benzonatate 200 milliGRAM(s) Oral three times a day  buDESOnide    Inhalation Suspension 0.5 milliGRAM(s) Inhalation every 12 hours  guaifenesin/dextromethorphan  Syrup 10 milliLiter(s) Oral every 6 hours    Anti-microbials:  azithromycin  IVPB      azithromycin  IVPB 500 milliGRAM(s) IV Intermittent every 24 hours  oseltamivir 75 milliGRAM(s) Oral two times a day    Cardiovascular:  diltiazem    milliGRAM(s) Oral daily  doxazosin 4 milliGRAM(s) Oral at bedtime  furosemide   Injectable 40 milliGRAM(s) IV Push once    Other:  atorvastatin 10 milliGRAM(s) Oral at bedtime  busPIRone 15 milliGRAM(s) Oral three times a day  dextrose 5%. 1000 milliLiter(s) IV Continuous <Continuous>  dextrose 50% Injectable 12.5 Gram(s) IV Push once  dextrose 50% Injectable 25 Gram(s) IV Push once  dextrose 50% Injectable 25 Gram(s) IV Push once  enoxaparin Injectable 40 milliGRAM(s) SubCutaneous daily  insulin lispro (HumaLOG) corrective regimen sliding scale   SubCutaneous three times a day before meals  insulin lispro (HumaLOG) corrective regimen sliding scale   SubCutaneous at bedtime  methylPREDNISolone sodium succinate Injectable 40 milliGRAM(s) IV Push every 6 hours  pantoprazole  Injectable 40 milliGRAM(s) IV Push daily  PARoxetine 30 milliGRAM(s) Oral daily  polyethylene glycol 3350 17 Gram(s) Oral daily  potassium chloride    Tablet ER 40 milliEquivalent(s) Oral once  potassium phosphate IVPB 15 milliMole(s) IV Intermittent once    MEDICATIONS  (PRN):  acetaminophen   Tablet .. 650 milliGRAM(s) Oral every 6 hours PRN Temp greater or equal to 38C (100.4F), Mild Pain (1 - 3), Moderate Pain (4 - 6)  clonazePAM  Tablet 1 milliGRAM(s) Oral three times a day PRN anxiety  dextrose 40% Gel 15 Gram(s) Oral once PRN Blood Glucose LESS THAN 70 milliGRAM(s)/deciliter  glucagon  Injectable 1 milliGRAM(s) IntraMuscular once PRN Glucose LESS THAN 70 milligrams/deciliter        OBJECTIVE:    I&O's Detail    23 Feb 2020 07:01  -  24 Feb 2020 07:00  --------------------------------------------------------  IN:    Oral Fluid: 500 mL  Total IN: 500 mL    OUT:    Indwelling Catheter - Urethral: 2650 mL  Total OUT: 2650 mL    Total NET: -2150 mL    POCT Blood Glucose.: 246 mg/dL (24 Feb 2020 08:40)  POCT Blood Glucose.: 207 mg/dL (24 Feb 2020 02:56)  POCT Blood Glucose.: 212 mg/dL (23 Feb 2020 21:54)  POCT Blood Glucose.: 228 mg/dL (23 Feb 2020 18:16)  POCT Blood Glucose.: 290 mg/dL (23 Feb 2020 12:09)      PHYSICAL EXAM:       ICU Vital Signs Last 24 Hrs  T(C): 36.4 (24 Feb 2020 09:03), Max: 36.8 (23 Feb 2020 20:24)  T(F): 97.5 (24 Feb 2020 09:03), Max: 98.3 (23 Feb 2020 20:24)  HR: 81 (24 Feb 2020 09:03) (71 - 81)  BP: 158/84 (24 Feb 2020 09:03) (158/84 - 168/85)  BP(mean): --  ABP: --  ABP(mean): --  RR: 20 (24 Feb 2020 09:03) (20 - 25)  SpO2: 91% (24 Feb 2020 09:03) (70% - 96%) on high flow -> BIPAP     General: Awake. Alert. Cooperative. No distress. Appears stated age. 	  HEENT: Atraumatic. Normocephalic. Anicteric. Normal oral mucosa. PERRL. EOMI.  Neck: Supple. Trachea midline. Thyroid without enlargement/tenderness/nodules. No carotid bruit. No JVD.	  Cardiovascular: Regular rate and rhythm. S1 S2 normal. No murmurs, rubs or gallops.  Respiratory: Respirations unlabored. Scattered rales. No curvature.  Abdomen: Soft. Non-tender. Non-distended. No organomegaly. No masses. Normal bowel sounds. Obese.  Extremities: Warm to touch. No clubbing or cyanosis. No pedal edema.  Pulses: 2+ peripheral pulses all extremities.	  Skin: Normal skin color. No rashes or lesions. No ecchymoses. No cyanosis. Warm to touch.  Lymph Nodes: Cervical, supraclavicular and axillary nodes normal  Neurological: Motor and sensory examination equal and normal. A and O x 3  Psychiatry: Appropriate mood and affect.    LABS:                          12.7   20.41 )-----------( 112      ( 24 Feb 2020 06:35 )             37.3     CBC    WBC  20.41 <==, 21.64 <==, 21.59 <==, 18.56 <==, 15.96 <==, 5.46 <==, 7.28 <==    Hemoglobin  12.7 <<==, 12.7 <<==, 12.8 <<==, 11.9 <<==, 11.8 <<==, 11.6 <<==, 11.8 <<==    Hematocrit  37.3 <==, 37.2 <==, 35.8 <==, 33.8 <==, 34.3 <==, 34.1 <==, 32.9 <==    Platelets  112 <==, 120 <==, 118 <==, 139 <==, 186 <==, 194 <==, 196 <==      132<L>  |  x   |  x   ----------------------------<  x     02-24  x    |  x   |  x       LYTES    sodium  132 <==, 134 <==, 133 <==, 135 <==, 132 <==, 130 <==, 125 <==    potassium   4.8 <==, 4.5 <==, 4.4 <==, 4.6 <==, 4.4 <==, 4.2 <==, 4.1 <==    chloride  95 <==, 95 <==, 95 <==, 94 <==, 93 <==, 89 <==, 91 <==    carbon dioxide  23 <==, 21 <==, 25 <==, 21 <==, 24 <==, 20 <==, 22 <==    =============================================================================================  RENAL FUNCTION:    Creatinine:   0.87  <<==, 0.85  <<==, 0.87  <<==, 0.82  <<==, 0.87  <<==, 0.84  <<==, 0.90  <<==    BUN:   29 <==, 28 <==, 28 <==, 26 <==, 27 <==, 25 <==, 24 <==    ============================================================================================    calcium   8.0 <==, 8.0 <==, 8.2 <==, 8.2 <==, 8.1 <==, 7.6 <==, 7.5 <==    phos   2.2 <==, 2.2 <==, 2.8 <==, 2.3 <==    mag   2.8 <==, 2.6 <==, 2.3 <==, 1.9 <==, 1.9 <==, 1.9 <==    ============================================================================================  LFTs    AST:   41 <== , 70 <== , 48 <==     ALT:  40  <== , 24  <== , 21  <==     AP:  101  <=, 46  <=, 50  <=    Bili:  0.4  <=, 0.3  <=, 0.5  <=      PT/INR - ( 23 Feb 2020 22:22 )   PT: 12.5 sec;   INR: 1.09 ratio       PTT - ( 23 Feb 2020 22:22 )  PTT: 22.3 sec    ABG - ( 24 Feb 2020 03:20 )  pH: 7.49  /  pCO2: 34    /  pO2: 191   / HCO3: 26    / Base Excess: 3.2   /  SaO2: 99        ABG - ( 24 Feb 2020 00:18 )  pH: 7.50  /  pCO2: 36    /  pO2: 72    / HCO3: 28    / Base Excess: 4.8   /  SaO2: 94        ABG - ( 23 Feb 2020 22:13 )  pH: 7.45  /  pCO2: 42    /  pO2: 49    / HCO3: 28    / Base Excess: 4.4   /  SaO2: 83        ABG - ( 23 Feb 2020 03:16 )  pH: 7.47  /  pCO2: 36    /  pO2: 56    / HCO3: 26    / Base Excess: 3.2   /  SaO2: 89        ABG - ( 23 Feb 2020 03:16 )  pH: 7.47  /  pCO2: 36    /  pO2: 56    / HCO3: 26    / Base Excess: 3.2   /  SaO2: 89        ABG - ( 21 Feb 2020 06:24 )  pH: 7.49  /  pCO2: 29    /  pO2: 53    / HCO3: 22    / Base Excess: -.1   /  SaO2: 89        ABG - ( 21 Feb 2020 00:03 )  pH: 7.46  /  pCO2: 31    /  pO2: 58    / HCO3: 22    / Base Excess: -1.0  /  SaO2: 91        ABG - ( 20 Feb 2020 11:46 )  pH: 7.49  /  pCO2: 29    /  pO2: 58    / HCO3: 22    / Base Excess: -.3   /  SaO2: 91        Procalcitonin, Serum: 0.69 ng/mL (02-21 @ 07:45)    Serum Pro-Brain Natriuretic Peptide: 364 pg/mL (02-19 @ 22:30)    CARDIAC MARKERS ( 20 Feb 2020 00:57 )  CPK x     /CKMB x     /CKMB Units x        troponin 17 ng/L    CARDIAC MARKERS ( 19 Feb 2020 22:30 )  CPK x     /CKMB x     /CKMB Units x        troponin 19 ng/L    MICROBIOLOGY:     FLU A B RSV Detection by PCR (02.19.20 @ 22:30)    Flu A Result: Detected    Flu B Result: NotDetec    RSV Result: NotDetec    Legionella pneumophila Antigen, Urine (02.20.20 @ 09:50)    Legionella Antigen, Urine: Negative    Culture - Blood (02.20.20 @ 10:14)    Specimen Source: .Blood Blood-Venous    Culture Results:   No growth to date.    Culture - Blood (02.20.20 @ 10:14)    Specimen Source: .Blood Blood-Peripheral    Culture Results:   No growth to date.    RADIOLOGY:  [x] Chest radiographs reviewed and interpreted by me    EXAM:  XR CHEST PORTABLE URGENT 1V                          PROCEDURE DATE:  02/22/2020      INTERPRETATION:  CLINICAL INFORMATION: Hypoxic respiratory failure.    EXAM: AP chest radiograph.    COMPARISON: Chest radiograph from 2/19/2020.    FINDINGS AND   IMPRESSION:  Heart size is difficult to assess in this projection.  Bilateral linear hazy opacities, more prominent in the left lung, appear increased since previous chest radiograph on 2/19/2020. No pleural effusion or pneumothorax.  The visualized osseous structures demonstrate no acute pathology.    GALILEO AGUILAR M.D., RADIOLOGY RESIDENT  This document has been electronically signed.  ES VELAZQUEZ M.D., ATTENDING RADIOLOGIST  This document has been electronically signed. Feb 22 2020 11:42AM  ---------------------------------------------------------------------------------------------------------------    EXAM:  XR CHEST AP OR PA 1V                          PROCEDURE DATE:  02/19/2020      INTERPRETATION:  CLINICAL INFORMATION: Chest pain.    EXAM: 1 view of the chest.    COMPARISON: None.    FINDINGS:    Bilateral midlung linear opacities. No pneumothorax. The cardiomediastinal silhouette is poorly evaluated on this projection. No acute osseous findings.    IMPRESSION:    Bilateral midlung linear opacities, which are of uncertain etiology.      RAMONA REDDY M.D., RADIOLOGY RESIDENT  This document has been electronically signed.  ES VELAZQUEZ M.D., ATTENDING RADIOLOGIST  This document has been electronically signed. Feb 20 2020  9:42AM    ---------------------------------------------------------------------------------------------------------------    EXAM:  CT ANGIO CHEST (W)AW IC                          PROCEDURE DATE:  02/20/2020      INTERPRETATION:  CLINICAL INFORMATION: Shortness of breath and fatigue. History of lung disease.    COMPARISON: Chest radiograph 2/19/2020    PROCEDURE:   CT Angiography of the Chest.  90 ml of Omnipaque 350 was injected intravenously. 10 ml were discarded.  Sagittal and coronal reformats were performed as well as 3D (MIP) reconstructions.    FINDINGS:    LUNGS AND AIRWAYS: Patent central airways. Areas of architectural distortion, mild traction bronchiectasis and linear/reticular opacities. Nonspecific groundglass/patchy opacities in both lungs.      PLEURA: No pleural effusion or pneumothorax.    MEDIASTINUM AND CHAVO: Subcentimeter mediastinal and hilar lymph nodes without lymphadenopathy.    VESSELS: Adequate contrast opacification of the pulmonary arteries. No obvious pulmonary embolus.    HEART: Normal heart size. No pericardial effusion.    CHEST WALL AND LOWER NECK: Heterogeneous thyroid gland, obscured by artifact.    VISUALIZED UPPER ABDOMEN: Colon diverticulosis.    BONES: Degenerative changes of the spine.    IMPRESSION:     No obvious pulmonary embolus.    Findings reflecting known pulmonary fibrosis. Recommend clinical correlation to assess underlying pneumonia. Recommend comparison to previous outside study and follow-up to exclude potential underlying neoplasm.    Heterogeneous thyroid gland, which is difficult to assess due to artifact and can be further characterized on a nonemergent ultrasound as clinically indicated.     Additional findings as described.    RAMONA REDDY M.D., RADIOLOGY RESIDENT  This document has been electronically signed.  MIAH CHAUDHRY M.D., ATTENDING RADIOLOGIST  This document has been electronically signed. Feb 20 2020  1:49AM  ---------------------------------------------------------------------------------------------------------------

## 2020-02-24 NOTE — PROGRESS NOTE ADULT - SUBJECTIVE AND OBJECTIVE BOX
Had some hypoxia overnight responsive to positioning, BiPAP w/ proper use, anxiolytics.  Feels better this AM  Now on high flow Nasal O2      Vital Signs Last 24 Hrs  T(C): 36.7 (24 Feb 2020 05:24), Max: 36.8 (23 Feb 2020 20:24)  T(F): 98.1 (24 Feb 2020 05:24), Max: 98.3 (23 Feb 2020 20:24)  HR: 78 (24 Feb 2020 05:42) (66 - 81)  BP: 163/91 (24 Feb 2020 05:24) (137/76 - 168/85)  BP(mean): --  RR: 20 (24 Feb 2020 06:52) (20 - 25)  SpO2: 93% (24 Feb 2020 06:52) (70% - 98%)  Daily     Daily     02-23 @ 07:01  -  02-24 @ 07:00  --------------------------------------------------------  IN: 500 mL / OUT: 2650 mL / NET: -2150 mL      General: Awake. Alert. Cooperative. No distress. 	  HEENT:  Anicteric.   Neck: Supple.. No carotid bruit. No JVD.	  Cardiovascular: Regular rate and rhythm. S1 S2 normal. No murmurs, rubs or gallops.  Respiratory: Respirations unlabored. Clear to auscultation and percussion.  Abdomen: Soft. Non-tender. Non-distended. No organomegaly. No masses. Normal bowel sounds. Obese.  Extremities: Warm to touch. No clubbing or cyanosis. No pedal edema.  Pulses: 2+ peripheral pulses all extremities.	  Skin: Normal skin color.  No cyanosis. Warm to touch.  Lymph Nodes: Cervical, supraclavicular and axillary nodes normal  Neurological: No global or focal defivits                                12.7   20.41 )-----------( 112      ( 24 Feb 2020 06:35 )             37.3     02-24    132<L>  |  x   |  x   ----------------------------<  x   x    |  x   |  x     Ca    8.0<L>      24 Feb 2020 03:16  Phos  2.2     02-23  Mg     2.8     02-23    TPro  6.3  /  Alb  3.0<L>  /  TBili  0.4  /  DBili  x   /  AST  41<H>  /  ALT  40  /  AlkPhos  101  02-23      PT/INR - ( 23 Feb 2020 22:22 )   PT: 12.5 sec;   INR: 1.09 ratio         PTT - ( 23 Feb 2020 22:22 )  PTT:22.3 sec    MEDICATIONS  (STANDING):  albuterol/ipratropium for Nebulization 3 milliLiter(s) Nebulizer every 6 hours  atorvastatin 10 milliGRAM(s) Oral at bedtime  azithromycin  IVPB      azithromycin  IVPB 500 milliGRAM(s) IV Intermittent every 24 hours  benzonatate 200 milliGRAM(s) Oral three times a day  buDESOnide    Inhalation Suspension 0.5 milliGRAM(s) Inhalation every 12 hours  busPIRone 15 milliGRAM(s) Oral three times a day  dextrose 5%. 1000 milliLiter(s) (50 mL/Hr) IV Continuous <Continuous>  dextrose 50% Injectable 12.5 Gram(s) IV Push once  dextrose 50% Injectable 25 Gram(s) IV Push once  dextrose 50% Injectable 25 Gram(s) IV Push once  diltiazem    milliGRAM(s) Oral daily  doxazosin 4 milliGRAM(s) Oral at bedtime  enoxaparin Injectable 40 milliGRAM(s) SubCutaneous daily  furosemide   Injectable 40 milliGRAM(s) IV Push once  guaifenesin/dextromethorphan  Syrup 10 milliLiter(s) Oral every 6 hours  insulin lispro (HumaLOG) corrective regimen sliding scale   SubCutaneous three times a day before meals  insulin lispro (HumaLOG) corrective regimen sliding scale   SubCutaneous at bedtime  methylPREDNISolone sodium succinate Injectable 40 milliGRAM(s) IV Push every 6 hours  oseltamivir 75 milliGRAM(s) Oral two times a day  pantoprazole  Injectable 40 milliGRAM(s) IV Push daily  PARoxetine 30 milliGRAM(s) Oral daily  polyethylene glycol 3350 17 Gram(s) Oral daily  potassium chloride    Tablet ER 40 milliEquivalent(s) Oral once  potassium phosphate IVPB 15 milliMole(s) IV Intermittent once    MEDICATIONS  (PRN):  acetaminophen   Tablet .. 650 milliGRAM(s) Oral every 6 hours PRN Temp greater or equal to 38C (100.4F), Mild Pain (1 - 3), Moderate Pain (4 - 6)  clonazePAM  Tablet 1 milliGRAM(s) Oral three times a day PRN anxiety  dextrose 40% Gel 15 Gram(s) Oral once PRN Blood Glucose LESS THAN 70 milliGRAM(s)/deciliter  glucagon  Injectable 1 milliGRAM(s) IntraMuscular once PRN Glucose LESS THAN 70 milligrams/deciliter    Impression:      PMH of HTN,  HLD, , pulmonary fibrosis,  not on home  O 2.   depression/anxiety , on  Klonopin          p/w SOB and cough/  acute resp failure with O2  sat of  80 on arrival to  er  O2  sat is  92  to 99 %,  earlier  and  now  lower  again    Iinfluenza A positive, with  bronchospasm   on Tamiflu. / nebs  and   solumedrol    mild anemia/  GI eval  Hyponatremia from   poor po intake/ dehydration/ on iv fluids/  stop  hctz  ct chest,    b/l patchy opacities/   traction bronchiectasis/   pulm fibrosis  on rocephin/  zmax/  CAP/   pulm    dr gusman,  for  hypoxia  on  buspar/ paxil  and klonopin/ home meds, on  dvt ppx   hyponatremia,  improved to 130/  seen by renal  ok to  restart  cardizem/  and  will   hold  olmesartan/ hctz  ID eval    noted , continue /    iv  ab/ afebrile,/  elevtaed wbc   from iv steroid/ may  taper  it  per  urology  TOV in 2  days/  wife  aware /  upset about highfs/ on prednisone now.  breathing has improved          Plan:    high flow nasal canula 100% FiO2 @ 50lpm - keep saturation greater than 88% as able  restart BIPAP only for increased work of breathing, resistant hypoxemia or respiratory acidosis  would continue tamiflu past 5 days given ongoing pneumonia  continue ceftriaxone/azithromycin  continue solumedrol 40mg IVP q6h  albuterol/atrovent nebs to q6h  pulmicort 0.5mg nebs q12h - give after duoneb - rinse mouth after use  robitussin DM/tessalon - discontinue hycodan  buspar/klonopin/paxil  DVT prophylaxis - SQ lovenox  urology consult noted - loza catheter/doxazosin      Johnson Verduzco MD  [phone]

## 2020-02-24 NOTE — PROGRESS NOTE ADULT - ASSESSMENT
68 yo male with HTN, anxiety disorder, and pulmonary fibrosis admitted with hypoxic respiratory failure.  Influenza A diagnosed 2/16 with a rapid test.  CTA is negative for PE.  Started on CTX and azithro along with tamiflu  Legionella urine antigen negative, MRSA screen is negative and his PC is elevated to 0.69.  The elevated PC is suggestive of a bacterial infection  Lack of improvement with tamiflu documented and raised concern of both viral pneumonia and a secondary process such as a bacterial pneumonia    Suggest:  1.Tamiflu, d # 5 days  - to be continued for now as per pulmonary given poor overall   2.CTX,azithro given lack of response to tamiflu- duration to be determined  3.Steroids per pulmonary  4.Suspect infection superimposed on chronic lung disease, however I do not see any evidence of uncontrolled infection. Slow improvement not unexpected  5.Repeat PCT in AM  6.Follow results of repeat Ct imaging from today

## 2020-02-24 NOTE — RAPID RESPONSE TEAM SUMMARY - NSSITUATIONBACKGROUNDRRT_GEN_ALL_CORE
69M with PMH of HTN, pulmonary fibrosis, depression/anxiety p/w SOB and cough.  RRT called today (+) hypoxia.  Upon arrival to RRT the patient was already placed on bi pap SPO 95% NIVMV settings 12/8  80%.   No notable labored breathing or auditable wheezing / rhonchi noted. Hemodynamics remain stable RRT was called for reported hypoxia 77%.

## 2020-02-24 NOTE — PROGRESS NOTE ADULT - SUBJECTIVE AND OBJECTIVE BOX
INTERVAL HPI/OVERNIGHT EVENTS:    patient seen and examined earlier this morning  events noted  on HFNC  tolerating diet  no further diarrhea  denies n/v abd pain     MEDICATIONS  (STANDING):  albuterol/ipratropium for Nebulization 3 milliLiter(s) Nebulizer every 6 hours  atorvastatin 10 milliGRAM(s) Oral at bedtime  azithromycin  IVPB      azithromycin  IVPB 500 milliGRAM(s) IV Intermittent every 24 hours  benzonatate 200 milliGRAM(s) Oral three times a day  buDESOnide    Inhalation Suspension 0.5 milliGRAM(s) Inhalation every 12 hours  busPIRone 15 milliGRAM(s) Oral three times a day  dextrose 5%. 1000 milliLiter(s) (50 mL/Hr) IV Continuous <Continuous>  dextrose 50% Injectable 12.5 Gram(s) IV Push once  dextrose 50% Injectable 25 Gram(s) IV Push once  dextrose 50% Injectable 25 Gram(s) IV Push once  diltiazem    milliGRAM(s) Oral daily  doxazosin 4 milliGRAM(s) Oral at bedtime  enoxaparin Injectable 40 milliGRAM(s) SubCutaneous daily  furosemide   Injectable 40 milliGRAM(s) IV Push once  guaifenesin/dextromethorphan  Syrup 10 milliLiter(s) Oral every 6 hours  insulin lispro (HumaLOG) corrective regimen sliding scale   SubCutaneous three times a day before meals  insulin lispro (HumaLOG) corrective regimen sliding scale   SubCutaneous at bedtime  methylPREDNISolone sodium succinate Injectable 40 milliGRAM(s) IV Push every 6 hours  oseltamivir 75 milliGRAM(s) Oral two times a day  pantoprazole  Injectable 40 milliGRAM(s) IV Push daily  PARoxetine 30 milliGRAM(s) Oral daily  polyethylene glycol 3350 17 Gram(s) Oral daily  potassium chloride    Tablet ER 40 milliEquivalent(s) Oral once  potassium phosphate IVPB 15 milliMole(s) IV Intermittent once    MEDICATIONS  (PRN):  acetaminophen   Tablet .. 650 milliGRAM(s) Oral every 6 hours PRN Temp greater or equal to 38C (100.4F), Mild Pain (1 - 3), Moderate Pain (4 - 6)  clonazePAM  Tablet 1 milliGRAM(s) Oral three times a day PRN anxiety  dextrose 40% Gel 15 Gram(s) Oral once PRN Blood Glucose LESS THAN 70 milliGRAM(s)/deciliter  glucagon  Injectable 1 milliGRAM(s) IntraMuscular once PRN Glucose LESS THAN 70 milligrams/deciliter      Allergies    penicillin (Anaphylaxis)    Intolerances        Review of Systems:    General:  No wt loss, fevers, chills, night sweats,fatigue,   Eyes:  Good vision, no reported pain  ENT:  No sore throat, pain, runny nose, dysphagia  CV:  No pain, palpitatioins, hypo/hypertension  Resp:  No dyspnea, cough, tachypnea, wheezing  GI:  No pain, No nausea, No vomiting, No diarrhea, No constipatiion, No weight loss, No fever, No pruritis, No rectal bleeding, No tarry stools, No dysphagia,  :  No pain, bleeding, incontinence, nocturia  Muscle:  No pain, weakness  Neuro:  No weakness, tingling, memory problems  Psych:  No fatigue, insomnia, mood problems, depression  Endocrine:  No polyuria, polydypsia, cold/heat intolerance  Heme:  No petechiae, ecchymosis, easy bruisability  Skin:  No rash, tattoos, scars, edema      Vital Signs Last 24 Hrs  T(C): 36.4 (24 Feb 2020 09:03), Max: 36.8 (23 Feb 2020 20:24)  T(F): 97.5 (24 Feb 2020 09:03), Max: 98.3 (23 Feb 2020 20:24)  HR: 77 (24 Feb 2020 10:05) (71 - 82)  BP: 158/84 (24 Feb 2020 09:03) (158/84 - 168/85)  BP(mean): --  RR: 21 (24 Feb 2020 10:04) (20 - 25)  SpO2: 94% (24 Feb 2020 10:05) (70% - 96%)    PHYSICAL EXAM:    Constitutional: NAD  HEENT: on HFNC   Cardiovascular: S1 and S2, RRR  Gastrointestinal: BS+, soft, NT/ND  Extremities: No peripheral edema  Vascular: 2+ peripheral pulses  Neurological: A/O x 3, no focal deficits        LABS:                        12.7   20.41 )-----------( 112      ( 24 Feb 2020 06:35 )             37.3     02-24    132<L>  |  x   |  x   ----------------------------<  x   x    |  x   |  x     Ca    8.0<L>      24 Feb 2020 03:16  Phos  2.2     02-23  Mg     2.8     02-23    TPro  6.3  /  Alb  3.0<L>  /  TBili  0.4  /  DBili  x   /  AST  41<H>  /  ALT  40  /  AlkPhos  101  02-23    PT/INR - ( 23 Feb 2020 22:22 )   PT: 12.5 sec;   INR: 1.09 ratio         PTT - ( 23 Feb 2020 22:22 )  PTT:22.3 sec      RADIOLOGY & ADDITIONAL TESTS:

## 2020-02-24 NOTE — PROGRESS NOTE ADULT - SUBJECTIVE AND OBJECTIVE BOX
CC: f/u for hypoxic respiratory failure    Patient reports that he is too weak to speak & that I should talk to his wife at the bedside    REVIEW OF SYSTEMS:  All other review of systems negative except easily becomes SOB & O2 levels drop     Antimicrobials Day #  : 5  azithromycin  IVPB      azithromycin  IVPB 500 milliGRAM(s) IV Intermittent every 24 hours  oseltamivir 75 milliGRAM(s) Oral two times a day    Other Medications Reviewed    T(F): 97.7 (02-24-20 @ 13:09), Max: 98.3 (02-23-20 @ 20:24)  HR: 78 (02-24-20 @ 15:07)  BP: 165/90 (02-24-20 @ 14:14)  RR: 20 (02-24-20 @ 15:05)  SpO2: 95% (02-24-20 @ 15:07)  Wt(kg): --    PHYSICAL EXAM:  General: alert, no acute distress  Eyes:  anicteric, no conjunctival injection, no discharge  Oropharynx: no lesions or injection 	  Neck: supple, without adenopathy  Lungs: clear to auscultation  Heart: regular rate and rhythm; no murmur  Abdomen: soft, nondistended, nontender  Skin: no lesions  Extremities: no clubbing, cyanosis, or edema  Neurologic: alert, oriented, moves all extremities  : Scruggs with clear urine     LAB RESULTS:                        12.7   20.41 )-----------( 112      ( 24 Feb 2020 06:35 )             37.3     02-24    132<L>  |  x   |  x   ----------------------------<  x   x    |  x   |  x     Ca    8.0<L>      24 Feb 2020 03:16  Phos  2.2     02-23  Mg     2.8     02-23    TPro  6.3  /  Alb  3.0<L>  /  TBili  0.4  /  DBili  x   /  AST  41<H>  /  ALT  40  /  AlkPhos  101  02-23    LIVER FUNCTIONS - ( 23 Feb 2020 22:22 )  Alb: 3.0 g/dL / Pro: 6.3 g/dL / ALK PHOS: 101 U/L / ALT: 40 U/L / AST: 41 U/L / GGT: x             MICROBIOLOGY:  RECENT CULTURES:  02-20 @ 10:14 .Blood Blood-Peripheral     No growth to date.          RADIOLOGY REVIEWED:

## 2020-02-24 NOTE — PHYSICAL THERAPY INITIAL EVALUATION ADULT - PERTINENT HX OF CURRENT PROBLEM, REHAB EVAL
Pt is a 69M with PMH of HTN, pulmonary fibrosis, depression/anxiety p/w SOB and cough. Pt states his mother passed away about 2 weeks ago and shortly after burying here, started to feel very unwell. He has had progressively worsening cough, non productive, but feels a lot of chest congestion. Associated with ROSE, headache and myalgias and fatigue. Was seen at  earlier this week and was diagnosed with flu. Continued below.

## 2020-02-25 LAB
ANION GAP SERPL CALC-SCNC: 12 MMOL/L — SIGNIFICANT CHANGE UP (ref 5–17)
BASOPHILS # BLD AUTO: 0 K/UL — SIGNIFICANT CHANGE UP (ref 0–0.2)
BASOPHILS NFR BLD AUTO: 0 % — SIGNIFICANT CHANGE UP (ref 0–2)
BUN SERPL-MCNC: 32 MG/DL — HIGH (ref 7–23)
CALCIUM SERPL-MCNC: 8.4 MG/DL — SIGNIFICANT CHANGE UP (ref 8.4–10.5)
CHLORIDE SERPL-SCNC: 97 MMOL/L — SIGNIFICANT CHANGE UP (ref 96–108)
CO2 SERPL-SCNC: 29 MMOL/L — SIGNIFICANT CHANGE UP (ref 22–31)
CREAT SERPL-MCNC: 0.88 MG/DL — SIGNIFICANT CHANGE UP (ref 0.5–1.3)
CULTURE RESULTS: SIGNIFICANT CHANGE UP
CULTURE RESULTS: SIGNIFICANT CHANGE UP
EOSINOPHIL # BLD AUTO: 0 K/UL — SIGNIFICANT CHANGE UP (ref 0–0.5)
EOSINOPHIL NFR BLD AUTO: 0 % — SIGNIFICANT CHANGE UP (ref 0–6)
GLUCOSE BLDC GLUCOMTR-MCNC: 211 MG/DL — HIGH (ref 70–99)
GLUCOSE BLDC GLUCOMTR-MCNC: 264 MG/DL — HIGH (ref 70–99)
GLUCOSE BLDC GLUCOMTR-MCNC: 308 MG/DL — HIGH (ref 70–99)
GLUCOSE SERPL-MCNC: 266 MG/DL — HIGH (ref 70–99)
HCT VFR BLD CALC: 38.4 % — LOW (ref 39–50)
HGB BLD-MCNC: 13.1 G/DL — SIGNIFICANT CHANGE UP (ref 13–17)
LYMPHOCYTES # BLD AUTO: 0.63 K/UL — LOW (ref 1–3.3)
LYMPHOCYTES # BLD AUTO: 2.6 % — LOW (ref 13–44)
MAGNESIUM SERPL-MCNC: 3 MG/DL — HIGH (ref 1.6–2.6)
MANUAL SMEAR VERIFICATION: SIGNIFICANT CHANGE UP
MCHC RBC-ENTMCNC: 30.5 PG — SIGNIFICANT CHANGE UP (ref 27–34)
MCHC RBC-ENTMCNC: 34.1 GM/DL — SIGNIFICANT CHANGE UP (ref 32–36)
MCV RBC AUTO: 89.3 FL — SIGNIFICANT CHANGE UP (ref 80–100)
METAMYELOCYTES # FLD: 0.9 % — HIGH (ref 0–0)
MONOCYTES # BLD AUTO: 1.04 K/UL — HIGH (ref 0–0.9)
MONOCYTES NFR BLD AUTO: 4.3 % — SIGNIFICANT CHANGE UP (ref 2–14)
MYELOCYTES NFR BLD: 2.6 % — HIGH (ref 0–0)
NEUTROPHILS # BLD AUTO: 21.58 K/UL — HIGH (ref 1.8–7.4)
NEUTROPHILS NFR BLD AUTO: 89.6 % — HIGH (ref 43–77)
PHOSPHATE SERPL-MCNC: 3.2 MG/DL — SIGNIFICANT CHANGE UP (ref 2.5–4.5)
PLAT MORPH BLD: NORMAL — SIGNIFICANT CHANGE UP
PLATELET # BLD AUTO: 121 K/UL — LOW (ref 150–400)
POIKILOCYTOSIS BLD QL AUTO: SLIGHT — SIGNIFICANT CHANGE UP
POTASSIUM SERPL-MCNC: 4.5 MMOL/L — SIGNIFICANT CHANGE UP (ref 3.5–5.3)
POTASSIUM SERPL-SCNC: 4.5 MMOL/L — SIGNIFICANT CHANGE UP (ref 3.5–5.3)
PROCALCITONIN SERPL-MCNC: 0.15 NG/ML — HIGH (ref 0.02–0.1)
RBC # BLD: 4.3 M/UL — SIGNIFICANT CHANGE UP (ref 4.2–5.8)
RBC # FLD: 14.3 % — SIGNIFICANT CHANGE UP (ref 10.3–14.5)
RBC BLD AUTO: SIGNIFICANT CHANGE UP
SODIUM SERPL-SCNC: 138 MMOL/L — SIGNIFICANT CHANGE UP (ref 135–145)
SPECIMEN SOURCE: SIGNIFICANT CHANGE UP
SPECIMEN SOURCE: SIGNIFICANT CHANGE UP
WBC # BLD: 24.08 K/UL — HIGH (ref 3.8–10.5)
WBC # FLD AUTO: 24.08 K/UL — HIGH (ref 3.8–10.5)

## 2020-02-25 PROCEDURE — 99291 CRITICAL CARE FIRST HOUR: CPT

## 2020-02-25 RX ORDER — ATORVASTATIN CALCIUM 80 MG/1
10 TABLET, FILM COATED ORAL AT BEDTIME
Refills: 0 | Status: DISCONTINUED | OUTPATIENT
Start: 2020-02-25 | End: 2020-03-02

## 2020-02-25 RX ORDER — INSULIN LISPRO 100/ML
VIAL (ML) SUBCUTANEOUS
Refills: 0 | Status: DISCONTINUED | OUTPATIENT
Start: 2020-02-25 | End: 2020-03-01

## 2020-02-25 RX ORDER — ACETAMINOPHEN 500 MG
650 TABLET ORAL EVERY 6 HOURS
Refills: 0 | Status: DISCONTINUED | OUTPATIENT
Start: 2020-02-25 | End: 2020-03-02

## 2020-02-25 RX ORDER — GLUCAGON INJECTION, SOLUTION 0.5 MG/.1ML
1 INJECTION, SOLUTION SUBCUTANEOUS ONCE
Refills: 0 | Status: DISCONTINUED | OUTPATIENT
Start: 2020-02-25 | End: 2020-03-01

## 2020-02-25 RX ORDER — CHLORHEXIDINE GLUCONATE 213 G/1000ML
1 SOLUTION TOPICAL
Refills: 0 | Status: DISCONTINUED | OUTPATIENT
Start: 2020-02-25 | End: 2020-03-07

## 2020-02-25 RX ORDER — PANTOPRAZOLE SODIUM 20 MG/1
40 TABLET, DELAYED RELEASE ORAL DAILY
Refills: 0 | Status: DISCONTINUED | OUTPATIENT
Start: 2020-02-25 | End: 2020-02-27

## 2020-02-25 RX ORDER — DEXTROSE 50 % IN WATER 50 %
15 SYRINGE (ML) INTRAVENOUS ONCE
Refills: 0 | Status: DISCONTINUED | OUTPATIENT
Start: 2020-02-25 | End: 2020-03-01

## 2020-02-25 RX ORDER — POLYETHYLENE GLYCOL 3350 17 G/17G
17 POWDER, FOR SOLUTION ORAL DAILY
Refills: 0 | Status: DISCONTINUED | OUTPATIENT
Start: 2020-02-25 | End: 2020-02-28

## 2020-02-25 RX ORDER — CLONAZEPAM 1 MG
1 TABLET ORAL THREE TIMES A DAY
Refills: 0 | Status: DISCONTINUED | OUTPATIENT
Start: 2020-02-25 | End: 2020-03-02

## 2020-02-25 RX ORDER — CEFEPIME 1 G/1
2000 INJECTION, POWDER, FOR SOLUTION INTRAMUSCULAR; INTRAVENOUS ONCE
Refills: 0 | Status: COMPLETED | OUTPATIENT
Start: 2020-02-25 | End: 2020-02-25

## 2020-02-25 RX ORDER — INSULIN LISPRO 100/ML
VIAL (ML) SUBCUTANEOUS AT BEDTIME
Refills: 0 | Status: DISCONTINUED | OUTPATIENT
Start: 2020-02-25 | End: 2020-03-01

## 2020-02-25 RX ORDER — VANCOMYCIN HCL 1 G
1000 VIAL (EA) INTRAVENOUS EVERY 12 HOURS
Refills: 0 | Status: DISCONTINUED | OUTPATIENT
Start: 2020-02-25 | End: 2020-02-27

## 2020-02-25 RX ORDER — ENOXAPARIN SODIUM 100 MG/ML
40 INJECTION SUBCUTANEOUS DAILY
Refills: 0 | Status: DISCONTINUED | OUTPATIENT
Start: 2020-02-25 | End: 2020-03-05

## 2020-02-25 RX ORDER — DEXTROSE 50 % IN WATER 50 %
25 SYRINGE (ML) INTRAVENOUS ONCE
Refills: 0 | Status: DISCONTINUED | OUTPATIENT
Start: 2020-02-25 | End: 2020-03-01

## 2020-02-25 RX ORDER — FUROSEMIDE 40 MG
40 TABLET ORAL ONCE
Refills: 0 | Status: COMPLETED | OUTPATIENT
Start: 2020-02-25 | End: 2020-02-25

## 2020-02-25 RX ORDER — DILTIAZEM HCL 120 MG
240 CAPSULE, EXT RELEASE 24 HR ORAL DAILY
Refills: 0 | Status: DISCONTINUED | OUTPATIENT
Start: 2020-02-25 | End: 2020-03-02

## 2020-02-25 RX ORDER — GUAIFENESIN/DEXTROMETHORPHAN 600MG-30MG
10 TABLET, EXTENDED RELEASE 12 HR ORAL EVERY 6 HOURS
Refills: 0 | Status: DISCONTINUED | OUTPATIENT
Start: 2020-02-25 | End: 2020-03-02

## 2020-02-25 RX ORDER — CEFEPIME 1 G/1
INJECTION, POWDER, FOR SOLUTION INTRAMUSCULAR; INTRAVENOUS
Refills: 0 | Status: DISCONTINUED | OUTPATIENT
Start: 2020-02-25 | End: 2020-03-03

## 2020-02-25 RX ORDER — DOXAZOSIN MESYLATE 4 MG
4 TABLET ORAL AT BEDTIME
Refills: 0 | Status: DISCONTINUED | OUTPATIENT
Start: 2020-02-25 | End: 2020-02-27

## 2020-02-25 RX ORDER — VANCOMYCIN HCL 1 G
1000 VIAL (EA) INTRAVENOUS ONCE
Refills: 0 | Status: COMPLETED | OUTPATIENT
Start: 2020-02-25 | End: 2020-02-25

## 2020-02-25 RX ORDER — DEXTROSE 50 % IN WATER 50 %
12.5 SYRINGE (ML) INTRAVENOUS ONCE
Refills: 0 | Status: DISCONTINUED | OUTPATIENT
Start: 2020-02-25 | End: 2020-03-01

## 2020-02-25 RX ORDER — VANCOMYCIN HCL 1 G
VIAL (EA) INTRAVENOUS
Refills: 0 | Status: DISCONTINUED | OUTPATIENT
Start: 2020-02-25 | End: 2020-02-25

## 2020-02-25 RX ORDER — SODIUM CHLORIDE 9 MG/ML
1000 INJECTION, SOLUTION INTRAVENOUS
Refills: 0 | Status: DISCONTINUED | OUTPATIENT
Start: 2020-02-25 | End: 2020-03-01

## 2020-02-25 RX ORDER — CEFEPIME 1 G/1
2000 INJECTION, POWDER, FOR SOLUTION INTRAMUSCULAR; INTRAVENOUS EVERY 8 HOURS
Refills: 0 | Status: DISCONTINUED | OUTPATIENT
Start: 2020-02-25 | End: 2020-03-03

## 2020-02-25 RX ADMIN — Medication 200 MILLIGRAM(S): at 13:00

## 2020-02-25 RX ADMIN — Medication 10 MILLILITER(S): at 12:02

## 2020-02-25 RX ADMIN — Medication 200 MILLIGRAM(S): at 05:26

## 2020-02-25 RX ADMIN — Medication 240 MILLIGRAM(S): at 05:26

## 2020-02-25 RX ADMIN — Medication 15 MILLIGRAM(S): at 13:00

## 2020-02-25 RX ADMIN — CEFEPIME 100 MILLIGRAM(S): 1 INJECTION, POWDER, FOR SOLUTION INTRAMUSCULAR; INTRAVENOUS at 14:51

## 2020-02-25 RX ADMIN — Medication 20 MILLIGRAM(S): at 21:31

## 2020-02-25 RX ADMIN — Medication 200 MILLIGRAM(S): at 21:05

## 2020-02-25 RX ADMIN — Medication 0.5 MILLIGRAM(S): at 05:27

## 2020-02-25 RX ADMIN — Medication 40 MILLIGRAM(S): at 05:26

## 2020-02-25 RX ADMIN — ATORVASTATIN CALCIUM 10 MILLIGRAM(S): 80 TABLET, FILM COATED ORAL at 21:05

## 2020-02-25 RX ADMIN — Medication 75 MILLIGRAM(S): at 12:02

## 2020-02-25 RX ADMIN — Medication 8: at 09:41

## 2020-02-25 RX ADMIN — Medication 650 MILLIGRAM(S): at 10:33

## 2020-02-25 RX ADMIN — Medication 15 MILLIGRAM(S): at 21:05

## 2020-02-25 RX ADMIN — ENOXAPARIN SODIUM 40 MILLIGRAM(S): 100 INJECTION SUBCUTANEOUS at 12:02

## 2020-02-25 RX ADMIN — Medication 15 MILLIGRAM(S): at 05:26

## 2020-02-25 RX ADMIN — Medication 40 MILLIGRAM(S): at 13:00

## 2020-02-25 RX ADMIN — Medication 650 MILLIGRAM(S): at 11:33

## 2020-02-25 RX ADMIN — PANTOPRAZOLE SODIUM 40 MILLIGRAM(S): 20 TABLET, DELAYED RELEASE ORAL at 05:26

## 2020-02-25 RX ADMIN — Medication 30 MILLIGRAM(S): at 12:02

## 2020-02-25 RX ADMIN — POLYETHYLENE GLYCOL 3350 17 GRAM(S): 17 POWDER, FOR SOLUTION ORAL at 12:03

## 2020-02-25 RX ADMIN — Medication 6: at 17:11

## 2020-02-25 RX ADMIN — Medication 10 MILLILITER(S): at 05:26

## 2020-02-25 RX ADMIN — Medication 40 MILLIGRAM(S): at 12:02

## 2020-02-25 RX ADMIN — Medication 4 MILLIGRAM(S): at 21:05

## 2020-02-25 RX ADMIN — Medication 10 MILLILITER(S): at 17:03

## 2020-02-25 RX ADMIN — Medication 1 MILLIGRAM(S): at 17:40

## 2020-02-25 RX ADMIN — Medication 3 MILLILITER(S): at 05:27

## 2020-02-25 RX ADMIN — Medication 250 MILLIGRAM(S): at 13:00

## 2020-02-25 RX ADMIN — Medication 3 MILLILITER(S): at 12:02

## 2020-02-25 RX ADMIN — Medication 1 MILLIGRAM(S): at 03:31

## 2020-02-25 RX ADMIN — CEFEPIME 100 MILLIGRAM(S): 1 INJECTION, POWDER, FOR SOLUTION INTRAMUSCULAR; INTRAVENOUS at 21:05

## 2020-02-25 NOTE — CONSULT NOTE ADULT - SUBJECTIVE AND OBJECTIVE BOX
[facility] Initial Consult Note    HPI:        REVIEW OF SYSTEMS:   CONSTITUTIONAL: No fevers, chills, fatigue, change in appetite, change in weight  HEENT: No visual changes, vertigo, ear pain, sore throat or nasal congestion  NECK: No pain or stiffness  RESPIRATORY: +Cough (improved), wheezing, hemoptysis or shortness of breath  CARDIOVASCULAR: No chest pain, palpitations, PND, orthopnea or leg swelling  GASTROINTESTINAL: No abdominal pain, nausea, vomiting, hematemesis, diarrhea, constipation, melena or hematochezia  GENITOURINARY: No dysuria, urinary frequency or hematuria  NEUROLOGICAL: No headache, confusion, focal numbness or weakness  SKIN: No rashes or lesions  MUSCULOSKELETAL: No myalgia, arthralgia, joint swelling or back pain  All other review of systems is negative unless indicated above.       PAST MEDICAL & SURGICAL HISTORY:  HTN (hypertension)  Depression  Pulmonary fibrosis  H/O inguinal hernia repair    FAMILY HISTORY:  No pertinent family history in first degree relatives    SOCIAL HISTORY:      HOME MEDICATIONS:   BuSpar 15 mg TID  dilTIAZem 240 mg ER daily  doxazosin 2 mg daily  hydroCHLOROthiazide 25 mg daily  KlonoPIN 1 mg TID PRN  Lipitor 10 mg daily  olmesartan 40 mg daily  Paxil 30 mg daily    ALLERGIES:   penicillin (Anaphylaxis)      OBJECTIVE:  Vital Signs Last 24 Hrs  T(C): 36.4 (25 Feb 2020 09:59), Max: 36.8 (24 Feb 2020 23:11)  T(F): 97.5 (25 Feb 2020 09:59), Max: 98.3 (24 Feb 2020 23:11)  HR: 88 (25 Feb 2020 09:59) (71 - 92)  BP: 166/82 (25 Feb 2020 09:59) (151/83 - 168/90)  RR: 20 (25 Feb 2020 09:59) (18 - 22)  SpO2: 97% (25 Feb 2020 09:59) (90% - 100%)    02-24 @ 07:01  -  02-25 @ 07:00  --------------------------------------------------------  IN: 120 mL / OUT: 950 mL / NET: -830 mL      PHYSICAL EXAM:   General: Mild respiratory distress, on high flow, lethargic  HEENT: Head atraumatic; EOMI, PERRLA, normal sclera and conjunctiva; dry MM  Neck: Supple, no JVD, no LAD  Chest/Lungs:   Heart: RRR, normal S1, S2, no murmurs or gallops  Abd: Soft, nondistended, nontender, normoactive bowel sounds  Extremities: 2+ peripheral pulses, no edema, clubbing or cyanosis  Skin: Flushed, warm, well-perfused  Neurologic: AOx3, intermittently confused, follows commands      HOSPITAL MEDICATIONS:  Standing Meds:  albuterol/ipratropium for Nebulization 3 milliLiter(s) Nebulizer every 6 hours  atorvastatin 10 milliGRAM(s) Oral at bedtime  azithromycin  IVPB      azithromycin  IVPB 500 milliGRAM(s) IV Intermittent every 24 hours  benzonatate 200 milliGRAM(s) Oral three times a day  buDESOnide    Inhalation Suspension 0.5 milliGRAM(s) Inhalation every 12 hours  busPIRone 15 milliGRAM(s) Oral three times a day  cefTRIAXone   IVPB 1000 milliGRAM(s) IV Intermittent every 24 hours  dextrose 5%. 1000 milliLiter(s) IV Continuous <Continuous>  dextrose 50% Injectable 12.5 Gram(s) IV Push once  dextrose 50% Injectable 25 Gram(s) IV Push once  dextrose 50% Injectable 25 Gram(s) IV Push once  diltiazem    milliGRAM(s) Oral daily  doxazosin 4 milliGRAM(s) Oral at bedtime  enoxaparin Injectable 40 milliGRAM(s) SubCutaneous daily  furosemide   Injectable 40 milliGRAM(s) IV Push once  guaifenesin/dextromethorphan  Syrup 10 milliLiter(s) Oral every 6 hours  insulin lispro (HumaLOG) corrective regimen sliding scale   SubCutaneous three times a day before meals  insulin lispro (HumaLOG) corrective regimen sliding scale   SubCutaneous at bedtime  methylPREDNISolone sodium succinate Injectable 40 milliGRAM(s) IV Push every 6 hours  oseltamivir 75 milliGRAM(s) Oral two times a day  pantoprazole  Injectable 40 milliGRAM(s) IV Push daily  PARoxetine 30 milliGRAM(s) Oral daily  polyethylene glycol 3350 17 Gram(s) Oral daily  potassium chloride    Tablet ER 40 milliEquivalent(s) Oral once  potassium phosphate IVPB 15 milliMole(s) IV Intermittent once    PRN Meds:  acetaminophen   Tablet .. 650 milliGRAM(s) Oral every 6 hours PRN  clonazePAM  Tablet 1 milliGRAM(s) Oral three times a day PRN  dextrose 40% Gel 15 Gram(s) Oral once PRN  glucagon  Injectable 1 milliGRAM(s) IntraMuscular once PRN      LABS:                        13.1   24.08 )-----------( 121      ( 25 Feb 2020 09:25 )             38.4     02-25    138  |  97  |  32<H>  ----------------------------<  266<H>  4.5   |  29  |  0.88    Ca    8.4      25 Feb 2020 09:25  Phos  3.2     02-25  Mg     3.0     02-25    Arterial Blood Gas:  02-24 @ 03:20  7.49/34/191/26/99/3.2  Arterial Blood Gas:  02-24 @ 00:18  7.50/36/72/28/94/4.8  Arterial Blood Gas:  02-23 @ 22:13  7.45/42/49/28/83/4.4    Culture - Blood (02.20.20 @ 10:14)    Specimen Source: .Blood Blood-Peripheral    Culture Results:   No growth at 5 days.  Culture - Blood (02.20.20 @ 10:14)    Specimen Source: .Blood Blood-Venous    Culture Results:   No growth at 5 days.      RADIOLOGY:    < from: CT Chest No Cont (02.24.20 @ 14:39) >    FINDINGS:    LUNGS AND AIRWAYS: The central airways are patent.  Patchy areas of groundglass opacities containing traction bronchiectasis are noted within both lungs. Addition, superimposed patchy groundglass opacities are also noted. They are new when compared to previous exam.    PLEURA: No pleural effusion.    MEDIASTINUM AND CHAVO: There are scattered small hilar and mediastinal lymph nodes.    VESSELS: Within normal limits.    HEART: The heart size is normal and there is no pericardial effusion.     CHEST WALL AND LOWER NECK: Within normal limits.    VISUALIZED UPPER ABDOMEN: Within normal limits.    BONES: There are multilevel degenerative changes of the spine    IMPRESSION:     Patchy groundglass opacities containing traction bronchiectasis bilaterally is suggestive of pulmonary fibrosis of uncertain etiology.    Groundglass opacities are noted within both lungs. Primary consideration is infection.    < end of copied text >

## 2020-02-25 NOTE — PROGRESS NOTE ADULT - ASSESSMENT
ASSESSMENT:    hypoxic respiratory failure - multifactorial - no evidence of hypercapnia - ABG reveals an acute respiratory alkalosis due to hyperventilation    1) influenza infection complicated by bronchospasm and pneumonia (likely viral infection in the absence of a leukocytosis - the initially mildly elevated procalcitonin level is now normal) - leukocytosis now is likely due to high dose steroids  2) underlying pulmonary fibrosis with traction bronchiectasis    PLAN/RECOMMENDATIONS:    high flow nasal canula 100% FiO2 @ 50lpm - keep saturation greater than 88% as able  BIPAP for increased work of breathing, resistant hypoxemia or respiratory acidosis  repeat chest CT reviewed  would continue tamiflu for 10 days days given ongoing pneumonia  check RVP  continue ceftriaxone/azithromycin  continue solumedrol 40mg IVP q6h  albuterol/atrovent nebs to q6h  pulmicort 0.5mg nebs q12h - give after duoneb - rinse mouth after use  robitussin DM/tessalon - discontinue hycodan  buspar/klonopin/paxil  DVT prophylaxis - SQ lovenox  urology consult noted - loza catheter/doxazosin - trial of void  [facility] consult called  arrangements are being made to transfer the patient to the [facility]    Will follow with you. Plan of care discussed with the patient at bedside and the dedicated floor NP.     Galileo Pop MD, [facility]  [phone]  Pulmonary Medicine

## 2020-02-25 NOTE — PROGRESS NOTE ADULT - SUBJECTIVE AND OBJECTIVE BOX
INTERVAL HPI/OVERNIGHT EVENTS:    patient seen and examined at bedside earlier this morning with wife present  overnight events noted  CT noted  on HFNC  poor PO intake  denies n/v abd pain    MEDICATIONS  (STANDING):  albuterol/ipratropium for Nebulization 3 milliLiter(s) Nebulizer every 6 hours  atorvastatin 10 milliGRAM(s) Oral at bedtime  benzonatate 200 milliGRAM(s) Oral three times a day  buDESOnide    Inhalation Suspension 0.5 milliGRAM(s) Inhalation every 12 hours  busPIRone 15 milliGRAM(s) Oral three times a day  cefepime   IVPB      dextrose 5%. 1000 milliLiter(s) (50 mL/Hr) IV Continuous <Continuous>  dextrose 50% Injectable 12.5 Gram(s) IV Push once  dextrose 50% Injectable 25 Gram(s) IV Push once  dextrose 50% Injectable 25 Gram(s) IV Push once  diltiazem    milliGRAM(s) Oral daily  doxazosin 4 milliGRAM(s) Oral at bedtime  enoxaparin Injectable 40 milliGRAM(s) SubCutaneous daily  guaifenesin/dextromethorphan  Syrup 10 milliLiter(s) Oral every 6 hours  insulin lispro (HumaLOG) corrective regimen sliding scale   SubCutaneous three times a day before meals  insulin lispro (HumaLOG) corrective regimen sliding scale   SubCutaneous at bedtime  methylPREDNISolone sodium succinate Injectable 40 milliGRAM(s) IV Push every 6 hours  oseltamivir 75 milliGRAM(s) Oral two times a day  pantoprazole  Injectable 40 milliGRAM(s) IV Push daily  PARoxetine 30 milliGRAM(s) Oral daily  polyethylene glycol 3350 17 Gram(s) Oral daily  potassium chloride    Tablet ER 40 milliEquivalent(s) Oral once  potassium phosphate IVPB 15 milliMole(s) IV Intermittent once  vancomycin  IVPB        MEDICATIONS  (PRN):  acetaminophen   Tablet .. 650 milliGRAM(s) Oral every 6 hours PRN Temp greater or equal to 38C (100.4F), Mild Pain (1 - 3), Moderate Pain (4 - 6)  clonazePAM  Tablet 1 milliGRAM(s) Oral three times a day PRN anxiety  dextrose 40% Gel 15 Gram(s) Oral once PRN Blood Glucose LESS THAN 70 milliGRAM(s)/deciliter  glucagon  Injectable 1 milliGRAM(s) IntraMuscular once PRN Glucose LESS THAN 70 milligrams/deciliter      Allergies    penicillin (Anaphylaxis)    Intolerances        Review of Systems:    General:  No wt loss, fevers, chills, night sweats,fatigue,   Eyes:  Good vision, no reported pain  ENT:  No sore throat, pain, runny nose, dysphagia  CV:  No pain, palpitatioins, hypo/hypertension  Resp:  +dyspnea, cough, tachypnea, wheezing  GI:  No pain, No nausea, No vomiting, No diarrhea, No constipatiion, No weight loss, No fever, No pruritis, No rectal bleeding, No tarry stools, No dysphagia,  :  No pain, bleeding, incontinence, nocturia  Muscle:  No pain, weakness  Neuro:  No weakness, tingling, memory problems  Psych:  No fatigue, insomnia, mood problems, depression  Endocrine:  No polyuria, polydypsia, cold/heat intolerance  Heme:  No petechiae, ecchymosis, easy bruisability  Skin:  No rash, tattoos, scars, edema      Vital Signs Last 24 Hrs  T(C): 36.6 (25 Feb 2020 13:00), Max: 36.8 (24 Feb 2020 23:11)  T(F): 97.9 (25 Feb 2020 13:00), Max: 98.3 (24 Feb 2020 23:11)  HR: 89 (25 Feb 2020 13:00) (71 - 92)  BP: 162/80 (25 Feb 2020 13:00) (151/83 - 167/85)  BP(mean): --  RR: 20 (25 Feb 2020 13:00) (18 - 22)  SpO2: 94% (25 Feb 2020 13:00) (90% - 100%)    PHYSICAL EXAM:    Constitutional: ill-appearing  HEENT: on HFNC  Cardiovascular: S1 and S2, RRR  Gastrointestinal: BS+, soft, obese, NT, softly distended  Extremities: No peripheral edema  Vascular: 2+ peripheral pulses  Neurological: A/O x 3, no focal deficits        LABS:                        13.1   24.08 )-----------( 121      ( 25 Feb 2020 09:25 )             38.4     02-25    138  |  97  |  32<H>  ----------------------------<  266<H>  4.5   |  29  |  0.88    Ca    8.4      25 Feb 2020 09:25  Phos  3.2     02-25  Mg     3.0     02-25    TPro  6.3  /  Alb  3.0<L>  /  TBili  0.4  /  DBili  x   /  AST  41<H>  /  ALT  40  /  AlkPhos  101  02-23    PT/INR - ( 23 Feb 2020 22:22 )   PT: 12.5 sec;   INR: 1.09 ratio         PTT - ( 23 Feb 2020 22:22 )  PTT:22.3 sec      RADIOLOGY & ADDITIONAL TESTS:  < from: CT Chest No Cont (02.24.20 @ 14:39) >    EXAM:  CT CHEST                            PROCEDURE DATE:  02/24/2020            INTERPRETATION:  CLINICAL INFORMATION: Dyspnea    COMPARISON: CT chest 2/20/2020    PROCEDURE:   CT of the Chest was performed without intravenous contrast.  [place] coronal reformats were performed.      FINDINGS:    LUNGS AND AIRWAYS: The central airways are patent.  Patchy areas of groundglass opacities containing traction bronchiectasis are noted within both lungs. Addition, superimposed patchy groundglass opacities are also noted. They are new when compared to previous exam.    PLEURA: No pleural effusion.    MEDIASTINUM AND CHAVO: There are scattered small hilar and mediastinal lymph nodes.    VESSELS: Within normal limits.    HEART: The heart size is normal and there is no pericardial effusion.     CHEST WALL AND LOWER NECK: Within normal limits.    VISUALIZED UPPER ABDOMEN: Within normal limits.    BONES: There are multilevel degenerative changes of the spine    IMPRESSION:     Patchy groundglass opacities containing traction bronchiectasis bilaterally is suggestive of pulmonary fibrosis of uncertain etiology.    Groundglass opacities are noted within both lungs. Primary consideration is infection.                    BRITNEY VERONICA M.D., RADIOLOGY RESIDENT  This document has been electronically signed.  TAMMIE LOTT M.D., ATTENDING RADIOLOGIST  This document has been electronically signed. Feb 24 2020  5:04PM                < end of copied text >

## 2020-02-25 NOTE — CHART NOTE - NSCHARTNOTEFT_GEN_A_CORE
HPI:        REVIEW OF SYSTEMS:   CONSTITUTIONAL: No fevers, chills, fatigue, change in appetite, change in weight  HEENT: No visual changes, vertigo, ear pain, sore throat or nasal congestion  NECK: No pain or stiffness  RESPIRATORY: +Cough (improved), wheezing, hemoptysis or shortness of breath  CARDIOVASCULAR: No chest pain, palpitations, PND, orthopnea or leg swelling  GASTROINTESTINAL: No abdominal pain, nausea, vomiting, hematemesis, diarrhea, constipation, melena or hematochezia  GENITOURINARY: No dysuria, urinary frequency or hematuria  NEUROLOGICAL: No headache, confusion, focal numbness or weakness  SKIN: No rashes or lesions  MUSCULOSKELETAL: No myalgia, arthralgia, joint swelling or back pain  All other review of systems is negative unless indicated above.       PAST MEDICAL & SURGICAL HISTORY:  HTN (hypertension)  Depression  Pulmonary fibrosis  H/O inguinal hernia repair    FAMILY HISTORY:  No pertinent family history in first degree relatives    SOCIAL HISTORY:      HOME MEDICATIONS:   BuSpar 15 mg TID  dilTIAZem 240 mg ER daily  doxazosin 2 mg daily  hydroCHLOROthiazide 25 mg daily  KlonoPIN 1 mg TID PRN  Lipitor 10 mg daily  olmesartan 40 mg daily  Paxil 30 mg daily    ALLERGIES:   penicillin (Anaphylaxis)      OBJECTIVE:  Vital Signs Last 24 Hrs  T(C): 36.4 (25 Feb 2020 09:59), Max: 36.8 (24 Feb 2020 23:11)  T(F): 97.5 (25 Feb 2020 09:59), Max: 98.3 (24 Feb 2020 23:11)  HR: 88 (25 Feb 2020 09:59) (71 - 92)  BP: 166/82 (25 Feb 2020 09:59) (151/83 - 168/90)  RR: 20 (25 Feb 2020 09:59) (18 - 22)  SpO2: 97% (25 Feb 2020 09:59) (90% - 100%)    02-24 @ 07:01  -  02-25 @ 07:00  --------------------------------------------------------  IN: 120 mL / OUT: 950 mL / NET: -830 mL      PHYSICAL EXAM:   General: Mild respiratory distress, on high flow, lethargic  HEENT: Head atraumatic; EOMI, PERRLA, normal sclera and conjunctiva; dry MM  Neck: Supple, no JVD, no LAD  Chest/Lungs:   Heart: RRR, normal S1, S2, no murmurs or gallops  Abd: Soft, nondistended, nontender, normoactive bowel sounds  Extremities: 2+ peripheral pulses, no edema, clubbing or cyanosis  Skin: Flushed, warm, well-perfused  Neurologic: AOx3, intermittently confused, follows commands      HOSPITAL MEDICATIONS:  Standing Meds:  albuterol/ipratropium for Nebulization 3 milliLiter(s) Nebulizer every 6 hours  atorvastatin 10 milliGRAM(s) Oral at bedtime  azithromycin  IVPB 500 milliGRAM(s) IV Intermittent every 24 hours  benzonatate 200 milliGRAM(s) Oral three times a day  buDESOnide    Inhalation Suspension 0.5 milliGRAM(s) Inhalation every 12 hours  busPIRone 15 milliGRAM(s) Oral three times a day  cefTRIAXone   IVPB 1000 milliGRAM(s) IV Intermittent every 24 hours  dextrose 5%. 1000 milliLiter(s) IV Continuous <Continuous>  dextrose 50% Injectable 12.5 Gram(s) IV Push once  dextrose 50% Injectable 25 Gram(s) IV Push once  dextrose 50% Injectable 25 Gram(s) IV Push once  diltiazem    milliGRAM(s) Oral daily  doxazosin 4 milliGRAM(s) Oral at bedtime  enoxaparin Injectable 40 milliGRAM(s) SubCutaneous daily  furosemide   Injectable 40 milliGRAM(s) IV Push once  guaifenesin/dextromethorphan  Syrup 10 milliLiter(s) Oral every 6 hours  insulin lispro (HumaLOG) corrective regimen sliding scale   SubCutaneous three times a day before meals  insulin lispro (HumaLOG) corrective regimen sliding scale   SubCutaneous at bedtime  methylPREDNISolone sodium succinate Injectable 40 milliGRAM(s) IV Push every 6 hours  oseltamivir 75 milliGRAM(s) Oral two times a day  pantoprazole  Injectable 40 milliGRAM(s) IV Push daily  PARoxetine 30 milliGRAM(s) Oral daily  polyethylene glycol 3350 17 Gram(s) Oral daily  potassium chloride    Tablet ER 40 milliEquivalent(s) Oral once  potassium phosphate IVPB 15 milliMole(s) IV Intermittent once    PRN Meds:  acetaminophen   Tablet .. 650 milliGRAM(s) Oral every 6 hours PRN  clonazePAM  Tablet 1 milliGRAM(s) Oral three times a day PRN  dextrose 40% Gel 15 Gram(s) Oral once PRN  glucagon  Injectable 1 milliGRAM(s) IntraMuscular once PRN      LABS:                        13.1   24.08 )-----------( 121      ( 25 Feb 2020 09:25 )             38.4     02-25    138  |  97  |  32<H>  ----------------------------<  266<H>  4.5   |  29  |  0.88    Ca    8.4      25 Feb 2020 09:25  Phos  3.2     02-25  Mg     3.0     02-25    Arterial Blood Gas:  02-24 @ 03:20  7.49/34/191/26/99/3.2  Arterial Blood Gas:  02-24 @ 00:18  7.50/36/72/28/94/4.8  Arterial Blood Gas:  02-23 @ 22:13  7.45/42/49/28/83/4.4    Culture - Blood (02.20.20 @ 10:14)    Specimen Source: .Blood Blood-Peripheral    Culture Results: No growth at 5 days.  Culture - Blood (02.20.20 @ 10:14)    Specimen Source: .Blood Blood-Venous    Culture Results: No growth at 5 days.      RADIOLOGY:    < from: CT Chest No Cont (02.24.20 @ 14:39) >    FINDINGS:  LUNGS AND AIRWAYS: The central airways are patent.  Patchy areas of groundglass opacities containing traction bronchiectasis are noted within both lungs. Addition, superimposed patchy groundglass opacities are also noted. They are new when compared to previous exam.  PLEURA: No pleural effusion.  MEDIASTINUM AND CHAVO: There are scattered small hilar and mediastinal lymph nodes.  VESSELS: Within normal limits.  HEART: The heart size is normal and there is no pericardial effusion.   CHEST WALL AND LOWER NECK: Within normal limits.  VISUALIZED UPPER ABDOMEN: Within normal limits.  BONES: There are multilevel degenerative changes of the spine    IMPRESSION:   Patchy groundglass opacities containing traction bronchiectasis bilaterally is suggestive of pulmonary fibrosis of uncertain etiology.  Groundglass opacities are noted within both lungs. Primary consideration is infection.    < end of copied text > MICU Accept Note    HPI/Hospital Course:   69M with PMH of pulmonary fibrosis (followed by Dr. Dia), HTN, HLD, depression, and anxiety who initially presented 2/20/20 following a fall at home, admitted with acute hypoxic respiratory failure in setting of influenza with concern for superimposed PNA. Per patient's wife, patient's mother recently passed away and patient had been exposed to many sick relatives and friends in the days prior to presentation. Over the past week, he developed fatigue, malaise, hacking cough, and progressive dyspnea. He was seen at an urgent care center 4 days PTA where he was diagnosed with flu. His symptoms continued to progress and on day of presentation, he was so weak that he was unable to stand and fell to the floor. He was brought to ED and placed on BiPAP for acute hypoxic respiratory failure. Confirmed influenza infection via RVP in ED. Chest CT angio on admission demonstrated known pulmonary fibrosis, no PE.     He was admitted to Medicine and has been receiving ceftriaxone, azithromycin, oseltamivir, and IV methylprednisolone. Blood cultures and MRSA swab were negative. His course has been complicated by worsening respiratory failure with hypoxia requiring titration of supplemental O2. He had two RRTs on 2/24 for labored breathing and hypoxemia, improved with high flow nasal cannula and intermittent BiPAP. Interval CT chest on 2/24 demonstrated new patchy GGOs within both lungs. He has been on high flow NC with FiO2 100% saturating in low high 80s to low 90s, intermittently requiring BiPAP for periods of desaturation.     MICU consulted today by Pulmonology for resistant hypoxemia, subsequently transferred to MICU for closer monitoring.       REVIEW OF SYSTEMS:   CONSTITUTIONAL: No fevers, chills, fatigue, change in appetite, change in weight  HEENT: No visual changes, vertigo, ear pain, sore throat or nasal congestion  NECK: No pain or stiffness  RESPIRATORY: +Cough (improved), wheezing, hemoptysis or shortness of breath  CARDIOVASCULAR: No chest pain, palpitations, PND, orthopnea or leg swelling  GASTROINTESTINAL: No abdominal pain, nausea, vomiting, hematemesis, diarrhea, constipation, melena or hematochezia  GENITOURINARY: +Urinary retention; no dysuria, urinary frequency or hematuria  NEUROLOGICAL: No headache, confusion, focal numbness or weakness  SKIN: No rashes or lesions  MUSCULOSKELETAL: No myalgia, arthralgia, joint swelling or back pain  All other review of systems is negative unless indicated above.       PAST MEDICAL & SURGICAL HISTORY:  HTN (hypertension)  Depression  Pulmonary fibrosis  H/O inguinal hernia repair    FAMILY HISTORY:  No pertinent family history in first degree relatives    SOCIAL HISTORY:  Retired. , lives with wife. Independent in ADLs. Remote history of tobacco use (10 years, quit 1970s). Denies alcohol or illicit drug use.     HOME MEDICATIONS:   BuSpar 15 mg TID  dilTIAZem 240 mg ER daily  doxazosin 2 mg daily  hydroCHLOROthiazide 25 mg daily  KlonoPIN 1 mg TID PRN  Lipitor 10 mg daily  olmesartan 40 mg daily  Paxil 30 mg daily    ALLERGIES:   penicillin (Anaphylaxis)      OBJECTIVE:  Vital Signs Last 24 Hrs  T(C): 36.4 (25 Feb 2020 09:59), Max: 36.8 (24 Feb 2020 23:11)  T(F): 97.5 (25 Feb 2020 09:59), Max: 98.3 (24 Feb 2020 23:11)  HR: 88 (25 Feb 2020 09:59) (71 - 92)  BP: 166/82 (25 Feb 2020 09:59) (151/83 - 168/90)  RR: 20 (25 Feb 2020 09:59) (18 - 22)  SpO2: 97% (25 Feb 2020 09:59) (90% - 100%)    02-24 @ 07:01  -  02-25 @ 07:00  --------------------------------------------------------  IN: 120 mL / OUT: 950 mL / NET: -830 mL      PHYSICAL EXAM:   General: Mild respiratory distress, lethargic, on high flow nasal cannula  HEENT: Head atraumatic; EOMI, PERRLA, normal sclera and conjunctiva; dry MM  Neck: Supple, no JVD, no LAD  Chest/Lungs: Coarse breath sounds bilaterally, no wheezing  Heart: RRR, normal S1, S2, no murmurs or gallops  Abd: Soft, obese, NTTP  Extremities: 2+ peripheral pulses, trace pedal edema  Skin: Flushed, warm, well-perfused  Neurologic: AOx3, follows commands appropriately      HOSPITAL MEDICATIONS:  Standing Meds:  albuterol/ipratropium for Nebulization 3 milliLiter(s) Nebulizer every 6 hours  atorvastatin 10 milliGRAM(s) Oral at bedtime  azithromycin  IVPB 500 milliGRAM(s) IV Intermittent every 24 hours  benzonatate 200 milliGRAM(s) Oral three times a day  buDESOnide    Inhalation Suspension 0.5 milliGRAM(s) Inhalation every 12 hours  busPIRone 15 milliGRAM(s) Oral three times a day  cefTRIAXone   IVPB 1000 milliGRAM(s) IV Intermittent every 24 hours  dextrose 5%. 1000 milliLiter(s) IV Continuous <Continuous>  dextrose 50% Injectable 12.5 Gram(s) IV Push once  dextrose 50% Injectable 25 Gram(s) IV Push once  dextrose 50% Injectable 25 Gram(s) IV Push once  diltiazem    milliGRAM(s) Oral daily  doxazosin 4 milliGRAM(s) Oral at bedtime  enoxaparin Injectable 40 milliGRAM(s) SubCutaneous daily  furosemide   Injectable 40 milliGRAM(s) IV Push once  guaifenesin/dextromethorphan  Syrup 10 milliLiter(s) Oral every 6 hours  insulin lispro (HumaLOG) corrective regimen sliding scale   SubCutaneous three times a day before meals  insulin lispro (HumaLOG) corrective regimen sliding scale   SubCutaneous at bedtime  methylPREDNISolone sodium succinate Injectable 40 milliGRAM(s) IV Push every 6 hours  oseltamivir 75 milliGRAM(s) Oral two times a day  pantoprazole  Injectable 40 milliGRAM(s) IV Push daily  PARoxetine 30 milliGRAM(s) Oral daily  polyethylene glycol 3350 17 Gram(s) Oral daily  potassium chloride    Tablet ER 40 milliEquivalent(s) Oral once  potassium phosphate IVPB 15 milliMole(s) IV Intermittent once    PRN Meds:  acetaminophen   Tablet .. 650 milliGRAM(s) Oral every 6 hours PRN  clonazePAM  Tablet 1 milliGRAM(s) Oral three times a day PRN  dextrose 40% Gel 15 Gram(s) Oral once PRN  glucagon  Injectable 1 milliGRAM(s) IntraMuscular once PRN      LABS:                        13.1   24.08 )-----------( 121      ( 25 Feb 2020 09:25 )             38.4     02-25    138  |  97  |  32<H>  ----------------------------<  266<H>  4.5   |  29  |  0.88    Ca    8.4      25 Feb 2020 09:25  Phos  3.2     02-25  Mg     3.0     02-25    Arterial Blood Gas:  02-24 @ 03:20  7.49/34/191/26/99/3.2  Arterial Blood Gas:  02-24 @ 00:18  7.50/36/72/28/94/4.8  Arterial Blood Gas:  02-23 @ 22:13  7.45/42/49/28/83/4.4    Culture - Blood (02.20.20 @ 10:14)    Specimen Source: .Blood Blood-Peripheral    Culture Results: No growth at 5 days.  Culture - Blood (02.20.20 @ 10:14)    Specimen Source: .Blood Blood-Venous    Culture Results: No growth at 5 days.      RADIOLOGY:    < from: CT Chest No Cont (02.24.20 @ 14:39) >    FINDINGS:  LUNGS AND AIRWAYS: The central airways are patent.  Patchy areas of groundglass opacities containing traction bronchiectasis are noted within both lungs. Addition, superimposed patchy groundglass opacities are also noted. They are new when compared to previous exam.  PLEURA: No pleural effusion.  MEDIASTINUM AND CHAVO: There are scattered small hilar and mediastinal lymph nodes.  VESSELS: Within normal limits.  HEART: The heart size is normal and there is no pericardial effusion.   CHEST WALL AND LOWER NECK: Within normal limits.  VISUALIZED UPPER ABDOMEN: Within normal limits.  BONES: There are multilevel degenerative changes of the spine    IMPRESSION:   Patchy groundglass opacities containing traction bronchiectasis bilaterally is suggestive of pulmonary fibrosis of uncertain etiology.  Groundglass opacities are noted within both lungs. Primary consideration is infection.    < end of copied text >        ASSESSMENT & PLAN:  69M with PMH of pulmonary fibrosis (followed by Dr. Dia), HTN, HLD, depression, and anxiety who initially presented 2/20/20 following a fall at home, admitted with acute hypoxic respiratory failure in setting of influenza with concern for superimposed PNA. Course c/b worsening respiratory failure with high oxygen requirements, transferred to MICU for close monitoring.     NEURO:     PULM:     CARDIOVASCULAR:     GI:     RENAL/:     ENDOCRINE:     HEMATOLOGIC:     ID:     PROPHYLAXIS:     CODE STATUS: MICU Accept Note    HPI/Hospital Course:   69M with PMH of pulmonary fibrosis (followed by Dr. Dia), HTN, HLD, depression, and anxiety who initially presented 2/20/20 following a fall at home, admitted with acute hypoxic respiratory failure in setting of influenza with concern for superimposed PNA. Per patient's wife, patient's mother recently passed away and patient had been exposed to many sick relatives and friends in the days prior to presentation. Over the past week, he developed fatigue, malaise, hacking cough, and progressive dyspnea. He was seen at an urgent care center 4 days PTA where he was diagnosed with flu. His symptoms continued to progress and on day of presentation, he was so weak that he was unable to stand and fell to the floor. He was brought to ED and placed on BiPAP for acute hypoxic respiratory failure. Confirmed influenza infection via RVP in ED. Chest CT angio on admission demonstrated known pulmonary fibrosis, no PE.     He was admitted to Medicine and has been receiving ceftriaxone, azithromycin, oseltamivir, and IV methylprednisolone. Blood cultures and MRSA swab were negative. His course has been complicated by worsening respiratory failure with hypoxia requiring titration of supplemental O2. He had two RRTs on 2/24 for labored breathing and hypoxemia, improved with high flow nasal cannula and intermittent BiPAP. Interval CT chest on 2/24 demonstrated new patchy GGOs within both lungs. He has been on high flow NC with FiO2 100% saturating in low high 80s to low 90s, intermittently requiring BiPAP for periods of desaturation.     MICU consulted today by Pulmonology for resistant hypoxemia, subsequently transferred to MICU for closer monitoring.       REVIEW OF SYSTEMS:   CONSTITUTIONAL: No fevers, chills, fatigue, change in appetite, change in weight  HEENT: No visual changes, vertigo, ear pain, sore throat or nasal congestion  NECK: No pain or stiffness  RESPIRATORY: +Cough (improved), wheezing, hemoptysis or shortness of breath  CARDIOVASCULAR: No chest pain, palpitations, PND, orthopnea or leg swelling  GASTROINTESTINAL: No abdominal pain, nausea, vomiting, hematemesis, diarrhea, constipation, melena or hematochezia  GENITOURINARY: +Urinary retention; no dysuria, urinary frequency or hematuria  NEUROLOGICAL: No headache, confusion, focal numbness or weakness  SKIN: No rashes or lesions  MUSCULOSKELETAL: No myalgia, arthralgia, joint swelling or back pain  All other review of systems is negative unless indicated above.       PAST MEDICAL & SURGICAL HISTORY:  HTN (hypertension)  Depression  Pulmonary fibrosis  H/O inguinal hernia repair    FAMILY HISTORY:  No pertinent family history in first degree relatives    SOCIAL HISTORY:  Retired. , lives with wife. Independent in ADLs. Remote history of tobacco use (10 years, quit 1970s). Denies alcohol or illicit drug use.     HOME MEDICATIONS:   BuSpar 15 mg TID  dilTIAZem 240 mg ER daily  doxazosin 2 mg daily  hydroCHLOROthiazide 25 mg daily  KlonoPIN 1 mg TID PRN  Lipitor 10 mg daily  olmesartan 40 mg daily  Paxil 30 mg daily    ALLERGIES:   penicillin (Anaphylaxis)      OBJECTIVE:  Vital Signs Last 24 Hrs  T(C): 36.4 (25 Feb 2020 09:59), Max: 36.8 (24 Feb 2020 23:11)  T(F): 97.5 (25 Feb 2020 09:59), Max: 98.3 (24 Feb 2020 23:11)  HR: 88 (25 Feb 2020 09:59) (71 - 92)  BP: 166/82 (25 Feb 2020 09:59) (151/83 - 168/90)  RR: 20 (25 Feb 2020 09:59) (18 - 22)  SpO2: 97% (25 Feb 2020 09:59) (90% - 100%)    02-24 @ 07:01  -  02-25 @ 07:00  --------------------------------------------------------  IN: 120 mL / OUT: 950 mL / NET: -830 mL      PHYSICAL EXAM:   General: Mild respiratory distress, lethargic, on high flow nasal cannula  HEENT: Head atraumatic; EOMI, PERRLA, normal sclera and conjunctiva; dry MM  Neck: Supple, no JVD, no LAD  Chest/Lungs: Coarse breath sounds bilaterally, no wheezing  Heart: RRR, normal S1, S2, no murmurs or gallops  Abd: Soft, obese, NTTP  Extremities: 2+ peripheral pulses, trace pedal edema  Skin: Flushed, warm, well-perfused  Neurologic: AOx3, follows commands appropriately      HOSPITAL MEDICATIONS:  Standing Meds:  albuterol/ipratropium for Nebulization 3 milliLiter(s) Nebulizer every 6 hours  atorvastatin 10 milliGRAM(s) Oral at bedtime  azithromycin  IVPB 500 milliGRAM(s) IV Intermittent every 24 hours  benzonatate 200 milliGRAM(s) Oral three times a day  buDESOnide    Inhalation Suspension 0.5 milliGRAM(s) Inhalation every 12 hours  busPIRone 15 milliGRAM(s) Oral three times a day  cefTRIAXone   IVPB 1000 milliGRAM(s) IV Intermittent every 24 hours  dextrose 5%. 1000 milliLiter(s) IV Continuous <Continuous>  dextrose 50% Injectable 12.5 Gram(s) IV Push once  dextrose 50% Injectable 25 Gram(s) IV Push once  dextrose 50% Injectable 25 Gram(s) IV Push once  diltiazem    milliGRAM(s) Oral daily  doxazosin 4 milliGRAM(s) Oral at bedtime  enoxaparin Injectable 40 milliGRAM(s) SubCutaneous daily  furosemide   Injectable 40 milliGRAM(s) IV Push once  guaifenesin/dextromethorphan  Syrup 10 milliLiter(s) Oral every 6 hours  insulin lispro (HumaLOG) corrective regimen sliding scale   SubCutaneous three times a day before meals  insulin lispro (HumaLOG) corrective regimen sliding scale   SubCutaneous at bedtime  methylPREDNISolone sodium succinate Injectable 40 milliGRAM(s) IV Push every 6 hours  oseltamivir 75 milliGRAM(s) Oral two times a day  pantoprazole  Injectable 40 milliGRAM(s) IV Push daily  PARoxetine 30 milliGRAM(s) Oral daily  polyethylene glycol 3350 17 Gram(s) Oral daily  potassium chloride    Tablet ER 40 milliEquivalent(s) Oral once  potassium phosphate IVPB 15 milliMole(s) IV Intermittent once    PRN Meds:  acetaminophen   Tablet .. 650 milliGRAM(s) Oral every 6 hours PRN  clonazePAM  Tablet 1 milliGRAM(s) Oral three times a day PRN  dextrose 40% Gel 15 Gram(s) Oral once PRN  glucagon  Injectable 1 milliGRAM(s) IntraMuscular once PRN      LABS:                        13.1   24.08 )-----------( 121      ( 25 Feb 2020 09:25 )             38.4     02-25    138  |  97  |  32<H>  ----------------------------<  266<H>  4.5   |  29  |  0.88    Ca    8.4      25 Feb 2020 09:25  Phos  3.2     02-25  Mg     3.0     02-25    Arterial Blood Gas:  02-24 @ 03:20  7.49/34/191/26/99/3.2  Arterial Blood Gas:  02-24 @ 00:18  7.50/36/72/28/94/4.8  Arterial Blood Gas:  02-23 @ 22:13  7.45/42/49/28/83/4.4    Culture - Blood (02.20.20 @ 10:14)    Specimen Source: .Blood Blood-Peripheral    Culture Results: No growth at 5 days.  Culture - Blood (02.20.20 @ 10:14)    Specimen Source: .Blood Blood-Venous    Culture Results: No growth at 5 days.      RADIOLOGY:    < from: CT Chest No Cont (02.24.20 @ 14:39) >    FINDINGS:  LUNGS AND AIRWAYS: The central airways are patent.  Patchy areas of groundglass opacities containing traction bronchiectasis are noted within both lungs. Addition, superimposed patchy groundglass opacities are also noted. They are new when compared to previous exam.  PLEURA: No pleural effusion.  MEDIASTINUM AND CHAVO: There are scattered small hilar and mediastinal lymph nodes.  VESSELS: Within normal limits.  HEART: The heart size is normal and there is no pericardial effusion.   CHEST WALL AND LOWER NECK: Within normal limits.  VISUALIZED UPPER ABDOMEN: Within normal limits.  BONES: There are multilevel degenerative changes of the spine    IMPRESSION:   Patchy groundglass opacities containing traction bronchiectasis bilaterally is suggestive of pulmonary fibrosis of uncertain etiology.  Groundglass opacities are noted within both lungs. Primary consideration is infection.    < end of copied text >        ASSESSMENT & PLAN:  69M with PMH of pulmonary fibrosis (followed by Dr. Dia), HTN, HLD, depression, and anxiety who initially presented 2/20/20 following a fall at home, admitted with acute hypoxic respiratory failure in setting of influenza with concern for superimposed PNA. Course c/b worsening respiratory failure with high oxygen requirements, transferred to MICU for close monitoring.       NEURO:   - Currently AOx3, lethargic but answers questions appropriately  - C/w home clonazepam PRN anxiety, hold for sedation  - C/w home buspirone  - C/w home paroxetine      PULM:   # Acute hypoxic respiratory failure:   Multifactorial related to influenza infection with high suspicion for superimposed bacterial PNA in setting of underlying lung disease. May also have component of aspiration pneumonitis.   - C/w HFNC at 100% FiO2 50LPM for now, wean FiO2 as tolerated  - Maintain saturation >88%  - BiPAP as needed  - Broaden antibiotics (see below)  - Continue Tamiflu for total 10 days (day 6)  - Continue steroids for airway inflammation, adjust dose to 20mg q8  - C/w albuterol/Atrovent nebs q6    # Pulmonary fibrosis:   - Appreciate Pulm recs  - C/w Pulmicort nebs q12      CARDIOVASCULAR:   Stable. No evidence of fluid overload.   - Continue to monitor      GI:   - C/w diet for now      RENAL/:   # Urinary retention:   Likely related to medication side effect s/p Scruggs placement.   - C/w Scruggs for now, trial of void when more stable  - C/w doxazosin  - Monitor urine output  - Trend BMP      ENDOCRINE:   - Insulin sliding scale for steroid-induced hyperglycemia      HEMATOLOGIC:   # Leukocytosis:   Likely secondary to steroids but cannot r/o worsening infection.   - Broaden antibiotics  - Continue to trend CBC with differential      ID:   # Influenza with suspected superimposed bacterial PNA:   - Broaden to vancomycin 1g 12 and cefepime 2g q8  - C/w Tamiflu for total 10 days as above  - Trend WBC count, fever curve  - Reculture and consider repeat RVP if febrile      PROPHYLAXIS:   - DVT ppx: Lovenox      CODE STATUS:  Discussed goals of care with patient and wife this afternoon. Wife is HCP and agrees to bring in form from home. Agreeable to trial of intubation. MICU Accept Note    HPI/Hospital Course:   69M with PMH of pulmonary fibrosis (followed by Dr. Dia), HTN, HLD, depression, and anxiety who initially presented 2/20/20 following a fall at home, admitted with acute hypoxic respiratory failure in setting of influenza with concern for superimposed PNA. Per patient's wife, patient's mother recently passed away and patient had been exposed to many sick relatives and friends in the days prior to presentation. Over the past week, he developed fatigue, malaise, hacking cough, and progressive dyspnea. He was seen at an urgent care center 4 days PTA where he was diagnosed with flu. His symptoms continued to progress and on day of presentation, he was so weak that he was unable to stand and fell to the floor. He was brought to ED and placed on BiPAP for acute hypoxic respiratory failure. Confirmed influenza infection via RVP in ED. Chest CT angio on admission demonstrated known pulmonary fibrosis, no PE.     He was admitted to Medicine and has been receiving ceftriaxone, azithromycin, oseltamivir, and IV methylprednisolone. Blood cultures and MRSA swab were negative. His course has been complicated by worsening respiratory failure with hypoxia requiring titration of supplemental O2. He had two RRTs on 2/24 for labored breathing and hypoxemia, improved with high flow nasal cannula and intermittent BiPAP. Interval CT chest on 2/24 demonstrated new patchy GGOs within both lungs. He has been on high flow NC with FiO2 100% saturating in low high 80s to low 90s, intermittently requiring BiPAP for periods of desaturation.     MICU consulted today by Pulmonology for resistant hypoxemia, subsequently transferred to MICU for closer monitoring.       REVIEW OF SYSTEMS:   CONSTITUTIONAL: No fevers, chills, fatigue, change in appetite, change in weight  HEENT: No visual changes, vertigo, ear pain, sore throat or nasal congestion  NECK: No pain or stiffness  RESPIRATORY: +Cough (improved), wheezing, hemoptysis or shortness of breath  CARDIOVASCULAR: No chest pain, palpitations, PND, orthopnea or leg swelling  GASTROINTESTINAL: No abdominal pain, nausea, vomiting, hematemesis, diarrhea, constipation, melena or hematochezia  GENITOURINARY: +Urinary retention; no dysuria, urinary frequency or hematuria  NEUROLOGICAL: No headache, confusion, focal numbness or weakness  SKIN: No rashes or lesions  MUSCULOSKELETAL: No myalgia, arthralgia, joint swelling or back pain  All other review of systems is negative unless indicated above.       PAST MEDICAL & SURGICAL HISTORY:  HTN (hypertension)  Depression  Pulmonary fibrosis  H/O inguinal hernia repair    FAMILY HISTORY:  No pertinent family history in first degree relatives    SOCIAL HISTORY:  Retired. , lives with wife. Independent in ADLs. Remote history of tobacco use (10 years, quit 1970s). Denies alcohol or illicit drug use.     HOME MEDICATIONS:   BuSpar 15 mg TID  dilTIAZem 240 mg ER daily  doxazosin 2 mg daily  hydroCHLOROthiazide 25 mg daily  KlonoPIN 1 mg TID PRN  Lipitor 10 mg daily  olmesartan 40 mg daily  Paxil 30 mg daily    ALLERGIES:   penicillin (Anaphylaxis)      OBJECTIVE:  Vital Signs Last 24 Hrs  T(C): 36.4 (25 Feb 2020 09:59), Max: 36.8 (24 Feb 2020 23:11)  T(F): 97.5 (25 Feb 2020 09:59), Max: 98.3 (24 Feb 2020 23:11)  HR: 88 (25 Feb 2020 09:59) (71 - 92)  BP: 166/82 (25 Feb 2020 09:59) (151/83 - 168/90)  RR: 20 (25 Feb 2020 09:59) (18 - 22)  SpO2: 97% (25 Feb 2020 09:59) (90% - 100%)    02-24 @ 07:01  -  02-25 @ 07:00  --------------------------------------------------------  IN: 120 mL / OUT: 950 mL / NET: -830 mL      PHYSICAL EXAM:   General: Mild respiratory distress, lethargic, on high flow nasal cannula  HEENT: Head atraumatic; EOMI, PERRLA, normal sclera and conjunctiva; dry MM  Neck: Supple, no JVD, no LAD  Chest/Lungs: Coarse breath sounds bilaterally, no wheezing  Heart: RRR, normal S1, S2, no murmurs or gallops  Abd: Soft, obese, NTTP  Extremities: 2+ peripheral pulses, trace pedal edema  Skin: Flushed, warm, well-perfused  Neurologic: AOx3, follows commands appropriately      HOSPITAL MEDICATIONS:  Standing Meds:  albuterol/ipratropium for Nebulization 3 milliLiter(s) Nebulizer every 6 hours  atorvastatin 10 milliGRAM(s) Oral at bedtime  azithromycin  IVPB 500 milliGRAM(s) IV Intermittent every 24 hours  benzonatate 200 milliGRAM(s) Oral three times a day  buDESOnide    Inhalation Suspension 0.5 milliGRAM(s) Inhalation every 12 hours  busPIRone 15 milliGRAM(s) Oral three times a day  cefTRIAXone   IVPB 1000 milliGRAM(s) IV Intermittent every 24 hours  dextrose 5%. 1000 milliLiter(s) IV Continuous <Continuous>  dextrose 50% Injectable 12.5 Gram(s) IV Push once  dextrose 50% Injectable 25 Gram(s) IV Push once  dextrose 50% Injectable 25 Gram(s) IV Push once  diltiazem    milliGRAM(s) Oral daily  doxazosin 4 milliGRAM(s) Oral at bedtime  enoxaparin Injectable 40 milliGRAM(s) SubCutaneous daily  furosemide   Injectable 40 milliGRAM(s) IV Push once  guaifenesin/dextromethorphan  Syrup 10 milliLiter(s) Oral every 6 hours  insulin lispro (HumaLOG) corrective regimen sliding scale   SubCutaneous three times a day before meals  insulin lispro (HumaLOG) corrective regimen sliding scale   SubCutaneous at bedtime  methylPREDNISolone sodium succinate Injectable 40 milliGRAM(s) IV Push every 6 hours  oseltamivir 75 milliGRAM(s) Oral two times a day  pantoprazole  Injectable 40 milliGRAM(s) IV Push daily  PARoxetine 30 milliGRAM(s) Oral daily  polyethylene glycol 3350 17 Gram(s) Oral daily  potassium chloride    Tablet ER 40 milliEquivalent(s) Oral once  potassium phosphate IVPB 15 milliMole(s) IV Intermittent once    PRN Meds:  acetaminophen   Tablet .. 650 milliGRAM(s) Oral every 6 hours PRN  clonazePAM  Tablet 1 milliGRAM(s) Oral three times a day PRN  dextrose 40% Gel 15 Gram(s) Oral once PRN  glucagon  Injectable 1 milliGRAM(s) IntraMuscular once PRN      LABS:                        13.1   24.08 )-----------( 121      ( 25 Feb 2020 09:25 )             38.4     02-25    138  |  97  |  32<H>  ----------------------------<  266<H>  4.5   |  29  |  0.88    Ca    8.4      25 Feb 2020 09:25  Phos  3.2     02-25  Mg     3.0     02-25    Arterial Blood Gas:  02-24 @ 03:20  7.49/34/191/26/99/3.2  Arterial Blood Gas:  02-24 @ 00:18  7.50/36/72/28/94/4.8  Arterial Blood Gas:  02-23 @ 22:13  7.45/42/49/28/83/4.4    Culture - Blood (02.20.20 @ 10:14)    Specimen Source: .Blood Blood-Peripheral    Culture Results: No growth at 5 days.  Culture - Blood (02.20.20 @ 10:14)    Specimen Source: .Blood Blood-Venous    Culture Results: No growth at 5 days.      RADIOLOGY:    < from: CT Chest No Cont (02.24.20 @ 14:39) >    FINDINGS:  LUNGS AND AIRWAYS: The central airways are patent.  Patchy areas of groundglass opacities containing traction bronchiectasis are noted within both lungs. Addition, superimposed patchy groundglass opacities are also noted. They are new when compared to previous exam.  PLEURA: No pleural effusion.  MEDIASTINUM AND CHAVO: There are scattered small hilar and mediastinal lymph nodes.  VESSELS: Within normal limits.  HEART: The heart size is normal and there is no pericardial effusion.   CHEST WALL AND LOWER NECK: Within normal limits.  VISUALIZED UPPER ABDOMEN: Within normal limits.  BONES: There are multilevel degenerative changes of the spine    IMPRESSION:   Patchy groundglass opacities containing traction bronchiectasis bilaterally is suggestive of pulmonary fibrosis of uncertain etiology.  Groundglass opacities are noted within both lungs. Primary consideration is infection.    < end of copied text >        ASSESSMENT & PLAN:  69M with PMH of pulmonary fibrosis (followed by Dr. Dia), HTN, HLD, depression, and anxiety who initially presented 2/20/20 following a fall at home, admitted with acute hypoxic respiratory failure in setting of influenza with concern for superimposed PNA. Course c/b worsening respiratory failure with high oxygen requirements, transferred to MICU for close monitoring.       NEURO:   - Currently AOx3, lethargic but answers questions appropriately  - C/w home clonazepam PRN anxiety, hold for sedation  - C/w home buspirone  - C/w home paroxetine      PULM:   # Acute hypoxic respiratory failure:   Multifactorial related to influenza infection with high suspicion for superimposed bacterial PNA in setting of underlying lung disease. May also have component of aspiration pneumonitis.   - C/w HFNC at 100% FiO2 50LPM for now, wean FiO2 as tolerated  - Maintain saturation >88%  - BiPAP as needed  - Broaden antibiotics (see below)  - Continue Tamiflu for total 10 days (day 6)  - Continue steroids for airway inflammation, adjust dose to 20mg q8  - C/w albuterol/Atrovent nebs q6    # Pulmonary fibrosis:   - Appreciate Pulm recs  - C/w Pulmicort nebs q12      CARDIOVASCULAR:   Stable. No evidence of fluid overload.   - Continue to monitor      GI:   - C/w diet for now      RENAL/:   # Urinary retention:   Likely related to medication side effect s/p Scruggs placement.   - C/w Scruggs for now, trial of void when more stable  - C/w doxazosin  - Monitor urine output  - Trend BMP      ENDOCRINE:   - Insulin sliding scale for steroid-induced hyperglycemia      HEMATOLOGIC:   # Leukocytosis:   Likely secondary to steroids but cannot r/o worsening infection.   - Broaden antibiotics  - Continue to trend CBC with differential      ID:   # Influenza with suspected superimposed bacterial PNA:   - Broaden to vancomycin 1g 12 and cefepime 2g q8  - C/w Tamiflu for total 10 days as above  - Trend WBC count, fever curve  - Reculture and consider repeat RVP if febrile      PROPHYLAXIS:   - DVT ppx: Lovenox      CODE STATUS:  Discussed goals of care with patient and wife this afternoon. Wife is HCP and agrees to bring in form from home. Agreeable to trial of intubation.            [facility] Attending: I have examined pt and agree with above exam and plan above.  68 y/o male with h/o pulmonary fibrosis now with influenza A pneumonia and superimposed  bacterial pneumonia. Pt with Acute hypoxemic respiratory failure. Pt 's oxygen requirements continues to worsen requiring  100% high flow 02. After long conversation with pt and his wife , pt has agreed to  mechanical ventilation if necessary. Will admit to MICU and monitor 02 sats on High flow. taper down FIO2 as tolerated. Change abx to vancomycin and cefepime. Continue Tamiflu. Decrease steroids  and taper off.     I have spent 45 min cc time not including procedures.    Louie Yao MD

## 2020-02-25 NOTE — PROGRESS NOTE ADULT - SUBJECTIVE AND OBJECTIVE BOX
Awake, alert.  Had difficulty oxygenating overnight but now  SaO2 90-95% on high flow nasal O2.  CT shows traction bronchiectasis c/w PF  B/L GGOs c/w infection.  	  Vital Signs Last 24 Hrs  T(C): 36.4 (25 Feb 2020 04:42), Max: 36.8 (24 Feb 2020 23:11)  T(F): 97.5 (25 Feb 2020 04:42), Max: 98.3 (24 Feb 2020 23:11)  HR: 83 (25 Feb 2020 06:15) (75 - 92)  BP: 151/83 (25 Feb 2020 04:42) (151/83 - 168/90)  BP(mean): --  RR: 21 (25 Feb 2020 06:15) (18 - 22)  SpO2: 98% (25 Feb 2020 06:15) (90% - 100%)  Daily     Daily     02-24 @ 07:01  -  02-25 @ 07:00  --------------------------------------------------------  IN: 120 mL / OUT: 950 mL / NET: -830 mL    General: Awake. Alert. Cooperative. No distress. 	  HEENT:  Anicteric.   Neck: Supple.. No carotid bruit. No JVD.	  Cardiovascular: Regular rate and rhythm. S1 S2 normal. No murmurs, rubs or gallops.  Respiratory: Respirations unlabored. Clear to auscultation and percussion.  Abdomen: Soft. Non-tender. Non-distended. No organomegaly. No masses. Normal bowel sounds. Obese.  Extremities: Warm to touch. No clubbing or cyanosis. No pedal edema.  Pulses: 2+ peripheral pulses all extremities.	  Skin: Normal skin color.  No cyanosis. Warm to touch.  Lymph Nodes: Cervical, supraclavicular and axillary nodes normal  Neurological: No global or focal defivits                                  12.7   20.41 )-----------( 112      ( 24 Feb 2020 06:35 )             37.3     02-24    132<L>  |  x   |  x   ----------------------------<  x   x    |  x   |  x     Ca    8.0<L>      24 Feb 2020 03:16  Phos  2.2     02-23  Mg     2.8     02-23    TPro  6.3  /  Alb  3.0<L>  /  TBili  0.4  /  DBili  x   /  AST  41<H>  /  ALT  40  /  AlkPhos  101  02-23      PT/INR - ( 23 Feb 2020 22:22 )   PT: 12.5 sec;   INR: 1.09 ratio         PTT - ( 23 Feb 2020 22:22 )  PTT:22.3 sec    MEDICATIONS  (STANDING):  albuterol/ipratropium for Nebulization 3 milliLiter(s) Nebulizer every 6 hours  atorvastatin 10 milliGRAM(s) Oral at bedtime  azithromycin  IVPB      azithromycin  IVPB 500 milliGRAM(s) IV Intermittent every 24 hours  benzonatate 200 milliGRAM(s) Oral three times a day  buDESOnide    Inhalation Suspension 0.5 milliGRAM(s) Inhalation every 12 hours  busPIRone 15 milliGRAM(s) Oral three times a day  cefTRIAXone   IVPB 1000 milliGRAM(s) IV Intermittent every 24 hours  dextrose 5%. 1000 milliLiter(s) (50 mL/Hr) IV Continuous <Continuous>  dextrose 50% Injectable 12.5 Gram(s) IV Push once  dextrose 50% Injectable 25 Gram(s) IV Push once  dextrose 50% Injectable 25 Gram(s) IV Push once  diltiazem    milliGRAM(s) Oral daily  doxazosin 4 milliGRAM(s) Oral at bedtime  enoxaparin Injectable 40 milliGRAM(s) SubCutaneous daily  furosemide   Injectable 40 milliGRAM(s) IV Push once  guaifenesin/dextromethorphan  Syrup 10 milliLiter(s) Oral every 6 hours  insulin lispro (HumaLOG) corrective regimen sliding scale   SubCutaneous three times a day before meals  insulin lispro (HumaLOG) corrective regimen sliding scale   SubCutaneous at bedtime  methylPREDNISolone sodium succinate Injectable 40 milliGRAM(s) IV Push every 6 hours  pantoprazole  Injectable 40 milliGRAM(s) IV Push daily  PARoxetine 30 milliGRAM(s) Oral daily  polyethylene glycol 3350 17 Gram(s) Oral daily  potassium chloride    Tablet ER 40 milliEquivalent(s) Oral once  potassium phosphate IVPB 15 milliMole(s) IV Intermittent once    MEDICATIONS  (PRN):  acetaminophen   Tablet .. 650 milliGRAM(s) Oral every 6 hours PRN Temp greater or equal to 38C (100.4F), Mild Pain (1 - 3), Moderate Pain (4 - 6)  clonazePAM  Tablet 1 milliGRAM(s) Oral three times a day PRN anxiety  dextrose 40% Gel 15 Gram(s) Oral once PRN Blood Glucose LESS THAN 70 milliGRAM(s)/deciliter  glucagon  Injectable 1 milliGRAM(s) IntraMuscular once PRN Glucose LESS THAN 70 milligrams/deciliter    Impression:      PMH of HTN,  HLD, , pulmonary fibrosis,  not on home  O 2.   depression/anxiety , on  Klonopin          p/w SOB and cough/  acute resp failure with O2  sat of  80 on arrival to  er  O2  sat is  92  to 99 %,  earlier  and  now  lower  again    Iinfluenza A positive, with  bronchospasm   on Tamiflu. / nebs  and   solumedrol    mild anemia/  GI eval  Hyponatremia from   poor po intake/ dehydration/ on iv fluids/  stop  hctz  ct chest,    b/l patchy opacities/   traction bronchiectasis/   pulm fibrosis  on rocephin/  zmax/  CAP/   pulm    dr gusman,  for  hypoxia  on  buspar/ paxil  and klonopin/ home meds, on  dvt ppx   hyponatremia,  improved to 130/  seen by renal  ok to  restart  cardizem/  and  will   hold  olmesartan/ hctz  ID eval    noted , continue /    iv  ab/ afebrile,/  elevtaed wbc   from iv steroid/ may  taper  it  per  urology  TOV in 2  days/  wife  aware /  upset about highfs/ on prednisone now.  breathing has improved      Plan:    high flow nasal canula 100% FiO2 @ 50lpm - keep saturation greater than 88% as able  restart BIPAP only for increased work of breathing, resistant hypoxemia or respiratory acidosis  would continue tamiflu past 5 days given ongoing pneumonia  continue ceftriaxone/azithromycin  continue solumedrol 40mg IVP q6h  albuterol/atrovent nebs to q6h  pulmicort 0.5mg nebs q12h - give after duoneb - rinse mouth after use  robitussin DM/tessalon - discontinue hycodan  buspar/klonopin/paxil  DVT prophylaxis - SQ lovenox  urology consult noted - loza catheter/doxazosin    Johnson Verduzco MD  [phone]

## 2020-02-25 NOTE — PROGRESS NOTE ADULT - ASSESSMENT
Diarrhea  anemia  hypoxic respiratory failure     no further diarrhea  denies melena/hematochezia  h/h remains stable   diet as tolerated  cont miralax 17g orally daily  colonoscopy 3 years ago, no need to repeat as inpatient  monitor for signs of GI bleeding  CT chest noted  pulmonary eval noted; recs appreciated   MICU consulted for possible transfer to RCU; f/u recommendations  will follow

## 2020-02-25 NOTE — PROGRESS NOTE ADULT - ASSESSMENT
70 yo male with HTN, anxiety disorder, and pulmonary fibrosis admitted with hypoxic respiratory failure.  Influenza A diagnosed 2/16 with a rapid test.  CTA is negative for PE.  Started on CTX and azithro along with tamiflu  Legionella urine antigen negative, MRSA screen is negative and his PC is elevated to 0.69.  The elevated PC is suggestive of a bacterial infection  Lack of improvement with tamiflu documented and raised concern of both viral pneumonia and a secondary process such as a bacterial pneumonia  Repeated CT chest reveals pulm fibrosis & bilateral groundglass opacities concerning for infection  However remains afebrile & PCT is low at 0.15    Suggest:  1.Tamiflu, d # 6 days  - to be continued for now as per pulmonary given poor overall   2.CTX,azithro given lack of response to tamiflu- duration to be determined  3.Steroids per pulmonary  4.Suspect infection superimposed on chronic lung disease. Slow improvement not unexpected

## 2020-02-25 NOTE — PROGRESS NOTE ADULT - SUBJECTIVE AND OBJECTIVE BOX
CC: f/u for hypoxic respiratory failure    Patient reports feeling a little better today    REVIEW OF SYSTEMS:  All other review of systems negative (Comprehensive ROS)    Antimicrobials Day #  : 6  cefepime   IVPB      cefepime   IVPB 2000 milliGRAM(s) IV Intermittent every 8 hours  oseltamivir 75 milliGRAM(s) Oral two times a day  vancomycin  IVPB 1000 milliGRAM(s) IV Intermittent every 12 hours    Other Medications Reviewed    T(F): 98.8 (02-25-20 @ 16:00), Max: 98.8 (02-25-20 @ 16:00)  HR: 74 (02-25-20 @ 16:00)  BP: 125/63 (02-25-20 @ 16:00)  RR: 20 (02-25-20 @ 16:00)  SpO2: 94% (02-25-20 @ 16:00)  Wt(kg): --    PHYSICAL EXAM:  General: alert, no acute distress  Eyes:  anicteric, no conjunctival injection, no discharge  Oropharynx: on high flow O2 support 	  Neck: supple, without adenopathy  Lungs: clear to auscultation  Heart: regular rate and rhythm; no murmur  Abdomen: soft, nondistended, nontender  Skin: no lesions  Extremities: no clubbing, cyanosis, or edema  Neurologic: alert, oriented, moves all extremities       LAB RESULTS:                        13.1   24.08 )-----------( 121      ( 25 Feb 2020 09:25 )             38.4     02-25    138  |  97  |  32<H>  ----------------------------<  266<H>  4.5   |  29  |  0.88    Ca    8.4      25 Feb 2020 09:25  Phos  3.2     02-25  Mg     3.0     02-25    TPro  6.3  /  Alb  3.0<L>  /  TBili  0.4  /  DBili  x   /  AST  41<H>  /  ALT  40  /  AlkPhos  101  02-23    LIVER FUNCTIONS - ( 23 Feb 2020 22:22 )  Alb: 3.0 g/dL / Pro: 6.3 g/dL / ALK PHOS: 101 U/L / ALT: 40 U/L / AST: 41 U/L / GGT: x             MICROBIOLOGY:  RECENT CULTURES:      RADIOLOGY REVIEWED:  CT Chest No Cont (02.24.20 @ 14:39)     IMPRESSION:   Patchy groundglass opacities containing traction bronchiectasis bilaterally is suggestive of pulmonary fibrosis of uncertain etiology.  Groundglass opacities are noted within both lungs. Primary consideration is infection.

## 2020-02-25 NOTE — PROGRESS NOTE ADULT - SUBJECTIVE AND OBJECTIVE BOX
[facility] - [facility] - PROGRESS NOTE    CHIEF COMPLAINT: hypoxic respiratory failure; influenza; pneumonia; bronchospasm; pulmonary fibrosis;     INTERVAL HISTORY:  tenuous respiratory status with increasing groundglass opacities on follow up chest CT; intermittently requiring BIPAP when desaturating on maximal dose high flow nasal canula; much more weak and tired; decreased appetite; periods of confusion and agitation; improved cough with minimal sputum production; no chest congestion or wheeze; no fevers, chills or sweats; no chest pain/pressure or palpitations; urinary retention -> loza    REVIEW OF SYSTEMS:  Constitutional: As per interval history  HEENT: Within normal limits  CV: As per interval history  Resp: As per interval history  GI: Within normal limits   : urinary retention  Musculoskeletal: Within normal limits  Skin: Within normal limits  Neurological: Within normal limits  Psychiatric: anxiety type depression  Endocrine: Within normal limits  Hematologic/Lymphatic: Within normal limits  Allergic/Immunologic: Within normal limits    MEDICATIONS:     Pulmonary "  albuterol/ipratropium for Nebulization 3 milliLiter(s) Nebulizer every 6 hours  benzonatate 200 milliGRAM(s) Oral three times a day  buDESOnide    Inhalation Suspension 0.5 milliGRAM(s) Inhalation every 12 hours  guaifenesin/dextromethorphan  Syrup 10 milliLiter(s) Oral every 6 hours    Anti-microbials:  azithromycin  IVPB      azithromycin  IVPB 500 milliGRAM(s) IV Intermittent every 24 hours  cefTRIAXone   IVPB 1000 milliGRAM(s) IV Intermittent every 24 hours  oseltamivir 75 milliGRAM(s) Oral two times a day    Cardiovascular:  diltiazem    milliGRAM(s) Oral daily  doxazosin 4 milliGRAM(s) Oral at bedtime  furosemide   Injectable 40 milliGRAM(s) IV Push once    Other:  atorvastatin 10 milliGRAM(s) Oral at bedtime  busPIRone 15 milliGRAM(s) Oral three times a day  dextrose 5%. 1000 milliLiter(s) IV Continuous <Continuous>  dextrose 50% Injectable 12.5 Gram(s) IV Push once  dextrose 50% Injectable 25 Gram(s) IV Push once  dextrose 50% Injectable 25 Gram(s) IV Push once  enoxaparin Injectable 40 milliGRAM(s) SubCutaneous daily  insulin lispro (HumaLOG) corrective regimen sliding scale   SubCutaneous three times a day before meals  insulin lispro (HumaLOG) corrective regimen sliding scale   SubCutaneous at bedtime  methylPREDNISolone sodium succinate Injectable 40 milliGRAM(s) IV Push every 6 hours  pantoprazole  Injectable 40 milliGRAM(s) IV Push daily  PARoxetine 30 milliGRAM(s) Oral daily  polyethylene glycol 3350 17 Gram(s) Oral daily  potassium chloride    Tablet ER 40 milliEquivalent(s) Oral once  potassium phosphate IVPB 15 milliMole(s) IV Intermittent once    MEDICATIONS  (PRN):  acetaminophen   Tablet .. 650 milliGRAM(s) Oral every 6 hours PRN Temp greater or equal to 38C (100.4F), Mild Pain (1 - 3), Moderate Pain (4 - 6)  clonazePAM  Tablet 1 milliGRAM(s) Oral three times a day PRN anxiety  dextrose 40% Gel 15 Gram(s) Oral once PRN Blood Glucose LESS THAN 70 milliGRAM(s)/deciliter  glucagon  Injectable 1 milliGRAM(s) IntraMuscular once PRN Glucose LESS THAN 70 milligrams/deciliter        OBJECTIVE:    I&O's Detail    24 Feb 2020 07:01  -  25 Feb 2020 07:00  --------------------------------------------------------  IN:    Oral Fluid: 120 mL  Total IN: 120 mL    OUT:    Indwelling Catheter - Urethral: 650 mL    Voided: 300 mL  Total OUT: 950 mL    Total NET: -830 mL    POCT Blood Glucose.: 308 mg/dL (25 Feb 2020 09:37)  POCT Blood Glucose.: 251 mg/dL (24 Feb 2020 21:24)  POCT Blood Glucose.: 238 mg/dL (24 Feb 2020 18:10)  POCT Blood Glucose.: 235 mg/dL (24 Feb 2020 14:03)      PHYSICAL EXAM:       ICU Vital Signs Last 24 Hrs  T(C): 36.4 (25 Feb 2020 09:59), Max: 36.8 (24 Feb 2020 23:11)  T(F): 97.5 (25 Feb 2020 09:59), Max: 98.3 (24 Feb 2020 23:11)  HR: 88 (25 Feb 2020 09:59) (71 - 92)  BP: 166/82 (25 Feb 2020 09:59) (151/83 - 168/90)  BP(mean): --  ABP: --  ABP(mean): --  RR: 20 (25 Feb 2020 09:59) (18 - 22)  SpO2: 97% (25 Feb 2020 09:59) (90% - 100%) on high flow 100% FiO2 @ 60 lpm -> BIPAP @ 80% FiO2     General: Awake. Alert. Cooperative. No distress. Appears stated age. Weak. Frail.  HEENT: Atraumatic. Normocephalic. Anicteric. Normal oral mucosa. PERRL. EOMI.  Neck: Supple. Trachea midline. Thyroid without enlargement/tenderness/nodules. No carotid bruit. No JVD.	  Cardiovascular: Regular rate and rhythm. S1 S2 normal. No murmurs, rubs or gallops.  Respiratory: Respirations unlabored. Scattered rales. No curvature.  Abdomen: Soft. Non-tender. Non-distended. No organomegaly. No masses. Normal bowel sounds. Obese.  Extremities: Warm to touch. No clubbing or cyanosis. No pedal edema.  Pulses: 2+ peripheral pulses all extremities.	  Skin: Normal skin color. No rashes or lesions. No ecchymoses. No cyanosis. Warm to touch.  Lymph Nodes: Cervical, supraclavicular and axillary nodes normal  Neurological: Motor and sensory examination equal and normal. A and O x 3  Psychiatry: Appropriate mood and affect.    LABS:                          13.1   24.08 )-----------( 121      ( 25 Feb 2020 09:25 )             38.4     CBC    WBC  24.08 <==, 20.41 <==, 21.64 <==, 21.59 <==, 18.56 <==, 15.96 <==, 5.46 <==    Hemoglobin  13.1 <<==, 12.7 <<==, 12.7 <<==, 12.8 <<==, 11.9 <<==, 11.8 <<==, 11.6 <<==    Hematocrit  38.4 <==, 37.3 <==, 37.2 <==, 35.8 <==, 33.8 <==, 34.3 <==, 34.1 <==    Platelets  121 <==, 112 <==, 120 <==, 118 <==, 139 <==, 186 <==, 194 <==      138  |  97  |  32<H>  ----------------------------<  266<H>    02-25  4.5   |  29  |  0.88      LYTES    sodium  138 <==, 132 <==, 134 <==, 133 <==, 135 <==, 132 <==, 130 <==    potassium   4.5 <==, 4.8 <==, 4.5 <==, 4.4 <==, 4.6 <==, 4.4 <==, 4.2 <==    chloride  97 <==, 95 <==, 95 <==, 95 <==, 94 <==, 93 <==, 89 <==    carbon dioxide  29 <==, 23 <==, 21 <==, 25 <==, 21 <==, 24 <==, 20 <==    =============================================================================================  RENAL FUNCTION:    Creatinine:   0.88  <<==, 0.87  <<==, 0.85  <<==, 0.87  <<==, 0.82  <<==, 0.87  <<==, 0.84  <<==    BUN:   32 <==, 29 <==, 28 <==, 28 <==, 26 <==, 27 <==, 25 <==    ============================================================================================    calcium   8.4 <==, 8.0 <==, 8.0 <==, 8.2 <==, 8.2 <==, 8.1 <==, 7.6 <==    phos   3.2 <==, 2.2 <==, 2.2 <==, 2.8 <==, 2.3 <==    mag   3.0 <==, 2.8 <==, 2.6 <==, 2.3 <==, 1.9 <==, 1.9 <==, 1.9 <==    ============================================================================================  LFTs    AST:   41 <== , 70 <== , 48 <==     ALT:  40  <== , 24  <== , 21  <==     AP:  101  <=, 46  <=, 50  <=    Bili:  0.4  <=, 0.3  <=, 0.5  <=      PT/INR - ( 23 Feb 2020 22:22 )   PT: 12.5 sec;   INR: 1.09 ratio       PTT - ( 23 Feb 2020 22:22 )  PTT: 22.3 sec    ABG - ( 24 Feb 2020 03:20 )  pH: 7.49  /  pCO2: 34    /  pO2: 191   / HCO3: 26    / Base Excess: 3.2   /  SaO2: 99        ABG - ( 24 Feb 2020 00:18 )  pH: 7.50  /  pCO2: 36    /  pO2: 72    / HCO3: 28    / Base Excess: 4.8   /  SaO2: 94        ABG - ( 23 Feb 2020 22:13 )  pH: 7.45  /  pCO2: 42    /  pO2: 49    / HCO3: 28    / Base Excess: 4.4   /  SaO2: 83        ABG - ( 23 Feb 2020 03:16 )  pH: 7.47  /  pCO2: 36    /  pO2: 56    / HCO3: 26    / Base Excess: 3.2   /  SaO2: 89        ABG - ( 23 Feb 2020 03:16 )  pH: 7.47  /  pCO2: 36    /  pO2: 56    / HCO3: 26    / Base Excess: 3.2   /  SaO2: 89        ABG - ( 21 Feb 2020 06:24 )  pH: 7.49  /  pCO2: 29    /  pO2: 53    / HCO3: 22    / Base Excess: -.1   /  SaO2: 89        ABG - ( 21 Feb 2020 00:03 )  pH: 7.46  /  pCO2: 31    /  pO2: 58    / HCO3: 22    / Base Excess: -1.0  /  SaO2: 91        ABG - ( 20 Feb 2020 11:46 )  pH: 7.49  /  pCO2: 29    /  pO2: 58    / HCO3: 22    / Base Excess: -.3   /  SaO2: 91       Procalcitonin, Serum: 0.15 ng/mL (02-25 @ 09:25)    Procalcitonin, Serum: 0.69 ng/mL (02-21 @ 07:45)    Serum Pro-Brain Natriuretic Peptide: 364 pg/mL (02-19 @ 22:30)    CARDIAC MARKERS ( 20 Feb 2020 00:57 )  CPK x     /CKMB x     /CKMB Units x        troponin 17 ng/L    CARDIAC MARKERS ( 19 Feb 2020 22:30 )  CPK x     /CKMB x     /CKMB Units x        troponin 19 ng/L    MICROBIOLOGY:     FLU A B RSV Detection by PCR (02.19.20 @ 22:30)    Flu A Result: Detected    Flu B Result: NotDetec    RSV Result: NotDetec    Legionella pneumophila Antigen, Urine (02.20.20 @ 09:50)    Legionella Antigen, Urine: Negative    Culture - Blood (02.20.20 @ 10:14)    Specimen Source: .Blood Blood-Venous    Culture Results:   No growth to date.    Culture - Blood (02.20.20 @ 10:14)    Specimen Source: .Blood Blood-Peripheral    Culture Results:   No growth to date.    RADIOLOGY:  [x] Chest radiographs reviewed and interpreted by me      EXAM:  CT CHEST                            PROCEDURE DATE:  02/24/2020            INTERPRETATION:  CLINICAL INFORMATION: Dyspnea    COMPARISON: CT chest 2/20/2020    PROCEDURE:   CT of the Chest was performed without intravenous contrast.  [place] coronal reformats were performed.      FINDINGS:    LUNGS AND AIRWAYS: The central airways are patent.  Patchy areas of groundglass opacities containing traction bronchiectasis are noted within both lungs. Addition, superimposed patchy groundglass opacities are also noted. They are new when compared to previous exam.    PLEURA: No pleural effusion.    MEDIASTINUM AND CHAVO: There are scattered small hilar and mediastinal lymph nodes.    VESSELS: Within normal limits.    HEART: The heart size is normal and there is no pericardial effusion.     CHEST WALL AND LOWER NECK: Within normal limits.    VISUALIZED UPPER ABDOMEN: Within normal limits.    BONES: There are multilevel degenerative changes of the spine    IMPRESSION:     Patchy groundglass opacities containing traction bronchiectasis bilaterally is suggestive of pulmonary fibrosis of uncertain etiology.    Groundglass opacities are noted within both lungs. Primary consideration is infection.    BRITNEY VERONICA M.D., RADIOLOGY RESIDENT  This document has been electronically signed.  TAMMIE LOTT M.D., ATTENDING RADIOLOGIST  This document has been electronically signed. Feb 24 2020  5:04PM  ---------------------------------------------------------------------------------------------------------------    EXAM:  CT ANGIO CHEST (W)AW                           PROCEDURE DATE:  02/20/2020      INTERPRETATION:  CLINICAL INFORMATION: Shortness of breath and fatigue. History of lung disease.    COMPARISON: Chest radiograph 2/19/2020    PROCEDURE:   CT Angiography of the Chest.  90 ml of Omnipaque 350 was injected intravenously. 10 ml were discarded.  Sagittal and coronal reformats were performed as well as 3D (MIP) reconstructions.    FINDINGS:    LUNGS AND AIRWAYS: Patent central airways. Areas of architectural distortion, mild traction bronchiectasis and linear/reticular opacities. Nonspecific groundglass/patchy opacities in both lungs.      PLEURA: No pleural effusion or pneumothorax.    MEDIASTINUM AND CHAVO: Subcentimeter mediastinal and hilar lymph nodes without lymphadenopathy.    VESSELS: Adequate contrast opacification of the pulmonary arteries. No obvious pulmonary embolus.    HEART: Normal heart size. No pericardial effusion.    CHEST WALL AND LOWER NECK: Heterogeneous thyroid gland, obscured by artifact.    VISUALIZED UPPER ABDOMEN: Colon diverticulosis.    BONES: Degenerative changes of the spine.    IMPRESSION:     No obvious pulmonary embolus.    Findings reflecting known pulmonary fibrosis. Recommend clinical correlation to assess underlying pneumonia. Recommend comparison to previous outside study and follow-up to exclude potential underlying neoplasm.    Heterogeneous thyroid gland, which is difficult to assess due to artifact and can be further characterized on a nonemergent ultrasound as clinically indicated.     Additional findings as described.    RAMONA REDDY M.D., RADIOLOGY RESIDENT  This document has been electronically signed.  MIAH CHAUDHRY M.D., ATTENDING RADIOLOGIST  This document has been electronically signed. Feb 20 2020  1:49AM  ---------------------------------------------------------------------------------------------------------------

## 2020-02-26 LAB
ALBUMIN SERPL ELPH-MCNC: 2.9 G/DL — LOW (ref 3.3–5)
ALP SERPL-CCNC: 76 U/L — SIGNIFICANT CHANGE UP (ref 40–120)
ALT FLD-CCNC: 33 U/L — SIGNIFICANT CHANGE UP (ref 10–45)
ANION GAP SERPL CALC-SCNC: 12 MMOL/L — SIGNIFICANT CHANGE UP (ref 5–17)
AST SERPL-CCNC: 25 U/L — SIGNIFICANT CHANGE UP (ref 10–40)
BASOPHILS # BLD AUTO: 0.06 K/UL — SIGNIFICANT CHANGE UP (ref 0–0.2)
BASOPHILS NFR BLD AUTO: 0.2 % — SIGNIFICANT CHANGE UP (ref 0–2)
BILIRUB SERPL-MCNC: 0.5 MG/DL — SIGNIFICANT CHANGE UP (ref 0.2–1.2)
BUN SERPL-MCNC: 36 MG/DL — HIGH (ref 7–23)
CALCIUM SERPL-MCNC: 7.9 MG/DL — LOW (ref 8.4–10.5)
CHLORIDE SERPL-SCNC: 94 MMOL/L — LOW (ref 96–108)
CO2 SERPL-SCNC: 28 MMOL/L — SIGNIFICANT CHANGE UP (ref 22–31)
CREAT SERPL-MCNC: 0.87 MG/DL — SIGNIFICANT CHANGE UP (ref 0.5–1.3)
EOSINOPHIL # BLD AUTO: 0 K/UL — SIGNIFICANT CHANGE UP (ref 0–0.5)
EOSINOPHIL NFR BLD AUTO: 0 % — SIGNIFICANT CHANGE UP (ref 0–6)
GAS PNL BLDA: SIGNIFICANT CHANGE UP
GLUCOSE BLDC GLUCOMTR-MCNC: 149 MG/DL — HIGH (ref 70–99)
GLUCOSE BLDC GLUCOMTR-MCNC: 192 MG/DL — HIGH (ref 70–99)
GLUCOSE BLDC GLUCOMTR-MCNC: 208 MG/DL — HIGH (ref 70–99)
GLUCOSE BLDC GLUCOMTR-MCNC: 254 MG/DL — HIGH (ref 70–99)
GLUCOSE SERPL-MCNC: 247 MG/DL — HIGH (ref 70–99)
HCT VFR BLD CALC: 35.7 % — LOW (ref 39–50)
HGB BLD-MCNC: 12 G/DL — LOW (ref 13–17)
IMM GRANULOCYTES NFR BLD AUTO: 6.2 % — HIGH (ref 0–1.5)
LYMPHOCYTES # BLD AUTO: 0.88 K/UL — LOW (ref 1–3.3)
LYMPHOCYTES # BLD AUTO: 3.5 % — LOW (ref 13–44)
MAGNESIUM SERPL-MCNC: 2.7 MG/DL — HIGH (ref 1.6–2.6)
MCHC RBC-ENTMCNC: 30.1 PG — SIGNIFICANT CHANGE UP (ref 27–34)
MCHC RBC-ENTMCNC: 33.6 GM/DL — SIGNIFICANT CHANGE UP (ref 32–36)
MCV RBC AUTO: 89.5 FL — SIGNIFICANT CHANGE UP (ref 80–100)
MONOCYTES # BLD AUTO: 1.69 K/UL — HIGH (ref 0–0.9)
MONOCYTES NFR BLD AUTO: 6.7 % — SIGNIFICANT CHANGE UP (ref 2–14)
NEUTROPHILS # BLD AUTO: 20.99 K/UL — HIGH (ref 1.8–7.4)
NEUTROPHILS NFR BLD AUTO: 83.4 % — HIGH (ref 43–77)
NRBC # BLD: 0 /100 WBCS — SIGNIFICANT CHANGE UP (ref 0–0)
PHOSPHATE SERPL-MCNC: 3.2 MG/DL — SIGNIFICANT CHANGE UP (ref 2.5–4.5)
PLATELET # BLD AUTO: 143 K/UL — LOW (ref 150–400)
POTASSIUM SERPL-MCNC: 4.5 MMOL/L — SIGNIFICANT CHANGE UP (ref 3.5–5.3)
POTASSIUM SERPL-SCNC: 4.5 MMOL/L — SIGNIFICANT CHANGE UP (ref 3.5–5.3)
PROT SERPL-MCNC: 6 G/DL — SIGNIFICANT CHANGE UP (ref 6–8.3)
RBC # BLD: 3.99 M/UL — LOW (ref 4.2–5.8)
RBC # FLD: 14.4 % — SIGNIFICANT CHANGE UP (ref 10.3–14.5)
SODIUM SERPL-SCNC: 134 MMOL/L — LOW (ref 135–145)
WBC # BLD: 25.18 K/UL — HIGH (ref 3.8–10.5)
WBC # FLD AUTO: 25.18 K/UL — HIGH (ref 3.8–10.5)

## 2020-02-26 PROCEDURE — 99291 CRITICAL CARE FIRST HOUR: CPT

## 2020-02-26 RX ORDER — IPRATROPIUM/ALBUTEROL SULFATE 18-103MCG
3 AEROSOL WITH ADAPTER (GRAM) INHALATION EVERY 6 HOURS
Refills: 0 | Status: DISCONTINUED | OUTPATIENT
Start: 2020-02-26 | End: 2020-03-05

## 2020-02-26 RX ORDER — LOSARTAN POTASSIUM 100 MG/1
100 TABLET, FILM COATED ORAL DAILY
Refills: 0 | Status: DISCONTINUED | OUTPATIENT
Start: 2020-02-26 | End: 2020-03-02

## 2020-02-26 RX ADMIN — Medication 15 MILLIGRAM(S): at 14:18

## 2020-02-26 RX ADMIN — ATORVASTATIN CALCIUM 10 MILLIGRAM(S): 80 TABLET, FILM COATED ORAL at 21:22

## 2020-02-26 RX ADMIN — Medication 10 MILLILITER(S): at 17:48

## 2020-02-26 RX ADMIN — Medication 30 MILLIGRAM(S): at 11:52

## 2020-02-26 RX ADMIN — Medication 250 MILLIGRAM(S): at 00:00

## 2020-02-26 RX ADMIN — Medication 10 MILLILITER(S): at 11:52

## 2020-02-26 RX ADMIN — CHLORHEXIDINE GLUCONATE 1 APPLICATION(S): 213 SOLUTION TOPICAL at 08:55

## 2020-02-26 RX ADMIN — Medication 1 MILLIGRAM(S): at 08:28

## 2020-02-26 RX ADMIN — CEFEPIME 100 MILLIGRAM(S): 1 INJECTION, POWDER, FOR SOLUTION INTRAMUSCULAR; INTRAVENOUS at 14:18

## 2020-02-26 RX ADMIN — Medication 650 MILLIGRAM(S): at 18:18

## 2020-02-26 RX ADMIN — Medication 250 MILLIGRAM(S): at 11:51

## 2020-02-26 RX ADMIN — PANTOPRAZOLE SODIUM 40 MILLIGRAM(S): 20 TABLET, DELAYED RELEASE ORAL at 11:52

## 2020-02-26 RX ADMIN — Medication 240 MILLIGRAM(S): at 05:14

## 2020-02-26 RX ADMIN — Medication 15 MILLIGRAM(S): at 21:22

## 2020-02-26 RX ADMIN — Medication 10 MILLILITER(S): at 05:13

## 2020-02-26 RX ADMIN — LOSARTAN POTASSIUM 100 MILLIGRAM(S): 100 TABLET, FILM COATED ORAL at 11:52

## 2020-02-26 RX ADMIN — Medication 75 MILLIGRAM(S): at 00:00

## 2020-02-26 RX ADMIN — Medication 75 MILLIGRAM(S): at 11:52

## 2020-02-26 RX ADMIN — CEFEPIME 100 MILLIGRAM(S): 1 INJECTION, POWDER, FOR SOLUTION INTRAMUSCULAR; INTRAVENOUS at 05:12

## 2020-02-26 RX ADMIN — Medication 200 MILLIGRAM(S): at 21:22

## 2020-02-26 RX ADMIN — Medication 1 MILLIGRAM(S): at 17:47

## 2020-02-26 RX ADMIN — Medication 20 MILLIGRAM(S): at 17:48

## 2020-02-26 RX ADMIN — POLYETHYLENE GLYCOL 3350 17 GRAM(S): 17 POWDER, FOR SOLUTION ORAL at 11:52

## 2020-02-26 RX ADMIN — Medication 200 MILLIGRAM(S): at 14:18

## 2020-02-26 RX ADMIN — Medication 200 MILLIGRAM(S): at 05:14

## 2020-02-26 RX ADMIN — ENOXAPARIN SODIUM 40 MILLIGRAM(S): 100 INJECTION SUBCUTANEOUS at 11:52

## 2020-02-26 RX ADMIN — Medication 10 MILLILITER(S): at 00:00

## 2020-02-26 RX ADMIN — CEFEPIME 100 MILLIGRAM(S): 1 INJECTION, POWDER, FOR SOLUTION INTRAMUSCULAR; INTRAVENOUS at 21:21

## 2020-02-26 RX ADMIN — Medication 20 MILLIGRAM(S): at 05:13

## 2020-02-26 RX ADMIN — Medication 4: at 08:55

## 2020-02-26 RX ADMIN — Medication 650 MILLIGRAM(S): at 17:48

## 2020-02-26 RX ADMIN — Medication 4 MILLIGRAM(S): at 21:22

## 2020-02-26 RX ADMIN — Medication 15 MILLIGRAM(S): at 05:14

## 2020-02-26 RX ADMIN — Medication 6: at 13:07

## 2020-02-26 NOTE — CONSULT NOTE ADULT - REASON FOR ADMISSION
SOB, cough and weakness

## 2020-02-26 NOTE — PROGRESS NOTE ADULT - ASSESSMENT
69M with PMH of pulmonary fibrosis (followed by Dr. Dia), HTN, HLD, depression, and anxiety who initially presented 2/20/20 following a fall at home, admitted with acute hypoxic respiratory failure in setting of influenza with concern for superimposed PNA. Course c/b worsening respiratory failure with high oxygen requirements, transferred to MICU for close monitoring.     NEURO:   - Currently AOx3, lethargic but answers questions appropriately  - C/w home clonazepam PRN anxiety, hold for sedation  - C/w home buspirone  - C/w home paroxetine    PULM:   # Acute hypoxic respiratory failure:   Multifactorial related to influenza infection with high suspicion for superimposed bacterial PNA in setting of underlying lung disease. May also have component of aspiration pneumonitis.   - C/w HFNC at 100% FiO2 50LPM for now, wean FiO2 as tolerated  - Maintain saturation >88%  - BiPAP as needed  - Broaden antibiotics (see below)  - Continue Tamiflu for total 10 days (day 6)  - Continue steroids for airway inflammation, adjust dose to 20mg q8  - C/w albuterol/Atrovent nebs q6    # Pulmonary fibrosis:   - Appreciate Pulm recs  - C/w Pulmicort nebs q12    CARDIOVASCULAR:   Stable. No evidence of fluid overload.   - Continue to monitor    GI:   - C/w diet for now    RENAL/:   # Urinary retention:   Likely related to medication side effect s/p Scruggs placement.   - C/w Scruggs for now, trial of void when more stable  - C/w doxazosin  - Monitor urine output  - Trend BMP    ENDOCRINE:   - Insulin sliding scale for steroid-induced hyperglycemia    HEMATOLOGIC:   # Leukocytosis:   Likely secondary to steroids but cannot r/o worsening infection.   - Broaden antibiotics  - Continue to trend CBC with differential    ID:   # Influenza with suspected superimposed bacterial PNA:   - Broaden to vancomycin 1g 12 and cefepime 2g q8  - C/w Tamiflu for total 10 days as above  - Trend WBC count, fever curve  - Reculture and consider repeat RVP if febrile    PROPHYLAXIS:   - DVT ppx: Lovenox    CODE STATUS:  Wife is HCP and agrees to bring in form from home. Agreeable to trial of intubation. 69M with PMH of pulmonary fibrosis (followed by Dr. Dia), HTN, HLD, depression, and anxiety who initially presented 2/20/20 following a fall at home, admitted with acute hypoxic respiratory failure in setting of influenza with concern for superimposed PNA. Course c/b worsening respiratory failure with high oxygen requirements, transferred to MICU for close monitoring.     NEURO:   - Currently AAOx3, slightly lethargic, answers questions appropriately  - C/w home clonazepam PRN anxiety, hold for sedation  - C/w home buspirone  - C/w home paroxetine    PULM:   # Acute hypoxic respiratory failure:   Multifactorial related to influenza infection with high suspicion for superimposed bacterial PNA in setting of underlying lung disease. May also have component of aspiration pneumonitis.   - C/w HFNC at 80% FiO2 50LPM for now, wean FiO2 as tolerated  - Maintain saturation >88%  - Broaden antibiotics (see below)  - C/w Tamiflu for total 10 days (2/20-2/29)  - C/w duonebs q6h PRN  - Continue to taper down on steroids     # Pulmonary fibrosis:   - Appreciate Pulm recs  - will reach out to pulmonologist Dr. Dia, for more info on PFTs, hx of pulm fibrosis, and CT reports, if available    CARDIOVASCULAR:   Stable. No evidence of fluid overload.   - Continue to monitor  - start on Losartan 100mg PO daily    GI:   - C/w regular diet (1L fluid restriction) for now    RENAL/:   # Urinary retention:   Likely related to medication side effect s/p Scruggs placement.   - TOV today  - C/w doxazosin  - Monitor urine output  - Trend BMP    ENDOCRINE:   - Insulin sliding scale for steroid-induced hyperglycemia    HEMATOLOGIC:   # Leukocytosis:   Likely secondary to steroids but cannot r/o worsening infection.   - Broaden antibiotics  - Continue to trend CBC with differential    ID:   # Influenza with suspected superimposed bacterial PNA:   - Broaden to vancomycin 1g 12 and cefepime 2g q8  - C/w Tamiflu for total 10 days as above  - Trend WBC count, fever curve  - Reculture and consider repeat RVP if febrile    PROPHYLAXIS:   - DVT ppx: Lovenox    CODE STATUS:  Wife is HCP and agrees to bring in form from home. Agreeable to trial of intubation. 69M with PMH of pulmonary fibrosis (followed by Dr. Dia), HTN, HLD, depression, and anxiety who initially presented 2/20/20 following a fall at home, admitted with acute hypoxic respiratory failure in setting of influenza with concern for superimposed PNA. Course c/b worsening respiratory failure with high oxygen requirements, transferred to MICU for close monitoring.     NEURO:   - Currently AAOx3, slightly lethargic, answers questions appropriately  - C/w home clonazepam PRN anxiety, hold for sedation  - C/w home buspirone  - C/w home paroxetine    PULM:   # Acute hypoxic respiratory failure:   Multifactorial related to influenza infection with high suspicion for superimposed bacterial PNA in setting of underlying lung disease. May also have component of aspiration pneumonitis.   - C/w HFNC at 80% FiO2 50LPM for now, wean FiO2 as tolerated  - Maintain saturation >88%  - Broaden antibiotics (see below)  - C/w Tamiflu for total 10 days (2/20-2/29)  - C/w duonebs q6h PRN  - Continue to taper down on steroids     # Pulmonary fibrosis:   - Appreciate Pulm recs  - As per pt's pulmonologist group (Dr. Dia/Dr. Pop): Seen for chronic cough, no previous documentation of pulm fibrosis, PFTs (2017) was wnl aside from diffusion capacity of 74%, and CT Chest (2013) showed no evidence of pulm fibrosis or other significant pulm pathology    CARDIOVASCULAR:   Stable. No evidence of fluid overload.   - Continue to monitor  - start on Losartan 100mg PO daily    GI:   - C/w regular diet (1L fluid restriction) for now    RENAL/:   # Urinary retention:   Likely related to medication side effect s/p Scruggs placement.   - TOV today  - C/w doxazosin  - Monitor urine output  - Trend BMP    ENDOCRINE:   - Insulin sliding scale for steroid-induced hyperglycemia    HEMATOLOGIC:   # Leukocytosis:   Likely secondary to steroids but cannot r/o worsening infection.   - Broaden antibiotics  - Continue to trend CBC with differential    ID:   # Influenza with suspected superimposed bacterial PNA:   - Broaden to vancomycin 1g 12 and cefepime 2g q8  - C/w Tamiflu for total 10 days as above  - Trend WBC count, fever curve  - Reculture and consider repeat RVP if febrile    PROPHYLAXIS:   - DVT ppx: Lovenox    CODE STATUS:  Wife is HCP and agrees to bring in form from home. Agreeable to trial of intubation.

## 2020-02-26 NOTE — PROGRESS NOTE ADULT - SUBJECTIVE AND OBJECTIVE BOX
CC: f/u for hypoxic respiratory failure    Patient reports feeling better today, coughing less    REVIEW OF SYSTEMS:  All other review of systems negative (Comprehensive ROS)    Antimicrobials Day #  : 7  cefepime   IVPB  -- D # 2  cefepime   IVPB 2000 milliGRAM(s) IV Intermittent every 8 hours  oseltamivir 75 milliGRAM(s) Oral two times a day  vancomycin  IVPB 1000 milliGRAM(s) IV Intermittent every 12 hours ---D # 2    Other Medications Reviewed    T(F): 97.9 (02-26-20 @ 08:00), Max: 98.8 (02-25-20 @ 16:00)  HR: 85 (02-26-20 @ 09:00)  BP: 166/74 (02-26-20 @ 09:00)  RR: 22 (02-26-20 @ 09:00)  SpO2: 93% (02-26-20 @ 09:00)  Wt(kg): --    PHYSICAL EXAM:  General: alert, no acute distress  Eyes:  anicteric, no conjunctival injection, no discharge  Oropharynx: on high flow O2 support 	  Neck: supple, without adenopathy  Lungs: clear to auscultation  Heart: regular rate and rhythm; no murmur  Abdomen: soft, nondistended, nontender  Extremities:  trace edema  Neurologic: alert, oriented, moves all extremities     LAB RESULTS:                        12.0   25.18 )-----------( 143      ( 26 Feb 2020 00:24 )             35.7     02-26    134<L>  |  94<L>  |  36<H>  ----------------------------<  247<H>  4.5   |  28  |  0.87    Ca    7.9<L>      26 Feb 2020 00:24  Phos  3.2     02-26  Mg     2.7     02-26    TPro  6.0  /  Alb  2.9<L>  /  TBili  0.5  /  DBili  x   /  AST  25  /  ALT  33  /  AlkPhos  76  02-26    LIVER FUNCTIONS - ( 26 Feb 2020 00:24 )  Alb: 2.9 g/dL / Pro: 6.0 g/dL / ALK PHOS: 76 U/L / ALT: 33 U/L / AST: 25 U/L / GGT: x             MICROBIOLOGY:  RECENT CULTURES:            RADIOLOGY REVIEWED:

## 2020-02-26 NOTE — PROGRESS NOTE ADULT - SUBJECTIVE AND OBJECTIVE BOX
CHIEF COMPLAINT: SOB, cough and weakness    OVERNIGHT / SUBJECTIVE:  -Overnight, no acute events. Decreased high flow FiO2 from 100% to 80%.  -Today, pt seen and examined at bedside in AM.     REVIEW OF SYSTEMS:    CONSTITUTIONAL: No weakness, fevers or chills  EYES/ENT: No visual changes;  No vertigo or throat pain   NECK: No pain or stiffness  RESPIRATORY: +cough (improved); No wheezing, hemoptysis; No shortness of breath  CARDIOVASCULAR: No chest pain or palpitations  GASTROINTESTINAL: No abdominal or epigastric pain. No nausea, vomiting, or hematemesis; No diarrhea or constipation. No melena or hematochezia.  GENITOURINARY: +urinary retention No dysuria, frequency or hematuria  NEUROLOGICAL: No numbness or weakness  SKIN: No itching, burning, rashes, or lesions   All other review of systems is negative unless indicated above.      OBJECTIVE:  ICU Vital Signs Last 24 Hrs  T(C): 36.7 (26 Feb 2020 04:00), Max: 37.1 (25 Feb 2020 16:00)  T(F): 98.1 (26 Feb 2020 04:00), Max: 98.8 (25 Feb 2020 16:00)  HR: 63 (26 Feb 2020 05:00) (63 - 96)  BP: 139/67 (26 Feb 2020 05:00) (125/63 - 166/82)  BP(mean): 96 (26 Feb 2020 05:00) (87 - 115)  ABP: --  ABP(mean): --  RR: 20 (26 Feb 2020 05:00) (18 - 34)  SpO2: 97% (26 Feb 2020 05:00) (85% - 100%)        02-25 @ 07:01  -  02-26 @ 07:00  --------------------------------------------------------  IN: 670 mL / OUT: 2945 mL / NET: -2275 mL      CAPILLARY BLOOD GLUCOSE      POCT Blood Glucose.: 211 mg/dL (25 Feb 2020 21:15)  POCT Blood Glucose.: 264 mg/dL (25 Feb 2020 17:10)  POCT Blood Glucose.: 308 mg/dL (25 Feb 2020 09:37)    I&O's Summary    25 Feb 2020 07:01  -  26 Feb 2020 07:00  --------------------------------------------------------  IN: 670 mL / OUT: 2945 mL / NET: -2275 mL        PHYSICAL EXAM:   General: Mild respiratory distress, on high flow nasal cannula  HEENT: Head atraumatic; EOMI, PERRLA, normal sclera and conjunctiva; MMM  Neck: Supple, no JVD, no LAD  Chest/Lungs: Coarse breath sounds bilaterally, no wheezing  Heart: RRR, normal S1, S2, no murmurs or gallops  Abd: +BS, soft, obese, nontender, nondistended  Extremities: 2+ peripheral pulses, trace pedal edema  Skin: No rashes or lesions, warm, well-perfused  Neurologic: AAOx3, follows commands appropriately      LABS:                        12.0   25.18 )-----------( 143      ( 26 Feb 2020 00:24 )             35.7     02-26    134<L>  |  94<L>  |  36<H>  ----------------------------<  247<H>  4.5   |  28  |  0.87    Ca    7.9<L>      26 Feb 2020 00:24  Phos  3.2     02-26  Mg     2.7     02-26    TPro  6.0  /  Alb  2.9<L>  /  TBili  0.5  /  DBili  x   /  AST  25  /  ALT  33  /  AlkPhos  76  02-26        LINES:    HOSPITAL MEDICATIONS:  Standing Meds:  atorvastatin 10 milliGRAM(s) Oral at bedtime  benzonatate 200 milliGRAM(s) Oral three times a day  busPIRone 15 milliGRAM(s) Oral three times a day  cefepime   IVPB      cefepime   IVPB 2000 milliGRAM(s) IV Intermittent every 8 hours  chlorhexidine 4% Liquid 1 Application(s) Topical <User Schedule>  dextrose 5%. 1000 milliLiter(s) IV Continuous <Continuous>  dextrose 50% Injectable 12.5 Gram(s) IV Push once  dextrose 50% Injectable 25 Gram(s) IV Push once  dextrose 50% Injectable 25 Gram(s) IV Push once  diltiazem    milliGRAM(s) Oral daily  doxazosin 4 milliGRAM(s) Oral at bedtime  enoxaparin Injectable 40 milliGRAM(s) SubCutaneous daily  guaifenesin/dextromethorphan  Syrup 10 milliLiter(s) Oral every 6 hours  insulin lispro (HumaLOG) corrective regimen sliding scale   SubCutaneous three times a day before meals  insulin lispro (HumaLOG) corrective regimen sliding scale   SubCutaneous at bedtime  methylPREDNISolone sodium succinate Injectable 20 milliGRAM(s) IV Push every 8 hours  oseltamivir 75 milliGRAM(s) Oral two times a day  pantoprazole  Injectable 40 milliGRAM(s) IV Push daily  PARoxetine 30 milliGRAM(s) Oral daily  polyethylene glycol 3350 17 Gram(s) Oral daily  vancomycin  IVPB 1000 milliGRAM(s) IV Intermittent every 12 hours      PRN Meds:  acetaminophen   Tablet .. 650 milliGRAM(s) Oral every 6 hours PRN  clonazePAM  Tablet 1 milliGRAM(s) Oral three times a day PRN  dextrose 40% Gel 15 Gram(s) Oral once PRN  glucagon  Injectable 1 milliGRAM(s) IntraMuscular once PRN      LABS:                        12.0   25.18 )-----------( 143      ( 26 Feb 2020 00:24 )             35.7     Hgb Trend: 12.0<--, 13.1<--, 12.7<--, 12.7<--, 12.8<--  02-26    134<L>  |  94<L>  |  36<H>  ----------------------------<  247<H>  4.5   |  28  |  0.87    Ca    7.9<L>      26 Feb 2020 00:24  Phos  3.2     02-26  Mg     2.7     02-26    TPro  6.0  /  Alb  2.9<L>  /  TBili  0.5  /  DBili  x   /  AST  25  /  ALT  33  /  AlkPhos  76  02-26    Creatinine Trend: 0.87<--, 0.88<--, 0.87<--, 0.85<--, 0.87<--, 0.82<--      Arterial Blood Gas:  02-26 @ 00:30  7.52/37/56/31/90/7.6  ABG lactate: --                MICROBIOLOGY:     RADIOLOGY:  < from: CT Chest No Cont (02.24.20 @ 14:39) >    IMPRESSION:   Patchy groundglass opacities containing traction bronchiectasis bilaterally is suggestive of pulmonary fibrosis of uncertain etiology.  Groundglass opacities are noted within both lungs. Primary consideration is infection.    < end of copied text > CHIEF COMPLAINT: SOB, cough and weakness    OVERNIGHT / SUBJECTIVE:  -Overnight, no acute events. Decreased high flow FiO2 from 100% to 80%.  -Today, pt seen and examined at bedside in AM.     REVIEW OF SYSTEMS:    CONSTITUTIONAL: No weakness, fevers or chills  EYES/ENT: No visual changes;  No vertigo or throat pain   NECK: No pain or stiffness  RESPIRATORY: +cough (improved); No wheezing, hemoptysis; No shortness of breath  CARDIOVASCULAR: No chest pain or palpitations  GASTROINTESTINAL: No abdominal or epigastric pain. No nausea, vomiting, or hematemesis; No diarrhea or constipation. No melena or hematochezia.  GENITOURINARY: +urinary retention No dysuria, frequency or hematuria  NEUROLOGICAL: No numbness or weakness  SKIN: No itching, burning, rashes, or lesions   All other review of systems is negative unless indicated above.      OBJECTIVE:  ICU Vital Signs Last 24 Hrs  T(C): 36.7 (26 Feb 2020 04:00), Max: 37.1 (25 Feb 2020 16:00)  T(F): 98.1 (26 Feb 2020 04:00), Max: 98.8 (25 Feb 2020 16:00)  HR: 63 (26 Feb 2020 05:00) (63 - 96)  BP: 139/67 (26 Feb 2020 05:00) (125/63 - 166/82)  BP(mean): 96 (26 Feb 2020 05:00) (87 - 115)  ABP: --  ABP(mean): --  RR: 20 (26 Feb 2020 05:00) (18 - 34)  SpO2: 97% (26 Feb 2020 05:00) (85% - 100%)        02-25 @ 07:01  -  02-26 @ 07:00  --------------------------------------------------------  IN: 670 mL / OUT: 2945 mL / NET: -2275 mL      CAPILLARY BLOOD GLUCOSE      POCT Blood Glucose.: 211 mg/dL (25 Feb 2020 21:15)  POCT Blood Glucose.: 264 mg/dL (25 Feb 2020 17:10)  POCT Blood Glucose.: 308 mg/dL (25 Feb 2020 09:37)    I&O's Summary    25 Feb 2020 07:01  -  26 Feb 2020 07:00  --------------------------------------------------------  IN: 670 mL / OUT: 2945 mL / NET: -2275 mL        PHYSICAL EXAM:   General: NAD, appearing slightly lethargic, on high flow nasal cannula  HEENT: Head atraumatic; EOMI, PERRLA, normal sclera and conjunctiva; Dry MM  Neck: Supple, no JVD, no LAD  Chest/Lungs: Coarse breath sounds bilaterally, no wheezing  Heart: RRR, normal S1, S2, no murmurs or gallops  Abd: +BS, soft, obese, nontender, nondistended  Extremities: 2+ peripheral pulses, trace pedal edema  Skin: No rashes or lesions, warm, well-perfused  Neurologic: AAOx3, follows commands appropriately      LABS:                        12.0   25.18 )-----------( 143      ( 26 Feb 2020 00:24 )             35.7     02-26    134<L>  |  94<L>  |  36<H>  ----------------------------<  247<H>  4.5   |  28  |  0.87    Ca    7.9<L>      26 Feb 2020 00:24  Phos  3.2     02-26  Mg     2.7     02-26    TPro  6.0  /  Alb  2.9<L>  /  TBili  0.5  /  DBili  x   /  AST  25  /  ALT  33  /  AlkPhos  76  02-26        LINES:    HOSPITAL MEDICATIONS:  Standing Meds:  atorvastatin 10 milliGRAM(s) Oral at bedtime  benzonatate 200 milliGRAM(s) Oral three times a day  busPIRone 15 milliGRAM(s) Oral three times a day  cefepime   IVPB      cefepime   IVPB 2000 milliGRAM(s) IV Intermittent every 8 hours  chlorhexidine 4% Liquid 1 Application(s) Topical <User Schedule>  dextrose 5%. 1000 milliLiter(s) IV Continuous <Continuous>  dextrose 50% Injectable 12.5 Gram(s) IV Push once  dextrose 50% Injectable 25 Gram(s) IV Push once  dextrose 50% Injectable 25 Gram(s) IV Push once  diltiazem    milliGRAM(s) Oral daily  doxazosin 4 milliGRAM(s) Oral at bedtime  enoxaparin Injectable 40 milliGRAM(s) SubCutaneous daily  guaifenesin/dextromethorphan  Syrup 10 milliLiter(s) Oral every 6 hours  insulin lispro (HumaLOG) corrective regimen sliding scale   SubCutaneous three times a day before meals  insulin lispro (HumaLOG) corrective regimen sliding scale   SubCutaneous at bedtime  methylPREDNISolone sodium succinate Injectable 20 milliGRAM(s) IV Push every 8 hours  oseltamivir 75 milliGRAM(s) Oral two times a day  pantoprazole  Injectable 40 milliGRAM(s) IV Push daily  PARoxetine 30 milliGRAM(s) Oral daily  polyethylene glycol 3350 17 Gram(s) Oral daily  vancomycin  IVPB 1000 milliGRAM(s) IV Intermittent every 12 hours      PRN Meds:  acetaminophen   Tablet .. 650 milliGRAM(s) Oral every 6 hours PRN  clonazePAM  Tablet 1 milliGRAM(s) Oral three times a day PRN  dextrose 40% Gel 15 Gram(s) Oral once PRN  glucagon  Injectable 1 milliGRAM(s) IntraMuscular once PRN      LABS:                        12.0   25.18 )-----------( 143      ( 26 Feb 2020 00:24 )             35.7     Hgb Trend: 12.0<--, 13.1<--, 12.7<--, 12.7<--, 12.8<--  02-26    134<L>  |  94<L>  |  36<H>  ----------------------------<  247<H>  4.5   |  28  |  0.87    Ca    7.9<L>      26 Feb 2020 00:24  Phos  3.2     02-26  Mg     2.7     02-26    TPro  6.0  /  Alb  2.9<L>  /  TBili  0.5  /  DBili  x   /  AST  25  /  ALT  33  /  AlkPhos  76  02-26    Creatinine Trend: 0.87<--, 0.88<--, 0.87<--, 0.85<--, 0.87<--, 0.82<--      Arterial Blood Gas:  02-26 @ 00:30  7.52/37/56/31/90/7.6  ABG lactate: --        MICROBIOLOGY:     RADIOLOGY:  < from: CT Chest No Cont (02.24.20 @ 14:39) >    IMPRESSION:   Patchy groundglass opacities containing traction bronchiectasis bilaterally is suggestive of pulmonary fibrosis of uncertain etiology.  Groundglass opacities are noted within both lungs. Primary consideration is infection.    < end of copied text > CHIEF COMPLAINT: SOB, cough and weakness    OVERNIGHT / SUBJECTIVE:  -Overnight, no acute events. Decreased high flow FiO2 from 100% to 80%.  -Today, pt seen and examined at bedside in AM. Pt reports feeling better, but states that he is tired of being here, is bored, and would like emotional support.    REVIEW OF SYSTEMS:    CONSTITUTIONAL: No weakness, fevers or chills  EYES/ENT: No visual changes;  No vertigo or throat pain   NECK: No pain or stiffness  RESPIRATORY: +cough (improved); No wheezing, hemoptysis; No shortness of breath  CARDIOVASCULAR: No chest pain or palpitations  GASTROINTESTINAL: No abdominal or epigastric pain. No nausea, vomiting, or hematemesis; No diarrhea or constipation. No melena or hematochezia.  GENITOURINARY: +urinary retention No dysuria, frequency or hematuria  NEUROLOGICAL: No numbness or weakness  SKIN: No itching, burning, rashes, or lesions   All other review of systems is negative unless indicated above.      OBJECTIVE:  ICU Vital Signs Last 24 Hrs  T(C): 36.7 (26 Feb 2020 04:00), Max: 37.1 (25 Feb 2020 16:00)  T(F): 98.1 (26 Feb 2020 04:00), Max: 98.8 (25 Feb 2020 16:00)  HR: 63 (26 Feb 2020 05:00) (63 - 96)  BP: 139/67 (26 Feb 2020 05:00) (125/63 - 166/82)  BP(mean): 96 (26 Feb 2020 05:00) (87 - 115)  ABP: --  ABP(mean): --  RR: 20 (26 Feb 2020 05:00) (18 - 34)  SpO2: 97% (26 Feb 2020 05:00) (85% - 100%)        02-25 @ 07:01  -  02-26 @ 07:00  --------------------------------------------------------  IN: 670 mL / OUT: 2945 mL / NET: -2275 mL      CAPILLARY BLOOD GLUCOSE      POCT Blood Glucose.: 211 mg/dL (25 Feb 2020 21:15)  POCT Blood Glucose.: 264 mg/dL (25 Feb 2020 17:10)  POCT Blood Glucose.: 308 mg/dL (25 Feb 2020 09:37)    I&O's Summary    25 Feb 2020 07:01  -  26 Feb 2020 07:00  --------------------------------------------------------  IN: 670 mL / OUT: 2945 mL / NET: -2275 mL        PHYSICAL EXAM:   General: NAD, appearing slightly lethargic, on high flow nasal cannula  HEENT: Head atraumatic; EOMI, PERRLA, normal sclera and conjunctiva; Dry MM  Neck: Supple, no JVD, no LAD  Chest/Lungs: Coarse breath sounds bilaterally, no wheezing  Heart: RRR, normal S1, S2, no murmurs or gallops  Abd: +BS, soft, obese, nontender, nondistended  Extremities: 2+ peripheral pulses, trace pedal edema  Skin: No rashes or lesions, warm, well-perfused  Neurologic: AAOx3, follows commands appropriately      LABS:                        12.0   25.18 )-----------( 143      ( 26 Feb 2020 00:24 )             35.7     02-26    134<L>  |  94<L>  |  36<H>  ----------------------------<  247<H>  4.5   |  28  |  0.87    Ca    7.9<L>      26 Feb 2020 00:24  Phos  3.2     02-26  Mg     2.7     02-26    TPro  6.0  /  Alb  2.9<L>  /  TBili  0.5  /  DBili  x   /  AST  25  /  ALT  33  /  AlkPhos  76  02-26        LINES:    HOSPITAL MEDICATIONS:  Standing Meds:  atorvastatin 10 milliGRAM(s) Oral at bedtime  benzonatate 200 milliGRAM(s) Oral three times a day  busPIRone 15 milliGRAM(s) Oral three times a day  cefepime   IVPB      cefepime   IVPB 2000 milliGRAM(s) IV Intermittent every 8 hours  chlorhexidine 4% Liquid 1 Application(s) Topical <User Schedule>  dextrose 5%. 1000 milliLiter(s) IV Continuous <Continuous>  dextrose 50% Injectable 12.5 Gram(s) IV Push once  dextrose 50% Injectable 25 Gram(s) IV Push once  dextrose 50% Injectable 25 Gram(s) IV Push once  diltiazem    milliGRAM(s) Oral daily  doxazosin 4 milliGRAM(s) Oral at bedtime  enoxaparin Injectable 40 milliGRAM(s) SubCutaneous daily  guaifenesin/dextromethorphan  Syrup 10 milliLiter(s) Oral every 6 hours  insulin lispro (HumaLOG) corrective regimen sliding scale   SubCutaneous three times a day before meals  insulin lispro (HumaLOG) corrective regimen sliding scale   SubCutaneous at bedtime  methylPREDNISolone sodium succinate Injectable 20 milliGRAM(s) IV Push every 8 hours  oseltamivir 75 milliGRAM(s) Oral two times a day  pantoprazole  Injectable 40 milliGRAM(s) IV Push daily  PARoxetine 30 milliGRAM(s) Oral daily  polyethylene glycol 3350 17 Gram(s) Oral daily  vancomycin  IVPB 1000 milliGRAM(s) IV Intermittent every 12 hours      PRN Meds:  acetaminophen   Tablet .. 650 milliGRAM(s) Oral every 6 hours PRN  clonazePAM  Tablet 1 milliGRAM(s) Oral three times a day PRN  dextrose 40% Gel 15 Gram(s) Oral once PRN  glucagon  Injectable 1 milliGRAM(s) IntraMuscular once PRN      LABS:                        12.0   25.18 )-----------( 143      ( 26 Feb 2020 00:24 )             35.7     Hgb Trend: 12.0<--, 13.1<--, 12.7<--, 12.7<--, 12.8<--  02-26    134<L>  |  94<L>  |  36<H>  ----------------------------<  247<H>  4.5   |  28  |  0.87    Ca    7.9<L>      26 Feb 2020 00:24  Phos  3.2     02-26  Mg     2.7     02-26    TPro  6.0  /  Alb  2.9<L>  /  TBili  0.5  /  DBili  x   /  AST  25  /  ALT  33  /  AlkPhos  76  02-26    Creatinine Trend: 0.87<--, 0.88<--, 0.87<--, 0.85<--, 0.87<--, 0.82<--      Arterial Blood Gas:  02-26 @ 00:30  7.52/37/56/31/90/7.6  ABG lactate: --        MICROBIOLOGY:     RADIOLOGY:  < from: CT Chest No Cont (02.24.20 @ 14:39) >    IMPRESSION:   Patchy groundglass opacities containing traction bronchiectasis bilaterally is suggestive of pulmonary fibrosis of uncertain etiology.  Groundglass opacities are noted within both lungs. Primary consideration is infection.    < end of copied text >

## 2020-02-26 NOTE — CONSULT NOTE ADULT - SUBJECTIVE AND OBJECTIVE BOX
CHIEF COMPLAINT:Patient is a 69y old  Male who presents with a chief complaint of SOB, cough and weakness (26 Feb 2020 07:30)      HPI:  69M with PMH of HTN, pulmonary fibrosis, depression/anxiety p/w SOB and cough. Pt states his mother passed away about 2 weeks ago and shortly after burying here, started to feel very unwell. He has had progressively worsening cough, non productive, but feels a lot of chest congestion. Associated with ROSE, headache and myalgias and fatigue. Was seen at  earlier this week and was diagnosed with flu. In the past couple of days, has been having very labored breathing; today, had an episode where he fell while trying to reach for BTR switch and was so winded and weak, he was unable to get up. Has barely been eating anything due to poor appetite and nausea, but denies vomiting. Denies fevers or chills, no CP, no palpitations, no abdominal pain, had 3 episodes of loose stools today, +decreased UOP, no LE swelling. Does not know of any sick contacts. Denies recent travel. (20 Feb 2020 04:41)      PAST MEDICAL & SURGICAL HISTORY:  HTN (hypertension)  Depression  Pulmonary fibrosis  H/O inguinal hernia repair      MEDICATIONS  (STANDING):  atorvastatin 10 milliGRAM(s) Oral at bedtime  benzonatate 200 milliGRAM(s) Oral three times a day  busPIRone 15 milliGRAM(s) Oral three times a day  cefepime   IVPB      cefepime   IVPB 2000 milliGRAM(s) IV Intermittent every 8 hours  chlorhexidine 4% Liquid 1 Application(s) Topical <User Schedule>  dextrose 5%. 1000 milliLiter(s) (50 mL/Hr) IV Continuous <Continuous>  dextrose 50% Injectable 12.5 Gram(s) IV Push once  dextrose 50% Injectable 25 Gram(s) IV Push once  dextrose 50% Injectable 25 Gram(s) IV Push once  diltiazem    milliGRAM(s) Oral daily  doxazosin 4 milliGRAM(s) Oral at bedtime  enoxaparin Injectable 40 milliGRAM(s) SubCutaneous daily  guaifenesin/dextromethorphan  Syrup 10 milliLiter(s) Oral every 6 hours  insulin lispro (HumaLOG) corrective regimen sliding scale   SubCutaneous three times a day before meals  insulin lispro (HumaLOG) corrective regimen sliding scale   SubCutaneous at bedtime  losartan 100 milliGRAM(s) Oral daily  methylPREDNISolone sodium succinate Injectable 20 milliGRAM(s) IV Push every 12 hours  oseltamivir 75 milliGRAM(s) Oral two times a day  pantoprazole  Injectable 40 milliGRAM(s) IV Push daily  PARoxetine 30 milliGRAM(s) Oral daily  polyethylene glycol 3350 17 Gram(s) Oral daily  vancomycin  IVPB 1000 milliGRAM(s) IV Intermittent every 12 hours    MEDICATIONS  (PRN):  acetaminophen   Tablet .. 650 milliGRAM(s) Oral every 6 hours PRN Temp greater or equal to 38C (100.4F), Mild Pain (1 - 3), Moderate Pain (4 - 6)  albuterol/ipratropium for Nebulization 3 milliLiter(s) Nebulizer every 6 hours PRN Shortness of Breath and/or Wheezing  clonazePAM  Tablet 1 milliGRAM(s) Oral three times a day PRN anxiety  dextrose 40% Gel 15 Gram(s) Oral once PRN Blood Glucose LESS THAN 70 milliGRAM(s)/deciliter  glucagon  Injectable 1 milliGRAM(s) IntraMuscular once PRN Glucose LESS THAN 70 milligrams/deciliter      FAMILY HISTORY:  No pertinent family history in first degree relatives      SOCIAL HISTORY:    [ ] Non-smoker  [ ] Smoker  [ ] Alcohol    Allergies    penicillin (Anaphylaxis)    Intolerances    	    REVIEW OF SYSTEMS:  CONSTITUTIONAL: No fever, weight loss, or fatigue  EYES: No eye pain, visual disturbances, or discharge  ENT:  No difficulty hearing, tinnitus, vertigo; No sinus or throat pain  NECK: No pain or stiffness  RESPIRATORY: + cough, wheezing, no chills or hemoptysis; +Shortness of Breath  CARDIOVASCULAR: No chest pain, palpitations, passing out, dizziness, or leg swelling  GASTROINTESTINAL: No abdominal or epigastric pain. No nausea, vomiting, or hematemesis; No diarrhea or constipation. No melena or hematochezia.  GENITOURINARY: No dysuria, frequency, hematuria, or incontinence  NEUROLOGICAL: No headaches, memory loss, loss of strength, numbness, or tremors  SKIN: No itching, burning, rashes, or lesions   LYMPH Nodes: No enlarged glands  ENDOCRINE: No heat or cold intolerance; No hair loss  MUSCULOSKELETAL: No joint pain or swelling; No muscle, back, or extremity pain  PSYCHIATRIC: No depression, anxiety, mood swings, or difficulty sleeping  HEME/LYMPH: No easy bruising, or bleeding gums  ALLERGY AND IMMUNOLOGIC: No hives or eczema	    [ ] All others negative	  [ ] Unable to obtain    PHYSICAL EXAM:  T(C): 36.6 (02-26-20 @ 08:00), Max: 37.1 (02-25-20 @ 16:00)  HR: 85 (02-26-20 @ 09:00) (63 - 96)  BP: 166/74 (02-26-20 @ 09:00) (125/63 - 166/79)  RR: 22 (02-26-20 @ 09:00) (18 - 34)  SpO2: 93% (02-26-20 @ 09:00) (85% - 100%)  Wt(kg): --  I&O's Summary    25 Feb 2020 07:01  -  26 Feb 2020 07:00  --------------------------------------------------------  IN: 670 mL / OUT: 3005 mL / NET: -2335 mL    26 Feb 2020 07:01  -  26 Feb 2020 10:11  --------------------------------------------------------  IN: 120 mL / OUT: 175 mL / NET: -55 mL        Appearance: Normal	  HEENT:   Normal oral mucosa, PERRL, EOMI	  Lymphatic: No lymphadenopathy  Cardiovascular: Normal S1 S2, No JVD,+ murmurs, No edema  Respiratory: decrease bs  Psychiatry: A & O x 3, Mood & affect appropriate  Gastrointestinal:  Soft, Non-tender, + BS	  Skin: No rashes, No ecchymoses, No cyanosis	  Neurologic: Non-focal  Extremities: Normal range of motion, No clubbing, cyanosis or edema  Vascular: Peripheral pulses palpable 2+ bilaterally    TELEMETRY: 	    ECG:  	  RADIOLOGY:  OTHER: 	  	  LABS:	 	    CARDIAC MARKERS:                              12.0   25.18 )-----------( 143      ( 26 Feb 2020 00:24 )             35.7     02-26    134<L>  |  94<L>  |  36<H>  ----------------------------<  247<H>  4.5   |  28  |  0.87    Ca    7.9<L>      26 Feb 2020 00:24  Phos  3.2     02-26  Mg     2.7     02-26    TPro  6.0  /  Alb  2.9<L>  /  TBili  0.5  /  DBili  x   /  AST  25  /  ALT  33  /  AlkPhos  76  02-26    proBNP:   Lipid Profile:   HgA1c:   TSH:       PREVIOUS DIAGNOSTIC TESTING:    < from: CT Chest No Cont (02.24.20 @ 14:39) >  Patchy groundglass opacities containing traction bronchiectasis bilaterally is suggestive of pulmonary fibrosis of uncertain etiology.    Groundglass opacities are noted within both lungs. Primary consideration is infection.    < from: Xray Chest 1 View- PORTABLE-Urgent (02.24.20 @ 04:56) >  Redemonstrated bilateral linear hazy opacities, left greater than right.    < from: 12 Lead ECG (02.19.20 @ 22:29) >  Diagnosis Line *** POOR DATA QUALITY, INTERPRETATION MAY BE ADVERSELY AFFECTED  NORMAL SINUS RHYTHM  NON-SPECIFIC ST/T ABNORMALITY  PROLONGED QT  ABNORMAL ECG

## 2020-02-26 NOTE — PROGRESS NOTE ADULT - ASSESSMENT
70 yo male with HTN, anxiety disorder, and pulmonary fibrosis admitted with hypoxic respiratory failure.  Influenza A diagnosed 2/16 with a rapid test.  CTA is negative for PE.  Started on CTX and azithro along with tamiflu  Legionella urine antigen negative, MRSA screen is negative and his PC is elevated to 0.69.  The elevated PC is suggestive of a bacterial infection  Lack of improvement with tamiflu documented and raised concern of both viral pneumonia and a secondary process such as a bacterial pneumonia - empiric antibiotics cont'd  Repeated CT chest reveals pulm fibrosis & bilateral groundglass opacities concerning for infection  However remains afebrile & PCT is low at 0.15  Leukocytosis unreliable - given pt on steroids    Suggest:  Tamiflu, d # 7 days  - to be continued for a total of 10 days course, given slow response  CTX,azithro were changed to vanco & cefepime on 2/25/20, in case this was HCAP, although low overall suspicion in this afebrile pt with low PCT.   Follow clinically

## 2020-02-26 NOTE — PROGRESS NOTE ADULT - SUBJECTIVE AND OBJECTIVE BOX
INTERVAL HPI/OVERNIGHT EVENTS:    patient seen and examined at bedside earlier this morning  overnight events noted  now in MICU 2/2 hypoxic respiratory failure  feeling better today  appetite improved  admits to feeling constipated, last bm was over the weekend  denies n/v abd pain     MEDICATIONS  (STANDING):  atorvastatin 10 milliGRAM(s) Oral at bedtime  benzonatate 200 milliGRAM(s) Oral three times a day  busPIRone 15 milliGRAM(s) Oral three times a day  cefepime   IVPB      cefepime   IVPB 2000 milliGRAM(s) IV Intermittent every 8 hours  chlorhexidine 4% Liquid 1 Application(s) Topical <User Schedule>  dextrose 5%. 1000 milliLiter(s) (50 mL/Hr) IV Continuous <Continuous>  dextrose 50% Injectable 12.5 Gram(s) IV Push once  dextrose 50% Injectable 25 Gram(s) IV Push once  dextrose 50% Injectable 25 Gram(s) IV Push once  diltiazem    milliGRAM(s) Oral daily  doxazosin 4 milliGRAM(s) Oral at bedtime  enoxaparin Injectable 40 milliGRAM(s) SubCutaneous daily  guaifenesin/dextromethorphan  Syrup 10 milliLiter(s) Oral every 6 hours  insulin lispro (HumaLOG) corrective regimen sliding scale   SubCutaneous three times a day before meals  insulin lispro (HumaLOG) corrective regimen sliding scale   SubCutaneous at bedtime  losartan 100 milliGRAM(s) Oral daily  methylPREDNISolone sodium succinate Injectable 20 milliGRAM(s) IV Push every 12 hours  oseltamivir 75 milliGRAM(s) Oral two times a day  pantoprazole  Injectable 40 milliGRAM(s) IV Push daily  PARoxetine 30 milliGRAM(s) Oral daily  polyethylene glycol 3350 17 Gram(s) Oral daily  vancomycin  IVPB 1000 milliGRAM(s) IV Intermittent every 12 hours    MEDICATIONS  (PRN):  acetaminophen   Tablet .. 650 milliGRAM(s) Oral every 6 hours PRN Temp greater or equal to 38C (100.4F), Mild Pain (1 - 3), Moderate Pain (4 - 6)  albuterol/ipratropium for Nebulization 3 milliLiter(s) Nebulizer every 6 hours PRN Shortness of Breath and/or Wheezing  clonazePAM  Tablet 1 milliGRAM(s) Oral three times a day PRN anxiety  dextrose 40% Gel 15 Gram(s) Oral once PRN Blood Glucose LESS THAN 70 milliGRAM(s)/deciliter  glucagon  Injectable 1 milliGRAM(s) IntraMuscular once PRN Glucose LESS THAN 70 milligrams/deciliter      Allergies    penicillin (Anaphylaxis)    Intolerances        Review of Systems:    General:  No wt loss, fevers, chills, night sweats,fatigue,   Eyes:  Good vision, no reported pain  ENT:  No sore throat, pain, runny nose, dysphagia  CV:  No pain, palpitatioins, hypo/hypertension  Resp:  No dyspnea, cough, tachypnea, wheezing  GI:  No pain, No nausea, No vomiting, No diarrhea, No constipatiion, No weight loss, No fever, No pruritis, No rectal bleeding, No tarry stools, No dysphagia,  :  No pain, bleeding, incontinence, nocturia  Muscle:  No pain, weakness  Neuro:  No weakness, tingling, memory problems  Psych:  No fatigue, insomnia, mood problems, depression  Endocrine:  No polyuria, polydypsia, cold/heat intolerance  Heme:  No petechiae, ecchymosis, easy bruisability  Skin:  No rash, tattoos, scars, edema      Vital Signs Last 24 Hrs  T(C): 37 (26 Feb 2020 12:00), Max: 37.1 (25 Feb 2020 16:00)  T(F): 98.6 (26 Feb 2020 12:00), Max: 98.8 (25 Feb 2020 16:00)  HR: 82 (26 Feb 2020 13:56) (63 - 96)  BP: 161/78 (26 Feb 2020 13:56) (125/63 - 166/79)  BP(mean): 108 (26 Feb 2020 12:00) (87 - 118)  RR: 22 (26 Feb 2020 13:56) (18 - 34)  SpO2: 95% (26 Feb 2020 13:56) (85% - 100%)    PHYSICAL EXAM:    Constitutional: NAD  HEENT: on HFNC  Cardiovascular: S1 and S2, RRR  Gastrointestinal: BS+, obese, soft,  NT, softly distended  Extremities: No peripheral edema  Neurological: A/O x 3, no focal deficits      LABS:                        12.0   25.18 )-----------( 143      ( 26 Feb 2020 00:24 )             35.7     02-26    134<L>  |  94<L>  |  36<H>  ----------------------------<  247<H>  4.5   |  28  |  0.87    Ca    7.9<L>      26 Feb 2020 00:24  Phos  3.2     02-26  Mg     2.7     02-26    TPro  6.0  /  Alb  2.9<L>  /  TBili  0.5  /  DBili  x   /  AST  25  /  ALT  33  /  AlkPhos  76  02-26          RADIOLOGY & ADDITIONAL TESTS:

## 2020-02-26 NOTE — PROGRESS NOTE ADULT - ASSESSMENT
ASSESSMENT:    hypoxic respiratory failure - multifactorial - no evidence of hypercapnia     1) influenza infection complicated by bronchospasm and pneumonia (likely viral infection in the absence of a leukocytosis - the initially mildly elevated procalcitonin level is now normal) - leukocytosis now is likely due to high dose steroids  2) underlying pulmonary fibrosis with traction bronchiectasis    PLAN/RECOMMENDATIONS:    high flow nasal canula 70% FiO2 @ 50lpm - keep saturation greater than 88% as able - taper as able  BIPAP for increased work of breathing, resistant hypoxemia or respiratory acidosis  repeat chest CT reviewed  would continue tamiflu for 10 days days given ongoing pneumonia  check RVP  cefepime/vancomycin  continue solumedrol 20mg IVP q12h  albuterol/atrovent nebs to q6h  pulmicort 0.5mg nebs q12h - give after duoneb - rinse mouth after use  robitussin DM/tessalon - off hycodan  buspar/klonopin/paxil  DVT prophylaxis - SQ lovenox  urology consult noted - loza catheter/doxazosin - trial of void    Will follow with you. Plan of care discussed with the patient at bedside and the MICU team.    Galileo Pop MD, [facility]  [phone]  Pulmonary Medicine

## 2020-02-26 NOTE — CONSULT NOTE ADULT - ASSESSMENT
69M with PMH of HTN, pulmonary fibrosis, depression/anxiety p/w SOB and cough. Pt states his mother passed away about 2 weeks ago and shortly after burying here, started to feel very unwell. He has had progressively worsening cough, non productive, but feels a lot of chest congestion. Associated with ROSE, headache and myalgias and fatigue. Was seen at  earlier this week and was diagnosed with flu. In the past couple of days, has been having very labored breathing; today, had an episode where he fell while trying to reach for [employer] switch and was so winded and weak, he was unable to get up. Has barely been eating anything due to poor appetite and nausea, but denies vomiting. Denies fevers or chills, no CP, no palpitations, no abdominal pain, had 3 episodes of loose stools today, +decreased UOP, no LE swelling. Does not know of any sick contacts. Denies recent travel.  sob/ respiratory failure sec to hx of pulmonary fibrosis ?pneumoniae.  check rvp  abx  echo to evaluate pulmonary pressure sec to hx of pulmonary fibrosis  add calcium channel blocker for bp control  dvt prophylaxis  asa daily

## 2020-02-26 NOTE — PROGRESS NOTE ADULT - ASSESSMENT
Diarrhea - resolved   anemia  hypoxic respiratory failure     no further diarrhea, however now complaining of constipation   h/h remains stable   diet as tolerated with 1L fluid restriction  cont miralax 17g orally daily  start senna 2 tablets at bedtime  colonoscopy 3 years ago, no need to repeat as inpatient  monitor for signs of GI bleeding  CT chest noted  pulmonary eval noted; recs appreciated   further care per MICU appreciated   will follow Diarrhea - resolved   anemia  hypoxic respiratory failure     no further diarrhea, however now complaining of constipation   h/h remains stable   diet as tolerated with 1L fluid restriction  cont miralax 17g orally daily  recommend senna 2 tablets at bedtime  colonoscopy 3 years ago, no need to repeat as inpatient  monitor for signs of GI bleeding  CT chest noted  pulmonary eval noted; recs appreciated   further care per MICU appreciated   will follow Diarrhea - resolved   anemia  hypoxic respiratory failure     no further diarrhea, however now complaining of constipation   drop in H/H noted, likely dilutional component. No overt signs of GIB  diet as tolerated with 1L fluid restriction  cont miralax 17g orally daily  recommend senna 2 tablets at bedtime  colonoscopy 3 years ago, no need to repeat as inpatient  monitor for signs of GI bleeding  CT chest noted  pulmonary eval noted; recs appreciated   further care per MICU appreciated   will follow

## 2020-02-26 NOTE — PROGRESS NOTE ADULT - ATTENDING COMMENTS
1. Acute hypoxemic respiratory failure due to influenza pneumonia and bacterial pneumonia. Pt still requiring 70-80% hi flow 02. Continue high flow 02. Continue Tamiflu and Vancomycin ans Cefepime. Continue to taper off steroids. H/O  Pulmonary fibrosis with minimal lung impact at this point.  2. HTN. Restart ARB  3. BPH. on Doxazosyn. D/C Scruggs catheter.  4. Anxiety disorder: Klonopin prn.

## 2020-02-26 NOTE — PROGRESS NOTE ADULT - SUBJECTIVE AND OBJECTIVE BOX
[facility] - PROGRESS NOTE    CHIEF COMPLAINT: hypoxic respiratory failure; influenza; pneumonia; bronchospasm; pulmonary fibrosis;     INTERVAL HISTORY: transferred to the MICU for closer clinical observation given tenuous respiratory status with increasing groundglass opacities on follow-up chest CT; clinically improved; down to 70% FiO2 on high flow nasal canula; less fatigue, malaise and weakness; better appetite; improved cough with minimal sputum production; no chest congestion or wheeze; no fevers, chills or sweats; no chest pain/pressure or palpitations; urinary retention -> loza    REVIEW OF SYSTEMS:  Constitutional: As per interval history  HEENT: Within normal limits  CV: As per interval history  Resp: As per interval history  GI: Within normal limits   : urinary retention  Musculoskeletal: Within normal limits  Skin: Within normal limits  Neurological: Within normal limits  Psychiatric: anxiety type depression  Endocrine: Within normal limits  Hematologic/Lymphatic: Within normal limits  Allergic/Immunologic: Within normal limits    MEDICATIONS:     Pulmonary "  benzonatate 200 milliGRAM(s) Oral three times a day  guaifenesin/dextromethorphan  Syrup 10 milliLiter(s) Oral every 6 hours    Anti-microbials:  cefepime   IVPB      cefepime   IVPB 2000 milliGRAM(s) IV Intermittent every 8 hours  oseltamivir 75 milliGRAM(s) Oral two times a day  vancomycin  IVPB 1000 milliGRAM(s) IV Intermittent every 12 hours    Cardiovascular:  diltiazem    milliGRAM(s) Oral daily  doxazosin 4 milliGRAM(s) Oral at bedtime  losartan 100 milliGRAM(s) Oral daily    Other:  atorvastatin 10 milliGRAM(s) Oral at bedtime  busPIRone 15 milliGRAM(s) Oral three times a day  chlorhexidine 4% Liquid 1 Application(s) Topical <User Schedule>  dextrose 5%. 1000 milliLiter(s) IV Continuous <Continuous>  dextrose 50% Injectable 12.5 Gram(s) IV Push once  dextrose 50% Injectable 25 Gram(s) IV Push once  dextrose 50% Injectable 25 Gram(s) IV Push once  enoxaparin Injectable 40 milliGRAM(s) SubCutaneous daily  insulin lispro (HumaLOG) corrective regimen sliding scale   SubCutaneous three times a day before meals  insulin lispro (HumaLOG) corrective regimen sliding scale   SubCutaneous at bedtime  methylPREDNISolone sodium succinate Injectable 20 milliGRAM(s) IV Push every 12 hours  pantoprazole  Injectable 40 milliGRAM(s) IV Push daily  PARoxetine 30 milliGRAM(s) Oral daily  polyethylene glycol 3350 17 Gram(s) Oral daily    MEDICATIONS  (PRN):  acetaminophen   Tablet .. 650 milliGRAM(s) Oral every 6 hours PRN Temp greater or equal to 38C (100.4F), Mild Pain (1 - 3), Moderate Pain (4 - 6)  albuterol/ipratropium for Nebulization 3 milliLiter(s) Nebulizer every 6 hours PRN Shortness of Breath and/or Wheezing  clonazePAM  Tablet 1 milliGRAM(s) Oral three times a day PRN anxiety  dextrose 40% Gel 15 Gram(s) Oral once PRN Blood Glucose LESS THAN 70 milliGRAM(s)/deciliter  glucagon  Injectable 1 milliGRAM(s) IntraMuscular once PRN Glucose LESS THAN 70 milligrams/deciliter        OBJECTIVE:    I&O's Detail    2020 07:  -  2020 07:00  --------------------------------------------------------  IN:    Oral Fluid: 20 mL    Solution: 650 mL  Total IN: 670 mL    OUT:    Indwelling Catheter - Urethral: 3005 mL  Total OUT: 3005 mL    Total NET: -2335 mL      2020 07:01  -  2020 12:07  --------------------------------------------------------  IN:    Oral Fluid: 120 mL  Total IN: 120 mL    OUT:    Indwelling Catheter - Urethral: 325 mL  Total OUT: 325 mL    Total NET: -205 mL       Daily Weight in k.7 (2020 04:00)    POCT Blood Glucose.: 208 mg/dL (2020 08:53)  POCT Blood Glucose.: 211 mg/dL (2020 21:15)  POCT Blood Glucose.: 264 mg/dL (2020 17:10)      PHYSICAL EXAM:       ICU Vital Signs Last 24 Hrs  T(C): 36.6 (2020 08:00), Max: 37.1 (2020 16:00)  T(F): 97.9 (2020 08:00), Max: 98.8 (2020 16:00)  HR: 70 (2020 10:00) (63 - 96)  BP: 135/63 (2020 10:00) (125/63 - 166/79)  BP(mean): 90 (2020 10:00) (87 - 118)  ABP: --  ABP(mean): --  RR: 19 (2020 10:00) (18 - 34)  SpO2: 96% (2020 10:00) (85% - 100%) on high flow nasal canula @ 70%     General: Awake. Alert. Cooperative. No distress. Appears stated age. Stronger.  HEENT: Atraumatic. Normocephalic. Anicteric. Normal oral mucosa. PERRL. EOMI.  Neck: Supple. Trachea midline. Thyroid without enlargement/tenderness/nodules. No carotid bruit. No JVD.	  Cardiovascular: Regular rate and rhythm. S1 S2 normal. No murmurs, rubs or gallops.  Respiratory: Respirations unlabored. Scattered rales. No curvature.  Abdomen: Soft. Non-tender. Non-distended. No organomegaly. No masses. Normal bowel sounds. Obese.  Extremities: Warm to touch. No clubbing or cyanosis. No pedal edema.  Pulses: 2+ peripheral pulses all extremities.	  Skin: Normal skin color. No rashes or lesions. No ecchymoses. No cyanosis. Warm to touch.  Lymph Nodes: Cervical, supraclavicular and axillary nodes normal  Neurological: Motor and sensory examination equal and normal. A and O x 3  Psychiatry: Appropriate mood and affect.    LABS:                          12.0   25.18 )-----------( 143      ( 2020 00:24 )             35.7     CBC    WBC  25.18 <==, 24.08 <==, 20.41 <==, 21.64 <==, 21.59 <==, 18.56 <==, 15.96 <==    Hemoglobin  12.0 <<==, 13.1 <<==, 12.7 <<==, 12.7 <<==, 12.8 <<==, 11.9 <<==, 11.8 <<==    Hematocrit  35.7 <==, 38.4 <==, 37.3 <==, 37.2 <==, 35.8 <==, 33.8 <==, 34.3 <==    Platelets  143 <==, 121 <==, 112 <==, 120 <==, 118 <==, 139 <==, 186 <==      134<L>  |  94<L>  |  36<H>  ----------------------------<  247<H>    02-26  4.5   |  28  |  0.87      LYTES    sodium  134 <==, 138 <==, 132 <==, 134 <==, 133 <==, 135 <==, 132 <==    potassium   4.5 <==, 4.5 <==, 4.8 <==, 4.5 <==, 4.4 <==, 4.6 <==, 4.4 <==    chloride  94 <==, 97 <==, 95 <==, 95 <==, 95 <==, 94 <==, 93 <==    carbon dioxide  28 <==, 29 <==, 23 <==, 21 <==, 25 <==, 21 <==, 24 <==    =============================================================================================  RENAL FUNCTION:    Creatinine:   0.87  <<==, 0.88  <<==, 0.87  <<==, 0.85  <<==, 0.87  <<==, 0.82  <<==, 0.87  <<==    BUN:   36 <==, 32 <==, 29 <==, 28 <==, 28 <==, 26 <==, 27 <==    ============================================================================================    calcium   7.9 <==, 8.4 <==, 8.0 <==, 8.0 <==, 8.2 <==, 8.2 <==, 8.1 <==    phos   3.2 <==, 3.2 <==, 2.2 <==, 2.2 <==, 2.8 <==, 2.3 <==    mag   2.7 <==, 3.0 <==, 2.8 <==, 2.6 <==, 2.3 <==, 1.9 <==, 1.9 <==    ============================================================================================  LFTs    AST:   25 <== , 41 <== , 70 <== , 48 <==     ALT:  33  <== , 40  <== , 24  <== , 21  <==     AP:  76  <=, 101  <=, 46  <=, 50  <=    Bili:  0.5  <=, 0.4  <=, 0.3  <=, 0.5  <=      PT/INR - ( 2020 22:22 )   PT: 12.5 sec;   INR: 1.09 ratio      ABG - ( 2020 00:30 )  pH: 7.52  /  pCO2: 37    /  pO2: 56    / HCO3: 31    / Base Excess: 7.6   /  SaO2: 90        ABG - ( 2020 03:20 )  pH: 7.49  /  pCO2: 34    /  pO2: 191   / HCO3: 26    / Base Excess: 3.2   /  SaO2: 99        ABG - ( 2020 00:18 )  pH: 7.50  /  pCO2: 36    /  pO2: 72    / HCO3: 28    / Base Excess: 4.8   /  SaO2: 94        ABG - ( 2020 22:13 )  pH: 7.45  /  pCO2: 42    /  pO2: 49    / HCO3: 28    / Base Excess: 4.4   /  SaO2: 83        ABG - ( 2020 03:16 )  pH: 7.47  /  pCO2: 36    /  pO2: 56    / HCO3: 26    / Base Excess: 3.2   /  SaO2: 89        ABG - ( 2020 03:16 )  pH: 7.47  /  pCO2: 36    /  pO2: 56    / HCO3: 26    / Base Excess: 3.2   /  SaO2: 89        ABG - ( 2020 06:24 )  pH: 7.49  /  pCO2: 29    /  pO2: 53    / HCO3: 22    / Base Excess: -.1   /  SaO2: 89        ABG - ( 2020 00:03 )  pH: 7.46  /  pCO2: 31    /  pO2: 58    / HCO3: 22    / Base Excess: -1.0  /  SaO2: 91        ABG - ( 2020 11:46 )  pH: 7.49  /  pCO2: 29    /  pO2: 58    / HCO3: 22    / Base Excess: -.3   /  SaO2: 91       Procalcitonin, Serum: 0.15 ng/mL ( @ 09:25)    Procalcitonin, Serum: 0.69 ng/mL ( @ 07:45)    Serum Pro-Brain Natriuretic Peptide: 364 pg/mL ( @ 22:30)    CARDIAC MARKERS ( 2020 00:57 )  CPK x     /CKMB x     /CKMB Units x        troponin 17 ng/L    CARDIAC MARKERS ( 2020 22:30 )  CPK x     /CKMB x     /CKMB Units x        troponin 19 ng/L    MICROBIOLOGY:     FLU A B RSV Detection by PCR (20 @ 22:30)    Flu A Result: Detected    Flu B Result: NotDetec    RSV Result: NotDetec    Legionella pneumophila Antigen, Urine (20 @ 09:50)    Legionella Antigen, Urine: Negative    Culture - Blood (20 @ 10:14)    Specimen Source: .Blood Blood-Venous    Culture Results:   No growth to date.    Culture - Blood (20 @ 10:14)    Specimen Source: .Blood Blood-Peripheral    Culture Results:   No growth to date.    RADIOLOGY:  [x] Chest radiographs reviewed and interpreted by me      EXAM:  CT CHEST                          PROCEDURE DATE:  2020      INTERPRETATION:  CLINICAL INFORMATION: Dyspnea    COMPARISON: CT chest 2020    PROCEDURE:   CT of the Chest was performed without intravenous contrast.  [place] coronal reformats were performed.      FINDINGS:    LUNGS AND AIRWAYS: The central airways are patent.  Patchy areas of groundglass opacities containing traction bronchiectasis are noted within both lungs. Addition, superimposed patchy groundglass opacities are also noted. They are new when compared to previous exam.    PLEURA: No pleural effusion.    MEDIASTINUM AND CHAVO: There are scattered small hilar and mediastinal lymph nodes.    VESSELS: Within normal limits.    HEART: The heart size is normal and there is no pericardial effusion.     CHEST WALL AND LOWER NECK: Within normal limits.    VISUALIZED UPPER ABDOMEN: Within normal limits.    BONES: There are multilevel degenerative changes of the spine    IMPRESSION:     Patchy groundglass opacities containing traction bronchiectasis bilaterally is suggestive of pulmonary fibrosis of uncertain etiology.    Groundglass opacities are noted within both lungs. Primary consideration is infection.    BRITNEY VERONICA M.D., RADIOLOGY RESIDENT  This document has been electronically signed.  TAMMIE LOTT M.D., ATTENDING RADIOLOGIST  This document has been electronically signed. 2020  5:04PM  ---------------------------------------------------------------------------------------------------------------    EXAM:  CT ANGIO CHEST (W)AW IC                          PROCEDURE DATE:  2020      INTERPRETATION:  CLINICAL INFORMATION: Shortness of breath and fatigue. History of lung disease.    COMPARISON: Chest radiograph 2020    PROCEDURE:   CT Angiography of the Chest.  90 ml of Omnipaque 350 was injected intravenously. 10 ml were discarded.  Sagittal and coronal reformats were performed as well as 3D (MIP) reconstructions.    FINDINGS:    LUNGS AND AIRWAYS: Patent central airways. Areas of architectural distortion, mild traction bronchiectasis and linear/reticular opacities. Nonspecific groundglass/patchy opacities in both lungs.      PLEURA: No pleural effusion or pneumothorax.    MEDIASTINUM AND CHAVO: Subcentimeter mediastinal and hilar lymph nodes without lymphadenopathy.    VESSELS: Adequate contrast opacification of the pulmonary arteries. No obvious pulmonary embolus.    HEART: Normal heart size. No pericardial effusion.    CHEST WALL AND LOWER NECK: Heterogeneous thyroid gland, obscured by artifact.    VISUALIZED UPPER ABDOMEN: Colon diverticulosis.    BONES: Degenerative changes of the spine.    IMPRESSION:     No obvious pulmonary embolus.    Findings reflecting known pulmonary fibrosis. Recommend clinical correlation to assess underlying pneumonia. Recommend comparison to previous outside study and follow-up to exclude potential underlying neoplasm.    Heterogeneous thyroid gland, which is difficult to assess due to artifact and can be further characterized on a nonemergent ultrasound as clinically indicated.     Additional findings as described.    RAMONA REDDY M.D., RADIOLOGY RESIDENT  This document has been electronically signed.  MIAH CHAUDHRY M.D., ATTENDING RADIOLOGIST  This document has been electronically signed. 2020  1:49AM  ---------------------------------------------------------------------------------------------------------------

## 2020-02-27 LAB
ALBUMIN SERPL ELPH-MCNC: 2.8 G/DL — LOW (ref 3.3–5)
ALP SERPL-CCNC: 76 U/L — SIGNIFICANT CHANGE UP (ref 40–120)
ALT FLD-CCNC: 37 U/L — SIGNIFICANT CHANGE UP (ref 10–45)
ANION GAP SERPL CALC-SCNC: 10 MMOL/L — SIGNIFICANT CHANGE UP (ref 5–17)
AST SERPL-CCNC: 24 U/L — SIGNIFICANT CHANGE UP (ref 10–40)
BILIRUB SERPL-MCNC: 0.5 MG/DL — SIGNIFICANT CHANGE UP (ref 0.2–1.2)
BUN SERPL-MCNC: 35 MG/DL — HIGH (ref 7–23)
CALCIUM SERPL-MCNC: 7.7 MG/DL — LOW (ref 8.4–10.5)
CHLORIDE SERPL-SCNC: 95 MMOL/L — LOW (ref 96–108)
CO2 SERPL-SCNC: 28 MMOL/L — SIGNIFICANT CHANGE UP (ref 22–31)
CREAT SERPL-MCNC: 0.81 MG/DL — SIGNIFICANT CHANGE UP (ref 0.5–1.3)
GAS PNL BLDA: SIGNIFICANT CHANGE UP
GLUCOSE BLDC GLUCOMTR-MCNC: 105 MG/DL — HIGH (ref 70–99)
GLUCOSE BLDC GLUCOMTR-MCNC: 147 MG/DL — HIGH (ref 70–99)
GLUCOSE BLDC GLUCOMTR-MCNC: 191 MG/DL — HIGH (ref 70–99)
GLUCOSE BLDC GLUCOMTR-MCNC: 230 MG/DL — HIGH (ref 70–99)
GLUCOSE SERPL-MCNC: 212 MG/DL — HIGH (ref 70–99)
HCT VFR BLD CALC: 34.5 % — LOW (ref 39–50)
HGB BLD-MCNC: 11.5 G/DL — LOW (ref 13–17)
MAGNESIUM SERPL-MCNC: 2.7 MG/DL — HIGH (ref 1.6–2.6)
MCHC RBC-ENTMCNC: 30.3 PG — SIGNIFICANT CHANGE UP (ref 27–34)
MCHC RBC-ENTMCNC: 33.3 GM/DL — SIGNIFICANT CHANGE UP (ref 32–36)
MCV RBC AUTO: 90.8 FL — SIGNIFICANT CHANGE UP (ref 80–100)
NRBC # BLD: 0 /100 WBCS — SIGNIFICANT CHANGE UP (ref 0–0)
PHOSPHATE SERPL-MCNC: 3.8 MG/DL — SIGNIFICANT CHANGE UP (ref 2.5–4.5)
PLATELET # BLD AUTO: 148 K/UL — LOW (ref 150–400)
POTASSIUM SERPL-MCNC: 5 MMOL/L — SIGNIFICANT CHANGE UP (ref 3.5–5.3)
POTASSIUM SERPL-SCNC: 5 MMOL/L — SIGNIFICANT CHANGE UP (ref 3.5–5.3)
PROT SERPL-MCNC: 5.6 G/DL — LOW (ref 6–8.3)
RBC # BLD: 3.8 M/UL — LOW (ref 4.2–5.8)
RBC # FLD: 14 % — SIGNIFICANT CHANGE UP (ref 10.3–14.5)
SODIUM SERPL-SCNC: 133 MMOL/L — LOW (ref 135–145)
VANCOMYCIN TROUGH SERPL-MCNC: 6.7 UG/ML — LOW (ref 10–20)
WBC # BLD: 20.53 K/UL — HIGH (ref 3.8–10.5)
WBC # FLD AUTO: 20.53 K/UL — HIGH (ref 3.8–10.5)

## 2020-02-27 PROCEDURE — 99291 CRITICAL CARE FIRST HOUR: CPT

## 2020-02-27 RX ORDER — TAMSULOSIN HYDROCHLORIDE 0.4 MG/1
0.4 CAPSULE ORAL AT BEDTIME
Refills: 0 | Status: DISCONTINUED | OUTPATIENT
Start: 2020-02-27 | End: 2020-03-02

## 2020-02-27 RX ORDER — CLONAZEPAM 1 MG
1 TABLET ORAL DAILY
Refills: 0 | Status: DISCONTINUED | OUTPATIENT
Start: 2020-02-27 | End: 2020-03-02

## 2020-02-27 RX ORDER — VANCOMYCIN HCL 1 G
1250 VIAL (EA) INTRAVENOUS EVERY 12 HOURS
Refills: 0 | Status: DISCONTINUED | OUTPATIENT
Start: 2020-02-27 | End: 2020-02-29

## 2020-02-27 RX ORDER — LACTULOSE 10 G/15ML
20 SOLUTION ORAL ONCE
Refills: 0 | Status: COMPLETED | OUTPATIENT
Start: 2020-02-27 | End: 2020-02-27

## 2020-02-27 RX ORDER — SENNA PLUS 8.6 MG/1
2 TABLET ORAL AT BEDTIME
Refills: 0 | Status: DISCONTINUED | OUTPATIENT
Start: 2020-02-27 | End: 2020-03-02

## 2020-02-27 RX ADMIN — Medication 75 MILLIGRAM(S): at 00:17

## 2020-02-27 RX ADMIN — Medication 20 MILLIGRAM(S): at 05:45

## 2020-02-27 RX ADMIN — Medication 1 MILLIGRAM(S): at 00:40

## 2020-02-27 RX ADMIN — CEFEPIME 100 MILLIGRAM(S): 1 INJECTION, POWDER, FOR SOLUTION INTRAMUSCULAR; INTRAVENOUS at 15:40

## 2020-02-27 RX ADMIN — Medication 650 MILLIGRAM(S): at 16:10

## 2020-02-27 RX ADMIN — Medication 200 MILLIGRAM(S): at 05:44

## 2020-02-27 RX ADMIN — Medication 1 MILLIGRAM(S): at 09:15

## 2020-02-27 RX ADMIN — ATORVASTATIN CALCIUM 10 MILLIGRAM(S): 80 TABLET, FILM COATED ORAL at 21:48

## 2020-02-27 RX ADMIN — TAMSULOSIN HYDROCHLORIDE 0.4 MILLIGRAM(S): 0.4 CAPSULE ORAL at 10:38

## 2020-02-27 RX ADMIN — Medication 200 MILLIGRAM(S): at 15:40

## 2020-02-27 RX ADMIN — Medication 30 MILLIGRAM(S): at 13:15

## 2020-02-27 RX ADMIN — ENOXAPARIN SODIUM 40 MILLIGRAM(S): 100 INJECTION SUBCUTANEOUS at 13:15

## 2020-02-27 RX ADMIN — Medication 10 MILLILITER(S): at 05:44

## 2020-02-27 RX ADMIN — Medication 10 MILLILITER(S): at 13:15

## 2020-02-27 RX ADMIN — CHLORHEXIDINE GLUCONATE 1 APPLICATION(S): 213 SOLUTION TOPICAL at 05:49

## 2020-02-27 RX ADMIN — Medication 10 MILLILITER(S): at 18:20

## 2020-02-27 RX ADMIN — CEFEPIME 100 MILLIGRAM(S): 1 INJECTION, POWDER, FOR SOLUTION INTRAMUSCULAR; INTRAVENOUS at 21:48

## 2020-02-27 RX ADMIN — CEFEPIME 100 MILLIGRAM(S): 1 INJECTION, POWDER, FOR SOLUTION INTRAMUSCULAR; INTRAVENOUS at 05:45

## 2020-02-27 RX ADMIN — LACTULOSE 20 GRAM(S): 10 SOLUTION ORAL at 10:38

## 2020-02-27 RX ADMIN — Medication 15 MILLIGRAM(S): at 21:49

## 2020-02-27 RX ADMIN — Medication 1 MILLIGRAM(S): at 13:15

## 2020-02-27 RX ADMIN — Medication 166.67 MILLIGRAM(S): at 15:40

## 2020-02-27 RX ADMIN — Medication 166.67 MILLIGRAM(S): at 03:06

## 2020-02-27 RX ADMIN — SENNA PLUS 2 TABLET(S): 8.6 TABLET ORAL at 21:48

## 2020-02-27 RX ADMIN — Medication 650 MILLIGRAM(S): at 15:40

## 2020-02-27 RX ADMIN — Medication 15 MILLIGRAM(S): at 15:40

## 2020-02-27 RX ADMIN — Medication 2: at 18:20

## 2020-02-27 RX ADMIN — Medication 15 MILLIGRAM(S): at 05:44

## 2020-02-27 RX ADMIN — Medication 1 MILLIGRAM(S): at 16:17

## 2020-02-27 RX ADMIN — Medication 240 MILLIGRAM(S): at 05:45

## 2020-02-27 RX ADMIN — LOSARTAN POTASSIUM 100 MILLIGRAM(S): 100 TABLET, FILM COATED ORAL at 05:46

## 2020-02-27 RX ADMIN — Medication 10 MILLILITER(S): at 00:17

## 2020-02-27 RX ADMIN — Medication 75 MILLIGRAM(S): at 13:15

## 2020-02-27 RX ADMIN — Medication 200 MILLIGRAM(S): at 21:49

## 2020-02-27 RX ADMIN — Medication 4: at 13:23

## 2020-02-27 RX ADMIN — POLYETHYLENE GLYCOL 3350 17 GRAM(S): 17 POWDER, FOR SOLUTION ORAL at 13:15

## 2020-02-27 NOTE — PROGRESS NOTE ADULT - ASSESSMENT
ASSESSMENT:    hypoxic respiratory failure - multifactorial - no evidence of hypercapnia on ABG    1) influenza infection complicated by bronchospasm and pneumonia (likely viral infection in the absence of a leukocytosis - the initially mildly elevated procalcitonin level is now normal) - leukocytosis now is likely due to high dose steroids at this point  2) underlying mild pulmonary fibrosis with traction bronchiectasis    PLAN/RECOMMENDATIONS:    high flow nasal canula 85% FiO2 @ 50lpm - keep saturation greater than 88% - taper as able  BIPAP for increased work of breathing, resistant hypoxemia or respiratory acidosis  would continue tamiflu for 10 days days given ongoing pneumonia  cefepime/vancomycin  IV steroids being rapidly tapered by ICU team  glucose control on steroids  off albuterol/atrovent nebs to q6h  offpulmicort 0.5mg nebs q12h  robitussin DM/tessalon  buspar/klonopin/paxil  DVT prophylaxis - SQ lovenox  urology consult noted - flomax - trial of void    Will follow with you. Plan of care discussed with the patient and his wife at bedside and the MICU team.    Galileo Pop MD, [facility]  [phone]  Pulmonary Medicine

## 2020-02-27 NOTE — DIETITIAN INITIAL EVALUATION ADULT. - ENERGY NEEDS
Ht: 72"  Wt: 238  BMI: 32.4 kg/m2   IBW: 178 (+/-10%)     134% IBW  Edema: 1+ B/L edema        Skin: no pressure injuries documented

## 2020-02-27 NOTE — PROGRESS NOTE ADULT - ASSESSMENT
69M with PMH of pulmonary fibrosis (followed by Dr. Dia), HTN, HLD, depression, and anxiety who initially presented 2/20/20 following a fall at home, admitted with acute hypoxic respiratory failure in setting of influenza with concern for superimposed PNA. Course c/b worsening respiratory failure with high oxygen requirements, transferred to MICU for close monitoring.     NEURO:   - Currently AAOx3, slightly lethargic, answers questions appropriately  - C/w home clonazepam PRN anxiety, hold for sedation  - C/w home buspirone  - C/w home paroxetine    PULM:   # Acute hypoxic respiratory failure:   Multifactorial related to influenza infection with high suspicion for superimposed bacterial PNA in setting of underlying lung disease. May also have component of aspiration pneumonitis.   - C/w HFNC, wean FiO2 as tolerated  - Maintain saturation >88%  - Broaden antibiotics (see below)  - C/w Tamiflu for total 10 days (2/20-2/29)  - C/w duonebs q6h PRN  - Continue to taper down on steroids     # Pulmonary fibrosis:   - Appreciate Pulm recs  - As per pt's pulmonologist group (Dr. Dia/Dr. Pop): Seen for chronic cough, hx of "insignificant pulmonary fibrosis," PFTs (2017) was wnl aside from diffusion capacity of 74%, and CT Chest (2013) showed no evidence of pulm fibrosis or other significant pulm pathology    CARDIOVASCULAR:   # Hypertension  - No evidence of fluid overload   - Continue to monitor  - c/w Diltiazem 240mg PO daily  - c/w Losartan 100mg PO daily (on olmesartan 40mg daily at home)  - Cardiology following (Dr. Lucero), appreciate recs    GI:   - C/w regular diet (1L fluid restriction) for now  - GI following (Dr. Kraft), appreciate recs  # Constipation  - senna 2 tabs qhs and miralax daily    RENAL/:   # Urinary retention:   Likely related to medication side effect s/p Scruggs placement.   - TOV today  - C/w doxazosin  - Monitor urine output  - Trend BMP    ENDOCRINE:   - Insulin sliding scale for steroid-induced hyperglycemia    HEMATOLOGIC:   # Leukocytosis:   Likely secondary to steroids but cannot r/o worsening infection.   - c/w broad-spectrum antibiotics  - Continue to trend CBC with differential    ID:   # Influenza with suspected superimposed bacterial PNA:   - c/w vancomycin 1g q12 and cefepime 2g q8  - C/w Tamiflu for total 10 days as above  - Trend WBC count, fever curve  - Reculture and consider repeat RVP if febrile    PROPHYLAXIS:   - DVT ppx: Lovenox    CODE STATUS:  - Wife is HCP and agrees to bring in form from home. Agreeable to trial of intubation if needed. 69M with PMH of pulmonary fibrosis (followed by Dr. Dia), HTN, HLD, depression, and anxiety who initially presented 2/20/20 following a fall at home, admitted with acute hypoxic respiratory failure in setting of influenza with concern for superimposed PNA. Course c/b worsening respiratory failure with high oxygen requirements, transferred to MICU for close monitoring.     NEURO:   - Currently AAOx3, slightly lethargic, answers questions appropriately  - C/w home clonazepam standing 1mg PO daily and 1mg PO TID PRN for anxiety, hold for sedation  - C/w home buspirone 15mg PO TID  - C/w home paroxetine 30mg PO daily    PULM:   # Acute hypoxic respiratory failure   Multifactorial related to influenza infection with high suspicion for superimposed bacterial PNA in setting of underlying lung disease. May also have component of aspiration pneumonitis.   - C/w HFNC, can wean FiO2 as tolerated as long as saturation maintained >90%  - C/w broad-spectrum antibiotics  - C/w Tamiflu for total 10 days (2/20-2/29)  - C/w duonebs q6h PRN  - Continue to taper down on steroids (to be dc'ed on 2/27)  - Acapella valve to loosen up secretions    # Pulmonary fibrosis:   - Appreciate Pulm recs  - As per pt's pulmonologist group (Dr. Dia/Dr. Pop): Seen for chronic cough, hx of "insignificant pulmonary fibrosis," PFTs (2017) was wnl aside from diffusion capacity of 74%, and CT Chest (2013) showed no evidence of pulm fibrosis or other significant pulm pathology    CARDIOVASCULAR:   # HTN  - No evidence of fluid overload   - Continue to monitor  - c/w Diltiazem 240mg PO daily  - c/w Losartan 100mg PO daily (on olmesartan 40mg daily at home)  - Cardiology following (Dr. Lucero), appreciate recs    GI:   - C/w regular diet (1L fluid restriction) for now  - GI following (Dr. Kraft), appreciate recs  # Constipation  - senna 2 tabs qhs and miralax daily  - lactuose 20g PO x1 given    RENAL/:   # Urinary retention:   Likely related to medication side effect s/p Scruggs placement.   - c/w TOV today  - Started on tamsulosin, dc'ed doxazosin  - Monitor urine output  - Trend BMP    ENDOCRINE:   - Insulin sliding scale for steroid-induced hyperglycemia    HEMATOLOGIC:   # Leukocytosis, improving   Likely secondary to steroids but cannot r/o worsening infection  - c/w broad-spectrum antibiotics  - Continue to trend CBC with differential    ID:   # Influenza with suspected superimposed bacterial PNA:   - c/w vancomycin 1g q12 and cefepime 2g q8  - C/w Tamiflu for total 10 days as above  - Trend WBC count, fever curve  - Reculture and consider repeat RVP if febrile    PROPHYLAXIS:   - DVT ppx: Lovenox    CODE STATUS:  - Wife is HCP and agrees to bring in form from home. Agreeable to trial of intubation if needed.

## 2020-02-27 NOTE — PROGRESS NOTE ADULT - ATTENDING COMMENTS
1. Acute hypoxemic respiratory failure due to influenza pneumonia and bacterial pneumonia. Pt still requiring 70-80% hi flow 02. Continue high flow 02. Continue Tamiflu and Vancomycin ans Cefepime. Continue to taper off steroids. H/O  Pulmonary fibrosis with minimal lung impact at this point. Acapella valve to help  mobilize secretions.   2. HTN. Restart ARB  3. BPH.. Requiring straight cath after d/c Scruggs catheter. Start Tamsulosin.  4. Anxiety disorder: Klonopin prn plus standing dose. Pt is very anxious.

## 2020-02-27 NOTE — PROGRESS NOTE ADULT - SUBJECTIVE AND OBJECTIVE BOX
CC: f/u for Flu & PNA    Patient reports feeling better today. His cough is chronic & he is not SOB. No fevers.     REVIEW OF SYSTEMS:  All other review of systems negative (Comprehensive ROS)    Antimicrobials Day #  : 8  cefepime   IVPB  ---> D # 3  cefepime   IVPB 2000 milliGRAM(s) IV Intermittent every 8 hours  oseltamivir 75 milliGRAM(s) Oral two times a day ---> D # 8/10  vancomycin  IVPB 1250 milliGRAM(s) IV Intermittent every 12 hours ---> D # 3    Other Medications Reviewed    T(F): 99.1 (02-27-20 @ 08:00), Max: 99.1 (02-27-20 @ 08:00)  HR: 87 (02-27-20 @ 10:00)  BP: 150/79 (02-27-20 @ 10:00)  RR: 27 (02-27-20 @ 10:00)  SpO2: 97% (02-27-20 @ 10:00)  Wt(kg): --    PHYSICAL EXAM:  General: alert, no acute distress  Eyes:  anicteric, no conjunctival injection, no discharge  Oropharynx: high flow O2 support 	  Neck: supple, without adenopathy  Lungs: bibasilar rales  Heart: regular rate and rhythm; no murmurs  Abdomen: soft, nondistended, nontender.   Skin: no lesions  Extremities: trace edema  Neurologic: alert, oriented, moves all extremities    LAB RESULTS:                        11.5   20.53 )-----------( 148      ( 27 Feb 2020 00:22 )             34.5     02-27    133<L>  |  95<L>  |  35<H>  ----------------------------<  212<H>  5.0   |  28  |  0.81    Ca    7.7<L>      27 Feb 2020 00:22  Phos  3.8     02-27  Mg     2.7     02-27    TPro  5.6<L>  /  Alb  2.8<L>  /  TBili  0.5  /  DBili  x   /  AST  24  /  ALT  37  /  AlkPhos  76  02-27    LIVER FUNCTIONS - ( 27 Feb 2020 00:22 )  Alb: 2.8 g/dL / Pro: 5.6 g/dL / ALK PHOS: 76 U/L / ALT: 37 U/L / AST: 24 U/L / GGT: x             MICROBIOLOGY:  RECENT CULTURES:        RADIOLOGY REVIEWED:

## 2020-02-27 NOTE — PROGRESS NOTE ADULT - SUBJECTIVE AND OBJECTIVE BOX
[facility] - PROGRESS NOTE    CHIEF COMPLAINT: hypoxic respiratory failure; influenza; pneumonia; bronchospasm; pulmonary fibrosis;     INTERVAL HISTORY: dejected - becoming quite debilitated - unable to get out of bed and into the chair without rapid oxygen desaturation - constipated - poor appetite; remains in the MICU for closer clinical observation given tenuous respiratory status with increasing groundglass opacities on follow-up chest CT; continues on high level high flow nasal canula; cough productive of dark sputum without chest congestion or wheeze; no fevers, chills or sweats; no chest pain/pressure or palpitations; urinary retention    REVIEW OF SYSTEMS:  Constitutional: As per interval history  HEENT: Within normal limits  CV: As per interval history  Resp: As per interval history  GI: Within normal limits   : urinary retention  Musculoskeletal: Within normal limits  Skin: Within normal limits  Neurological: Within normal limits  Psychiatric: anxiety type depression  Endocrine: Within normal limits  Hematologic/Lymphatic: Within normal limits  Allergic/Immunologic: Within normal limits    MEDICATIONS:     Pulmonary "  benzonatate 200 milliGRAM(s) Oral three times a day  guaifenesin/dextromethorphan  Syrup 10 milliLiter(s) Oral every 6 hours    Anti-microbials:  cefepime   IVPB      cefepime   IVPB 2000 milliGRAM(s) IV Intermittent every 8 hours  oseltamivir 75 milliGRAM(s) Oral two times a day  vancomycin  IVPB 1250 milliGRAM(s) IV Intermittent every 12 hours    Cardiovascular:  diltiazem    milliGRAM(s) Oral daily  losartan 100 milliGRAM(s) Oral daily  tamsulosin 0.4 milliGRAM(s) Oral at bedtime    Other:  atorvastatin 10 milliGRAM(s) Oral at bedtime  busPIRone 15 milliGRAM(s) Oral three times a day  chlorhexidine 4% Liquid 1 Application(s) Topical <User Schedule>  clonazePAM  Tablet 1 milliGRAM(s) Oral daily  dextrose 5%. 1000 milliLiter(s) IV Continuous <Continuous>  dextrose 50% Injectable 12.5 Gram(s) IV Push once  dextrose 50% Injectable 25 Gram(s) IV Push once  dextrose 50% Injectable 25 Gram(s) IV Push once  enoxaparin Injectable 40 milliGRAM(s) SubCutaneous daily  insulin lispro (HumaLOG) corrective regimen sliding scale   SubCutaneous three times a day before meals  insulin lispro (HumaLOG) corrective regimen sliding scale   SubCutaneous at bedtime  methylPREDNISolone sodium succinate Injectable 20 milliGRAM(s) IV Push daily  PARoxetine 30 milliGRAM(s) Oral daily  polyethylene glycol 3350 17 Gram(s) Oral daily  senna 2 Tablet(s) Oral at bedtime    MEDICATIONS  (PRN):  acetaminophen   Tablet .. 650 milliGRAM(s) Oral every 6 hours PRN Temp greater or equal to 38C (100.4F), Mild Pain (1 - 3), Moderate Pain (4 - 6)  albuterol/ipratropium for Nebulization 3 milliLiter(s) Nebulizer every 6 hours PRN Shortness of Breath and/or Wheezing  clonazePAM  Tablet 1 milliGRAM(s) Oral three times a day PRN anxiety  dextrose 40% Gel 15 Gram(s) Oral once PRN Blood Glucose LESS THAN 70 milliGRAM(s)/deciliter  glucagon  Injectable 1 milliGRAM(s) IntraMuscular once PRN Glucose LESS THAN 70 milligrams/deciliter        OBJECTIVE:    I&O's Detail    2020 07:01  -  2020 07:00  --------------------------------------------------------  IN:    Oral Fluid: 990 mL    Solution: 650 mL  Total IN: 1640 mL    OUT:    Incontinent per Condom Catheter: 1500 mL    Indwelling Catheter - Urethral: 400 mL  Total OUT: 1900 mL    Total NET: -260 mL    Daily Height in cm: 182.9 (2020 10:58)      Daily Weight in k (2020 04:00)    POCT Blood Glucose.: 147 mg/dL (2020 09:30)  POCT Blood Glucose.: 192 mg/dL (2020 21:27)  POCT Blood Glucose.: 149 mg/dL (2020 17:56)  POCT Blood Glucose.: 254 mg/dL (2020 13:04)      PHYSICAL EXAM:       ICU Vital Signs Last 24 Hrs  T(C): 37.3 (2020 08:00), Max: 37.3 (2020 08:00)  T(F): 99.1 (2020 08:00), Max: 99.1 (2020 08:00)  HR: 87 (2020 10:00) (61 - 92)  BP: 150/79 (2020 10:00) (134/64 - 171/81)  BP(mean): 106 (2020 10:00) (89 - 116)  ABP: --  ABP(mean): --  RR: 27 (2020 10:00) (17 - 31)  SpO2: 97% (2020 10:00) (88% - 100%) on 85% high flow nasal canula     General: Awake. Alert. Cooperative. No distress. Appears stated age. Stronger.  HEENT: Atraumatic. Normocephalic. Anicteric. Normal oral mucosa. PERRL. EOMI.  Neck: Supple. Trachea midline. Thyroid without enlargement/tenderness/nodules. No carotid bruit. No JVD.	  Cardiovascular: Regular rate and rhythm. S1 S2 normal. No murmurs, rubs or gallops.  Respiratory: Respirations unlabored. Scattered rales. No curvature.  Abdomen: Soft. Non-tender. Non-distended. No organomegaly. No masses. Normal bowel sounds. Obese.  Extremities: Warm to touch. No clubbing or cyanosis. No pedal edema.  Pulses: 2+ peripheral pulses all extremities.	  Skin: Normal skin color. No rashes or lesions. No ecchymoses. No cyanosis. Warm to touch.  Lymph Nodes: Cervical, supraclavicular and axillary nodes normal  Neurological: Motor and sensory examination equal and normal. A and O x 3  Psychiatry: Appropriate mood and affect.    LABS:                          11.5   20.53 )-----------( 148      ( 2020 00:22 )             34.5     CBC    WBC  20.53 <==, 25.18 <==, 24.08 <==, 20.41 <==, 21.64 <==, 21.59 <==, 18.56 <==    Hemoglobin  11.5 <<==, 12.0 <<==, 13.1 <<==, 12.7 <<==, 12.7 <<==, 12.8 <<==, 11.9 <<==    Hematocrit  34.5 <==, 35.7 <==, 38.4 <==, 37.3 <==, 37.2 <==, 35.8 <==, 33.8 <==    Platelets  148 <==, 143 <==, 121 <==, 112 <==, 120 <==, 118 <==, 139 <==      133<L>  |  95<L>  |  35<H>  ----------------------------<  212<H>    02-27  5.0   |  28  |  0.81      LYTES    sodium  133 <==, 134 <==, 138 <==, 132 <==, 134 <==, 133 <==, 135 <==    potassium   5.0 <==, 4.5 <==, 4.5 <==, 4.8 <==, 4.5 <==, 4.4 <==, 4.6 <==    chloride  95 <==, 94 <==, 97 <==, 95 <==, 95 <==, 95 <==, 94 <==    carbon dioxide  28 <==, 28 <==, 29 <==, 23 <==, 21 <==, 25 <==, 21 <==    =============================================================================================  RENAL FUNCTION:    Creatinine:   0.81  <<==, 0.87  <<==, 0.88  <<==, 0.87  <<==, 0.85  <<==, 0.87  <<==, 0.82  <<==    BUN:   35 <==, 36 <==, 32 <==, 29 <==, 28 <==, 28 <==, 26 <==    ============================================================================================    calcium   7.7 <==, 7.9 <==, 8.4 <==, 8.0 <==, 8.0 <==, 8.2 <==, 8.2 <==    phos   3.8 <==, 3.2 <==, 3.2 <==, 2.2 <==, 2.2 <==, 2.8 <==    mag   2.7 <==, 2.7 <==, 3.0 <==, 2.8 <==, 2.6 <==, 2.3 <==, 1.9 <==    ============================================================================================  LFTs    AST:   24 <== , 25 <== , 41 <== , 70 <==     ALT:  37  <== , 33  <== , 40  <== , 24  <==     AP:  76  <=, 76  <=, 101  <=, 46  <=    Bili:  0.5  <=, 0.5  <=, 0.4  <=, 0.3  <=      PT/INR - ( 2020 22:22 )   PT: 12.5 sec;   INR: 1.09 ratio      ABG - ( 2020 00:22 )  pH: 7.51  /  pCO2: 38    /  pO2: 56    / HCO3: 30    / Base Excess: 7.2   /  SaO2: 89        ABG - ( 2020 00:30 )  pH: 7.52  /  pCO2: 37    /  pO2: 56    / HCO3: 31    / Base Excess: 7.6   /  SaO2: 90        ABG - ( 2020 03:20 )  pH: 7.49  /  pCO2: 34    /  pO2: 191   / HCO3: 26    / Base Excess: 3.2   /  SaO2: 99        ABG - ( 2020 00:18 )  pH: 7.50  /  pCO2: 36    /  pO2: 72    / HCO3: 28    / Base Excess: 4.8   /  SaO2: 94        ABG - ( 2020 22:13 )  pH: 7.45  /  pCO2: 42    /  pO2: 49    / HCO3: 28    / Base Excess: 4.4   /  SaO2: 83        ABG - ( 2020 03:16 )  pH: 7.47  /  pCO2: 36    /  pO2: 56    / HCO3: 26    / Base Excess: 3.2   /  SaO2: 89        ABG - ( 2020 03:16 )  pH: 7.47  /  pCO2: 36    /  pO2: 56    / HCO3: 26    / Base Excess: 3.2   /  SaO2: 89        ABG - ( 2020 06:24 )  pH: 7.49  /  pCO2: 29    /  pO2: 53    / HCO3: 22    / Base Excess: -.1   /  SaO2: 89        ABG - ( 2020 00:03 )  pH: 7.46  /  pCO2: 31    /  pO2: 58    / HCO3: 22    / Base Excess: -1.0  /  SaO2: 91        ABG - ( 2020 11:46 )  pH: 7.49  /  pCO2: 29    /  pO2: 58    / HCO3: 22    / Base Excess: -.3   /  SaO2: 91       Procalcitonin, Serum: 0.15 ng/mL ( @ 09:25)    Procalcitonin, Serum: 0.69 ng/mL ( @ 07:45)    Serum Pro-Brain Natriuretic Peptide: 364 pg/mL ( @ 22:30)    CARDIAC MARKERS ( 2020 00:57 )  CPK x     /CKMB x     /CKMB Units x        troponin 17 ng/L    CARDIAC MARKERS ( 2020 22:30 )  CPK x     /CKMB x     /CKMB Units x        troponin 19 ng/L    MICROBIOLOGY:     FLU A B RSV Detection by PCR (20 @ 22:30)    Flu A Result: Detected    Flu B Result: NotDetec    RSV Result: NotDetec    Legionella pneumophila Antigen, Urine (20 @ 09:50)    Legionella Antigen, Urine: Negative    Culture - Blood (20 @ 10:14)    Specimen Source: .Blood Blood-Venous    Culture Results:   No growth to date.    Culture - Blood (20 @ 10:14)    Specimen Source: .Blood Blood-Peripheral    Culture Results:   No growth to date.    RADIOLOGY:  [x] Chest radiographs reviewed and interpreted by me      EXAM:  CT CHEST                          PROCEDURE DATE:  2020      INTERPRETATION:  CLINICAL INFORMATION: Dyspnea    COMPARISON: CT chest 2020    PROCEDURE:   CT of the Chest was performed without intravenous contrast.  [place] coronal reformats were performed.      FINDINGS:    LUNGS AND AIRWAYS: The central airways are patent.  Patchy areas of groundglass opacities containing traction bronchiectasis are noted within both lungs. Addition, superimposed patchy groundglass opacities are also noted. They are new when compared to previous exam.    PLEURA: No pleural effusion.    MEDIASTINUM AND CHAVO: There are scattered small hilar and mediastinal lymph nodes.    VESSELS: Within normal limits.    HEART: The heart size is normal and there is no pericardial effusion.     CHEST WALL AND LOWER NECK: Within normal limits.    VISUALIZED UPPER ABDOMEN: Within normal limits.    BONES: There are multilevel degenerative changes of the spine    IMPRESSION:     Patchy groundglass opacities containing traction bronchiectasis bilaterally is suggestive of pulmonary fibrosis of uncertain etiology.    Groundglass opacities are noted within both lungs. Primary consideration is infection.    BRITNEY VERONICA M.D., RADIOLOGY RESIDENT  This document has been electronically signed.  TAMMIE LOTT M.D., ATTENDING RADIOLOGIST  This document has been electronically signed. 2020  5:04PM  ---------------------------------------------------------------------------------------------------------------    EXAM:  CT ANGIO CHEST (W)AW IC                          PROCEDURE DATE:  2020      INTERPRETATION:  CLINICAL INFORMATION: Shortness of breath and fatigue. History of lung disease.    COMPARISON: Chest radiograph 2020    PROCEDURE:   CT Angiography of the Chest.  90 ml of Omnipaque 350 was injected intravenously. 10 ml were discarded.  Sagittal and coronal reformats were performed as well as 3D (MIP) reconstructions.    FINDINGS:    LUNGS AND AIRWAYS: Patent central airways. Areas of architectural distortion, mild traction bronchiectasis and linear/reticular opacities. Nonspecific groundglass/patchy opacities in both lungs.      PLEURA: No pleural effusion or pneumothorax.    MEDIASTINUM AND CHAVO: Subcentimeter mediastinal and hilar lymph nodes without lymphadenopathy.    VESSELS: Adequate contrast opacification of the pulmonary arteries. No obvious pulmonary embolus.    HEART: Normal heart size. No pericardial effusion.    CHEST WALL AND LOWER NECK: Heterogeneous thyroid gland, obscured by artifact.    VISUALIZED UPPER ABDOMEN: Colon diverticulosis.    BONES: Degenerative changes of the spine.    IMPRESSION:     No obvious pulmonary embolus.    Findings reflecting known pulmonary fibrosis. Recommend clinical correlation to assess underlying pneumonia. Recommend comparison to previous outside study and follow-up to exclude potential underlying neoplasm.    Heterogeneous thyroid gland, which is difficult to assess due to artifact and can be further characterized on a nonemergent ultrasound as clinically indicated.     Additional findings as described.    RAMONA REDDY M.D., RADIOLOGY RESIDENT  This document has been electronically signed.  MIAH CHAUDHRY M.D., ATTENDING RADIOLOGIST  This document has been electronically signed. 2020  1:49AM  ---------------------------------------------------------------------------------------------------------------

## 2020-02-27 NOTE — DIETITIAN INITIAL EVALUATION ADULT. - OTHER INFO
Pt seen for: MICU Length Of Stay   Adm dx: SOB, AURA MICU adm for respiratory failure    GI issues: denies N/V, c/o constipation, last BM 2/22 (on bowel regimen)  Food allergies/Intolerances: mussels  Vit/supplement PTA: B12, multivitamin     Diet PTA: no diet restrictions PTA     Subjective/Objective information: pt reports decreased po intake PTA and since adm, is drinking Ensure. reports usual wt 235 lb, dosing wt 2/20 261 lb, wt today 2/27 238 lb, ? discrepancy.    Education: pt declined any written education at this time, discussed current fluid restriction and indication

## 2020-02-27 NOTE — DIETITIAN INITIAL EVALUATION ADULT. - PROBLEM SELECTOR PLAN 5
pt septic on arrival; CT chest cannot r/o underlying PNA  given severity of pts hypoxia and sepsis, will treat for CAP with ceftriaxone and azithromycin  f/u urine legionella   c/w O2 supplementation

## 2020-02-27 NOTE — DIETITIAN INITIAL EVALUATION ADULT. - PHYSICAL APPEARANCE
overweight/Nutrition Focused Physical Assessment done w/ pt consent, no overt signs of fat, muscle loss

## 2020-02-27 NOTE — DIETITIAN INITIAL EVALUATION ADULT. - PERTINENT MEDS FT
MEDICATIONS  (STANDING):  atorvastatin 10 milliGRAM(s) Oral at bedtime  benzonatate 200 milliGRAM(s) Oral three times a day  busPIRone 15 milliGRAM(s) Oral three times a day  cefepime   IVPB      cefepime   IVPB 2000 milliGRAM(s) IV Intermittent every 8 hours  chlorhexidine 4% Liquid 1 Application(s) Topical <User Schedule>  clonazePAM  Tablet 1 milliGRAM(s) Oral daily  dextrose 5%. 1000 milliLiter(s) (50 mL/Hr) IV Continuous <Continuous>  dextrose 50% Injectable 12.5 Gram(s) IV Push once  dextrose 50% Injectable 25 Gram(s) IV Push once  dextrose 50% Injectable 25 Gram(s) IV Push once  diltiazem    milliGRAM(s) Oral daily  enoxaparin Injectable 40 milliGRAM(s) SubCutaneous daily  guaifenesin/dextromethorphan  Syrup 10 milliLiter(s) Oral every 6 hours  insulin lispro (HumaLOG) corrective regimen sliding scale   SubCutaneous three times a day before meals  insulin lispro (HumaLOG) corrective regimen sliding scale   SubCutaneous at bedtime  losartan 100 milliGRAM(s) Oral daily  methylPREDNISolone sodium succinate Injectable 20 milliGRAM(s) IV Push daily  oseltamivir 75 milliGRAM(s) Oral two times a day  PARoxetine 30 milliGRAM(s) Oral daily  polyethylene glycol 3350 17 Gram(s) Oral daily  senna 2 Tablet(s) Oral at bedtime  tamsulosin 0.4 milliGRAM(s) Oral at bedtime  vancomycin  IVPB 1250 milliGRAM(s) IV Intermittent every 12 hours    MEDICATIONS  (PRN):  acetaminophen   Tablet .. 650 milliGRAM(s) Oral every 6 hours PRN Temp greater or equal to 38C (100.4F), Mild Pain (1 - 3), Moderate Pain (4 - 6)  albuterol/ipratropium for Nebulization 3 milliLiter(s) Nebulizer every 6 hours PRN Shortness of Breath and/or Wheezing  clonazePAM  Tablet 1 milliGRAM(s) Oral three times a day PRN anxiety  dextrose 40% Gel 15 Gram(s) Oral once PRN Blood Glucose LESS THAN 70 milliGRAM(s)/deciliter  glucagon  Injectable 1 milliGRAM(s) IntraMuscular once PRN Glucose LESS THAN 70 milligrams/deciliter

## 2020-02-27 NOTE — DIETITIAN INITIAL EVALUATION ADULT. - PROBLEM SELECTOR PLAN 4
AURA with Cr 1.4, improving now with IVF  pre-renal in setting of sepsis vs obstructive (pt with 900cc output during straight cath)   c/t monitor Cr  renally dose medications

## 2020-02-27 NOTE — PROGRESS NOTE ADULT - SUBJECTIVE AND OBJECTIVE BOX
CHIEF COMPLAINT: SOB, cough and weakness    OVERNIGHT / SUBJECTIVE:  -Overnight, no acute events. High flow FiO2 was at 70% to about midnight, then changed back to 80%.  -Today, pt seen and examined at bedside in AM. Last BM ~5 days ago.    REVIEW OF SYSTEMS:    CONSTITUTIONAL: No weakness, fevers or chills  EYES/ENT: No visual changes;  No vertigo or throat pain   NECK: No pain or stiffness  RESPIRATORY: +cough (improved, also has chronic cough); No wheezing, hemoptysis; No shortness of breath  CARDIOVASCULAR: No chest pain or palpitations  GASTROINTESTINAL: No abdominal or epigastric pain. No nausea, vomiting, or hematemesis; No diarrhea or constipation. No melena or hematochezia.  GENITOURINARY: No dysuria, frequency or hematuria  NEUROLOGICAL: No numbness or weakness  SKIN: No itching, burning, rashes, or lesions   All other review of systems is negative unless indicated above.      OBJECTIVE:  ICU Vital Signs Last 24 Hrs  T(C): 36.7 (27 Feb 2020 04:00), Max: 37 (26 Feb 2020 12:00)  T(F): 98 (27 Feb 2020 04:00), Max: 98.6 (26 Feb 2020 12:00)  HR: 83 (27 Feb 2020 06:00) (61 - 87)  BP: 158/74 (27 Feb 2020 06:00) (135/63 - 171/81)  BP(mean): 107 (27 Feb 2020 06:00) (89 - 118)  ABP: --  ABP(mean): --  RR: 31 (27 Feb 2020 06:00) (17 - 31)  SpO2: 91% (27 Feb 2020 06:00) (88% - 100%)        02-26 @ 07:01  -  02-27 @ 07:00  --------------------------------------------------------  IN: 1640 mL / OUT: 1900 mL / NET: -260 mL      CAPILLARY BLOOD GLUCOSE      POCT Blood Glucose.: 192 mg/dL (26 Feb 2020 21:27)  POCT Blood Glucose.: 149 mg/dL (26 Feb 2020 17:56)  POCT Blood Glucose.: 254 mg/dL (26 Feb 2020 13:04)  POCT Blood Glucose.: 208 mg/dL (26 Feb 2020 08:53)    I&O's Summary    26 Feb 2020 07:01  -  27 Feb 2020 07:00  --------------------------------------------------------  IN: 1640 mL / OUT: 1900 mL / NET: -260 mL      PHYSICAL EXAM:   General: NAD, appearing slightly lethargic, on high flow nasal cannula  HEENT: Head atraumatic; EOMI, PERRLA, normal sclera and conjunctiva; Dry MM  Neck: Supple, no JVD, no LAD  Chest/Lungs: Coarse breath sounds bilaterally, no wheezing  Heart: RRR, normal S1, S2, no murmurs or gallops  Abd: +BS, soft, obese, nontender, nondistended  Extremities: 2+ peripheral pulses, trace pedal edema  Skin: No rashes or lesions, warm, well-perfused  Neurologic: AAOx3, follows commands appropriately    LABS:                        11.5   20.53 )-----------( 148      ( 27 Feb 2020 00:22 )             34.5     02-27    133<L>  |  95<L>  |  35<H>  ----------------------------<  212<H>  5.0   |  28  |  0.81    Ca    7.7<L>      27 Feb 2020 00:22  Phos  3.8     02-27  Mg     2.7     02-27    TPro  5.6<L>  /  Alb  2.8<L>  /  TBili  0.5  /  DBili  x   /  AST  24  /  ALT  37  /  AlkPhos  76  02-27        LINES:    HOSPITAL MEDICATIONS:  Standing Meds:  atorvastatin 10 milliGRAM(s) Oral at bedtime  benzonatate 200 milliGRAM(s) Oral three times a day  busPIRone 15 milliGRAM(s) Oral three times a day  cefepime   IVPB      cefepime   IVPB 2000 milliGRAM(s) IV Intermittent every 8 hours  chlorhexidine 4% Liquid 1 Application(s) Topical <User Schedule>  dextrose 5%. 1000 milliLiter(s) IV Continuous <Continuous>  dextrose 50% Injectable 12.5 Gram(s) IV Push once  dextrose 50% Injectable 25 Gram(s) IV Push once  dextrose 50% Injectable 25 Gram(s) IV Push once  diltiazem    milliGRAM(s) Oral daily  doxazosin 4 milliGRAM(s) Oral at bedtime  enoxaparin Injectable 40 milliGRAM(s) SubCutaneous daily  guaifenesin/dextromethorphan  Syrup 10 milliLiter(s) Oral every 6 hours  insulin lispro (HumaLOG) corrective regimen sliding scale   SubCutaneous three times a day before meals  insulin lispro (HumaLOG) corrective regimen sliding scale   SubCutaneous at bedtime  losartan 100 milliGRAM(s) Oral daily  methylPREDNISolone sodium succinate Injectable 20 milliGRAM(s) IV Push every 12 hours  oseltamivir 75 milliGRAM(s) Oral two times a day  pantoprazole  Injectable 40 milliGRAM(s) IV Push daily  PARoxetine 30 milliGRAM(s) Oral daily  polyethylene glycol 3350 17 Gram(s) Oral daily  vancomycin  IVPB 1250 milliGRAM(s) IV Intermittent every 12 hours      PRN Meds:  acetaminophen   Tablet .. 650 milliGRAM(s) Oral every 6 hours PRN  albuterol/ipratropium for Nebulization 3 milliLiter(s) Nebulizer every 6 hours PRN  clonazePAM  Tablet 1 milliGRAM(s) Oral three times a day PRN  dextrose 40% Gel 15 Gram(s) Oral once PRN  glucagon  Injectable 1 milliGRAM(s) IntraMuscular once PRN      LABS:                        11.5   20.53 )-----------( 148      ( 27 Feb 2020 00:22 )             34.5     Hgb Trend: 11.5<--, 12.0<--, 13.1<--, 12.7<--, 12.7<--  02-27    133<L>  |  95<L>  |  35<H>  ----------------------------<  212<H>  5.0   |  28  |  0.81    Ca    7.7<L>      27 Feb 2020 00:22  Phos  3.8     02-27  Mg     2.7     02-27    TPro  5.6<L>  /  Alb  2.8<L>  /  TBili  0.5  /  DBili  x   /  AST  24  /  ALT  37  /  AlkPhos  76  02-27    Creatinine Trend: 0.81<--, 0.87<--, 0.88<--, 0.87<--, 0.85<--, 0.87<--      Arterial Blood Gas:  02-27 @ 00:22  7.51/38/56/30/89/7.2  ABG lactate: --  Arterial Blood Gas:  02-26 @ 00:30  7.52/37/56/31/90/7.6  ABG lactate: --                MICROBIOLOGY:     RADIOLOGY: Reviewed CHIEF COMPLAINT: SOB, cough and weakness    OVERNIGHT / SUBJECTIVE:  -Overnight, no acute events. High flow FiO2 was at 70% to about midnight, then changed back to 80% due to pO2 56, but O2 sat remained >90%  -Today, pt seen and examined at bedside in AM. Last BM ~5 days ago. States he feels a little warm. Like yesterday, pt continues to repeatedly ask "Am I going to die?" and appears anxious about his situation.    REVIEW OF SYSTEMS:    CONSTITUTIONAL: +feels warm; No weakness, fevers or chills  EYES/ENT: No visual changes;  No vertigo or throat pain   NECK: No pain or stiffness  RESPIRATORY: +cough (improved, also has chronic cough); No wheezing, hemoptysis; No shortness of breath  CARDIOVASCULAR: No chest pain or palpitations  GASTROINTESTINAL: No abdominal or epigastric pain. No nausea, vomiting, or hematemesis; No diarrhea or constipation. No melena or hematochezia.  GENITOURINARY: No dysuria, frequency or hematuria  NEUROLOGICAL: No numbness or weakness  SKIN: No itching, burning, rashes, or lesions   All other review of systems is negative unless indicated above.      OBJECTIVE:  ICU Vital Signs Last 24 Hrs  T(C): 36.7 (27 Feb 2020 04:00), Max: 37 (26 Feb 2020 12:00)  T(F): 98 (27 Feb 2020 04:00), Max: 98.6 (26 Feb 2020 12:00)  HR: 83 (27 Feb 2020 06:00) (61 - 87)  BP: 158/74 (27 Feb 2020 06:00) (135/63 - 171/81)  BP(mean): 107 (27 Feb 2020 06:00) (89 - 118)  ABP: --  ABP(mean): --  RR: 31 (27 Feb 2020 06:00) (17 - 31)  SpO2: 91% (27 Feb 2020 06:00) (88% - 100%)        02-26 @ 07:01  -  02-27 @ 07:00  --------------------------------------------------------  IN: 1640 mL / OUT: 1900 mL / NET: -260 mL      CAPILLARY BLOOD GLUCOSE      POCT Blood Glucose.: 192 mg/dL (26 Feb 2020 21:27)  POCT Blood Glucose.: 149 mg/dL (26 Feb 2020 17:56)  POCT Blood Glucose.: 254 mg/dL (26 Feb 2020 13:04)  POCT Blood Glucose.: 208 mg/dL (26 Feb 2020 08:53)    I&O's Summary    26 Feb 2020 07:01  -  27 Feb 2020 07:00  --------------------------------------------------------  IN: 1640 mL / OUT: 1900 mL / NET: -260 mL      PHYSICAL EXAM:   General: NAD, on high flow nasal cannula  HEENT: Head atraumatic; EOMI, PERRLA, normal sclera and conjunctiva; Dry MM  Neck: Supple, no JVD, no LAD  Chest/Lungs: Clear to ausculation anteriorly with mild coarse breath sounds, no wheezing appreciated  Heart: RRR, normal S1, S2, no murmurs or gallops  Abd: +BS, soft, obese, nontender, nondistended  Extremities: 2+ peripheral pulses, trace pedal edema  Skin: No rashes or lesions, warm, well-perfused  Neurologic: AAOx3, follows commands appropriately    LABS:                        11.5   20.53 )-----------( 148      ( 27 Feb 2020 00:22 )             34.5     02-27    133<L>  |  95<L>  |  35<H>  ----------------------------<  212<H>  5.0   |  28  |  0.81    Ca    7.7<L>      27 Feb 2020 00:22  Phos  3.8     02-27  Mg     2.7     02-27    TPro  5.6<L>  /  Alb  2.8<L>  /  TBili  0.5  /  DBili  x   /  AST  24  /  ALT  37  /  AlkPhos  76  02-27        LINES:    HOSPITAL MEDICATIONS:  Standing Meds:  atorvastatin 10 milliGRAM(s) Oral at bedtime  benzonatate 200 milliGRAM(s) Oral three times a day  busPIRone 15 milliGRAM(s) Oral three times a day  cefepime   IVPB      cefepime   IVPB 2000 milliGRAM(s) IV Intermittent every 8 hours  chlorhexidine 4% Liquid 1 Application(s) Topical <User Schedule>  dextrose 5%. 1000 milliLiter(s) IV Continuous <Continuous>  dextrose 50% Injectable 12.5 Gram(s) IV Push once  dextrose 50% Injectable 25 Gram(s) IV Push once  dextrose 50% Injectable 25 Gram(s) IV Push once  diltiazem    milliGRAM(s) Oral daily  doxazosin 4 milliGRAM(s) Oral at bedtime  enoxaparin Injectable 40 milliGRAM(s) SubCutaneous daily  guaifenesin/dextromethorphan  Syrup 10 milliLiter(s) Oral every 6 hours  insulin lispro (HumaLOG) corrective regimen sliding scale   SubCutaneous three times a day before meals  insulin lispro (HumaLOG) corrective regimen sliding scale   SubCutaneous at bedtime  losartan 100 milliGRAM(s) Oral daily  methylPREDNISolone sodium succinate Injectable 20 milliGRAM(s) IV Push every 12 hours  oseltamivir 75 milliGRAM(s) Oral two times a day  pantoprazole  Injectable 40 milliGRAM(s) IV Push daily  PARoxetine 30 milliGRAM(s) Oral daily  polyethylene glycol 3350 17 Gram(s) Oral daily  vancomycin  IVPB 1250 milliGRAM(s) IV Intermittent every 12 hours      PRN Meds:  acetaminophen   Tablet .. 650 milliGRAM(s) Oral every 6 hours PRN  albuterol/ipratropium for Nebulization 3 milliLiter(s) Nebulizer every 6 hours PRN  clonazePAM  Tablet 1 milliGRAM(s) Oral three times a day PRN  dextrose 40% Gel 15 Gram(s) Oral once PRN  glucagon  Injectable 1 milliGRAM(s) IntraMuscular once PRN      LABS:                        11.5   20.53 )-----------( 148      ( 27 Feb 2020 00:22 )             34.5     Hgb Trend: 11.5<--, 12.0<--, 13.1<--, 12.7<--, 12.7<--  02-27    133<L>  |  95<L>  |  35<H>  ----------------------------<  212<H>  5.0   |  28  |  0.81    Ca    7.7<L>      27 Feb 2020 00:22  Phos  3.8     02-27  Mg     2.7     02-27    TPro  5.6<L>  /  Alb  2.8<L>  /  TBili  0.5  /  DBili  x   /  AST  24  /  ALT  37  /  AlkPhos  76  02-27    Creatinine Trend: 0.81<--, 0.87<--, 0.88<--, 0.87<--, 0.85<--, 0.87<--      Arterial Blood Gas:  02-27 @ 00:22  7.51/38/56/30/89/7.2  ABG lactate: --  Arterial Blood Gas:  02-26 @ 00:30  7.52/37/56/31/90/7.6  ABG lactate: --                MICROBIOLOGY:     RADIOLOGY: Reviewed

## 2020-02-27 NOTE — DIETITIAN INITIAL EVALUATION ADULT. - PROBLEM SELECTOR PLAN 8
BP currently at goal   will hold home antiHTN in setting of sepsis - resume as appropriate   would c/t hold HCTZ given hypoNa

## 2020-02-27 NOTE — DIETITIAN INITIAL EVALUATION ADULT. - PROBLEM SELECTOR PLAN 3
sepsis with fever and tachycardia, likely 2/2 PNA and fluA+   lactate 3.3 on admission, improved after IVF   treat PNA with ceftriaxone/azithromycin as below   tamiflu for FluA  hold off on further IVF for now in setting of hypoNa of unclear etiology   monitor vitals q4h   trend fever curve

## 2020-02-27 NOTE — DIETITIAN INITIAL EVALUATION ADULT. - PROBLEM SELECTOR PLAN 6
pt with hypokalemia, hypoPhos and hypoCa in setting of AURA and poor PO intake   will replete   c/t monitor

## 2020-02-27 NOTE — PROGRESS NOTE ADULT - ASSESSMENT
70 yo male with HTN, anxiety disorder, and pulmonary fibrosis admitted with hypoxic respiratory failure.  Influenza A diagnosed 2/16 with a rapid test.  CTA is negative for PE.  Started on CTX and azithro along with tamiflu  Legionella urine antigen negative, MRSA screen is negative and his PC is elevated to 0.69.  The elevated PC is suggestive of a bacterial infection  Lack of improvement with tamiflu documented and raised concern of both viral pneumonia and a secondary process such as a bacterial pneumonia - empiric antibiotics cont'd  Repeated CT chest reveals pulm fibrosis & bilateral groundglass opacities concerning for infection  However remains afebrile & PCT is low at 0.15  Leukocytosis unreliable - given pt on steroids  CTX,azithro were changed to vanco & cefepime on 2/25/20, in case this was HCAP, although low overall suspicion in this afebrile pt with low PCT.    Suggest:  Tamiflu, d # 8 days  - to be continued for a total of 10 days course, given slow response  Continue vanco & cefepime on 2/25/20, following clinical improveent.   Check vanco trough

## 2020-02-27 NOTE — DIETITIAN INITIAL EVALUATION ADULT. - PROBLEM SELECTOR PLAN 9
c/w home medications for now - paxil 30 daily and buspar 15 TID  if hyponatremia does not improve, may have to continue tapering off   c/w klonopin 1mg TID as needed (ISTOP #[id number])

## 2020-02-27 NOTE — PROGRESS NOTE ADULT - SUBJECTIVE AND OBJECTIVE BOX
CARDIOLOGY     PROGRESS  NOTE   ________________________________________________    CHIEF COMPLAINT:Patient is a 69y old  Male who presents with a chief complaint of SOB, cough and weakness (27 Feb 2020 07:43)    	  REVIEW OF SYSTEMS:  CONSTITUTIONAL: No fever, weight loss, or fatigue  EYES: No eye pain, visual disturbances, or discharge  ENT:  No difficulty hearing, tinnitus, vertigo; No sinus or throat pain  NECK: No pain or stiffness  RESPIRATORY: No cough, wheezing, chills or hemoptysis; No Shortness of Breath  CARDIOVASCULAR: No chest pain, palpitations, passing out, dizziness, or leg swelling  GASTROINTESTINAL: No abdominal or epigastric pain. No nausea, vomiting, or hematemesis; No diarrhea or constipation. No melena or hematochezia.  GENITOURINARY: No dysuria, frequency, hematuria, or incontinence  NEUROLOGICAL: No headaches, memory loss, loss of strength, numbness, or tremors  SKIN: No itching, burning, rashes, or lesions   LYMPH Nodes: No enlarged glands  ENDOCRINE: No heat or cold intolerance; No hair loss  MUSCULOSKELETAL: No joint pain or swelling; No muscle, back, or extremity pain  PSYCHIATRIC: No depression, anxiety, mood swings, or difficulty sleeping  HEME/LYMPH: No easy bruising, or bleeding gums  ALLERGY AND IMMUNOLOGIC: No hives or eczema	    [ ] All others negative	  [ ] Unable to obtain    PHYSICAL EXAM:  T(C): 37.3 (02-27-20 @ 08:00), Max: 37.3 (02-27-20 @ 08:00)  HR: 81 (02-27-20 @ 09:00) (61 - 92)  BP: 159/74 (02-27-20 @ 09:00) (134/64 - 171/81)  RR: 28 (02-27-20 @ 09:00) (17 - 31)  SpO2: 89% (02-27-20 @ 09:00) (88% - 100%)  Wt(kg): --  I&O's Summary    26 Feb 2020 07:01  -  27 Feb 2020 07:00  --------------------------------------------------------  IN: 1640 mL / OUT: 1900 mL / NET: -260 mL        Appearance: Normal	  HEENT:   Normal oral mucosa, PERRL, EOMI	  Lymphatic: No lymphadenopathy  Cardiovascular: Normal S1 S2, No JVD, + murmurs, No edema  Respiratory: decrease bs   Psychiatry: A & O x 3, Mood & affect appropriate  Gastrointestinal:  Soft, Non-tender, + BS	  Skin: No rashes, No ecchymoses, No cyanosis	  Neurologic: Non-focal  Extremities: Normal range of motion, No clubbing, cyanosis or edema  Vascular: Peripheral pulses palpable 2+ bilaterally    MEDICATIONS  (STANDING):  atorvastatin 10 milliGRAM(s) Oral at bedtime  benzonatate 200 milliGRAM(s) Oral three times a day  busPIRone 15 milliGRAM(s) Oral three times a day  cefepime   IVPB      cefepime   IVPB 2000 milliGRAM(s) IV Intermittent every 8 hours  chlorhexidine 4% Liquid 1 Application(s) Topical <User Schedule>  clonazePAM  Tablet 1 milliGRAM(s) Oral daily  dextrose 5%. 1000 milliLiter(s) (50 mL/Hr) IV Continuous <Continuous>  dextrose 50% Injectable 12.5 Gram(s) IV Push once  dextrose 50% Injectable 25 Gram(s) IV Push once  dextrose 50% Injectable 25 Gram(s) IV Push once  diltiazem    milliGRAM(s) Oral daily  enoxaparin Injectable 40 milliGRAM(s) SubCutaneous daily  guaifenesin/dextromethorphan  Syrup 10 milliLiter(s) Oral every 6 hours  insulin lispro (HumaLOG) corrective regimen sliding scale   SubCutaneous three times a day before meals  insulin lispro (HumaLOG) corrective regimen sliding scale   SubCutaneous at bedtime  lactulose Syrup 20 Gram(s) Oral once  losartan 100 milliGRAM(s) Oral daily  methylPREDNISolone sodium succinate Injectable 20 milliGRAM(s) IV Push every 12 hours  oseltamivir 75 milliGRAM(s) Oral two times a day  PARoxetine 30 milliGRAM(s) Oral daily  polyethylene glycol 3350 17 Gram(s) Oral daily  senna 2 Tablet(s) Oral at bedtime  tamsulosin 0.4 milliGRAM(s) Oral at bedtime  vancomycin  IVPB 1250 milliGRAM(s) IV Intermittent every 12 hours      TELEMETRY: 	    ECG:  	  RADIOLOGY:  OTHER: 	  	  LABS:	 	    CARDIAC MARKERS:                                11.5   20.53 )-----------( 148      ( 27 Feb 2020 00:22 )             34.5     02-27    133<L>  |  95<L>  |  35<H>  ----------------------------<  212<H>  5.0   |  28  |  0.81    Ca    7.7<L>      27 Feb 2020 00:22  Phos  3.8     02-27  Mg     2.7     02-27    TPro  5.6<L>  /  Alb  2.8<L>  /  TBili  0.5  /  DBili  x   /  AST  24  /  ALT  37  /  AlkPhos  76  02-27    proBNP: Serum Pro-Brain Natriuretic Peptide: 364 pg/mL (02-19 @ 22:30)    Lipid Profile:   HgA1c: Hemoglobin A1C, Whole Blood: 6.6 % (02-24 @ 08:40)    TSH: Thyroid Stimulating Hormone, Serum: 0.39 uIU/mL (02-21 @ 09:19)  Thyroid Stimulating Hormone, Serum: 0.47 uIU/mL (02-21 @ 00:19)  setting of underlying lung disease. May also have component of aspiration pneumonitis.   - C/w HFNC, wean FiO2 as tolerated  - Maintain saturation >88%  - Broaden antibiotics (see below)  - C/w Tamiflu for total 10 days (2/20-2/29)  - C/w duonebs q6h PRN  - Continue to taper down on steroids     # Pulmonary fibrosis:   - Appreciate Pulm recs  - As per pt's pulmonologist group (Dr. Dia/Dr. Pop): Seen for chronic cough, hx of "insignificant pulmonary fibrosis," PFTs (2017) was wnl aside from diffusion capacity of 74%, and CT Chest (2013) showed no evidence of pulm fibrosis or other significant pulm pathology        Assessment and plan  ---------------------------  69M with PMH of HTN, pulmonary fibrosis, depression/anxiety p/w SOB and cough. Pt states his mother passed away about 2 weeks ago and shortly after burying here, started to feel very unwell. He has had progressively worsening cough, non productive, but feels a lot of chest congestion. Associated with ROSE, headache and myalgias and fatigue. Was seen at  earlier this week and was diagnosed with flu. In the past couple of days, has been having very labored breathing; today, had an episode where he fell while trying to reach for [employer] switch and was so winded and weak, he was unable to get up. Has barely been eating anything due to poor appetite and nausea, but denies vomiting. Denies fevers or chills, no CP, no palpitations, no abdominal pain, had 3 episodes of loose stools today, +decreased UOP, no LE swelling. Does not know of any sick contacts. Denies recent travel.  sob/ respiratory failure sec to hx of pulmonary fibrosis ?pneumoniae.  check rvp  abx  echo to evaluate pulmonary pressure sec to hx of pulmonary fibrosis  add calcium channel blocker for bp control  dvt prophylaxis  asa daily  oob to chair

## 2020-02-27 NOTE — PROGRESS NOTE ADULT - SUBJECTIVE AND OBJECTIVE BOX
INTERVAL HPI/OVERNIGHT EVENTS:    patient seen and examined at bedside  he denies abdominal pain, n/v  appetite improved  requesting bed pan for bm    MEDICATIONS  (STANDING):  atorvastatin 10 milliGRAM(s) Oral at bedtime  benzonatate 200 milliGRAM(s) Oral three times a day  busPIRone 15 milliGRAM(s) Oral three times a day  cefepime   IVPB      cefepime   IVPB 2000 milliGRAM(s) IV Intermittent every 8 hours  chlorhexidine 4% Liquid 1 Application(s) Topical <User Schedule>  clonazePAM  Tablet 1 milliGRAM(s) Oral daily  dextrose 5%. 1000 milliLiter(s) (50 mL/Hr) IV Continuous <Continuous>  dextrose 50% Injectable 12.5 Gram(s) IV Push once  dextrose 50% Injectable 25 Gram(s) IV Push once  dextrose 50% Injectable 25 Gram(s) IV Push once  diltiazem    milliGRAM(s) Oral daily  enoxaparin Injectable 40 milliGRAM(s) SubCutaneous daily  guaifenesin/dextromethorphan  Syrup 10 milliLiter(s) Oral every 6 hours  insulin lispro (HumaLOG) corrective regimen sliding scale   SubCutaneous three times a day before meals  insulin lispro (HumaLOG) corrective regimen sliding scale   SubCutaneous at bedtime  losartan 100 milliGRAM(s) Oral daily  methylPREDNISolone sodium succinate Injectable 20 milliGRAM(s) IV Push daily  oseltamivir 75 milliGRAM(s) Oral two times a day  PARoxetine 30 milliGRAM(s) Oral daily  polyethylene glycol 3350 17 Gram(s) Oral daily  senna 2 Tablet(s) Oral at bedtime  tamsulosin 0.4 milliGRAM(s) Oral at bedtime  vancomycin  IVPB 1250 milliGRAM(s) IV Intermittent every 12 hours    MEDICATIONS  (PRN):  acetaminophen   Tablet .. 650 milliGRAM(s) Oral every 6 hours PRN Temp greater or equal to 38C (100.4F), Mild Pain (1 - 3), Moderate Pain (4 - 6)  albuterol/ipratropium for Nebulization 3 milliLiter(s) Nebulizer every 6 hours PRN Shortness of Breath and/or Wheezing  clonazePAM  Tablet 1 milliGRAM(s) Oral three times a day PRN anxiety  dextrose 40% Gel 15 Gram(s) Oral once PRN Blood Glucose LESS THAN 70 milliGRAM(s)/deciliter  glucagon  Injectable 1 milliGRAM(s) IntraMuscular once PRN Glucose LESS THAN 70 milligrams/deciliter      Allergies    penicillin (Anaphylaxis)    Intolerances        Review of Systems:    General:  No wt loss, fevers, chills, night sweats,fatigue,   Eyes:  Good vision, no reported pain  ENT:  No sore throat, pain, runny nose, dysphagia  CV:  No pain, palpitations, hypo/hypertension  Resp:  No dyspnea, cough, tachypnea, wheezing  GI:  No pain, No nausea, No vomiting, No diarrhea, No constipation, No weight loss, No fever, No pruritis, No rectal bleeding, No melena, No dysphagia  :  No pain, bleeding, incontinence, nocturia  Muscle:  No pain, weakness  Neuro:  No weakness, tingling, memory problems  Psych:  No fatigue, insomnia, mood problems, depression  Endocrine:  No polyuria, polydypsia, cold/heat intolerance  Heme:  No petechiae, ecchymosis, easy bruisability  Skin:  No rash, tattoos, scars, edema      Vital Signs Last 24 Hrs  T(C): 37.4 (27 Feb 2020 12:00), Max: 37.4 (27 Feb 2020 12:00)  T(F): 99.4 (27 Feb 2020 12:00), Max: 99.4 (27 Feb 2020 12:00)  HR: 70 (27 Feb 2020 13:51) (61 - 93)  BP: 151/74 (27 Feb 2020 13:00) (134/64 - 171/81)  BP(mean): 105 (27 Feb 2020 13:00) (89 - 116)  RR: 20 (27 Feb 2020 13:51) (17 - 31)  SpO2: 98% (27 Feb 2020 13:51) (88% - 100%)    PHYSICAL EXAM:    Constitutional: NAD, well-developed  HEENT: EOMI, throat clear  Neck: No LAD, supple  Respiratory: CTA and P  Cardiovascular: S1 and S2, RRR, no M  Gastrointestinal: BS+, soft, NT/ND, neg HSM,  Extremities: No peripheral edema, neg clubing, cyanosis  Vascular: 2+ peripheral pulses  Neurological: A/O x 3, no focal deficits  Psychiatric: Normal mood, normal affect  Skin: No rashes      LABS:                        11.5   20.53 )-----------( 148      ( 27 Feb 2020 00:22 )             34.5     02-27    133<L>  |  95<L>  |  35<H>  ----------------------------<  212<H>  5.0   |  28  |  0.81    Ca    7.7<L>      27 Feb 2020 00:22  Phos  3.8     02-27  Mg     2.7     02-27    TPro  5.6<L>  /  Alb  2.8<L>  /  TBili  0.5  /  DBili  x   /  AST  24  /  ALT  37  /  AlkPhos  76  02-27          RADIOLOGY & ADDITIONAL TESTS:

## 2020-02-27 NOTE — DIETITIAN INITIAL EVALUATION ADULT. - PROBLEM SELECTOR PLAN 7
pt with e/o pulmonary fibrosis on CT chest, pt unaware of any such diagnosis but was following with pulmonologist who told pt he had nodules in his lung  would consult pulmonology for work-up   c/w O2 as needed

## 2020-02-28 LAB
ALBUMIN SERPL ELPH-MCNC: 2.8 G/DL — LOW (ref 3.3–5)
ALP SERPL-CCNC: 79 U/L — SIGNIFICANT CHANGE UP (ref 40–120)
ALT FLD-CCNC: 39 U/L — SIGNIFICANT CHANGE UP (ref 10–45)
ANION GAP SERPL CALC-SCNC: 12 MMOL/L — SIGNIFICANT CHANGE UP (ref 5–17)
AST SERPL-CCNC: 31 U/L — SIGNIFICANT CHANGE UP (ref 10–40)
BASOPHILS # BLD AUTO: 0.07 K/UL — SIGNIFICANT CHANGE UP (ref 0–0.2)
BASOPHILS NFR BLD AUTO: 0.3 % — SIGNIFICANT CHANGE UP (ref 0–2)
BILIRUB SERPL-MCNC: 0.5 MG/DL — SIGNIFICANT CHANGE UP (ref 0.2–1.2)
BUN SERPL-MCNC: 30 MG/DL — HIGH (ref 7–23)
CALCIUM SERPL-MCNC: 8.2 MG/DL — LOW (ref 8.4–10.5)
CHLORIDE SERPL-SCNC: 96 MMOL/L — SIGNIFICANT CHANGE UP (ref 96–108)
CO2 SERPL-SCNC: 24 MMOL/L — SIGNIFICANT CHANGE UP (ref 22–31)
CREAT SERPL-MCNC: 0.78 MG/DL — SIGNIFICANT CHANGE UP (ref 0.5–1.3)
EOSINOPHIL # BLD AUTO: 0.27 K/UL — SIGNIFICANT CHANGE UP (ref 0–0.5)
EOSINOPHIL NFR BLD AUTO: 1.2 % — SIGNIFICANT CHANGE UP (ref 0–6)
GAS PNL BLDA: SIGNIFICANT CHANGE UP
GLUCOSE BLDC GLUCOMTR-MCNC: 137 MG/DL — HIGH (ref 70–99)
GLUCOSE BLDC GLUCOMTR-MCNC: 150 MG/DL — HIGH (ref 70–99)
GLUCOSE BLDC GLUCOMTR-MCNC: 155 MG/DL — HIGH (ref 70–99)
GLUCOSE BLDC GLUCOMTR-MCNC: 190 MG/DL — HIGH (ref 70–99)
GLUCOSE SERPL-MCNC: 122 MG/DL — HIGH (ref 70–99)
HCT VFR BLD CALC: 36.2 % — LOW (ref 39–50)
HGB BLD-MCNC: 12.1 G/DL — LOW (ref 13–17)
IMM GRANULOCYTES NFR BLD AUTO: 7.2 % — HIGH (ref 0–1.5)
LYMPHOCYTES # BLD AUTO: 1.75 K/UL — SIGNIFICANT CHANGE UP (ref 1–3.3)
LYMPHOCYTES # BLD AUTO: 7.8 % — LOW (ref 13–44)
MAGNESIUM SERPL-MCNC: 2.4 MG/DL — SIGNIFICANT CHANGE UP (ref 1.6–2.6)
MCHC RBC-ENTMCNC: 30.3 PG — SIGNIFICANT CHANGE UP (ref 27–34)
MCHC RBC-ENTMCNC: 33.4 GM/DL — SIGNIFICANT CHANGE UP (ref 32–36)
MCV RBC AUTO: 90.7 FL — SIGNIFICANT CHANGE UP (ref 80–100)
MONOCYTES # BLD AUTO: 1.32 K/UL — HIGH (ref 0–0.9)
MONOCYTES NFR BLD AUTO: 5.9 % — SIGNIFICANT CHANGE UP (ref 2–14)
NEUTROPHILS # BLD AUTO: 17.38 K/UL — HIGH (ref 1.8–7.4)
NEUTROPHILS NFR BLD AUTO: 77.6 % — HIGH (ref 43–77)
NRBC # BLD: 0 /100 WBCS — SIGNIFICANT CHANGE UP (ref 0–0)
PHOSPHATE SERPL-MCNC: 3.1 MG/DL — SIGNIFICANT CHANGE UP (ref 2.5–4.5)
PLATELET # BLD AUTO: 152 K/UL — SIGNIFICANT CHANGE UP (ref 150–400)
POTASSIUM SERPL-MCNC: 4.7 MMOL/L — SIGNIFICANT CHANGE UP (ref 3.5–5.3)
POTASSIUM SERPL-SCNC: 4.7 MMOL/L — SIGNIFICANT CHANGE UP (ref 3.5–5.3)
PROT SERPL-MCNC: 5.9 G/DL — LOW (ref 6–8.3)
RBC # BLD: 3.99 M/UL — LOW (ref 4.2–5.8)
RBC # FLD: 14.1 % — SIGNIFICANT CHANGE UP (ref 10.3–14.5)
SODIUM SERPL-SCNC: 132 MMOL/L — LOW (ref 135–145)
VANCOMYCIN TROUGH SERPL-MCNC: 9.3 UG/ML — LOW (ref 10–20)
WBC # BLD: 22.4 K/UL — HIGH (ref 3.8–10.5)
WBC # FLD AUTO: 22.4 K/UL — HIGH (ref 3.8–10.5)

## 2020-02-28 PROCEDURE — 99291 CRITICAL CARE FIRST HOUR: CPT

## 2020-02-28 RX ORDER — POLYETHYLENE GLYCOL 3350 17 G/17G
17 POWDER, FOR SOLUTION ORAL DAILY
Refills: 0 | Status: DISCONTINUED | OUTPATIENT
Start: 2020-02-28 | End: 2020-03-02

## 2020-02-28 RX ORDER — PSYLLIUM SEED (WITH DEXTROSE)
1 POWDER (GRAM) ORAL DAILY
Refills: 0 | Status: DISCONTINUED | OUTPATIENT
Start: 2020-02-28 | End: 2020-03-02

## 2020-02-28 RX ADMIN — Medication 20 MILLIGRAM(S): at 05:13

## 2020-02-28 RX ADMIN — Medication 1 PACKET(S): at 16:06

## 2020-02-28 RX ADMIN — Medication 75 MILLIGRAM(S): at 12:06

## 2020-02-28 RX ADMIN — Medication 15 MILLIGRAM(S): at 22:31

## 2020-02-28 RX ADMIN — Medication 240 MILLIGRAM(S): at 05:13

## 2020-02-28 RX ADMIN — Medication 166.67 MILLIGRAM(S): at 02:39

## 2020-02-28 RX ADMIN — Medication 650 MILLIGRAM(S): at 02:44

## 2020-02-28 RX ADMIN — Medication 1 MILLIGRAM(S): at 18:58

## 2020-02-28 RX ADMIN — Medication 1 MILLIGRAM(S): at 01:04

## 2020-02-28 RX ADMIN — Medication 15 MILLIGRAM(S): at 05:13

## 2020-02-28 RX ADMIN — Medication 200 MILLIGRAM(S): at 22:31

## 2020-02-28 RX ADMIN — Medication 10 MILLILITER(S): at 12:05

## 2020-02-28 RX ADMIN — Medication 30 MILLIGRAM(S): at 12:06

## 2020-02-28 RX ADMIN — Medication 2: at 12:59

## 2020-02-28 RX ADMIN — CEFEPIME 100 MILLIGRAM(S): 1 INJECTION, POWDER, FOR SOLUTION INTRAMUSCULAR; INTRAVENOUS at 05:13

## 2020-02-28 RX ADMIN — Medication 1 MILLIGRAM(S): at 08:10

## 2020-02-28 RX ADMIN — Medication 1 MILLIGRAM(S): at 12:05

## 2020-02-28 RX ADMIN — CEFEPIME 100 MILLIGRAM(S): 1 INJECTION, POWDER, FOR SOLUTION INTRAMUSCULAR; INTRAVENOUS at 13:56

## 2020-02-28 RX ADMIN — SENNA PLUS 2 TABLET(S): 8.6 TABLET ORAL at 22:30

## 2020-02-28 RX ADMIN — ATORVASTATIN CALCIUM 10 MILLIGRAM(S): 80 TABLET, FILM COATED ORAL at 22:30

## 2020-02-28 RX ADMIN — Medication 15 MILLIGRAM(S): at 13:57

## 2020-02-28 RX ADMIN — Medication 650 MILLIGRAM(S): at 20:02

## 2020-02-28 RX ADMIN — CHLORHEXIDINE GLUCONATE 1 APPLICATION(S): 213 SOLUTION TOPICAL at 05:24

## 2020-02-28 RX ADMIN — Medication 10 MILLILITER(S): at 01:02

## 2020-02-28 RX ADMIN — Medication 2: at 08:39

## 2020-02-28 RX ADMIN — CEFEPIME 100 MILLIGRAM(S): 1 INJECTION, POWDER, FOR SOLUTION INTRAMUSCULAR; INTRAVENOUS at 22:31

## 2020-02-28 RX ADMIN — TAMSULOSIN HYDROCHLORIDE 0.4 MILLIGRAM(S): 0.4 CAPSULE ORAL at 22:30

## 2020-02-28 RX ADMIN — LOSARTAN POTASSIUM 100 MILLIGRAM(S): 100 TABLET, FILM COATED ORAL at 05:13

## 2020-02-28 RX ADMIN — Medication 200 MILLIGRAM(S): at 05:13

## 2020-02-28 RX ADMIN — Medication 10 MILLILITER(S): at 18:16

## 2020-02-28 RX ADMIN — Medication 200 MILLIGRAM(S): at 13:56

## 2020-02-28 RX ADMIN — ENOXAPARIN SODIUM 40 MILLIGRAM(S): 100 INJECTION SUBCUTANEOUS at 12:05

## 2020-02-28 RX ADMIN — Medication 166.67 MILLIGRAM(S): at 15:53

## 2020-02-28 RX ADMIN — Medication 75 MILLIGRAM(S): at 01:02

## 2020-02-28 RX ADMIN — Medication 10 MILLILITER(S): at 05:13

## 2020-02-28 RX ADMIN — Medication 650 MILLIGRAM(S): at 21:05

## 2020-02-28 RX ADMIN — Medication 650 MILLIGRAM(S): at 01:44

## 2020-02-28 NOTE — PROGRESS NOTE ADULT - SUBJECTIVE AND OBJECTIVE BOX
CC: f/u for pneumonia and flu    Patient reports feels better today, up in a chair    REVIEW OF SYSTEMS:  All other review of systems negative (Comprehensive ROS)    Antimicrobials Day #  :  cefepime   IVPB      cefepime   IVPB 2000 milliGRAM(s) IV Intermittent every 8 hours    DAY 4/7  oseltamivir 75 milliGRAM(s) Oral two times a day   DAY 9/10  vancomycin  IVPB 1250 milliGRAM(s) IV Intermittent every 12 hours   DAY 4/7    Other Medications Reviewed    T(F): 98.3 (02-28-20 @ 16:00), Max: 98.7 (02-28-20 @ 00:00)  HR: 66 (02-28-20 @ 18:00)  BP: 151/71 (02-28-20 @ 18:00)  RR: 20 (02-28-20 @ 18:00)  SpO2: 91% (02-28-20 @ 18:00)  Wt(kg): --    PHYSICAL EXAM:  General: alert, no acute distress, weak, up in chair, on hiflow  Eyes:  anicteric, no conjunctival injection, no discharge  Oropharynx: no lesions or injection 	  Neck: supple, without adenopathy  Lungs: course  to auscultation  Heart: regular rate and rhythm; no murmur, rubs or gallops  Abdomen: soft, nondistended, nontender, without mass or organomegaly  Skin: no lesions  Extremities: no clubbing, cyanosis, or edema  Neurologic: alert, oriented, moves all extremities    LAB RESULTS:                        12.1   22.40 )-----------( 152      ( 28 Feb 2020 01:11 )             36.2     02-28    132<L>  |  96  |  30<H>  ----------------------------<  122<H>  4.7   |  24  |  0.78    Ca    8.2<L>      28 Feb 2020 01:11  Phos  3.1     02-28  Mg     2.4     02-28    TPro  5.9<L>  /  Alb  2.8<L>  /  TBili  0.5  /  DBili  x   /  AST  31  /  ALT  39  /  AlkPhos  79  02-28    LIVER FUNCTIONS - ( 28 Feb 2020 01:11 )  Alb: 2.8 g/dL / Pro: 5.9 g/dL / ALK PHOS: 79 U/L / ALT: 39 U/L / AST: 31 U/L / GGT: x             MICROBIOLOGY:  RECENT CULTURES:  Legionella pneumophila Antigen, Urine (02.20.20 @ 09:50)    Legionella Antigen, Urine: Negative        RADIOLOGY REVIEWED:  < from: CT Chest No Cont (02.24.20 @ 14:39) >    IMPRESSION:     Patchy groundglass opacities containing traction bronchiectasis bilaterally is suggestive of pulmonary fibrosis of uncertain etiology.    Groundglass opacities are noted within both lungs. Primary consideration is infection.      < end of copied text >              Assessment: Patient with pulmonary fibrosis admitted with the flu, respiratory failure, getting tamiflu, ctx/zithro but did not improve significantly so changed antibiotics to vanco and cefepime, slowly getting better.     Plan:  1 more day of tamiflu  3 more days of cefepime and vanco  supportive care per micu

## 2020-02-28 NOTE — PROGRESS NOTE ADULT - ASSESSMENT
69M with PMH of pulmonary fibrosis (followed by Dr. Dia), HTN, HLD, depression, and anxiety who initially presented 2/20/20 following a fall at home, admitted with acute hypoxic respiratory failure in setting of influenza with concern for superimposed PNA. Course c/b worsening respiratory failure with high oxygen requirements, transferred to MICU for close monitoring.     NEURO:   - Currently AAOx3, slightly lethargic, answers questions appropriately  - C/w home clonazepam standing 1mg PO daily and 1mg PO TID PRN for anxiety, hold for sedation  - C/w home buspirone 15mg PO TID  - C/w home paroxetine 30mg PO daily    PULM:   # Acute hypoxic respiratory failure   Multifactorial related to influenza infection with high suspicion for superimposed bacterial PNA in setting of underlying lung disease. May also have component of aspiration pneumonitis.   - C/w HFNC, can wean FiO2 as tolerated as long as saturation maintained >90%  - C/w broad-spectrum antibiotics  - C/w Tamiflu for total 10 days (2/20-2/29)  - C/w duonebs q6h PRN  - Continue to taper down on steroids (to be dc'ed on 2/27)  - Acapella valve to loosen up secretions    # Pulmonary fibrosis:   - Appreciate Pulm recs  - As per pt's pulmonologist group (Dr. Dia/Dr. Pop): Seen for chronic cough, hx of "insignificant pulmonary fibrosis," PFTs (2017) was wnl aside from diffusion capacity of 74%, and CT Chest (2013) showed no evidence of pulm fibrosis or other significant pulm pathology    CARDIOVASCULAR:   # HTN  - No evidence of fluid overload   - Continue to monitor  - c/w Diltiazem 240mg PO daily  - c/w Losartan 100mg PO daily (on olmesartan 40mg daily at home)  - Cardiology following (Dr. Lucero), appreciate recs    GI:   - C/w regular diet (1L fluid restriction) for now  - GI following (Dr. Kraft), appreciate recs  # Constipation  - senna 2 tabs qhs and miralax daily  - lactuose 20g PO x1 given    RENAL/:   # Urinary retention:   Likely related to medication side effect s/p Scruggs placement.   - c/w TOV today  - Started on tamsulosin, dc'ed doxazosin  - Monitor urine output  - Trend BMP    ENDOCRINE:   - Insulin sliding scale for steroid-induced hyperglycemia    HEMATOLOGIC:   # Leukocytosis, improving   Likely secondary to steroids but cannot r/o worsening infection  - c/w broad-spectrum antibiotics  - Continue to trend CBC with differential    ID:   # Influenza with suspected superimposed bacterial PNA:   - c/w vancomycin 1g q12 and cefepime 2g q8  - C/w Tamiflu for total 10 days as above  - Trend WBC count, fever curve  - Reculture and consider repeat RVP if febrile    PROPHYLAXIS:   - DVT ppx: Lovenox    CODE STATUS:  - Wife is HCP and agrees to bring in form from home. Agreeable to trial of intubation if needed. 69M with PMH of HTN, HLD, depression, anxiety, and insignificant degree of pulmonary fibrosis, who initially presented 2/20/20 following a fall at home, admitted with acute hypoxic respiratory failure in setting of influenza with concern for superimposed PNA. Course c/b worsening respiratory failure with high oxygen requirements, transferred to MICU for close monitoring.     NEURO:   - Currently AAOx3, awake, alert, answers questions appropriately  - C/w home clonazepam standing 1mg PO daily and 1mg PO TID PRN for anxiety, hold for sedation  - C/w home buspirone 15mg PO TID  - C/w home paroxetine 30mg PO daily    PULM:   # Acute hypoxic respiratory failure   Multifactorial related to influenza infection with high suspicion for superimposed bacterial PNA in setting of underlying lung disease. May also have component of aspiration pneumonitis.   - C/w HFNC, can wean down on FiO2 as tolerated as long as saturation maintained >90% (currently at FiO2 60%)  - C/w broad-spectrum antibiotics  - C/w Tamiflu for total 10 days (2/20-2/29)  - C/w duonebs q6h PRN  - dc'ed steroids  - Encourage Acapella valve use to loosen up secretions    # Pulmonary fibrosis:   - Appreciate Pulm recs  - As per pt's pulmonologist group (Dr. Dia/Dr. Pop): Seen for chronic cough, hx of "insignificant pulmonary fibrosis," PFTs (2017) was wnl aside from diffusion capacity of 74%, and CT Chest (2013) showed no evidence of pulm fibrosis or other significant pulm pathology    #Chronic cough  - c/w Tessalon Perles 200mg PO TID  - c/w Robitussin-DM 10mL PO q6h    CARDIOVASCULAR:   # HTN  - No evidence of fluid overload   - Continue to monitor  - c/w Diltiazem 240mg PO daily  - c/w Losartan 100mg PO daily (on olmesartan 40mg daily at home)  - Cardiology following (Dr. Lucero), appreciate recs    GI:   - C/w regular diet (1L fluid restriction) for now  - GI following (Dr. Kraft), appreciate recs  # Constipation  - senna 2 tabs qhs and miralax daily  - lactuose 20g PO x1 given    RENAL/:   # Urinary retention:   Likely related to medication side effect s/p Loza placement.   - c/w tamsulosin, off home doxazosin  - Monitor urine output, off loza, straight cath PRN  - Trend BMP    ENDOCRINE:   - Insulin sliding scale for steroid-induced hyperglycemia    HEMATOLOGIC:   # Leukocytosis, improving   Likely secondary to steroids but cannot r/o worsening infection  - c/w broad-spectrum antibiotics  - Continue to trend CBC with differential    ID:   # Influenza with suspected superimposed bacterial PNA:   - c/w vancomycin 1g q12 and cefepime 2g q8  - C/w Tamiflu for total 10 days as above  - Trend WBC count, fever curve  - Reculture and consider repeat RVP if febrile    PROPHYLAXIS:   - DVT ppx: Lovenox    CODE STATUS:  - Wife is HCP and agrees to bring in form from home. Agreeable to trial of intubation if needed. 69M with PMH of HTN, HLD, depression, anxiety, and insignificant degree of pulmonary fibrosis, who initially presented 2/20/20 following a fall at home, admitted with acute hypoxic respiratory failure in setting of influenza with concern for superimposed PNA. Course c/b worsening respiratory failure with high oxygen requirements, transferred to MICU for close monitoring.     NEURO:   - Currently AAOx3, awake, alert, answers questions appropriately  - C/w home clonazepam standing 1mg PO daily and 1mg PO TID PRN for anxiety, hold for sedation  - C/w home buspirone 15mg PO TID  - C/w home paroxetine 30mg PO daily    PULM:   # Acute hypoxic respiratory failure   Multifactorial related to influenza infection with high suspicion for superimposed bacterial PNA in setting of underlying lung disease. May also have component of aspiration pneumonitis.   - C/w HFNC, can wean down on FiO2 as tolerated as long as saturation maintained >90% (currently at FiO2 60%)  - C/w broad-spectrum antibiotics  - C/w Tamiflu for total 10 days (2/20-2/29)  - C/w duonebs q6h PRN  - dc'ed steroids  - Encourage Acapella valve use to loosen up secretions    # Pulmonary fibrosis:   - Appreciate Pulm recs  - As per pt's pulmonologist group (Dr. Dia/Dr. Pop): Seen for chronic cough, hx of "insignificant pulmonary fibrosis," PFTs (2017) was wnl aside from diffusion capacity of 74%, and CT Chest (2013) showed no evidence of pulm fibrosis or other significant pulm pathology    #Chronic cough  - c/w Tessalon Perles 200mg PO TID  - c/w Robitussin-DM 10mL PO q6h    CARDIOVASCULAR:   # HTN  - No evidence of fluid overload   - Continue to monitor  - c/w Diltiazem 240mg PO daily  - c/w Losartan 100mg PO daily for better BP control (on olmesartan 40mg daily at home)   - Cardiology following (Dr. Lucero), appreciate recs    GI:   - C/w regular diet (1L fluid restriction) for now  - GI following (Dr. Kraft), appreciate recs  # Constipation  - lactuose 20g PO x1 given with subsequent 2 BMs  - c/w senna 2 tabs qhs and miralax daily    RENAL/:   # Urinary retention:   Likely related to medication side effect s/p Loza placement.   - c/w tamsulosin, off home doxazosin  - Monitor urine output, off loza, straight cath PRN  - Trend BMP    ENDOCRINE:   - Insulin sliding scale for steroid-induced hyperglycemia    HEMATOLOGIC:   # Leukocytosis, improving   Likely secondary to steroids but cannot r/o worsening infection  - c/w broad-spectrum antibiotics  - Continue to trend CBC with differential    ID:   # Influenza with suspected superimposed bacterial PNA:   - c/w vancomycin 1g q12 and cefepime 2g q8  - C/w Tamiflu for total 10 days as above  - Trend WBC count, fever curve  - Reculture and consider repeat RVP if febrile    PROPHYLAXIS:   - DVT ppx: Lovenox  - Out of bed to chair as tolerated    CODE STATUS:  - Wife is HCP and agrees to bring in form from home. Agreeable to trial of intubation if needed.

## 2020-02-28 NOTE — PROGRESS NOTE ADULT - SUBJECTIVE AND OBJECTIVE BOX
[facility] - PROGRESS NOTE    CHIEF COMPLAINT: hypoxic respiratory failure; influenza; pneumonia; bronchospasm; pulmonary fibrosis;     INTERVAL HISTORY: dejected - becoming quite debilitated - still not out of bed and into the chair - (+) bowel movement - poor appetite; remains in the MICU for closer clinical observation given tenuous respiratory status with increasing groundglass opacities on follow-up chest CT; continues on high level high flow nasal canula; no cough, sputum production, chest congestion or wheeze; no fevers, chills or sweats; no chest pain/pressure or palpitations; urinary retention    REVIEW OF SYSTEMS:  Constitutional: As per interval history  HEENT: Within normal limits  CV: As per interval history  Resp: As per interval history  GI: Within normal limits   : urinary retention  Musculoskeletal: Within normal limits  Skin: Within normal limits  Neurological: Within normal limits  Psychiatric: anxiety type depression  Endocrine: Within normal limits  Hematologic/Lymphatic: Within normal limits  Allergic/Immunologic: Within normal limits    MEDICATIONS:     Pulmonary "  benzonatate 200 milliGRAM(s) Oral three times a day  guaifenesin/dextromethorphan  Syrup 10 milliLiter(s) Oral every 6 hours    Anti-microbials:  cefepime   IVPB      cefepime   IVPB 2000 milliGRAM(s) IV Intermittent every 8 hours  oseltamivir 75 milliGRAM(s) Oral two times a day  vancomycin  IVPB 1250 milliGRAM(s) IV Intermittent every 12 hours    Cardiovascular:  diltiazem    milliGRAM(s) Oral daily  losartan 100 milliGRAM(s) Oral daily  tamsulosin 0.4 milliGRAM(s) Oral at bedtime    Other:  atorvastatin 10 milliGRAM(s) Oral at bedtime  busPIRone 15 milliGRAM(s) Oral three times a day  chlorhexidine 4% Liquid 1 Application(s) Topical <User Schedule>  clonazePAM  Tablet 1 milliGRAM(s) Oral daily  dextrose 5%. 1000 milliLiter(s) IV Continuous <Continuous>  dextrose 50% Injectable 12.5 Gram(s) IV Push once  dextrose 50% Injectable 25 Gram(s) IV Push once  dextrose 50% Injectable 25 Gram(s) IV Push once  enoxaparin Injectable 40 milliGRAM(s) SubCutaneous daily  insulin lispro (HumaLOG) corrective regimen sliding scale   SubCutaneous three times a day before meals  insulin lispro (HumaLOG) corrective regimen sliding scale   SubCutaneous at bedtime  methylPREDNISolone sodium succinate Injectable 20 milliGRAM(s) IV Push daily  PARoxetine 30 milliGRAM(s) Oral daily  polyethylene glycol 3350 17 Gram(s) Oral daily  senna 2 Tablet(s) Oral at bedtime    MEDICATIONS  (PRN):  acetaminophen   Tablet .. 650 milliGRAM(s) Oral every 6 hours PRN Temp greater or equal to 38C (100.4F), Mild Pain (1 - 3), Moderate Pain (4 - 6)  albuterol/ipratropium for Nebulization 3 milliLiter(s) Nebulizer every 6 hours PRN Shortness of Breath and/or Wheezing  clonazePAM  Tablet 1 milliGRAM(s) Oral three times a day PRN anxiety  dextrose 40% Gel 15 Gram(s) Oral once PRN Blood Glucose LESS THAN 70 milliGRAM(s)/deciliter  glucagon  Injectable 1 milliGRAM(s) IntraMuscular once PRN Glucose LESS THAN 70 milligrams/deciliter        OBJECTIVE:    I&O's Detail    2020 07:01  -  2020 07:00  --------------------------------------------------------  IN:    Oral Fluid: 1217 mL    Solution: 600 mL  Total IN: 1817 mL    OUT:    Incontinent per Condom Catheter: 600 mL    Intermittent Catheterization - Urethral: 450 mL  Total OUT: 1050 mL    Total NET: 767 mL    Daily Height in cm: 182.9 (2020 10:58)      Daily Weight in k.2 (2020 04:00)    POCT Blood Glucose.: 155 mg/dL (2020 08:32)  POCT Blood Glucose.: 105 mg/dL (2020 21:54)  POCT Blood Glucose.: 191 mg/dL (2020 18:17)  POCT Blood Glucose.: 230 mg/dL (2020 13:21)  POCT Blood Glucose.: 147 mg/dL (2020 09:30)      PHYSICAL EXAM:       ICU Vital Signs Last 24 Hrs  T(C): 36.8 (2020 04:00), Max: 37.4 (2020 12:00)  T(F): 98.3 (2020 04:00), Max: 99.4 (2020 12:00)  HR: 85 (2020 08:42) (68 - 101)  BP: 167/80 (2020 07:00) (124/62 - 167/80)  BP(mean): 114 (2020 07:00) (86 - 114)  ABP: --  ABP(mean): --  RR: 24 (2020 08:42) (19 - 29)  SpO2: 95% (2020 08:42) (87% - 100%) on 70% high flow nasal canula     General: Awake. Alert. Cooperative. No distress. Appears stated age. Tired.  HEENT: Atraumatic. Normocephalic. Anicteric. Normal oral mucosa. PERRL. EOMI.  Neck: Supple. Trachea midline. Thyroid without enlargement/tenderness/nodules. No carotid bruit. No JVD.	  Cardiovascular: Regular rate and rhythm. S1 S2 normal. No murmurs, rubs or gallops.  Respiratory: Respirations unlabored. Scattered rales. No curvature.  Abdomen: Soft. Non-tender. Non-distended. No organomegaly. No masses. Normal bowel sounds. Obese.  Extremities: Warm to touch. No clubbing or cyanosis. No pedal edema.  Pulses: 2+ peripheral pulses all extremities.	  Skin: Normal skin color. No rashes or lesions. No ecchymoses. No cyanosis. Warm to touch.  Lymph Nodes: Cervical, supraclavicular and axillary nodes normal  Neurological: Motor and sensory examination equal and normal. A and O x 3  Psychiatry: Appropriate mood and affect.    LABS:                          12.1   22.40 )-----------( 152      ( 2020 01:11 )             36.2     CBC    WBC  22.40 <==, 20.53 <==, 25.18 <==, 24.08 <==, 20.41 <==, 21.64 <==, 21.59 <==    Hemoglobin  12.1 <<==, 11.5 <<==, 12.0 <<==, 13.1 <<==, 12.7 <<==, 12.7 <<==, 12.8 <<==    Hematocrit  36.2 <==, 34.5 <==, 35.7 <==, 38.4 <==, 37.3 <==, 37.2 <==, 35.8 <==    Platelets  152 <==, 148 <==, 143 <==, 121 <==, 112 <==, 120 <==, 118 <==      132<L>  |  96  |  30<H>  ----------------------------<  122<H>    02-28  4.7   |  24  |  0.78      LYTES    sodium  132 <==, 133 <==, 134 <==, 138 <==, 132 <==, 134 <==, 133 <==    potassium   4.7 <==, 5.0 <==, 4.5 <==, 4.5 <==, 4.8 <==, 4.5 <==, 4.4 <==    chloride  96 <==, 95 <==, 94 <==, 97 <==, 95 <==, 95 <==, 95 <==    carbon dioxide  24 <==, 28 <==, 28 <==, 29 <==, 23 <==, 21 <==, 25 <==    =============================================================================================  RENAL FUNCTION:    Creatinine:   0.78  <<==, 0.81  <<==, 0.87  <<==, 0.88  <<==, 0.87  <<==, 0.85  <<==, 0.87  <<==    BUN:   30 <==, 35 <==, 36 <==, 32 <==, 29 <==, 28 <==, 28 <==    ============================================================================================    calcium   8.2 <==, 7.7 <==, 7.9 <==, 8.4 <==, 8.0 <==, 8.0 <==, 8.2 <==    phos   3.1 <==, 3.8 <==, 3.2 <==, 3.2 <==, 2.2 <==, 2.2 <==    mag   2.4 <==, 2.7 <==, 2.7 <==, 3.0 <==, 2.8 <==, 2.6 <==    ============================================================================================  LFTs    AST:   31 <== , 24 <== , 25 <== , 41 <==     ALT:  39  <== , 37  <== , 33  <== , 40  <==     AP:  79  <=, 76  <=, 76  <=, 101  <=    Bili:  0.5  <=, 0.5  <=, 0.5  <=, 0.4  <=    PT/INR - ( 2020 22:22 )   PT: 12.5 sec;   INR: 1.09 ratio      ABG - ( 2020 01:07 )  pH: 7.49  /  pCO2: 40    /  pO2: 55    / HCO3: 30    / Base Excess: 6.9   /  SaO2: 86        ABG - ( 2020 00:22 )  pH: 7.51  /  pCO2: 38    /  pO2: 56    / HCO3: 30    / Base Excess: 7.2   /  SaO2: 89     Procalcitonin, Serum: 0.15 ng/mL ( @ 09:25)    Procalcitonin, Serum: 0.69 ng/mL ( @ 07:45)    Serum Pro-Brain Natriuretic Peptide: 364 pg/mL ( @ 22:30)    CARDIAC MARKERS ( 2020 00:57 )  CPK x     /CKMB x     /CKMB Units x        troponin 17 ng/L    CARDIAC MARKERS ( 2020 22:30 )  CPK x     /CKMB x     /CKMB Units x        troponin 19 ng/L    MICROBIOLOGY:     FLU A B RSV Detection by PCR (20 @ 22:30)    Flu A Result: Detected    Flu B Result: NotDetec    RSV Result: NotDetec    Legionella pneumophila Antigen, Urine (20 @ 09:50)    Legionella Antigen, Urine: Negative    Culture - Blood (20 @ 10:14)    Specimen Source: .Blood Blood-Venous    Culture Results:   No growth to date.    Culture - Blood (20 @ 10:14)    Specimen Source: .Blood Blood-Peripheral    Culture Results:   No growth to date.    RADIOLOGY:  [x] Chest radiographs reviewed and interpreted by me      EXAM:  CT CHEST                          PROCEDURE DATE:  2020      INTERPRETATION:  CLINICAL INFORMATION: Dyspnea    COMPARISON: CT chest 2020    PROCEDURE:   CT of the Chest was performed without intravenous contrast.  [place] coronal reformats were performed.      FINDINGS:    LUNGS AND AIRWAYS: The central airways are patent.  Patchy areas of groundglass opacities containing traction bronchiectasis are noted within both lungs. Addition, superimposed patchy groundglass opacities are also noted. They are new when compared to previous exam.    PLEURA: No pleural effusion.    MEDIASTINUM AND CHAVO: There are scattered small hilar and mediastinal lymph nodes.    VESSELS: Within normal limits.    HEART: The heart size is normal and there is no pericardial effusion.     CHEST WALL AND LOWER NECK: Within normal limits.    VISUALIZED UPPER ABDOMEN: Within normal limits.    BONES: There are multilevel degenerative changes of the spine    IMPRESSION:     Patchy groundglass opacities containing traction bronchiectasis bilaterally is suggestive of pulmonary fibrosis of uncertain etiology.    Groundglass opacities are noted within both lungs. Primary consideration is infection.    BRITNEY VERONICA M.D., RADIOLOGY RESIDENT  This document has been electronically signed.  TAMMIE LOTT M.D., ATTENDING RADIOLOGIST  This document has been electronically signed. 2020  5:04PM  ---------------------------------------------------------------------------------------------------------------    EXAM:  CT ANGIO CHEST (W)AW IC                          PROCEDURE DATE:  2020      INTERPRETATION:  CLINICAL INFORMATION: Shortness of breath and fatigue. History of lung disease.    COMPARISON: Chest radiograph 2020    PROCEDURE:   CT Angiography of the Chest.  90 ml of Omnipaque 350 was injected intravenously. 10 ml were discarded.  Sagittal and coronal reformats were performed as well as 3D (MIP) reconstructions.    FINDINGS:    LUNGS AND AIRWAYS: Patent central airways. Areas of architectural distortion, mild traction bronchiectasis and linear/reticular opacities. Nonspecific groundglass/patchy opacities in both lungs.      PLEURA: No pleural effusion or pneumothorax.    MEDIASTINUM AND CHAVO: Subcentimeter mediastinal and hilar lymph nodes without lymphadenopathy.    VESSELS: Adequate contrast opacification of the pulmonary arteries. No obvious pulmonary embolus.    HEART: Normal heart size. No pericardial effusion.    CHEST WALL AND LOWER NECK: Heterogeneous thyroid gland, obscured by artifact.    VISUALIZED UPPER ABDOMEN: Colon diverticulosis.    BONES: Degenerative changes of the spine.    IMPRESSION:     No obvious pulmonary embolus.    Findings reflecting known pulmonary fibrosis. Recommend clinical correlation to assess underlying pneumonia. Recommend comparison to previous outside study and follow-up to exclude potential underlying neoplasm.    Heterogeneous thyroid gland, which is difficult to assess due to artifact and can be further characterized on a nonemergent ultrasound as clinically indicated.     Additional findings as described.    RAMONA REDDY M.D., RADIOLOGY RESIDENT  This document has been electronically signed.  MIAH CHAUDHRY M.D., ATTENDING RADIOLOGIST  This document has been electronically signed. 2020  1:49AM  ---------------------------------------------------------------------------------------------------------------

## 2020-02-28 NOTE — PROGRESS NOTE ADULT - SUBJECTIVE AND OBJECTIVE BOX
CARDIOLOGY     PROGRESS  NOTE   ________________________________________________    CHIEF COMPLAINT:Patient is a 69y old  Male who presents with a chief complaint of SOB, cough and weakness (28 Feb 2020 07:17)  doing better.  	  REVIEW OF SYSTEMS:  CONSTITUTIONAL: No fever, weight loss, or fatigue  EYES: No eye pain, visual disturbances, or discharge  ENT:  No difficulty hearing, tinnitus, vertigo; No sinus or throat pain  NECK: No pain or stiffness  RESPIRATORY: No cough, wheezing, chills or hemoptysis; decrease  Shortness of Breath  CARDIOVASCULAR: No chest pain, palpitations, passing out, dizziness, or leg swelling  GASTROINTESTINAL: No abdominal or epigastric pain. No nausea, vomiting, or hematemesis; No diarrhea or constipation. No melena or hematochezia.  GENITOURINARY: No dysuria, frequency, hematuria, or incontinence  NEUROLOGICAL: No headaches, memory loss, loss of strength, numbness, or tremors  SKIN: No itching, burning, rashes, or lesions   LYMPH Nodes: No enlarged glands  ENDOCRINE: No heat or cold intolerance; No hair loss  MUSCULOSKELETAL: No joint pain or swelling; No muscle, back, or extremity pain  PSYCHIATRIC: No depression, anxiety, mood swings, or difficulty sleeping  HEME/LYMPH: No easy bruising, or bleeding gums  ALLERGY AND IMMUNOLOGIC: No hives or eczema	    [ ] All others negative	  [ ] Unable to obtain    PHYSICAL EXAM:  T(C): 36.9 (02-28-20 @ 08:00), Max: 37.4 (02-27-20 @ 12:00)  HR: 86 (02-28-20 @ 09:00) (68 - 101)  BP: 155/70 (02-28-20 @ 09:00) (124/62 - 169/74)  RR: 29 (02-28-20 @ 09:00) (19 - 47)  SpO2: 94% (02-28-20 @ 09:00) (87% - 100%)  Wt(kg): --  I&O's Summary    27 Feb 2020 07:01  -  28 Feb 2020 07:00  --------------------------------------------------------  IN: 1817 mL / OUT: 1050 mL / NET: 767 mL    28 Feb 2020 07:01  -  28 Feb 2020 09:35  --------------------------------------------------------  IN: 200 mL / OUT: 0 mL / NET: 200 mL        Appearance: Normal	  HEENT:   Normal oral mucosa, PERRL, EOMI	  Lymphatic: No lymphadenopathy  Cardiovascular: Normal S1 S2, No JVD, + murmurs, No edema  Respiratory: Lungs clear to auscultation	  Psychiatry: A & O x 3, Mood & affect appropriate  Gastrointestinal:  Soft, Non-tender, + BS	  Skin: No rashes, No ecchymoses, No cyanosis	  Neurologic: Non-focal  Extremities: Normal range of motion, No clubbing, cyanosis or edema  Vascular: Peripheral pulses palpable 2+ bilaterally    MEDICATIONS  (STANDING):  atorvastatin 10 milliGRAM(s) Oral at bedtime  benzonatate 200 milliGRAM(s) Oral three times a day  busPIRone 15 milliGRAM(s) Oral three times a day  cefepime   IVPB      cefepime   IVPB 2000 milliGRAM(s) IV Intermittent every 8 hours  chlorhexidine 4% Liquid 1 Application(s) Topical <User Schedule>  clonazePAM  Tablet 1 milliGRAM(s) Oral daily  dextrose 5%. 1000 milliLiter(s) (50 mL/Hr) IV Continuous <Continuous>  dextrose 50% Injectable 12.5 Gram(s) IV Push once  dextrose 50% Injectable 25 Gram(s) IV Push once  dextrose 50% Injectable 25 Gram(s) IV Push once  diltiazem    milliGRAM(s) Oral daily  enoxaparin Injectable 40 milliGRAM(s) SubCutaneous daily  guaifenesin/dextromethorphan  Syrup 10 milliLiter(s) Oral every 6 hours  insulin lispro (HumaLOG) corrective regimen sliding scale   SubCutaneous three times a day before meals  insulin lispro (HumaLOG) corrective regimen sliding scale   SubCutaneous at bedtime  losartan 100 milliGRAM(s) Oral daily  methylPREDNISolone sodium succinate Injectable 20 milliGRAM(s) IV Push daily  oseltamivir 75 milliGRAM(s) Oral two times a day  PARoxetine 30 milliGRAM(s) Oral daily  polyethylene glycol 3350 17 Gram(s) Oral daily  senna 2 Tablet(s) Oral at bedtime  tamsulosin 0.4 milliGRAM(s) Oral at bedtime  vancomycin  IVPB 1250 milliGRAM(s) IV Intermittent every 12 hours      TELEMETRY: 	    ECG:  	  RADIOLOGY:  OTHER: 	  	  LABS:	 	    CARDIAC MARKERS:                                12.1   22.40 )-----------( 152      ( 28 Feb 2020 01:11 )             36.2     02-28    132<L>  |  96  |  30<H>  ----------------------------<  122<H>  4.7   |  24  |  0.78    Ca    8.2<L>      28 Feb 2020 01:11  Phos  3.1     02-28  Mg     2.4     02-28    TPro  5.9<L>  /  Alb  2.8<L>  /  TBili  0.5  /  DBili  x   /  AST  31  /  ALT  39  /  AlkPhos  79  02-28    proBNP: Serum Pro-Brain Natriuretic Peptide: 364 pg/mL (02-19 @ 22:30)    Lipid Profile:   HgA1c: Hemoglobin A1C, Whole Blood: 6.6 % (02-24 @ 08:40)    TSH: Thyroid Stimulating Hormone, Serum: 0.39 uIU/mL (02-21 @ 09:19)  Thyroid Stimulating Hormone, Serum: 0.47 uIU/mL (02-21 @ 00:19)          Assessment and plan  ---------------------------  69M with PMH of HTN, pulmonary fibrosis, depression/anxiety p/w SOB and cough. Pt states his mother passed away about 2 weeks ago and shortly after burying here, started to feel very unwell. He has had progressively worsening cough, non productive, but feels a lot of chest congestion. Associated with ROSE, headache and myalgias and fatigue. Was seen at  earlier this week and was diagnosed with flu. In the past couple of days, has been having very labored breathing; today, had an episode where he fell while trying to reach for alight switch and was so winded and weak, he was unable to get up. Has barely been eating anything due to poor appetite and nausea, but denies vomiting. Denies fevers or chills, no CP, no palpitations, no abdominal pain, had 3 episodes of loose stools today, +decreased UOP, no LE swelling. Does not know of any sick contacts. Denies recent travel.  sob/ respiratory failure sec to hx of pulmonary fibrosis ?pneumoniae.  abx  echo to evaluate pulmonary pressure sec to hx of pulmonary fibrosis  add calcium channel blocker for bp control, increase Cardizem CD if increase bp/hr  dvt prophylaxis  asa daily  awaiting echo, no evidence of heart failure  supportive measures

## 2020-02-28 NOTE — PROGRESS NOTE ADULT - ASSESSMENT
diarrhea - resolved  anemia  constipation      +bms s/p lactulose x 1  cont bowel regimen; hold if having loose stool  h/h stable  diet as tolerated, encourage PO intake   colonoscopy 3 years ago, no need to repeat as inpatient  monitor for gi bleed  encourage OOB/ambulation  dvt ppx  further care per MICU appreciated   will follow

## 2020-02-28 NOTE — PROGRESS NOTE ADULT - ATTENDING COMMENTS
1. Acute hypoxemic respiratory failure due to influenza pneumonia and bacterial pneumonia. Pt still requiring 55-6-% hi flow 02. Continue high flow 02. Continue Tamiflu and Vancomycin and Cefepime. Continue to taper off steroids. H/O  Pulmonary fibrosis with minimal lung impact at this point. Acapella valve to help  mobilize secretions.   2. HTN. Restart ARB  3. BPH.. Requiring straight cath after d/c Scruggs catheter. Start Tamsulosin.  4. Anxiety disorder: Klonopin prn plus standing dose. Pt is very anxious.  5. OOB chair

## 2020-02-28 NOTE — PROGRESS NOTE ADULT - SUBJECTIVE AND OBJECTIVE BOX
CHIEF COMPLAINT: SOB, cough and weakness    OVERNIGHT / SUBJECTIVE:  -Overnight, no acute events. High flow FiO2 was at 70% to about midnight, then changed back to 80% due to pO2 56, but O2 sat remained >90%  -Today, pt seen and examined at bedside in AM.    REVIEW OF SYSTEMS:    CONSTITUTIONAL: +feels warm; No weakness, fevers or chills  EYES/ENT: No visual changes;  No vertigo or throat pain   NECK: No pain or stiffness  RESPIRATORY: +cough (improved, also has chronic cough); No wheezing, hemoptysis; No shortness of breath  CARDIOVASCULAR: No chest pain or palpitations  GASTROINTESTINAL: No abdominal or epigastric pain. No nausea, vomiting, or hematemesis; No diarrhea or constipation. No melena or hematochezia.  GENITOURINARY: No dysuria, frequency or hematuria  NEUROLOGICAL: No numbness or weakness  SKIN: No itching, burning, rashes, or lesions   All other review of systems is negative unless indicated above.      OBJECTIVE:  ICU Vital Signs Last 24 Hrs  T(C): 36.8 (28 Feb 2020 04:00), Max: 37.4 (27 Feb 2020 12:00)  T(F): 98.3 (28 Feb 2020 04:00), Max: 99.4 (27 Feb 2020 12:00)  HR: 81 (28 Feb 2020 05:00) (68 - 101)  BP: 163/71 (28 Feb 2020 05:00) (124/62 - 163/71)  BP(mean): 102 (28 Feb 2020 05:00) (86 - 110)  ABP: --  ABP(mean): --  RR: 27 (28 Feb 2020 05:00) (19 - 29)  SpO2: 87% (28 Feb 2020 05:00) (87% - 100%)        02-27 @ 07:01  -  02-28 @ 07:00  --------------------------------------------------------  IN: 1817 mL / OUT: 1050 mL / NET: 767 mL      CAPILLARY BLOOD GLUCOSE      POCT Blood Glucose.: 105 mg/dL (27 Feb 2020 21:54)  POCT Blood Glucose.: 191 mg/dL (27 Feb 2020 18:17)  POCT Blood Glucose.: 230 mg/dL (27 Feb 2020 13:21)  POCT Blood Glucose.: 147 mg/dL (27 Feb 2020 09:30)    I&O's Summary    27 Feb 2020 07:01  -  28 Feb 2020 07:00  --------------------------------------------------------  IN: 1817 mL / OUT: 1050 mL / NET: 767 mL        PHYSICAL EXAM  General: NAD, on high flow nasal cannula  HEENT: Head atraumatic; EOMI, PERRLA, normal sclera and conjunctiva; Dry MM  Neck: Supple, no JVD, no LAD  Chest/Lungs: Clear to ausculation anteriorly with mild coarse breath sounds, no wheezing appreciated  Heart: RRR, normal S1, S2, no murmurs or gallops  Abd: +BS, soft, obese, nontender, nondistended  Extremities: 2+ peripheral pulses, trace pedal edema  Skin: No rashes or lesions, warm, well-perfused  Neurologic: AAOx3, follows commands appropriately    LABS:                        12.1   22.40 )-----------( 152      ( 28 Feb 2020 01:11 )             36.2     02-28    132<L>  |  96  |  30<H>  ----------------------------<  122<H>  4.7   |  24  |  0.78    Ca    8.2<L>      28 Feb 2020 01:11  Phos  3.1     02-28  Mg     2.4     02-28    TPro  5.9<L>  /  Alb  2.8<L>  /  TBili  0.5  /  DBili  x   /  AST  31  /  ALT  39  /  AlkPhos  79  02-28        LINES:    HOSPITAL MEDICATIONS:  Standing Meds:  atorvastatin 10 milliGRAM(s) Oral at bedtime  benzonatate 200 milliGRAM(s) Oral three times a day  busPIRone 15 milliGRAM(s) Oral three times a day  cefepime   IVPB      cefepime   IVPB 2000 milliGRAM(s) IV Intermittent every 8 hours  chlorhexidine 4% Liquid 1 Application(s) Topical <User Schedule>  clonazePAM  Tablet 1 milliGRAM(s) Oral daily  dextrose 5%. 1000 milliLiter(s) IV Continuous <Continuous>  dextrose 50% Injectable 12.5 Gram(s) IV Push once  dextrose 50% Injectable 25 Gram(s) IV Push once  dextrose 50% Injectable 25 Gram(s) IV Push once  diltiazem    milliGRAM(s) Oral daily  enoxaparin Injectable 40 milliGRAM(s) SubCutaneous daily  guaifenesin/dextromethorphan  Syrup 10 milliLiter(s) Oral every 6 hours  insulin lispro (HumaLOG) corrective regimen sliding scale   SubCutaneous three times a day before meals  insulin lispro (HumaLOG) corrective regimen sliding scale   SubCutaneous at bedtime  losartan 100 milliGRAM(s) Oral daily  methylPREDNISolone sodium succinate Injectable 20 milliGRAM(s) IV Push daily  oseltamivir 75 milliGRAM(s) Oral two times a day  PARoxetine 30 milliGRAM(s) Oral daily  polyethylene glycol 3350 17 Gram(s) Oral daily  senna 2 Tablet(s) Oral at bedtime  tamsulosin 0.4 milliGRAM(s) Oral at bedtime  vancomycin  IVPB 1250 milliGRAM(s) IV Intermittent every 12 hours      PRN Meds:  acetaminophen   Tablet .. 650 milliGRAM(s) Oral every 6 hours PRN  albuterol/ipratropium for Nebulization 3 milliLiter(s) Nebulizer every 6 hours PRN  clonazePAM  Tablet 1 milliGRAM(s) Oral three times a day PRN  dextrose 40% Gel 15 Gram(s) Oral once PRN  glucagon  Injectable 1 milliGRAM(s) IntraMuscular once PRN      LABS:                        12.1   22.40 )-----------( 152      ( 28 Feb 2020 01:11 )             36.2     Hgb Trend: 12.1<--, 11.5<--, 12.0<--, 13.1<--, 12.7<--  02-28    132<L>  |  96  |  30<H>  ----------------------------<  122<H>  4.7   |  24  |  0.78    Ca    8.2<L>      28 Feb 2020 01:11  Phos  3.1     02-28  Mg     2.4     02-28    TPro  5.9<L>  /  Alb  2.8<L>  /  TBili  0.5  /  DBili  x   /  AST  31  /  ALT  39  /  AlkPhos  79  02-28    Creatinine Trend: 0.78<--, 0.81<--, 0.87<--, 0.88<--, 0.87<--, 0.85<--      Arterial Blood Gas:  02-28 @ 01:07  7.49/40/55/30/86/6.9  ABG lactate: --  Arterial Blood Gas:  02-27 @ 00:22  7.51/38/56/30/89/7.2  ABG lactate: --                MICROBIOLOGY:     RADIOLOGY: Reviewed CHIEF COMPLAINT: SOB, cough and weakness    OVERNIGHT / SUBJECTIVE:  -Overnight, no acute events. High flow FiO2 was downtitrated to 65% yesterday, but was changed back to 80% overnight; O2 sat remained >90%  -Today, pt seen and examined at bedside in AM. Reports feeling down, but better overall. Notes that he had two BMs yesterday, but continues to be unable to urinate. High flow FiO2 was downtitrated to 60% earlier in the morning, pt appears able to tolerate    REVIEW OF SYSTEMS:    CONSTITUTIONAL: No weakness, fevers or chills  EYES/ENT: No visual changes;  No vertigo or throat pain   NECK: No pain or stiffness  RESPIRATORY: +cough (improved, also has chronic cough); No wheezing, hemoptysis; No shortness of breath  CARDIOVASCULAR: No chest pain or palpitations  GASTROINTESTINAL: No abdominal or epigastric pain. No nausea, vomiting, or hematemesis; No diarrhea or constipation. No melena or hematochezia.  GENITOURINARY: No dysuria, frequency or hematuria  NEUROLOGICAL: No numbness or weakness  SKIN: No itching, burning, rashes, or lesions   All other review of systems is negative unless indicated above.      OBJECTIVE:  ICU Vital Signs Last 24 Hrs  T(C): 36.8 (28 Feb 2020 04:00), Max: 37.4 (27 Feb 2020 12:00)  T(F): 98.3 (28 Feb 2020 04:00), Max: 99.4 (27 Feb 2020 12:00)  HR: 81 (28 Feb 2020 05:00) (68 - 101)  BP: 163/71 (28 Feb 2020 05:00) (124/62 - 163/71)  BP(mean): 102 (28 Feb 2020 05:00) (86 - 110)  ABP: --  ABP(mean): --  RR: 27 (28 Feb 2020 05:00) (19 - 29)  SpO2: 87% (28 Feb 2020 05:00) (87% - 100%)        02-27 @ 07:01  -  02-28 @ 07:00  --------------------------------------------------------  IN: 1817 mL / OUT: 1050 mL / NET: 767 mL      CAPILLARY BLOOD GLUCOSE      POCT Blood Glucose.: 105 mg/dL (27 Feb 2020 21:54)  POCT Blood Glucose.: 191 mg/dL (27 Feb 2020 18:17)  POCT Blood Glucose.: 230 mg/dL (27 Feb 2020 13:21)  POCT Blood Glucose.: 147 mg/dL (27 Feb 2020 09:30)    I&O's Summary    27 Feb 2020 07:01  -  28 Feb 2020 07:00  --------------------------------------------------------  IN: 1817 mL / OUT: 1050 mL / NET: 767 mL        PHYSICAL EXAM  General: NAD, on high flow nasal cannula  HEENT: Head atraumatic; EOMI, PERRLA, normal sclera and conjunctiva; Dry MM  Neck: Supple, no JVD, no LAD  Chest/Lungs: Clear to ausculation anteriorly with mild coarse breath sounds, no wheezing appreciated  Heart: RRR, normal S1, S2, no murmurs or gallops  Abd: +BS, soft, obese, nontender, nondistended  Extremities: 2+ peripheral pulses, trace pedal edema  Skin: No rashes or lesions, warm, well-perfused  Neurologic: AAOx3, follows commands appropriately    LABS:                        12.1   22.40 )-----------( 152      ( 28 Feb 2020 01:11 )             36.2     02-28    132<L>  |  96  |  30<H>  ----------------------------<  122<H>  4.7   |  24  |  0.78    Ca    8.2<L>      28 Feb 2020 01:11  Phos  3.1     02-28  Mg     2.4     02-28    TPro  5.9<L>  /  Alb  2.8<L>  /  TBili  0.5  /  DBili  x   /  AST  31  /  ALT  39  /  AlkPhos  79  02-28        LINES:    HOSPITAL MEDICATIONS:  Standing Meds:  atorvastatin 10 milliGRAM(s) Oral at bedtime  benzonatate 200 milliGRAM(s) Oral three times a day  busPIRone 15 milliGRAM(s) Oral three times a day  cefepime   IVPB      cefepime   IVPB 2000 milliGRAM(s) IV Intermittent every 8 hours  chlorhexidine 4% Liquid 1 Application(s) Topical <User Schedule>  clonazePAM  Tablet 1 milliGRAM(s) Oral daily  dextrose 5%. 1000 milliLiter(s) IV Continuous <Continuous>  dextrose 50% Injectable 12.5 Gram(s) IV Push once  dextrose 50% Injectable 25 Gram(s) IV Push once  dextrose 50% Injectable 25 Gram(s) IV Push once  diltiazem    milliGRAM(s) Oral daily  enoxaparin Injectable 40 milliGRAM(s) SubCutaneous daily  guaifenesin/dextromethorphan  Syrup 10 milliLiter(s) Oral every 6 hours  insulin lispro (HumaLOG) corrective regimen sliding scale   SubCutaneous three times a day before meals  insulin lispro (HumaLOG) corrective regimen sliding scale   SubCutaneous at bedtime  losartan 100 milliGRAM(s) Oral daily  methylPREDNISolone sodium succinate Injectable 20 milliGRAM(s) IV Push daily  oseltamivir 75 milliGRAM(s) Oral two times a day  PARoxetine 30 milliGRAM(s) Oral daily  polyethylene glycol 3350 17 Gram(s) Oral daily  senna 2 Tablet(s) Oral at bedtime  tamsulosin 0.4 milliGRAM(s) Oral at bedtime  vancomycin  IVPB 1250 milliGRAM(s) IV Intermittent every 12 hours      PRN Meds:  acetaminophen   Tablet .. 650 milliGRAM(s) Oral every 6 hours PRN  albuterol/ipratropium for Nebulization 3 milliLiter(s) Nebulizer every 6 hours PRN  clonazePAM  Tablet 1 milliGRAM(s) Oral three times a day PRN  dextrose 40% Gel 15 Gram(s) Oral once PRN  glucagon  Injectable 1 milliGRAM(s) IntraMuscular once PRN      LABS:                        12.1   22.40 )-----------( 152      ( 28 Feb 2020 01:11 )             36.2     Hgb Trend: 12.1<--, 11.5<--, 12.0<--, 13.1<--, 12.7<--  02-28    132<L>  |  96  |  30<H>  ----------------------------<  122<H>  4.7   |  24  |  0.78    Ca    8.2<L>      28 Feb 2020 01:11  Phos  3.1     02-28  Mg     2.4     02-28    TPro  5.9<L>  /  Alb  2.8<L>  /  TBili  0.5  /  DBili  x   /  AST  31  /  ALT  39  /  AlkPhos  79  02-28    Creatinine Trend: 0.78<--, 0.81<--, 0.87<--, 0.88<--, 0.87<--, 0.85<--      Arterial Blood Gas:  02-28 @ 01:07  7.49/40/55/30/86/6.9  ABG lactate: --  Arterial Blood Gas:  02-27 @ 00:22  7.51/38/56/30/89/7.2  ABG lactate: --                MICROBIOLOGY:     RADIOLOGY: Reviewed

## 2020-02-28 NOTE — PROGRESS NOTE ADULT - SUBJECTIVE AND OBJECTIVE BOX
INTERVAL HPI/OVERNIGHT EVENTS:    patient seen and examined at bedside   events noted  +bms  appetite slowly improving, tolerating PO  denies n/v/d/c abd pain     MEDICATIONS  (STANDING):  atorvastatin 10 milliGRAM(s) Oral at bedtime  benzonatate 200 milliGRAM(s) Oral three times a day  busPIRone 15 milliGRAM(s) Oral three times a day  cefepime   IVPB      cefepime   IVPB 2000 milliGRAM(s) IV Intermittent every 8 hours  chlorhexidine 4% Liquid 1 Application(s) Topical <User Schedule>  clonazePAM  Tablet 1 milliGRAM(s) Oral daily  dextrose 5%. 1000 milliLiter(s) (50 mL/Hr) IV Continuous <Continuous>  dextrose 50% Injectable 12.5 Gram(s) IV Push once  dextrose 50% Injectable 25 Gram(s) IV Push once  dextrose 50% Injectable 25 Gram(s) IV Push once  diltiazem    milliGRAM(s) Oral daily  enoxaparin Injectable 40 milliGRAM(s) SubCutaneous daily  guaifenesin/dextromethorphan  Syrup 10 milliLiter(s) Oral every 6 hours  insulin lispro (HumaLOG) corrective regimen sliding scale   SubCutaneous three times a day before meals  insulin lispro (HumaLOG) corrective regimen sliding scale   SubCutaneous at bedtime  losartan 100 milliGRAM(s) Oral daily  oseltamivir 75 milliGRAM(s) Oral two times a day  PARoxetine 30 milliGRAM(s) Oral daily  polyethylene glycol 3350 17 Gram(s) Oral daily  senna 2 Tablet(s) Oral at bedtime  tamsulosin 0.4 milliGRAM(s) Oral at bedtime  vancomycin  IVPB 1250 milliGRAM(s) IV Intermittent every 12 hours    MEDICATIONS  (PRN):  acetaminophen   Tablet .. 650 milliGRAM(s) Oral every 6 hours PRN Temp greater or equal to 38C (100.4F), Mild Pain (1 - 3), Moderate Pain (4 - 6)  albuterol/ipratropium for Nebulization 3 milliLiter(s) Nebulizer every 6 hours PRN Shortness of Breath and/or Wheezing  clonazePAM  Tablet 1 milliGRAM(s) Oral three times a day PRN anxiety  dextrose 40% Gel 15 Gram(s) Oral once PRN Blood Glucose LESS THAN 70 milliGRAM(s)/deciliter  glucagon  Injectable 1 milliGRAM(s) IntraMuscular once PRN Glucose LESS THAN 70 milligrams/deciliter      Allergies    penicillin (Anaphylaxis)    Intolerances        Review of Systems:    General:  No wt loss, fevers, chills, night sweats,fatigue,   Eyes:  Good vision, no reported pain  ENT:  No sore throat, pain, runny nose, dysphagia  CV:  No pain, palpitatioins, hypo/hypertension  Resp:  No dyspnea, cough, tachypnea, wheezing  GI:  No pain, No nausea, No vomiting, No diarrhea, No constipatiion, No weight loss, No fever, No pruritis, No rectal bleeding, No tarry stools, No dysphagia,  :  No pain, bleeding, incontinence, nocturia  Muscle:  No pain, weakness  Neuro:  No weakness, tingling, memory problems  Psych:  No fatigue, insomnia, mood problems, depression  Endocrine:  No polyuria, polydypsia, cold/heat intolerance  Heme:  No petechiae, ecchymosis, easy bruisability  Skin:  No rash, tattoos, scars, edema      Vital Signs Last 24 Hrs  T(C): 36.9 (28 Feb 2020 08:00), Max: 37.4 (27 Feb 2020 12:00)  T(F): 98.5 (28 Feb 2020 08:00), Max: 99.4 (27 Feb 2020 12:00)  HR: 77 (28 Feb 2020 10:00) (68 - 101)  BP: 154/77 (28 Feb 2020 10:00) (124/62 - 169/74)  BP(mean): 108 (28 Feb 2020 10:00) (86 - 114)  RR: 25 (28 Feb 2020 10:00) (19 - 29)  SpO2: 91% (28 Feb 2020 10:00) (87% - 100%)    PHYSICAL EXAM:    Constitutional: NAD  HEENT: on HFNC  Cardiovascular: S1 and S2, RRR, no M  Gastrointestinal: BS+, obese, soft, NT/ND  Extremities: No peripheral edema  Neurological: A/O x 3, no focal deficits        LABS:                        12.1   22.40 )-----------( 152      ( 28 Feb 2020 01:11 )             36.2     02-28    132<L>  |  96  |  30<H>  ----------------------------<  122<H>  4.7   |  24  |  0.78    Ca    8.2<L>      28 Feb 2020 01:11  Phos  3.1     02-28  Mg     2.4     02-28    TPro  5.9<L>  /  Alb  2.8<L>  /  TBili  0.5  /  DBili  x   /  AST  31  /  ALT  39  /  AlkPhos  79  02-28          RADIOLOGY & ADDITIONAL TESTS:

## 2020-02-28 NOTE — PROGRESS NOTE ADULT - ASSESSMENT
ASSESSMENT:    hypoxic respiratory failure - multifactorial - no evidence of hypercapnia on ABG    1) influenza infection complicated by bronchospasm (resolved) and pneumonia (likely viral infection in the absence of a leukocytosis) - the initially mildly elevated procalcitonin level is now normal - leukocytosis now is likely due to high dose steroids at this point  2) underlying mild pulmonary fibrosis with traction bronchiectasis    PLAN/RECOMMENDATIONS:    high flow nasal canula to keep saturation greater than 88% - taper as able  BIPAP for increased work of breathing, resistant hypoxemia or respiratory acidosis  would continue tamiflu for 10 days days given ongoing pneumonia  cefepime/vancomycin  IV steroids being rapidly tapered by ICU team  glucose control on steroids  off albuterol/atrovent nebs to q6h  off pulmicort 0.5mg nebs q12h  robitussin DM/tessalon  buspar/klonopin/paxil  DVT prophylaxis - SQ lovenox  flomax - straight catheterization as needed    Will follow with you. Plan of care discussed with the patient and his wife at bedside and the MICU team.    Galileo Pop MD, [facility]  [phone]  Pulmonary Medicine

## 2020-02-29 LAB
ALBUMIN SERPL ELPH-MCNC: 2.4 G/DL — LOW (ref 3.3–5)
ALP SERPL-CCNC: 79 U/L — SIGNIFICANT CHANGE UP (ref 40–120)
ALT FLD-CCNC: 39 U/L — SIGNIFICANT CHANGE UP (ref 10–45)
ANION GAP SERPL CALC-SCNC: 14 MMOL/L — SIGNIFICANT CHANGE UP (ref 5–17)
AST SERPL-CCNC: 36 U/L — SIGNIFICANT CHANGE UP (ref 10–40)
BASE EXCESS BLDV CALC-SCNC: 5.3 MMOL/L — HIGH (ref -2–2)
BASOPHILS # BLD AUTO: 0.11 K/UL — SIGNIFICANT CHANGE UP (ref 0–0.2)
BASOPHILS NFR BLD AUTO: 0.5 % — SIGNIFICANT CHANGE UP (ref 0–2)
BILIRUB SERPL-MCNC: 0.5 MG/DL — SIGNIFICANT CHANGE UP (ref 0.2–1.2)
BUN SERPL-MCNC: 26 MG/DL — HIGH (ref 7–23)
CA-I SERPL-SCNC: 1.18 MMOL/L — SIGNIFICANT CHANGE UP (ref 1.12–1.3)
CALCIUM SERPL-MCNC: 8.4 MG/DL — SIGNIFICANT CHANGE UP (ref 8.4–10.5)
CHLORIDE BLDV-SCNC: 100 MMOL/L — SIGNIFICANT CHANGE UP (ref 96–108)
CHLORIDE SERPL-SCNC: 95 MMOL/L — LOW (ref 96–108)
CO2 BLDV-SCNC: 31 MMOL/L — HIGH (ref 22–30)
CO2 SERPL-SCNC: 23 MMOL/L — SIGNIFICANT CHANGE UP (ref 22–31)
CREAT SERPL-MCNC: 0.78 MG/DL — SIGNIFICANT CHANGE UP (ref 0.5–1.3)
EOSINOPHIL # BLD AUTO: 0.32 K/UL — SIGNIFICANT CHANGE UP (ref 0–0.5)
EOSINOPHIL NFR BLD AUTO: 1.4 % — SIGNIFICANT CHANGE UP (ref 0–6)
GAS PNL BLDV: 133 MMOL/L — LOW (ref 135–145)
GAS PNL BLDV: SIGNIFICANT CHANGE UP
GLUCOSE BLDC GLUCOMTR-MCNC: 122 MG/DL — HIGH (ref 70–99)
GLUCOSE BLDC GLUCOMTR-MCNC: 123 MG/DL — HIGH (ref 70–99)
GLUCOSE BLDC GLUCOMTR-MCNC: 172 MG/DL — HIGH (ref 70–99)
GLUCOSE BLDC GLUCOMTR-MCNC: 193 MG/DL — HIGH (ref 70–99)
GLUCOSE BLDV-MCNC: 168 MG/DL — HIGH (ref 70–99)
GLUCOSE SERPL-MCNC: 174 MG/DL — HIGH (ref 70–99)
HCO3 BLDV-SCNC: 30 MMOL/L — HIGH (ref 21–29)
HCT VFR BLD CALC: 35.4 % — LOW (ref 39–50)
HCT VFR BLDA CALC: 36 % — LOW (ref 39–50)
HGB BLD CALC-MCNC: 11.8 G/DL — LOW (ref 13–17)
HGB BLD-MCNC: 11.6 G/DL — LOW (ref 13–17)
IMM GRANULOCYTES NFR BLD AUTO: 5.7 % — HIGH (ref 0–1.5)
LACTATE BLDV-MCNC: 1.4 MMOL/L — SIGNIFICANT CHANGE UP (ref 0.7–2)
LYMPHOCYTES # BLD AUTO: 1.33 K/UL — SIGNIFICANT CHANGE UP (ref 1–3.3)
LYMPHOCYTES # BLD AUTO: 5.7 % — LOW (ref 13–44)
MAGNESIUM SERPL-MCNC: 2.1 MG/DL — SIGNIFICANT CHANGE UP (ref 1.6–2.6)
MCHC RBC-ENTMCNC: 30.4 PG — SIGNIFICANT CHANGE UP (ref 27–34)
MCHC RBC-ENTMCNC: 32.8 GM/DL — SIGNIFICANT CHANGE UP (ref 32–36)
MCV RBC AUTO: 92.7 FL — SIGNIFICANT CHANGE UP (ref 80–100)
MONOCYTES # BLD AUTO: 1.31 K/UL — HIGH (ref 0–0.9)
MONOCYTES NFR BLD AUTO: 5.6 % — SIGNIFICANT CHANGE UP (ref 2–14)
NEUTROPHILS # BLD AUTO: 18.97 K/UL — HIGH (ref 1.8–7.4)
NEUTROPHILS NFR BLD AUTO: 81.1 % — HIGH (ref 43–77)
NRBC # BLD: 0 /100 WBCS — SIGNIFICANT CHANGE UP (ref 0–0)
OTHER CELLS CSF MANUAL: 14 ML/DL — LOW (ref 18–22)
PCO2 BLDV: 43 MMHG — SIGNIFICANT CHANGE UP (ref 35–50)
PH BLDV: 7.45 — SIGNIFICANT CHANGE UP (ref 7.35–7.45)
PHOSPHATE SERPL-MCNC: 3 MG/DL — SIGNIFICANT CHANGE UP (ref 2.5–4.5)
PLATELET # BLD AUTO: 142 K/UL — LOW (ref 150–400)
PO2 BLDV: 52 MMHG — HIGH (ref 25–45)
POTASSIUM BLDV-SCNC: 4.2 MMOL/L — SIGNIFICANT CHANGE UP (ref 3.5–5.3)
POTASSIUM SERPL-MCNC: 4.6 MMOL/L — SIGNIFICANT CHANGE UP (ref 3.5–5.3)
POTASSIUM SERPL-SCNC: 4.6 MMOL/L — SIGNIFICANT CHANGE UP (ref 3.5–5.3)
PROT SERPL-MCNC: 5.6 G/DL — LOW (ref 6–8.3)
RBC # BLD: 3.82 M/UL — LOW (ref 4.2–5.8)
RBC # FLD: 14.2 % — SIGNIFICANT CHANGE UP (ref 10.3–14.5)
SAO2 % BLDV: 84 % — SIGNIFICANT CHANGE UP (ref 67–88)
SODIUM SERPL-SCNC: 132 MMOL/L — LOW (ref 135–145)
WBC # BLD: 23.36 K/UL — HIGH (ref 3.8–10.5)
WBC # FLD AUTO: 23.36 K/UL — HIGH (ref 3.8–10.5)

## 2020-02-29 PROCEDURE — 99233 SBSQ HOSP IP/OBS HIGH 50: CPT

## 2020-02-29 PROCEDURE — 99291 CRITICAL CARE FIRST HOUR: CPT

## 2020-02-29 RX ORDER — VANCOMYCIN HCL 1 G
1500 VIAL (EA) INTRAVENOUS EVERY 12 HOURS
Refills: 0 | Status: DISCONTINUED | OUTPATIENT
Start: 2020-02-29 | End: 2020-03-03

## 2020-02-29 RX ORDER — FUROSEMIDE 40 MG
40 TABLET ORAL ONCE
Refills: 0 | Status: COMPLETED | OUTPATIENT
Start: 2020-02-29 | End: 2020-02-29

## 2020-02-29 RX ADMIN — Medication 200 MILLIGRAM(S): at 22:45

## 2020-02-29 RX ADMIN — Medication 30 MILLIGRAM(S): at 16:59

## 2020-02-29 RX ADMIN — Medication 10 MILLILITER(S): at 17:56

## 2020-02-29 RX ADMIN — CHLORHEXIDINE GLUCONATE 1 APPLICATION(S): 213 SOLUTION TOPICAL at 05:05

## 2020-02-29 RX ADMIN — Medication 650 MILLIGRAM(S): at 17:56

## 2020-02-29 RX ADMIN — TAMSULOSIN HYDROCHLORIDE 0.4 MILLIGRAM(S): 0.4 CAPSULE ORAL at 22:46

## 2020-02-29 RX ADMIN — ATORVASTATIN CALCIUM 10 MILLIGRAM(S): 80 TABLET, FILM COATED ORAL at 22:48

## 2020-02-29 RX ADMIN — SENNA PLUS 2 TABLET(S): 8.6 TABLET ORAL at 22:45

## 2020-02-29 RX ADMIN — Medication 1 MILLIGRAM(S): at 00:55

## 2020-02-29 RX ADMIN — Medication 300 MILLIGRAM(S): at 17:56

## 2020-02-29 RX ADMIN — Medication 10 MILLILITER(S): at 05:07

## 2020-02-29 RX ADMIN — Medication 240 MILLIGRAM(S): at 05:07

## 2020-02-29 RX ADMIN — Medication 1 MILLIGRAM(S): at 08:32

## 2020-02-29 RX ADMIN — Medication 20 MILLIGRAM(S): at 22:45

## 2020-02-29 RX ADMIN — Medication 10 MILLILITER(S): at 00:54

## 2020-02-29 RX ADMIN — Medication 40 MILLIGRAM(S): at 13:03

## 2020-02-29 RX ADMIN — Medication 10 MILLILITER(S): at 11:34

## 2020-02-29 RX ADMIN — Medication 2: at 10:00

## 2020-02-29 RX ADMIN — CEFEPIME 100 MILLIGRAM(S): 1 INJECTION, POWDER, FOR SOLUTION INTRAMUSCULAR; INTRAVENOUS at 22:46

## 2020-02-29 RX ADMIN — CEFEPIME 100 MILLIGRAM(S): 1 INJECTION, POWDER, FOR SOLUTION INTRAMUSCULAR; INTRAVENOUS at 14:24

## 2020-02-29 RX ADMIN — Medication 1 PACKET(S): at 11:36

## 2020-02-29 RX ADMIN — Medication 2: at 17:57

## 2020-02-29 RX ADMIN — Medication 15 MILLIGRAM(S): at 14:24

## 2020-02-29 RX ADMIN — Medication 15 MILLIGRAM(S): at 05:07

## 2020-02-29 RX ADMIN — Medication 20 MILLIGRAM(S): at 13:03

## 2020-02-29 RX ADMIN — CEFEPIME 100 MILLIGRAM(S): 1 INJECTION, POWDER, FOR SOLUTION INTRAMUSCULAR; INTRAVENOUS at 05:06

## 2020-02-29 RX ADMIN — Medication 200 MILLIGRAM(S): at 05:07

## 2020-02-29 RX ADMIN — Medication 1 MILLIGRAM(S): at 16:59

## 2020-02-29 RX ADMIN — POLYETHYLENE GLYCOL 3350 17 GRAM(S): 17 POWDER, FOR SOLUTION ORAL at 11:34

## 2020-02-29 RX ADMIN — Medication 650 MILLIGRAM(S): at 18:26

## 2020-02-29 RX ADMIN — Medication 200 MILLIGRAM(S): at 14:24

## 2020-02-29 RX ADMIN — Medication 300 MILLIGRAM(S): at 05:05

## 2020-02-29 RX ADMIN — Medication 75 MILLIGRAM(S): at 11:36

## 2020-02-29 RX ADMIN — Medication 2: at 14:25

## 2020-02-29 RX ADMIN — Medication 15 MILLIGRAM(S): at 22:45

## 2020-02-29 RX ADMIN — Medication 1 MILLIGRAM(S): at 11:34

## 2020-02-29 RX ADMIN — LOSARTAN POTASSIUM 100 MILLIGRAM(S): 100 TABLET, FILM COATED ORAL at 05:07

## 2020-02-29 RX ADMIN — Medication 75 MILLIGRAM(S): at 00:54

## 2020-02-29 RX ADMIN — ENOXAPARIN SODIUM 40 MILLIGRAM(S): 100 INJECTION SUBCUTANEOUS at 11:34

## 2020-02-29 NOTE — PROGRESS NOTE ADULT - ASSESSMENT
70 yo male with HTN, anxiety disorder, and pulmonary fibrosis admitted with hypoxic respiratory failure.  Influenza A diagnosed 2/16 with a rapid test.  CTA is negative for PE.  Started on CTX and azithro along with tamiflu  Legionella urine antigen negative, MRSA screen is negative and his PC is elevated to 0.69.  The elevated PC is suggestive of a bacterial infection  Lack of improvement with tamiflu documented and raised concern of both viral pneumonia and a secondary process such as a bacterial pneumonia - empiric antibiotics cont'd  Repeated CT chest revealed pulm fibrosis & bilateral groundglass opacities concerning for infection  However remains afebrile & PCT is low at 0.15  Leukocytosis unreliable - given pt on steroids  CTX, azithro were changed to vanco & cefepime on 2/25/20, in case this was HCAP, although low overall suspicion in this afebrile pt with low PCT.  Vanco trough was lower & dose appropriately adjusted - however, no support for MRSA PNA - hopefully can d/c vancomycin soon.   Worsened hypoxemia - as of last night requiring higher levels of O2 support via high flow - again do not think from bacterial PNA    Suggest:  Tamiflu, d # 10 day  - should be stopped today  Can continue vanco & cefepime for 2 additional days, following clinical improvement.   Steroid mgmt as per ICU/ pulmonary

## 2020-02-29 NOTE — PROGRESS NOTE ADULT - ASSESSMENT
69M with PMH of HTN, HLD, depression, anxiety, and insignificant degree of pulmonary fibrosis, who initially presented 2/20/20 following a fall at home, admitted with acute hypoxic respiratory failure in setting of influenza with concern for superimposed PNA. Course c/b worsening respiratory failure with high oxygen requirements, transferred to MICU for close monitoring.     NEURO:   - Currently AAOx3, awake, alert, answers questions appropriately  - C/w home clonazepam standing 1mg PO daily and 1mg PO TID PRN for anxiety, hold for sedation  - C/w home buspirone 15mg PO TID  - C/w home paroxetine 30mg PO daily    PULM:   # Acute hypoxic respiratory failure   Multifactorial related to influenza infection with high suspicion for superimposed bacterial PNA in setting of underlying lung disease. May also have component of aspiration pneumonitis.   - C/w HFNC, can wean down on FiO2 as tolerated as long as saturation maintained >90% (currently at FiO2 60%)  - C/w broad-spectrum antibiotics  - C/w Tamiflu for total 10 days (2/20-2/29)  - C/w duonebs q6h PRN  - dc'ed steroids  - Encourage Acapella valve use to loosen up secretions    # Pulmonary fibrosis:   - Appreciate Pulm recs  - As per pt's pulmonologist group (Dr. Dia/Dr. Pop): Seen for chronic cough, hx of "insignificant pulmonary fibrosis," PFTs (2017) was wnl aside from diffusion capacity of 74%, and CT Chest (2013) showed no evidence of pulm fibrosis or other significant pulm pathology    #Chronic cough  - c/w Tessalon Perles 200mg PO TID  - c/w Robitussin-DM 10mL PO q6h    CARDIOVASCULAR:   # HTN  - No evidence of fluid overload   - Continue to monitor  - c/w Diltiazem 240mg PO daily  - c/w Losartan 100mg PO daily for better BP control (on olmesartan 40mg daily at home)   - Cardiology following (Dr. Lucero), appreciate recs    GI:   - C/w regular diet (1L fluid restriction) for now  - GI following (Dr. Kraft), appreciate recs  # Constipation  - lactuose 20g PO x1 given with subsequent 2 BMs  - c/w senna 2 tabs qhs and miralax daily    RENAL/:   # Urinary retention:   Likely related to medication side effect s/p Loza placement.   - c/w tamsulosin, off home doxazosin  - Monitor urine output, off loza, straight cath PRN  - Trend BMP    ENDOCRINE:   - Insulin sliding scale for steroid-induced hyperglycemia    HEMATOLOGIC:   # Leukocytosis, improving   Likely secondary to steroids but cannot r/o worsening infection  - c/w broad-spectrum antibiotics  - Continue to trend CBC with differential    ID:   # Influenza with suspected superimposed bacterial PNA:   - c/w vancomycin 1g q12 and cefepime 2g q8  - C/w Tamiflu for total 10 days as above  - Trend WBC count, fever curve  - Reculture and consider repeat RVP if febrile    PROPHYLAXIS:   - DVT ppx: Lovenox  - Out of bed to chair as tolerated    CODE STATUS:  - Wife is HCP and agrees to bring in form from home. Agreeable to trial of intubation if needed. 69M with PMH of HTN, HLD, depression, anxiety, and insignificant degree of pulmonary fibrosis, who initially presented 2/20/20 following a fall at home, admitted with acute hypoxic respiratory failure in setting of influenza with concern for superimposed PNA. Course c/b worsening respiratory failure with high oxygen requirements, transferred to MICU for close monitoring.     NEURO:   - Currently AAOx3, awake, alert, answers questions appropriately  - C/w home clonazepam standing 1mg PO daily and 1mg PO TID PRN for anxiety, hold for sedation  - C/w home buspirone 15mg PO TID  - C/w home paroxetine 30mg PO daily    PULM:   # Acute hypoxic respiratory failure   Multifactorial related to influenza infection with high suspicion for superimposed bacterial PNA in setting of underlying lung disease. May also have component of aspiration pneumonitis.   - C/w HFNC, can wean down on FiO2 as tolerated as long as saturation maintained >90% (currently at FiO2 60%)  - C/w broad-spectrum antibiotics  - C/w Tamiflu for total 10 days (2/20-2/29)  - C/w duonebs q6h PRN  - Encourage Acapella valve use to loosen up secretions    # Pulmonary fibrosis:   - Appreciate Pulm recs  - As per pt's pulmonologist group (Dr. Dia/Dr. Pop): Seen for chronic cough, hx of "insignificant pulmonary fibrosis," PFTs (2017) was wnl aside from diffusion capacity of 74%, and CT Chest (2013) showed no evidence of pulm fibrosis or other significant pulm pathology    #Chronic cough  - c/w Tessalon Perles 200mg PO TID  - c/w Robitussin-DM 10mL PO q6h    CARDIOVASCULAR:   # HTN  - No evidence of fluid overload   - Continue to monitor  - c/w Diltiazem 240mg PO daily  - c/w Losartan 100mg PO daily for better BP control (on olmesartan 40mg daily at home)   - Cardiology following (Dr. Lucero), appreciate recs    GI:   - C/w regular diet (1L fluid restriction) for now  - GI following (Dr. Kraft), appreciate recs  # Constipation  - lactuose 20g PO x1 given with subsequent 2 BMs  - c/w senna 2 tabs qhs and miralax daily    RENAL/:   # Urinary retention:   Likely related to medication side effect s/p Loza placement.   - c/w tamsulosin, off home doxazosin  - Monitor urine output, off loza, straight cath PRN  - Trend BMP    ENDOCRINE:   - Insulin sliding scale for steroid-induced hyperglycemia    HEMATOLOGIC:   # Leukocytosis, improving   Likely secondary to steroids but cannot r/o worsening infection  - c/w broad-spectrum antibiotics  - Continue to trend CBC with differential    ID:   # Influenza with suspected superimposed bacterial PNA:   - c/w vancomycin 1g q12 and cefepime 2g q8  - C/w Tamiflu for total 10 days as above  - Trend WBC count, fever curve  - Reculture and consider repeat RVP if febrile    PROPHYLAXIS:   - DVT ppx: Lovenox  - Out of bed to chair as tolerated    CODE STATUS:  - Wife is HCP and agrees to bring in form from home. Agreeable to trial of intubation if needed. 69M with PMH of HTN, HLD, depression, anxiety, and insignificant degree of pulmonary fibrosis, who initially presented 2/20/20 following a fall at home, admitted with acute hypoxic respiratory failure in setting of influenza with concern for superimposed PNA. Course c/b worsening respiratory failure with high oxygen requirements, transferred to MICU for close monitoring.     NEURO:   - Currently AAOx3, awake, alert, answers questions appropriately  - C/w home clonazepam standing 1mg PO daily and 1mg PO TID PRN for anxiety, hold for sedation  - C/w home buspirone 15mg PO TID  - C/w home paroxetine 30mg PO daily    PULM:   # Acute hypoxic respiratory failure   Multifactorial related to influenza infection with high suspicion for superimposed bacterial PNA in setting of underlying lung disease. May also have component of aspiration pneumonitis.   - C/w HFNC, can wean down on FiO2 as tolerated as long as saturation maintained >90% (currently at FiO2 60%)  - C/w broad-spectrum antibiotics  - C/w Tamiflu for total 10 days (2/20-2/29)  - C/w duonebs q6h PRN  - Encourage Acapella valve use to loosen up secretions    # Pulmonary fibrosis:   - Appreciate Pulm recs  - As per pt's pulmonologist group (Dr. Dia/Dr. Pop): Seen for chronic cough, hx of "insignificant pulmonary fibrosis," PFTs (2017) was wnl aside from diffusion capacity of 74%, and CT Chest (2013) showed no evidence of pulm fibrosis or other significant pulm pathology  - solumedrol restarted 20mg IV Q8    #Chronic cough  - c/w Tessalon Perles 200mg PO TID  - c/w Robitussin-DM 10mL PO q6h    CARDIOVASCULAR:   # HTN  - No evidence of fluid overload   - Continue to monitor  - c/w Diltiazem 240mg PO daily  - c/w Losartan 100mg PO daily for better BP control (on olmesartan 40mg daily at home)   - Cardiology following (Dr. Lucero), appreciate recs    GI:   - C/w regular diet (1L fluid restriction) for now  - GI following (Dr. Kraft), appreciate recs  # Constipation  - c/w senna 2 tabs qhs and miralax daily    RENAL/:   # Urinary retention:   Likely related to medication side effect s/p Loza placement.   - c/w tamsulosin, off home doxazosin  - Monitor urine output,   - loza placed  - lasix 40mg x 1   - Trend BMP    ENDOCRINE:   - Insulin sliding scale for steroid-induced hyperglycemia    HEMATOLOGIC:   # Leukocytosis, improving   Likely secondary to steroids but cannot r/o worsening infection  - c/w broad-spectrum antibiotics  - Continue to trend CBC with differential    ID:   # Influenza with suspected superimposed bacterial PNA:   - c/w vancomycin 1g q12 and cefepime 2g q8  - C/w Tamiflu for total 10 days as above  - Trend WBC count, fever curve  - Reculture and consider repeat RVP if febrile    PROPHYLAXIS:   - DVT ppx: Lovenox  - Out of bed to chair as tolerated    CODE STATUS:  - Wife is HCP and agrees to bring in form from home. Agreeable to trial of intubation if needed.

## 2020-02-29 NOTE — PROGRESS NOTE ADULT - ATTENDING COMMENTS
Agree with above.  Patient seen and examined. Chart reviewed.    69 year old man with acute hypoxemic respiratory failure due to influenza pneumonia and bacterial pneumonia.     Overnight patients requirements of oxygen increased back to 100% otherwise remained afebrile and hemodynamically stable also with complaints of urinary retention    - Continue high flow 02 titrate down as tolerated to keep saturation over 90%  - Continue Tamiflu and Vancomycin and Cefepime  - steroids restarted patient with H/O  Pulmonary fibrosis  - encourage chest PT and incentive spirometry  - replace loza catheter today  - trial of diuresis    case discussed in detail with wife at bedside.

## 2020-02-29 NOTE — PROGRESS NOTE ADULT - SUBJECTIVE AND OBJECTIVE BOX
Patient is a 69y old  Male who presents with a chief complaint of SOB, cough and weakness (28 Feb 2020 18:30)      Interval Events:    REVIEW OF SYSTEMS:  Constitutional: [ ] negative [ ] fevers [ ] chills [ ] weight loss [ ] weight gain  HEENT: [ ] negative [ ] dry eyes [ ] eye irritation [ ] postnasal drip [ ] nasal congestion  CV: [ ] negative  [ ] chest pain [ ] orthopnea [ ] palpitations [ ] murmur  Resp: [ ] negative [ ] cough [ ] shortness of breath [ ] dyspnea [ ] wheezing [ ] sputum [ ] hemoptysis  GI: [ ] negative [ ] nausea [ ] vomiting [ ] diarrhea [ ] constipation [ ] abd pain [ ] dysphagia   : [ ] negative [ ] dysuria [ ] nocturia [ ] hematuria [ ] increased urinary frequency  Musculoskeletal: [ ] negative [ ] back pain [ ] myalgias [ ] arthralgias [ ] fracture  Skin: [ ] negative [ ] rash [ ] itch  Neurological: [ ] negative [ ] headache [ ] dizziness [ ] syncope [ ] weakness [ ] numbness  Psychiatric: [ ] negative [ ] anxiety [ ] depression  Endocrine: [ ] negative [ ] diabetes [ ] thyroid problem  Hematologic/Lymphatic: [ ] negative [ ] anemia [ ] bleeding problem  Allergic/Immunologic: [ ] negative [ ] itchy eyes [ ] nasal discharge [ ] hives [ ] angioedema  [ ] All other systems negative  [ ] Unable to assess ROS because ________    OBJECTIVE:  ICU Vital Signs Last 24 Hrs  T(C): 37.4 (29 Feb 2020 04:00), Max: 37.4 (29 Feb 2020 04:00)  T(F): 99.4 (29 Feb 2020 04:00), Max: 99.4 (29 Feb 2020 04:00)  HR: 77 (29 Feb 2020 07:00) (66 - 99)  BP: 137/63 (29 Feb 2020 07:00) (123/63 - 169/74)  BP(mean): 90 (29 Feb 2020 07:00) (84 - 108)  ABP: --  ABP(mean): --  RR: 25 (29 Feb 2020 07:00) (20 - 33)  SpO2: 88% (29 Feb 2020 07:00) (85% - 98%)        02-28 @ 07:01 - 02-29 @ 07:00  --------------------------------------------------------  IN: 1324 mL / OUT: 2800 mL / NET: -1476 mL      CAPILLARY BLOOD GLUCOSE      POCT Blood Glucose.: 122 mg/dL (29 Feb 2020 04:41)      PHYSICAL EXAM:  General:   HEENT:   Lymph Nodes:  Neck:   Respiratory:   Cardiovascular:   Abdomen:   Extremities:   Skin:   Neurological:  Psychiatry:    LINES:    HOSPITAL MEDICATIONS:  Standing Meds:  atorvastatin 10 milliGRAM(s) Oral at bedtime  benzonatate 200 milliGRAM(s) Oral three times a day  busPIRone 15 milliGRAM(s) Oral three times a day  cefepime   IVPB      cefepime   IVPB 2000 milliGRAM(s) IV Intermittent every 8 hours  chlorhexidine 4% Liquid 1 Application(s) Topical <User Schedule>  clonazePAM  Tablet 1 milliGRAM(s) Oral daily  dextrose 5%. 1000 milliLiter(s) IV Continuous <Continuous>  dextrose 50% Injectable 12.5 Gram(s) IV Push once  dextrose 50% Injectable 25 Gram(s) IV Push once  dextrose 50% Injectable 25 Gram(s) IV Push once  diltiazem    milliGRAM(s) Oral daily  enoxaparin Injectable 40 milliGRAM(s) SubCutaneous daily  guaifenesin/dextromethorphan  Syrup 10 milliLiter(s) Oral every 6 hours  insulin lispro (HumaLOG) corrective regimen sliding scale   SubCutaneous three times a day before meals  insulin lispro (HumaLOG) corrective regimen sliding scale   SubCutaneous at bedtime  losartan 100 milliGRAM(s) Oral daily  oseltamivir 75 milliGRAM(s) Oral two times a day  PARoxetine 30 milliGRAM(s) Oral daily  psyllium Powder 1 Packet(s) Oral daily  senna 2 Tablet(s) Oral at bedtime  tamsulosin 0.4 milliGRAM(s) Oral at bedtime  vancomycin  IVPB 1500 milliGRAM(s) IV Intermittent every 12 hours      PRN Meds:  acetaminophen   Tablet .. 650 milliGRAM(s) Oral every 6 hours PRN  albuterol/ipratropium for Nebulization 3 milliLiter(s) Nebulizer every 6 hours PRN  clonazePAM  Tablet 1 milliGRAM(s) Oral three times a day PRN  dextrose 40% Gel 15 Gram(s) Oral once PRN  glucagon  Injectable 1 milliGRAM(s) IntraMuscular once PRN  polyethylene glycol 3350 17 Gram(s) Oral daily PRN      LABS:                        11.6   23.36 )-----------( 142      ( 29 Feb 2020 00:30 )             35.4     Hgb Trend: 11.6<--, 12.1<--, 11.5<--, 12.0<--, 13.1<--  02-29    132<L>  |  95<L>  |  26<H>  ----------------------------<  174<H>  4.6   |  23  |  0.78    Ca    8.4      29 Feb 2020 00:30  Phos  3.0     02-29  Mg     2.1     02-29    TPro  5.6<L>  /  Alb  2.4<L>  /  TBili  0.5  /  DBili  x   /  AST  36  /  ALT  39  /  AlkPhos  79  02-29    Creatinine Trend: 0.78<--, 0.78<--, 0.81<--, 0.87<--, 0.88<--, 0.87<--      Arterial Blood Gas:  02-28 @ 01:07  7.49/40/55/30/86/6.9  ABG lactate: --        MICROBIOLOGY:     RADIOLOGY:  [ ] Reviewed and interpreted by me    EKG: Patient is a 69y old  Male who presents with a chief complaint of SOB, cough and weakness (28 Feb 2020 18:30)      Interval Events: Overnight pt desaturated to 80's on 75% FiO2 HFNC, increased to 100%. Pt seen and examined at bedside    REVIEW OF SYSTEMS:  CARDIOVASCULAR: No chest pain or palpitations  GASTROINTESTINAL: No abdominal or epigastric pain. No nausea, vomiting, or hematemesis; No diarrhea or constipation. No melena or hematochezia.  GENITOURINARY: No dysuria, frequency or hematuria  NEUROLOGICAL: No numbness or weakness  SKIN: No itching, burning, rashes, or lesions   All other review of systems is negative unless indicated above.    OBJECTIVE:  ICU Vital Signs Last 24 Hrs  T(C): 37.4 (29 Feb 2020 04:00), Max: 37.4 (29 Feb 2020 04:00)  T(F): 99.4 (29 Feb 2020 04:00), Max: 99.4 (29 Feb 2020 04:00)  HR: 77 (29 Feb 2020 07:00) (66 - 99)  BP: 137/63 (29 Feb 2020 07:00) (123/63 - 169/74)  BP(mean): 90 (29 Feb 2020 07:00) (84 - 108)  ABP: --  ABP(mean): --  RR: 25 (29 Feb 2020 07:00) (20 - 33)  SpO2: 88% (29 Feb 2020 07:00) (85% - 98%)        02-28 @ 07:01  -  02-29 @ 07:00  --------------------------------------------------------  IN: 1324 mL / OUT: 2800 mL / NET: -1476 mL      CAPILLARY BLOOD GLUCOSE      POCT Blood Glucose.: 122 mg/dL (29 Feb 2020 04:41)      PHYSICAL EXAM:  General: NAD, on high flow nasal cannula  HEENT: Head atraumatic; EOMI, PERRLA, normal sclera and conjunctiva; Dry MM  Neck: Supple, no JVD, no LAD  Chest/Lungs: Clear to ausculation anteriorly with mild coarse breath sounds, no wheezing appreciated  Heart: RRR, normal S1, S2, no murmurs or gallops  Abd: +BS, soft, obese, nontender, nondistended  Extremities: 2+ peripheral pulses, trace pedal edema  Skin: No rashes or lesions, warm, well-perfused  Neurologic: AAOx3, follows commands appropriately    LINES:    HOSPITAL MEDICATIONS:  Standing Meds:  atorvastatin 10 milliGRAM(s) Oral at bedtime  benzonatate 200 milliGRAM(s) Oral three times a day  busPIRone 15 milliGRAM(s) Oral three times a day  cefepime   IVPB      cefepime   IVPB 2000 milliGRAM(s) IV Intermittent every 8 hours  chlorhexidine 4% Liquid 1 Application(s) Topical <User Schedule>  clonazePAM  Tablet 1 milliGRAM(s) Oral daily  dextrose 5%. 1000 milliLiter(s) IV Continuous <Continuous>  dextrose 50% Injectable 12.5 Gram(s) IV Push once  dextrose 50% Injectable 25 Gram(s) IV Push once  dextrose 50% Injectable 25 Gram(s) IV Push once  diltiazem    milliGRAM(s) Oral daily  enoxaparin Injectable 40 milliGRAM(s) SubCutaneous daily  guaifenesin/dextromethorphan  Syrup 10 milliLiter(s) Oral every 6 hours  insulin lispro (HumaLOG) corrective regimen sliding scale   SubCutaneous three times a day before meals  insulin lispro (HumaLOG) corrective regimen sliding scale   SubCutaneous at bedtime  losartan 100 milliGRAM(s) Oral daily  oseltamivir 75 milliGRAM(s) Oral two times a day  PARoxetine 30 milliGRAM(s) Oral daily  psyllium Powder 1 Packet(s) Oral daily  senna 2 Tablet(s) Oral at bedtime  tamsulosin 0.4 milliGRAM(s) Oral at bedtime  vancomycin  IVPB 1500 milliGRAM(s) IV Intermittent every 12 hours      PRN Meds:  acetaminophen   Tablet .. 650 milliGRAM(s) Oral every 6 hours PRN  albuterol/ipratropium for Nebulization 3 milliLiter(s) Nebulizer every 6 hours PRN  clonazePAM  Tablet 1 milliGRAM(s) Oral three times a day PRN  dextrose 40% Gel 15 Gram(s) Oral once PRN  glucagon  Injectable 1 milliGRAM(s) IntraMuscular once PRN  polyethylene glycol 3350 17 Gram(s) Oral daily PRN      LABS:                        11.6   23.36 )-----------( 142      ( 29 Feb 2020 00:30 )             35.4     Hgb Trend: 11.6<--, 12.1<--, 11.5<--, 12.0<--, 13.1<--  02-29    132<L>  |  95<L>  |  26<H>  ----------------------------<  174<H>  4.6   |  23  |  0.78    Ca    8.4      29 Feb 2020 00:30  Phos  3.0     02-29  Mg     2.1     02-29    TPro  5.6<L>  /  Alb  2.4<L>  /  TBili  0.5  /  DBili  x   /  AST  36  /  ALT  39  /  AlkPhos  79  02-29    Creatinine Trend: 0.78<--, 0.78<--, 0.81<--, 0.87<--, 0.88<--, 0.87<--      Arterial Blood Gas:  02-28 @ 01:07  7.49/40/55/30/86/6.9  ABG lactate: --        MICROBIOLOGY:     RADIOLOGY:  [ ] Reviewed and interpreted by me    EKG:

## 2020-02-29 NOTE — PROGRESS NOTE ADULT - SUBJECTIVE AND OBJECTIVE BOX
no cp    REVIEW OF SYSTEMS:  GEN: no fever,    no chills  RESP: no SOB,   no cough  CVS: no chest pain,   no palpitations  GI: no abdominal pain,   no nausea,   no vomiting,   no constipation,   no diarrhea  : no dysuria,   no frequency  NEURO: no headache,   no dizziness  PSYCH: no depression,   not anxious  Derm : no rash    MEDICATIONS  (STANDING):  atorvastatin 10 milliGRAM(s) Oral at bedtime  benzonatate 200 milliGRAM(s) Oral three times a day  busPIRone 15 milliGRAM(s) Oral three times a day  cefepime   IVPB      cefepime   IVPB 2000 milliGRAM(s) IV Intermittent every 8 hours  chlorhexidine 4% Liquid 1 Application(s) Topical <User Schedule>  clonazePAM  Tablet 1 milliGRAM(s) Oral daily  dextrose 5%. 1000 milliLiter(s) (50 mL/Hr) IV Continuous <Continuous>  dextrose 50% Injectable 12.5 Gram(s) IV Push once  dextrose 50% Injectable 25 Gram(s) IV Push once  dextrose 50% Injectable 25 Gram(s) IV Push once  diltiazem    milliGRAM(s) Oral daily  enoxaparin Injectable 40 milliGRAM(s) SubCutaneous daily  guaifenesin/dextromethorphan  Syrup 10 milliLiter(s) Oral every 6 hours  insulin lispro (HumaLOG) corrective regimen sliding scale   SubCutaneous three times a day before meals  insulin lispro (HumaLOG) corrective regimen sliding scale   SubCutaneous at bedtime  losartan 100 milliGRAM(s) Oral daily  oseltamivir 75 milliGRAM(s) Oral two times a day  PARoxetine 30 milliGRAM(s) Oral daily  psyllium Powder 1 Packet(s) Oral daily  senna 2 Tablet(s) Oral at bedtime  tamsulosin 0.4 milliGRAM(s) Oral at bedtime  vancomycin  IVPB 1500 milliGRAM(s) IV Intermittent every 12 hours    MEDICATIONS  (PRN):  acetaminophen   Tablet .. 650 milliGRAM(s) Oral every 6 hours PRN Temp greater or equal to 38C (100.4F), Mild Pain (1 - 3), Moderate Pain (4 - 6)  albuterol/ipratropium for Nebulization 3 milliLiter(s) Nebulizer every 6 hours PRN Shortness of Breath and/or Wheezing  clonazePAM  Tablet 1 milliGRAM(s) Oral three times a day PRN anxiety  dextrose 40% Gel 15 Gram(s) Oral once PRN Blood Glucose LESS THAN 70 milliGRAM(s)/deciliter  glucagon  Injectable 1 milliGRAM(s) IntraMuscular once PRN Glucose LESS THAN 70 milligrams/deciliter  polyethylene glycol 3350 17 Gram(s) Oral daily PRN Constipation      Vital Signs Last 24 Hrs  T(C): 37.4 (29 Feb 2020 07:43), Max: 37.4 (29 Feb 2020 04:00)  T(F): 99.3 (29 Feb 2020 07:43), Max: 99.4 (29 Feb 2020 04:00)  HR: 75 (29 Feb 2020 09:00) (66 - 99)  BP: 130/60 (29 Feb 2020 09:00) (123/63 - 157/78)  BP(mean): 86 (29 Feb 2020 09:00) (84 - 104)  RR: 23 (29 Feb 2020 09:00) (20 - 33)  SpO2: 92% (29 Feb 2020 09:00) (85% - 98%)  CAPILLARY BLOOD GLUCOSE      POCT Blood Glucose.: 122 mg/dL (29 Feb 2020 04:41)  POCT Blood Glucose.: 150 mg/dL (28 Feb 2020 22:28)  POCT Blood Glucose.: 137 mg/dL (28 Feb 2020 17:57)  POCT Blood Glucose.: 190 mg/dL (28 Feb 2020 12:23)    I&O's Summary    28 Feb 2020 07:01  -  29 Feb 2020 07:00  --------------------------------------------------------  IN: 1324 mL / OUT: 2800 mL / NET: -1476 mL        PHYSICAL EXAM:  HEAD:  Atraumatic, Normocephalic  NECK: Supple, No   JVD  CHEST/LUNG:   no     rales,     no,    rhonchi  HEART: Regular rate and rhythm;         murmur  ABDOMEN: Soft, Nontender, ;   EXTREMITIES:   no    edema  NEUROLOGY:  alert    LABS:                        11.6   23.36 )-----------( 142      ( 29 Feb 2020 00:30 )             35.4     02-29    132<L>  |  95<L>  |  26<H>  ----------------------------<  174<H>  4.6   |  23  |  0.78    Ca    8.4      29 Feb 2020 00:30  Phos  3.0     02-29  Mg     2.1     02-29    TPro  5.6<L>  /  Alb  2.4<L>  /  TBili  0.5  /  DBili  x   /  AST  36  /  ALT  39  /  AlkPhos  79  02-29              ABG - ( 28 Feb 2020 01:07 )  pH, Arterial: 7.49  pH, Blood: x     /  pCO2: 40    /  pO2: 55    / HCO3: 30    / Base Excess: 6.9   /  SaO2: 86                02-29 @ 09:27  4.2  52    Hemoglobin A1C, Whole Blood: 6.6 % (02-24 @ 08:40)    Thyroid Stimulating Hormone, Serum: 0.39 uIU/mL (02-21 @ 09:19)          Consultant(s) Notes Reviewed:      Care Discussed with Consultants/Other Providers:

## 2020-02-29 NOTE — PROGRESS NOTE ADULT - SUBJECTIVE AND OBJECTIVE BOX
CARDIOLOGY     PROGRESS  NOTE   ________________________________________________    CHIEF COMPLAINT:Patient is a 69y old  Male who presents with a chief complaint of SOB, cough and weakness (29 Feb 2020 07:33)  doing better.  	  REVIEW OF SYSTEMS:  CONSTITUTIONAL: No fever, weight loss, or fatigue  EYES: No eye pain, visual disturbances, or discharge  ENT:  No difficulty hearing, tinnitus, vertigo; No sinus or throat pain  NECK: No pain or stiffness  RESPIRATORY: No cough, wheezing, chills or hemoptysis; decrease  Shortness of Breath  CARDIOVASCULAR: No chest pain, palpitations, passing out, dizziness, or leg swelling  GASTROINTESTINAL: No abdominal or epigastric pain. No nausea, vomiting, or hematemesis; No diarrhea or constipation. No melena or hematochezia.  GENITOURINARY: No dysuria, frequency, hematuria, or incontinence  NEUROLOGICAL: No headaches, memory loss, loss of strength, numbness, or tremors  SKIN: No itching, burning, rashes, or lesions   LYMPH Nodes: No enlarged glands  ENDOCRINE: No heat or cold intolerance; No hair loss  MUSCULOSKELETAL: No joint pain or swelling; No muscle, back, or extremity pain  PSYCHIATRIC: No depression, anxiety, mood swings, or difficulty sleeping  HEME/LYMPH: No easy bruising, or bleeding gums  ALLERGY AND IMMUNOLOGIC: No hives or eczema	    [ ] All others negative	  [ ] Unable to obtain    PHYSICAL EXAM:  T(C): 37.4 (02-29-20 @ 07:43), Max: 37.4 (02-29-20 @ 04:00)  HR: 82 (02-29-20 @ 08:44) (66 - 99)  BP: 132/66 (02-29-20 @ 08:44) (123/63 - 157/78)  RR: 22 (02-29-20 @ 08:44) (20 - 33)  SpO2: 93% (02-29-20 @ 08:44) (85% - 98%)  Wt(kg): --  I&O's Summary    28 Feb 2020 07:01  -  29 Feb 2020 07:00  --------------------------------------------------------  IN: 1324 mL / OUT: 2800 mL / NET: -1476 mL        Appearance: Normal	  HEENT:   Normal oral mucosa, PERRL, EOMI	  Lymphatic: No lymphadenopathy  Cardiovascular: Normal S1 S2, No JVD, + murmurs, No edema  Respiratorydecrease bs  Psychiatry: A & O x 3, Mood & affect appropriate  Gastrointestinal:  Soft, Non-tender, + BS	  Skin: No rashes, No ecchymoses, No cyanosis	  Neurologic: Non-focal  Extremities: Normal range of motion, No clubbing, cyanosis or edema  Vascular: Peripheral pulses palpable 2+ bilaterally    MEDICATIONS  (STANDING):  atorvastatin 10 milliGRAM(s) Oral at bedtime  benzonatate 200 milliGRAM(s) Oral three times a day  busPIRone 15 milliGRAM(s) Oral three times a day  cefepime   IVPB      cefepime   IVPB 2000 milliGRAM(s) IV Intermittent every 8 hours  chlorhexidine 4% Liquid 1 Application(s) Topical <User Schedule>  clonazePAM  Tablet 1 milliGRAM(s) Oral daily  dextrose 5%. 1000 milliLiter(s) (50 mL/Hr) IV Continuous <Continuous>  dextrose 50% Injectable 12.5 Gram(s) IV Push once  dextrose 50% Injectable 25 Gram(s) IV Push once  dextrose 50% Injectable 25 Gram(s) IV Push once  diltiazem    milliGRAM(s) Oral daily  enoxaparin Injectable 40 milliGRAM(s) SubCutaneous daily  guaifenesin/dextromethorphan  Syrup 10 milliLiter(s) Oral every 6 hours  insulin lispro (HumaLOG) corrective regimen sliding scale   SubCutaneous three times a day before meals  insulin lispro (HumaLOG) corrective regimen sliding scale   SubCutaneous at bedtime  losartan 100 milliGRAM(s) Oral daily  oseltamivir 75 milliGRAM(s) Oral two times a day  PARoxetine 30 milliGRAM(s) Oral daily  psyllium Powder 1 Packet(s) Oral daily  senna 2 Tablet(s) Oral at bedtime  tamsulosin 0.4 milliGRAM(s) Oral at bedtime  vancomycin  IVPB 1500 milliGRAM(s) IV Intermittent every 12 hours      TELEMETRY: 	    ECG:  	  RADIOLOGY:  OTHER: 	  	  LABS:	 	    CARDIAC MARKERS:                                11.6   23.36 )-----------( 142      ( 29 Feb 2020 00:30 )             35.4     02-29    132<L>  |  95<L>  |  26<H>  ----------------------------<  174<H>  4.6   |  23  |  0.78    Ca    8.4      29 Feb 2020 00:30  Phos  3.0     02-29  Mg     2.1     02-29    TPro  5.6<L>  /  Alb  2.4<L>  /  TBili  0.5  /  DBili  x   /  AST  36  /  ALT  39  /  AlkPhos  79  02-29    proBNP: Serum Pro-Brain Natriuretic Peptide: 364 pg/mL (02-19 @ 22:30)    Lipid Profile:   HgA1c: Hemoglobin A1C, Whole Blood: 6.6 % (02-24 @ 08:40)    TSH: Thyroid Stimulating Hormone, Serum: 0.39 uIU/mL (02-21 @ 09:19)  Thyroid Stimulating Hormone, Serum: 0.47 uIU/mL (02-21 @ 00:19)          Assessment and plan  ---------------------------  69M with PMH of HTN, pulmonary fibrosis, depression/anxiety p/w SOB and cough. Pt states his mother passed away about 2 weeks ago and shortly after burying here, started to feel very unwell. He has had progressively worsening cough, non productive, but feels a lot of chest congestion. Associated with ROSE, headache and myalgias and fatigue. Was seen at  earlier this week and was diagnosed with flu. In the past couple of days, has been having very labored breathing; today, had an episode where he fell while trying to reach for alight switch and was so winded and weak, he was unable to get up. Has barely been eating anything due to poor appetite and nausea, but denies vomiting. Denies fevers or chills, no CP, no palpitations, no abdominal pain, had 3 episodes of loose stools today, +decreased UOP, no LE swelling. Does not know of any sick contacts. Denies recent travel.  sob/ respiratory failure sec to hx of pulmonary fibrosis ?pneumoniae.  abx  echo to evaluate pulmonary pressure sec to hx of pulmonary fibrosis  add calcium channel blocker for bp control, increase Cardizem CD if increase bp/hr  dvt prophylaxis  asa daily  awaiting echo, no evidence of heart failure  supportive measures

## 2020-02-29 NOTE — PROGRESS NOTE ADULT - ASSESSMENT
69M with        PMH of HTN,  HLD, , pulmonary fibrosis,  not on home  O 2.   depression/anxiety , on  Klonopin          p/w SOB and cough/  acute resp failure with O2  sat of  80 on arrival to  er  O2  sat is  92  to 99 %,  earlier  and  now  lower  again    Iinfluenza A positive, with  bronchospasm   on Tamiflu. / nebs  and   solumedrol    mild anemia/  GI eval  Hyponatremia ,   stop  hctz  ct chest,    b/l patchy opacities/   traction bronchiectasis/   pulm fibrosis   CAP/   pulm    dr gusman,  for  hypoxia  on  buspar/ paxil  and klonopin/ home meds, on  dvt ppx   hyponatremia,   stable   resp failure from pulm  fibrosis  care  per  [facility]    ID eval , on cefepime/ vanco     < from: CT Angio Chest w/ IV Cont (02.20.20 @ 00:14) >  IMPRESSION:   No obvious pulmonary embolus.  Findings reflecting known pulmonary fibrosis. Recommend clinical correlation to assess underlying pneumonia. Recommend comparison to previous outside study and follow-up to exclude potential underlying neoplasm.  Heterogeneous thyroid gland, which is difficult to assess due to artifact and can be further characterizedon a nonemergent ultrasound as clinically indicated.   Additional findings as described  < end of copied text >

## 2020-02-29 NOTE — PROGRESS NOTE ADULT - SUBJECTIVE AND OBJECTIVE BOX
CC: f/u for for pneumonia and flu    REVIEW OF SYSTEMS:  Somnolent, [place], reports breathing okay & not much of a cough. Nurse reports hypoxemia has worsened as of last night, on 100% high flow O2 support now.     Antimicrobials Day #  :  cefepime   IVPB      cefepime   IVPB 2000 milliGRAM(s) IV Intermittent every 8 hours ----> D # 5/7  oseltamivir 75 milliGRAM(s) Oral two times a day ---> D # 10/10  vancomycin  IVPB 1500 milliGRAM(s) IV Intermittent every 12 hours ---> D # 5/7    Other Medications Reviewed    T(F): 99.3 (02-29-20 @ 07:43), Max: 99.4 (02-29-20 @ 04:00)  HR: 75 (02-29-20 @ 09:00)  BP: 130/60 (02-29-20 @ 09:00)  RR: 23 (02-29-20 @ 09:00)  SpO2: 92% (02-29-20 @ 09:00)  Wt(kg): --    PHYSICAL EXAM:  General: alert, no acute distress on high flow O2 support   Eyes:  anicteric, no conjunctival injection, no discharge  Oropharynx: no lesions or injection 	  Neck: supple, without adenopathy  Lungs: bilateral fine rales  Heart: regular rate and rhythm; no murmurs  Abdomen: soft, nondistended, nontender  Skin: no lesions  Extremities: no clubbing, cyanosis, or edema  Neurologic: arousable & moves all extremities    LAB RESULTS:                        11.6   23.36 )-----------( 142      ( 29 Feb 2020 00:30 )             35.4     02-29    132<L>  |  95<L>  |  26<H>  ----------------------------<  174<H>  4.6   |  23  |  0.78    Ca    8.4      29 Feb 2020 00:30  Phos  3.0     02-29  Mg     2.1     02-29    TPro  5.6<L>  /  Alb  2.4<L>  /  TBili  0.5  /  DBili  x   /  AST  36  /  ALT  39  /  AlkPhos  79  02-29    LIVER FUNCTIONS - ( 29 Feb 2020 00:30 )  Alb: 2.4 g/dL / Pro: 5.6 g/dL / ALK PHOS: 79 U/L / ALT: 39 U/L / AST: 36 U/L / GGT: x             MICROBIOLOGY:  RECENT CULTURES:            RADIOLOGY REVIEWED:

## 2020-02-29 NOTE — PROGRESS NOTE ADULT - SUBJECTIVE AND OBJECTIVE BOX
INTERVAL HPI/OVERNIGHT EVENTS:    patient seen and examined at bedside   events noted  wants additional bowel regimen, however as per flowsheet pt did not receive miralax yesterday  no bms today, +flatus   poor PO intake  denies n/v  abd pain     MEDICATIONS  (STANDING):  atorvastatin 10 milliGRAM(s) Oral at bedtime  benzonatate 200 milliGRAM(s) Oral three times a day  busPIRone 15 milliGRAM(s) Oral three times a day  cefepime   IVPB      cefepime   IVPB 2000 milliGRAM(s) IV Intermittent every 8 hours  chlorhexidine 4% Liquid 1 Application(s) Topical <User Schedule>  clonazePAM  Tablet 1 milliGRAM(s) Oral daily  dextrose 5%. 1000 milliLiter(s) (50 mL/Hr) IV Continuous <Continuous>  dextrose 50% Injectable 12.5 Gram(s) IV Push once  dextrose 50% Injectable 25 Gram(s) IV Push once  dextrose 50% Injectable 25 Gram(s) IV Push once  diltiazem    milliGRAM(s) Oral daily  enoxaparin Injectable 40 milliGRAM(s) SubCutaneous daily  guaifenesin/dextromethorphan  Syrup 10 milliLiter(s) Oral every 6 hours  insulin lispro (HumaLOG) corrective regimen sliding scale   SubCutaneous three times a day before meals  insulin lispro (HumaLOG) corrective regimen sliding scale   SubCutaneous at bedtime  losartan 100 milliGRAM(s) Oral daily  oseltamivir 75 milliGRAM(s) Oral two times a day  PARoxetine 30 milliGRAM(s) Oral daily  polyethylene glycol 3350 17 Gram(s) Oral daily  senna 2 Tablet(s) Oral at bedtime  tamsulosin 0.4 milliGRAM(s) Oral at bedtime  vancomycin  IVPB 1250 milliGRAM(s) IV Intermittent every 12 hours    MEDICATIONS  (PRN):  acetaminophen   Tablet .. 650 milliGRAM(s) Oral every 6 hours PRN Temp greater or equal to 38C (100.4F), Mild Pain (1 - 3), Moderate Pain (4 - 6)  albuterol/ipratropium for Nebulization 3 milliLiter(s) Nebulizer every 6 hours PRN Shortness of Breath and/or Wheezing  clonazePAM  Tablet 1 milliGRAM(s) Oral three times a day PRN anxiety  dextrose 40% Gel 15 Gram(s) Oral once PRN Blood Glucose LESS THAN 70 milliGRAM(s)/deciliter  glucagon  Injectable 1 milliGRAM(s) IntraMuscular once PRN Glucose LESS THAN 70 milligrams/deciliter      Allergies    penicillin (Anaphylaxis)    Intolerances        Review of Systems:    General:  No wt loss, fevers, chills, night sweats,fatigue,   Eyes:  Good vision, no reported pain  ENT:  No sore throat, pain, runny nose, dysphagia  CV:  No pain, palpitatioins, hypo/hypertension  Resp:  No dyspnea, cough, tachypnea, wheezing  GI:  No pain, No nausea, No vomiting, No diarrhea, No constipatiion, No weight loss, No fever, No pruritis, No rectal bleeding, No tarry stools, No dysphagia,  :  No pain, bleeding, incontinence, nocturia  Muscle:  No pain, weakness  Neuro:  No weakness, tingling, memory problems  Psych:  No fatigue, insomnia, mood problems, depression  Endocrine:  No polyuria, polydypsia, cold/heat intolerance  Heme:  No petechiae, ecchymosis, easy bruisability  Skin:  No rash, tattoos, scars, edema      Vital Signs Last 24 Hrs  T(C): 36.9 (28 Feb 2020 08:00), Max: 37.4 (27 Feb 2020 12:00)  T(F): 98.5 (28 Feb 2020 08:00), Max: 99.4 (27 Feb 2020 12:00)  HR: 77 (28 Feb 2020 10:00) (68 - 101)  BP: 154/77 (28 Feb 2020 10:00) (124/62 - 169/74)  BP(mean): 108 (28 Feb 2020 10:00) (86 - 114)  RR: 25 (28 Feb 2020 10:00) (19 - 29)  SpO2: 91% (28 Feb 2020 10:00) (87% - 100%)    PHYSICAL EXAM:    Constitutional: NAD  HEENT: on HFNC  Cardiovascular: S1 and S2, RRR, no M  Gastrointestinal: BS+, obese, soft, NT/ND  Extremities: No peripheral edema  Neurological: A/O x 3, no focal deficits        LABS:                        12.1   22.40 )-----------( 152      ( 28 Feb 2020 01:11 )             36.2     02-28    132<L>  |  96  |  30<H>  ----------------------------<  122<H>  4.7   |  24  |  0.78    Ca    8.2<L>      28 Feb 2020 01:11  Phos  3.1     02-28  Mg     2.4     02-28    TPro  5.9<L>  /  Alb  2.8<L>  /  TBili  0.5  /  DBili  x   /  AST  31  /  ALT  39  /  AlkPhos  79  02-28          RADIOLOGY & ADDITIONAL TESTS:

## 2020-03-01 LAB
ANION GAP SERPL CALC-SCNC: 11 MMOL/L — SIGNIFICANT CHANGE UP (ref 5–17)
BUN SERPL-MCNC: 25 MG/DL — HIGH (ref 7–23)
CALCIUM SERPL-MCNC: 8.7 MG/DL — SIGNIFICANT CHANGE UP (ref 8.4–10.5)
CHLORIDE SERPL-SCNC: 95 MMOL/L — LOW (ref 96–108)
CO2 SERPL-SCNC: 27 MMOL/L — SIGNIFICANT CHANGE UP (ref 22–31)
CREAT SERPL-MCNC: 0.84 MG/DL — SIGNIFICANT CHANGE UP (ref 0.5–1.3)
GAS PNL BLDA: SIGNIFICANT CHANGE UP
GLUCOSE BLDC GLUCOMTR-MCNC: 197 MG/DL — HIGH (ref 70–99)
GLUCOSE BLDC GLUCOMTR-MCNC: 233 MG/DL — HIGH (ref 70–99)
GLUCOSE BLDC GLUCOMTR-MCNC: 237 MG/DL — HIGH (ref 70–99)
GLUCOSE BLDC GLUCOMTR-MCNC: 256 MG/DL — HIGH (ref 70–99)
GLUCOSE SERPL-MCNC: 165 MG/DL — HIGH (ref 70–99)
HCT VFR BLD CALC: 35.7 % — LOW (ref 39–50)
HGB BLD-MCNC: 11.5 G/DL — LOW (ref 13–17)
MAGNESIUM SERPL-MCNC: 2 MG/DL — SIGNIFICANT CHANGE UP (ref 1.6–2.6)
MCHC RBC-ENTMCNC: 29.9 PG — SIGNIFICANT CHANGE UP (ref 27–34)
MCHC RBC-ENTMCNC: 32.2 GM/DL — SIGNIFICANT CHANGE UP (ref 32–36)
MCV RBC AUTO: 93 FL — SIGNIFICANT CHANGE UP (ref 80–100)
NRBC # BLD: 0 /100 WBCS — SIGNIFICANT CHANGE UP (ref 0–0)
PHOSPHATE SERPL-MCNC: 3.7 MG/DL — SIGNIFICANT CHANGE UP (ref 2.5–4.5)
PLATELET # BLD AUTO: 182 K/UL — SIGNIFICANT CHANGE UP (ref 150–400)
POTASSIUM SERPL-MCNC: 4.6 MMOL/L — SIGNIFICANT CHANGE UP (ref 3.5–5.3)
POTASSIUM SERPL-SCNC: 4.6 MMOL/L — SIGNIFICANT CHANGE UP (ref 3.5–5.3)
RBC # BLD: 3.84 M/UL — LOW (ref 4.2–5.8)
RBC # FLD: 14 % — SIGNIFICANT CHANGE UP (ref 10.3–14.5)
SODIUM SERPL-SCNC: 133 MMOL/L — LOW (ref 135–145)
VANCOMYCIN TROUGH SERPL-MCNC: 12.8 UG/ML — SIGNIFICANT CHANGE UP (ref 10–20)
WBC # BLD: 31.73 K/UL — HIGH (ref 3.8–10.5)
WBC # FLD AUTO: 31.73 K/UL — HIGH (ref 3.8–10.5)

## 2020-03-01 PROCEDURE — 99233 SBSQ HOSP IP/OBS HIGH 50: CPT

## 2020-03-01 PROCEDURE — 93306 TTE W/DOPPLER COMPLETE: CPT | Mod: 26

## 2020-03-01 PROCEDURE — 99291 CRITICAL CARE FIRST HOUR: CPT

## 2020-03-01 RX ORDER — SODIUM CHLORIDE 9 MG/ML
1000 INJECTION, SOLUTION INTRAVENOUS
Refills: 0 | Status: DISCONTINUED | OUTPATIENT
Start: 2020-03-01 | End: 2020-03-07

## 2020-03-01 RX ORDER — INSULIN LISPRO 100/ML
VIAL (ML) SUBCUTANEOUS
Refills: 0 | Status: DISCONTINUED | OUTPATIENT
Start: 2020-03-01 | End: 2020-03-02

## 2020-03-01 RX ORDER — DEXTROSE 50 % IN WATER 50 %
25 SYRINGE (ML) INTRAVENOUS ONCE
Refills: 0 | Status: DISCONTINUED | OUTPATIENT
Start: 2020-03-01 | End: 2020-03-07

## 2020-03-01 RX ORDER — FUROSEMIDE 40 MG
40 TABLET ORAL ONCE
Refills: 0 | Status: COMPLETED | OUTPATIENT
Start: 2020-03-01 | End: 2020-03-01

## 2020-03-01 RX ORDER — DEXTROSE 50 % IN WATER 50 %
15 SYRINGE (ML) INTRAVENOUS ONCE
Refills: 0 | Status: DISCONTINUED | OUTPATIENT
Start: 2020-03-01 | End: 2020-03-07

## 2020-03-01 RX ORDER — BENZOCAINE AND MENTHOL 5; 1 G/100ML; G/100ML
1 LIQUID ORAL
Refills: 0 | Status: DISCONTINUED | OUTPATIENT
Start: 2020-03-01 | End: 2020-03-02

## 2020-03-01 RX ORDER — GLUCAGON INJECTION, SOLUTION 0.5 MG/.1ML
1 INJECTION, SOLUTION SUBCUTANEOUS ONCE
Refills: 0 | Status: DISCONTINUED | OUTPATIENT
Start: 2020-03-01 | End: 2020-03-07

## 2020-03-01 RX ORDER — DEXTROSE 50 % IN WATER 50 %
12.5 SYRINGE (ML) INTRAVENOUS ONCE
Refills: 0 | Status: DISCONTINUED | OUTPATIENT
Start: 2020-03-01 | End: 2020-03-07

## 2020-03-01 RX ADMIN — Medication 10 MILLILITER(S): at 17:12

## 2020-03-01 RX ADMIN — ATORVASTATIN CALCIUM 10 MILLIGRAM(S): 80 TABLET, FILM COATED ORAL at 21:10

## 2020-03-01 RX ADMIN — Medication 1 MILLIGRAM(S): at 14:17

## 2020-03-01 RX ADMIN — CEFEPIME 100 MILLIGRAM(S): 1 INJECTION, POWDER, FOR SOLUTION INTRAMUSCULAR; INTRAVENOUS at 14:18

## 2020-03-01 RX ADMIN — Medication 200 MILLIGRAM(S): at 21:08

## 2020-03-01 RX ADMIN — Medication 200 MILLIGRAM(S): at 05:21

## 2020-03-01 RX ADMIN — LOSARTAN POTASSIUM 100 MILLIGRAM(S): 100 TABLET, FILM COATED ORAL at 05:22

## 2020-03-01 RX ADMIN — Medication 40 MILLIGRAM(S): at 16:12

## 2020-03-01 RX ADMIN — Medication 4: at 17:21

## 2020-03-01 RX ADMIN — Medication 1 MILLIGRAM(S): at 11:18

## 2020-03-01 RX ADMIN — Medication 10 MILLILITER(S): at 05:20

## 2020-03-01 RX ADMIN — Medication 20 MILLIGRAM(S): at 05:20

## 2020-03-01 RX ADMIN — Medication 300 MILLIGRAM(S): at 05:51

## 2020-03-01 RX ADMIN — CHLORHEXIDINE GLUCONATE 1 APPLICATION(S): 213 SOLUTION TOPICAL at 05:23

## 2020-03-01 RX ADMIN — Medication 20 MILLIGRAM(S): at 14:17

## 2020-03-01 RX ADMIN — Medication 2: at 09:00

## 2020-03-01 RX ADMIN — Medication 240 MILLIGRAM(S): at 05:21

## 2020-03-01 RX ADMIN — Medication 1 MILLIGRAM(S): at 00:27

## 2020-03-01 RX ADMIN — Medication 1 PACKET(S): at 11:18

## 2020-03-01 RX ADMIN — SENNA PLUS 2 TABLET(S): 8.6 TABLET ORAL at 21:09

## 2020-03-01 RX ADMIN — Medication 10 MILLILITER(S): at 01:02

## 2020-03-01 RX ADMIN — CEFEPIME 100 MILLIGRAM(S): 1 INJECTION, POWDER, FOR SOLUTION INTRAMUSCULAR; INTRAVENOUS at 05:20

## 2020-03-01 RX ADMIN — TAMSULOSIN HYDROCHLORIDE 0.4 MILLIGRAM(S): 0.4 CAPSULE ORAL at 21:08

## 2020-03-01 RX ADMIN — Medication 15 MILLIGRAM(S): at 05:21

## 2020-03-01 RX ADMIN — Medication 20 MILLIGRAM(S): at 21:08

## 2020-03-01 RX ADMIN — Medication 650 MILLIGRAM(S): at 09:49

## 2020-03-01 RX ADMIN — Medication 300 MILLIGRAM(S): at 17:12

## 2020-03-01 RX ADMIN — Medication 30 MILLIGRAM(S): at 11:18

## 2020-03-01 RX ADMIN — Medication 3 MILLILITER(S): at 20:54

## 2020-03-01 RX ADMIN — CEFEPIME 100 MILLIGRAM(S): 1 INJECTION, POWDER, FOR SOLUTION INTRAMUSCULAR; INTRAVENOUS at 21:08

## 2020-03-01 RX ADMIN — Medication 1 MILLIGRAM(S): at 08:55

## 2020-03-01 RX ADMIN — Medication 10 MILLILITER(S): at 11:18

## 2020-03-01 RX ADMIN — Medication 15 MILLIGRAM(S): at 14:17

## 2020-03-01 RX ADMIN — Medication 6: at 21:26

## 2020-03-01 RX ADMIN — ENOXAPARIN SODIUM 40 MILLIGRAM(S): 100 INJECTION SUBCUTANEOUS at 11:17

## 2020-03-01 RX ADMIN — Medication 4: at 12:42

## 2020-03-01 RX ADMIN — Medication 200 MILLIGRAM(S): at 14:18

## 2020-03-01 RX ADMIN — Medication 3 MILLILITER(S): at 11:27

## 2020-03-01 RX ADMIN — Medication 650 MILLIGRAM(S): at 10:20

## 2020-03-01 RX ADMIN — Medication 15 MILLIGRAM(S): at 21:09

## 2020-03-01 NOTE — PROGRESS NOTE ADULT - SUBJECTIVE AND OBJECTIVE BOX
CC: f/u for pneumonia    Patient reports feeling good overall but upset about having told that he had a fever last night. Reports that he does not feel sick    REVIEW OF SYSTEMS:  All other review of systems negative (Comprehensive ROS)    Antimicrobials Day #  : D # 6/7  cefepime   IVPB      cefepime   IVPB 2000 milliGRAM(s) IV Intermittent every 8 hours  vancomycin  IVPB 1500 milliGRAM(s) IV Intermittent every 12 hours    Other Medications Reviewed    T(F): 98.2 (03-01-20 @ 08:00), Max: 100.8 (02-29-20 @ 17:00)  HR: 79 (03-01-20 @ 10:00)  BP: 138/64 (03-01-20 @ 10:00)  RR: 30 (03-01-20 @ 10:00)  SpO2: 91% (03-01-20 @ 10:00)  Wt(kg): --    PHYSICAL EXAM:  General: alert, no acute distress on high flow O2 support   Eyes:  anicteric, no conjunctival injection, no discharge  Oropharynx: no lesions	  Neck: supple  Lungs: bilateral fine rales  Heart: regular rate and rhythm; no murmurs  Abdomen: soft, nondistended, nontender  Skin: no lesions  Extremities: no clubbing, cyanosis, or edema  Neurologic: arousable & moves all extremities    LAB RESULTS:                        11.5   31.73 )-----------( 182      ( 01 Mar 2020 01:33 )             35.7     03-01    133<L>  |  95<L>  |  25<H>  ----------------------------<  165<H>  4.6   |  27  |  0.84    Ca    8.7      01 Mar 2020 01:33  Phos  3.7     03-01  Mg     2.0     03-01    TPro  5.6<L>  /  Alb  2.4<L>  /  TBili  0.5  /  DBili  x   /  AST  36  /  ALT  39  /  AlkPhos  79  02-29    LIVER FUNCTIONS - ( 29 Feb 2020 00:30 )  Alb: 2.4 g/dL / Pro: 5.6 g/dL / ALK PHOS: 79 U/L / ALT: 39 U/L / AST: 36 U/L / GGT: x             MICROBIOLOGY:  RECENT CULTURES:        RADIOLOGY REVIEWED:

## 2020-03-01 NOTE — PROGRESS NOTE ADULT - SUBJECTIVE AND OBJECTIVE BOX
CARDIOLOGY     PROGRESS  NOTE   ________________________________________________    CHIEF COMPLAINT:Patient is a 69y old  Male who presents with a chief complaint of SOB, cough and weakness (01 Mar 2020 10:27)  no complain.  	  REVIEW OF SYSTEMS:  CONSTITUTIONAL: No fever, weight loss, or fatigue  EYES: No eye pain, visual disturbances, or discharge  ENT:  No difficulty hearing, tinnitus, vertigo; No sinus or throat pain  NECK: No pain or stiffness  RESPIRATORY: No cough, wheezing, chills or hemoptysis; + Shortness of Breath  CARDIOVASCULAR: No chest pain, palpitations, passing out, dizziness, or leg swelling  GASTROINTESTINAL: No abdominal or epigastric pain. No nausea, vomiting, or hematemesis; No diarrhea or constipation. No melena or hematochezia.  GENITOURINARY: No dysuria, frequency, hematuria, or incontinence  NEUROLOGICAL: No headaches, memory loss, loss of strength, numbness, or tremors  SKIN: No itching, burning, rashes, or lesions   LYMPH Nodes: No enlarged glands  ENDOCRINE: No heat or cold intolerance; No hair loss  MUSCULOSKELETAL: No joint pain or swelling; No muscle, back, or extremity pain  PSYCHIATRIC: No depression, anxiety, mood swings, or difficulty sleeping  HEME/LYMPH: No easy bruising, or bleeding gums  ALLERGY AND IMMUNOLOGIC: No hives or eczema	    [ ] All others negative	  [ ] Unable to obtain    PHYSICAL EXAM:  T(C): 36.8 (03-01-20 @ 08:00), Max: 38.2 (02-29-20 @ 17:00)  HR: 79 (03-01-20 @ 10:00) (62 - 876)  BP: 138/64 (03-01-20 @ 10:00) (127/61 - 151/67)  RR: 30 (03-01-20 @ 10:00) (18 - 32)  SpO2: 91% (03-01-20 @ 10:00) (82% - 98%)  Wt(kg): --  I&O's Summary    29 Feb 2020 07:01  -  01 Mar 2020 07:00  --------------------------------------------------------  IN: 390 mL / OUT: 2945 mL / NET: -2555 mL    01 Mar 2020 07:01  -  01 Mar 2020 11:06  --------------------------------------------------------  IN: 240 mL / OUT: 575 mL / NET: -335 mL        Appearance: Normal	  HEENT:   Normal oral mucosa, PERRL, EOMI	  Lymphatic: No lymphadenopathy  Cardiovascular: Normal S1 S2, No JVD, + murmurs, No edema  Respiratory: decrease bs  Psychiatry: A & O x 3, Mood & affect appropriate  Gastrointestinal:  Soft, Non-tender, + BS	  Skin: No rashes, No ecchymoses, No cyanosis	  Neurologic: Non-focal  Extremities: Normal range of motion, No clubbing, cyanosis or edema  Vascular: Peripheral pulses palpable 2+ bilaterally    MEDICATIONS  (STANDING):  atorvastatin 10 milliGRAM(s) Oral at bedtime  benzonatate 200 milliGRAM(s) Oral three times a day  busPIRone 15 milliGRAM(s) Oral three times a day  cefepime   IVPB      cefepime   IVPB 2000 milliGRAM(s) IV Intermittent every 8 hours  chlorhexidine 4% Liquid 1 Application(s) Topical <User Schedule>  clonazePAM  Tablet 1 milliGRAM(s) Oral daily  dextrose 5%. 1000 milliLiter(s) (50 mL/Hr) IV Continuous <Continuous>  dextrose 50% Injectable 12.5 Gram(s) IV Push once  dextrose 50% Injectable 25 Gram(s) IV Push once  dextrose 50% Injectable 25 Gram(s) IV Push once  diltiazem    milliGRAM(s) Oral daily  enoxaparin Injectable 40 milliGRAM(s) SubCutaneous daily  guaifenesin/dextromethorphan  Syrup 10 milliLiter(s) Oral every 6 hours  insulin lispro (HumaLOG) corrective regimen sliding scale   SubCutaneous three times a day before meals  insulin lispro (HumaLOG) corrective regimen sliding scale   SubCutaneous at bedtime  losartan 100 milliGRAM(s) Oral daily  methylPREDNISolone sodium succinate Injectable 20 milliGRAM(s) IV Push every 8 hours  PARoxetine 30 milliGRAM(s) Oral daily  psyllium Powder 1 Packet(s) Oral daily  senna 2 Tablet(s) Oral at bedtime  tamsulosin 0.4 milliGRAM(s) Oral at bedtime  vancomycin  IVPB 1500 milliGRAM(s) IV Intermittent every 12 hours      TELEMETRY: 	    ECG:  	  RADIOLOGY:  OTHER: 	  	  LABS:	 	    CARDIAC MARKERS:                                11.5   31.73 )-----------( 182      ( 01 Mar 2020 01:33 )             35.7     03-01    133<L>  |  95<L>  |  25<H>  ----------------------------<  165<H>  4.6   |  27  |  0.84    Ca    8.7      01 Mar 2020 01:33  Phos  3.7     03-01  Mg     2.0     03-01    TPro  5.6<L>  /  Alb  2.4<L>  /  TBili  0.5  /  DBili  x   /  AST  36  /  ALT  39  /  AlkPhos  79  02-29    proBNP: Serum Pro-Brain Natriuretic Peptide: 364 pg/mL (02-19 @ 22:30)    Lipid Profile:   HgA1c: Hemoglobin A1C, Whole Blood: 6.6 % (02-24 @ 08:40)    TSH: Thyroid Stimulating Hormone, Serum: 0.39 uIU/mL (02-21 @ 09:19)  Thyroid Stimulating Hormone, Serum: 0.47 uIU/mL (02-21 @ 00:19)          Assessment and plan  ---------------------------  69M with PMH of HTN, pulmonary fibrosis, depression/anxiety p/w SOB and cough. Pt states his mother passed away about 2 weeks ago and shortly after burying here, started to feel very unwell. He has had progressively worsening cough, non productive, but feels a lot of chest congestion. Associated with ROSE, headache and myalgias and fatigue. Was seen at  earlier this week and was diagnosed with flu. In the past couple of days, has been having very labored breathing; today, had an episode where he fell while trying to reach for alight switch and was so winded and weak, he was unable to get up. Has barely been eating anything due to poor appetite and nausea, but denies vomiting. Denies fevers or chills, no CP, no palpitations, no abdominal pain, had 3 episodes of loose stools today, +decreased UOP, no LE swelling. Does not know of any sick contacts. Denies recent travel.  sob/ respiratory failure sec to hx of pulmonary fibrosis ?pneumoniae.  abx  echo to evaluate pulmonary pressure sec to hx of pulmonary fibrosis  add calcium channel blocker for bp control, increase Cardizem CD if increase bp/hr  dvt prophylaxis  asa daily  awaiting echo, no evidence of heart failure on exam  supportive measures

## 2020-03-01 NOTE — PROGRESS NOTE ADULT - ATTENDING COMMENTS
Agree with above.  Patient seen and examined. Chart reviewed.    69 year old man with acute hypoxemic respiratory failure due to influenza pneumonia and bacterial pneumonia.     - Continue high flow 02 titrate down as tolerated to keep saturation over 90%  - minimal improvement with diuresis  - continues to have good urine out put  - Continue Tamiflu and Vancomycin and Cefepime  - continue steroids patient with H/O  Pulmonary fibrosis  - encourage chest PT and incentive spirometry      case discussed in detail with wife at bedside.

## 2020-03-01 NOTE — PROGRESS NOTE ADULT - ASSESSMENT
70 yo male with HTN, anxiety disorder, and pulmonary fibrosis admitted with hypoxic respiratory failure.  Influenza A diagnosed 2/16 with a rapid test.  CTA is negative for PE.  Started on CTX and azithro along with tamiflu  Legionella urine antigen negative, MRSA screen is negative and his PC is elevated to 0.69.  The elevated PC is suggestive of a bacterial infection  Lack of improvement with tamiflu documented and raised concern of both viral pneumonia and a secondary process such as a bacterial pneumonia - empiric antibiotics cont'd  Repeated CT chest revealed pulm fibrosis & bilateral groundglass opacities concerning for infection  However remains afebrile & PCT is low at 0.15  Leukocytosis unreliable - given pt on steroids  CTX, azithro were changed to vanco & cefepime on 2/25/20, in case this was HCAP, although low overall suspicion in this afebrile pt with low PCT.  Hypoxemia remains an issue with ongoing requirement for high flow O2 support & steroids  Yesterday had a fever of 100.8 - was unaware of it subjectively - significance unclear    Suggest:  Complete vanco & cefepime tomorrow - 7 days total  If he continues to get fevers, we might need to repeat cx  Steroid mgmt as per ICU/ pulmonary  Leukocytosis unreliable on IV steroids  Check procalcitonin level in AM

## 2020-03-01 NOTE — PROGRESS NOTE ADULT - SUBJECTIVE AND OBJECTIVE BOX
CHIEF COMPLAINT: SOB, cough and weakness    OVERNIGHT / SUBJECTIVE:  -Overnight, no acute events. Was given Lasix and restarted on steroids yesterday in the day time. FiO2 downtitrated to 90% overnight.   -Today, pt seen and examined at bedside in AM.    REVIEW OF SYSTEMS:    CONSTITUTIONAL: No weakness, fevers or chills  EYES/ENT: No visual changes;  No vertigo or throat pain   NECK: No pain or stiffness  RESPIRATORY: +cough (improved, also has chronic cough); No wheezing, hemoptysis; No shortness of breath  CARDIOVASCULAR: No chest pain or palpitations  GASTROINTESTINAL: No abdominal or epigastric pain. No nausea, vomiting, or hematemesis; No diarrhea or constipation. No melena or hematochezia.  GENITOURINARY: No dysuria, frequency or hematuria  NEUROLOGICAL: No numbness or weakness  SKIN: No itching, burning, rashes, or lesions   All other review of systems is negative unless indicated above.        OBJECTIVE:  ICU Vital Signs Last 24 Hrs  T(C): 36.9 (01 Mar 2020 04:00), Max: 38.2 (29 Feb 2020 17:00)  T(F): 98.5 (01 Mar 2020 04:00), Max: 100.8 (29 Feb 2020 17:00)  HR: 68 (01 Mar 2020 06:00) (62 - 93)  BP: 137/68 (01 Mar 2020 06:00) (127/64 - 151/67)  BP(mean): 95 (01 Mar 2020 06:00) (82 - 104)  ABP: --  ABP(mean): --  RR: 26 (01 Mar 2020 06:00) (18 - 32)  SpO2: 90% (01 Mar 2020 06:00) (88% - 98%)        02-29 @ 07:01  -  03-01 @ 07:00  --------------------------------------------------------  IN: 390 mL / OUT: 2945 mL / NET: -2555 mL      CAPILLARY BLOOD GLUCOSE      POCT Blood Glucose.: 123 mg/dL (29 Feb 2020 22:52)  POCT Blood Glucose.: 193 mg/dL (29 Feb 2020 17:55)  POCT Blood Glucose.: 172 mg/dL (29 Feb 2020 14:22)    I&O's Summary    29 Feb 2020 07:01  -  01 Mar 2020 07:00  --------------------------------------------------------  IN: 390 mL / OUT: 2945 mL / NET: -2555 mL        PHYSICAL EXAM  General: NAD, on high flow nasal cannula  HEENT: Head atraumatic; EOMI, PERRLA, normal sclera and conjunctiva; Dry MM  Neck: Supple, no JVD, no LAD  Chest/Lungs: Clear to ausculation anteriorly with mild coarse breath sounds, no wheezing appreciated  Heart: RRR, normal S1, S2, no murmurs or gallops  Abd: +BS, soft, obese, nontender, nondistended  Extremities: 2+ peripheral pulses, trace pedal edema  Skin: No rashes or lesions, warm, well-perfused  Neurologic: AAOx3, follows commands appropriately      LABS:                        11.5   31.73 )-----------( 182      ( 01 Mar 2020 01:33 )             35.7     03-01    133<L>  |  95<L>  |  25<H>  ----------------------------<  165<H>  4.6   |  27  |  0.84    Ca    8.7      01 Mar 2020 01:33  Phos  3.7     03-01  Mg     2.0     03-01    TPro  5.6<L>  /  Alb  2.4<L>  /  TBili  0.5  /  DBili  x   /  AST  36  /  ALT  39  /  AlkPhos  79  02-29        LINES:    HOSPITAL MEDICATIONS:  Standing Meds:  atorvastatin 10 milliGRAM(s) Oral at bedtime  benzonatate 200 milliGRAM(s) Oral three times a day  busPIRone 15 milliGRAM(s) Oral three times a day  cefepime   IVPB      cefepime   IVPB 2000 milliGRAM(s) IV Intermittent every 8 hours  chlorhexidine 4% Liquid 1 Application(s) Topical <User Schedule>  clonazePAM  Tablet 1 milliGRAM(s) Oral daily  dextrose 5%. 1000 milliLiter(s) IV Continuous <Continuous>  dextrose 50% Injectable 12.5 Gram(s) IV Push once  dextrose 50% Injectable 25 Gram(s) IV Push once  dextrose 50% Injectable 25 Gram(s) IV Push once  diltiazem    milliGRAM(s) Oral daily  enoxaparin Injectable 40 milliGRAM(s) SubCutaneous daily  guaifenesin/dextromethorphan  Syrup 10 milliLiter(s) Oral every 6 hours  insulin lispro (HumaLOG) corrective regimen sliding scale   SubCutaneous three times a day before meals  insulin lispro (HumaLOG) corrective regimen sliding scale   SubCutaneous at bedtime  losartan 100 milliGRAM(s) Oral daily  methylPREDNISolone sodium succinate Injectable 20 milliGRAM(s) IV Push every 8 hours  PARoxetine 30 milliGRAM(s) Oral daily  psyllium Powder 1 Packet(s) Oral daily  senna 2 Tablet(s) Oral at bedtime  tamsulosin 0.4 milliGRAM(s) Oral at bedtime  vancomycin  IVPB 1500 milliGRAM(s) IV Intermittent every 12 hours      PRN Meds:  acetaminophen   Tablet .. 650 milliGRAM(s) Oral every 6 hours PRN  albuterol/ipratropium for Nebulization 3 milliLiter(s) Nebulizer every 6 hours PRN  clonazePAM  Tablet 1 milliGRAM(s) Oral three times a day PRN  dextrose 40% Gel 15 Gram(s) Oral once PRN  glucagon  Injectable 1 milliGRAM(s) IntraMuscular once PRN  polyethylene glycol 3350 17 Gram(s) Oral daily PRN      LABS:                        11.5   31.73 )-----------( 182      ( 01 Mar 2020 01:33 )             35.7     Hgb Trend: 11.5<--, 11.6<--, 12.1<--, 11.5<--, 12.0<--  03-01    133<L>  |  95<L>  |  25<H>  ----------------------------<  165<H>  4.6   |  27  |  0.84    Ca    8.7      01 Mar 2020 01:33  Phos  3.7     03-01  Mg     2.0     03-01    TPro  5.6<L>  /  Alb  2.4<L>  /  TBili  0.5  /  DBili  x   /  AST  36  /  ALT  39  /  AlkPhos  79  02-29    Creatinine Trend: 0.84<--, 0.78<--, 0.78<--, 0.81<--, 0.87<--, 0.88<--        Venous Blood Gas:  02-29 @ 09:27  7.45/43/52/30/84  VBG Lactate: 1.4              MICROBIOLOGY:     RADIOLOGY: Reviewed CHIEF COMPLAINT: SOB, cough and weakness    OVERNIGHT / SUBJECTIVE:  -Overnight, no acute events. Was given Lasix and restarted on steroids yesterday in the day time. Fever of 100.8F yesterday afternoon. FiO2 downtitrated to 90% overnight.   -Today, pt seen and examined at bedside in AM. Reports feeling okay. Denies any pain, SOB, or dyspnea. States he has not been using his incentive spirometer.    REVIEW OF SYSTEMS:    CONSTITUTIONAL: No weakness, fevers or chills  EYES/ENT: No visual changes;  No vertigo or throat pain   NECK: No pain or stiffness  RESPIRATORY: +cough (improved, also has chronic cough); No wheezing, hemoptysis; No shortness of breath  CARDIOVASCULAR: No chest pain or palpitations  GASTROINTESTINAL: No abdominal or epigastric pain. No nausea, vomiting, or hematemesis; No diarrhea or constipation. No melena or hematochezia.  GENITOURINARY: No dysuria, frequency or hematuria  NEUROLOGICAL: No numbness or weakness  SKIN: No itching, burning, rashes, or lesions   All other review of systems is negative unless indicated above.        OBJECTIVE:  ICU Vital Signs Last 24 Hrs  T(C): 36.9 (01 Mar 2020 04:00), Max: 38.2 (29 Feb 2020 17:00)  T(F): 98.5 (01 Mar 2020 04:00), Max: 100.8 (29 Feb 2020 17:00)  HR: 68 (01 Mar 2020 06:00) (62 - 93)  BP: 137/68 (01 Mar 2020 06:00) (127/64 - 151/67)  BP(mean): 95 (01 Mar 2020 06:00) (82 - 104)  ABP: --  ABP(mean): --  RR: 26 (01 Mar 2020 06:00) (18 - 32)  SpO2: 90% (01 Mar 2020 06:00) (88% - 98%)        02-29 @ 07:01  -  03-01 @ 07:00  --------------------------------------------------------  IN: 390 mL / OUT: 2945 mL / NET: -2555 mL      CAPILLARY BLOOD GLUCOSE      POCT Blood Glucose.: 123 mg/dL (29 Feb 2020 22:52)  POCT Blood Glucose.: 193 mg/dL (29 Feb 2020 17:55)  POCT Blood Glucose.: 172 mg/dL (29 Feb 2020 14:22)    I&O's Summary    29 Feb 2020 07:01  -  01 Mar 2020 07:00  --------------------------------------------------------  IN: 390 mL / OUT: 2945 mL / NET: -2555 mL        PHYSICAL EXAM  General: NAD, on high flow nasal cannula  HEENT: Head atraumatic; EOMI, PERRLA, normal sclera and conjunctiva; Dry MM  Neck: Supple, no JVD, no LAD  Chest/Lungs: Clear to ausculation anteriorly with mild coarse breath sounds, no wheezing appreciated  Heart: RRR, normal S1, S2, no murmurs or gallops  Abd: +BS, soft, obese, nontender, nondistended  Extremities: 2+ peripheral pulses, trace pedal edema  Skin: No rashes or lesions, warm, well-perfused  Neurologic: AAOx3, follows commands appropriately      LABS:                        11.5   31.73 )-----------( 182      ( 01 Mar 2020 01:33 )             35.7     03-01    133<L>  |  95<L>  |  25<H>  ----------------------------<  165<H>  4.6   |  27  |  0.84    Ca    8.7      01 Mar 2020 01:33  Phos  3.7     03-01  Mg     2.0     03-01    TPro  5.6<L>  /  Alb  2.4<L>  /  TBili  0.5  /  DBili  x   /  AST  36  /  ALT  39  /  AlkPhos  79  02-29        LINES:    HOSPITAL MEDICATIONS:  Standing Meds:  atorvastatin 10 milliGRAM(s) Oral at bedtime  benzonatate 200 milliGRAM(s) Oral three times a day  busPIRone 15 milliGRAM(s) Oral three times a day  cefepime   IVPB      cefepime   IVPB 2000 milliGRAM(s) IV Intermittent every 8 hours  chlorhexidine 4% Liquid 1 Application(s) Topical <User Schedule>  clonazePAM  Tablet 1 milliGRAM(s) Oral daily  dextrose 5%. 1000 milliLiter(s) IV Continuous <Continuous>  dextrose 50% Injectable 12.5 Gram(s) IV Push once  dextrose 50% Injectable 25 Gram(s) IV Push once  dextrose 50% Injectable 25 Gram(s) IV Push once  diltiazem    milliGRAM(s) Oral daily  enoxaparin Injectable 40 milliGRAM(s) SubCutaneous daily  guaifenesin/dextromethorphan  Syrup 10 milliLiter(s) Oral every 6 hours  insulin lispro (HumaLOG) corrective regimen sliding scale   SubCutaneous three times a day before meals  insulin lispro (HumaLOG) corrective regimen sliding scale   SubCutaneous at bedtime  losartan 100 milliGRAM(s) Oral daily  methylPREDNISolone sodium succinate Injectable 20 milliGRAM(s) IV Push every 8 hours  PARoxetine 30 milliGRAM(s) Oral daily  psyllium Powder 1 Packet(s) Oral daily  senna 2 Tablet(s) Oral at bedtime  tamsulosin 0.4 milliGRAM(s) Oral at bedtime  vancomycin  IVPB 1500 milliGRAM(s) IV Intermittent every 12 hours      PRN Meds:  acetaminophen   Tablet .. 650 milliGRAM(s) Oral every 6 hours PRN  albuterol/ipratropium for Nebulization 3 milliLiter(s) Nebulizer every 6 hours PRN  clonazePAM  Tablet 1 milliGRAM(s) Oral three times a day PRN  dextrose 40% Gel 15 Gram(s) Oral once PRN  glucagon  Injectable 1 milliGRAM(s) IntraMuscular once PRN  polyethylene glycol 3350 17 Gram(s) Oral daily PRN      LABS:                        11.5   31.73 )-----------( 182      ( 01 Mar 2020 01:33 )             35.7     Hgb Trend: 11.5<--, 11.6<--, 12.1<--, 11.5<--, 12.0<--  03-01    133<L>  |  95<L>  |  25<H>  ----------------------------<  165<H>  4.6   |  27  |  0.84    Ca    8.7      01 Mar 2020 01:33  Phos  3.7     03-01  Mg     2.0     03-01    TPro  5.6<L>  /  Alb  2.4<L>  /  TBili  0.5  /  DBili  x   /  AST  36  /  ALT  39  /  AlkPhos  79  02-29    Creatinine Trend: 0.84<--, 0.78<--, 0.78<--, 0.81<--, 0.87<--, 0.88<--        Venous Blood Gas:  02-29 @ 09:27  7.45/43/52/30/84  VBG Lactate: 1.4              MICROBIOLOGY:     RADIOLOGY: Reviewed CHIEF COMPLAINT: SOB, cough and weakness    OVERNIGHT / SUBJECTIVE:  -Overnight, no acute events. Was given Lasix and restarted on steroids yesterday in the day time. Fever of 100.8F yesterday afternoon. FiO2 downtitrated to 90% overnight.   -Today, pt seen and examined at bedside in AM. Reports feeling okay. Denies any pain, SOB, or dyspnea. States he has not been using his incentive spirometer, but has been using his acapella valve    REVIEW OF SYSTEMS:    CONSTITUTIONAL: No weakness, fevers or chills  EYES/ENT: No visual changes;  No vertigo or throat pain   NECK: No pain or stiffness  RESPIRATORY: +cough (improved, also has chronic cough); No wheezing, hemoptysis; No shortness of breath  CARDIOVASCULAR: No chest pain or palpitations  GASTROINTESTINAL: No abdominal or epigastric pain. No nausea, vomiting, or hematemesis; No diarrhea or constipation. No melena or hematochezia.  GENITOURINARY: No dysuria, frequency or hematuria  NEUROLOGICAL: No numbness or weakness  SKIN: No itching, burning, rashes, or lesions   All other review of systems is negative unless indicated above.        OBJECTIVE:  ICU Vital Signs Last 24 Hrs  T(C): 36.9 (01 Mar 2020 04:00), Max: 38.2 (29 Feb 2020 17:00)  T(F): 98.5 (01 Mar 2020 04:00), Max: 100.8 (29 Feb 2020 17:00)  HR: 68 (01 Mar 2020 06:00) (62 - 93)  BP: 137/68 (01 Mar 2020 06:00) (127/64 - 151/67)  BP(mean): 95 (01 Mar 2020 06:00) (82 - 104)  ABP: --  ABP(mean): --  RR: 26 (01 Mar 2020 06:00) (18 - 32)  SpO2: 90% (01 Mar 2020 06:00) (88% - 98%)        02-29 @ 07:01  -  03-01 @ 07:00  --------------------------------------------------------  IN: 390 mL / OUT: 2945 mL / NET: -2555 mL      CAPILLARY BLOOD GLUCOSE      POCT Blood Glucose.: 123 mg/dL (29 Feb 2020 22:52)  POCT Blood Glucose.: 193 mg/dL (29 Feb 2020 17:55)  POCT Blood Glucose.: 172 mg/dL (29 Feb 2020 14:22)    I&O's Summary    29 Feb 2020 07:01  -  01 Mar 2020 07:00  --------------------------------------------------------  IN: 390 mL / OUT: 2945 mL / NET: -2555 mL        PHYSICAL EXAM  General: NAD, on high flow nasal cannula  HEENT: Head atraumatic; EOMI, PERRLA, normal sclera and conjunctiva; Dry MM  Neck: Supple, no JVD, no LAD  Chest/Lungs: Clear to ausculation anteriorly with mild coarse breath sounds, no wheezing appreciated  Heart: RRR, normal S1, S2, no murmurs or gallops  Abd: +BS, soft, obese, nontender, nondistended  Extremities: 2+ peripheral pulses, trace pedal edema  Skin: No rashes or lesions, warm, well-perfused  Neurologic: AAOx3, follows commands appropriately      LABS:                        11.5   31.73 )-----------( 182      ( 01 Mar 2020 01:33 )             35.7     03-01    133<L>  |  95<L>  |  25<H>  ----------------------------<  165<H>  4.6   |  27  |  0.84    Ca    8.7      01 Mar 2020 01:33  Phos  3.7     03-01  Mg     2.0     03-01    TPro  5.6<L>  /  Alb  2.4<L>  /  TBili  0.5  /  DBili  x   /  AST  36  /  ALT  39  /  AlkPhos  79  02-29        LINES:    HOSPITAL MEDICATIONS:  Standing Meds:  atorvastatin 10 milliGRAM(s) Oral at bedtime  benzonatate 200 milliGRAM(s) Oral three times a day  busPIRone 15 milliGRAM(s) Oral three times a day  cefepime   IVPB      cefepime   IVPB 2000 milliGRAM(s) IV Intermittent every 8 hours  chlorhexidine 4% Liquid 1 Application(s) Topical <User Schedule>  clonazePAM  Tablet 1 milliGRAM(s) Oral daily  dextrose 5%. 1000 milliLiter(s) IV Continuous <Continuous>  dextrose 50% Injectable 12.5 Gram(s) IV Push once  dextrose 50% Injectable 25 Gram(s) IV Push once  dextrose 50% Injectable 25 Gram(s) IV Push once  diltiazem    milliGRAM(s) Oral daily  enoxaparin Injectable 40 milliGRAM(s) SubCutaneous daily  guaifenesin/dextromethorphan  Syrup 10 milliLiter(s) Oral every 6 hours  insulin lispro (HumaLOG) corrective regimen sliding scale   SubCutaneous three times a day before meals  insulin lispro (HumaLOG) corrective regimen sliding scale   SubCutaneous at bedtime  losartan 100 milliGRAM(s) Oral daily  methylPREDNISolone sodium succinate Injectable 20 milliGRAM(s) IV Push every 8 hours  PARoxetine 30 milliGRAM(s) Oral daily  psyllium Powder 1 Packet(s) Oral daily  senna 2 Tablet(s) Oral at bedtime  tamsulosin 0.4 milliGRAM(s) Oral at bedtime  vancomycin  IVPB 1500 milliGRAM(s) IV Intermittent every 12 hours      PRN Meds:  acetaminophen   Tablet .. 650 milliGRAM(s) Oral every 6 hours PRN  albuterol/ipratropium for Nebulization 3 milliLiter(s) Nebulizer every 6 hours PRN  clonazePAM  Tablet 1 milliGRAM(s) Oral three times a day PRN  dextrose 40% Gel 15 Gram(s) Oral once PRN  glucagon  Injectable 1 milliGRAM(s) IntraMuscular once PRN  polyethylene glycol 3350 17 Gram(s) Oral daily PRN      LABS:                        11.5   31.73 )-----------( 182      ( 01 Mar 2020 01:33 )             35.7     Hgb Trend: 11.5<--, 11.6<--, 12.1<--, 11.5<--, 12.0<--  03-01    133<L>  |  95<L>  |  25<H>  ----------------------------<  165<H>  4.6   |  27  |  0.84    Ca    8.7      01 Mar 2020 01:33  Phos  3.7     03-01  Mg     2.0     03-01    TPro  5.6<L>  /  Alb  2.4<L>  /  TBili  0.5  /  DBili  x   /  AST  36  /  ALT  39  /  AlkPhos  79  02-29    Creatinine Trend: 0.84<--, 0.78<--, 0.78<--, 0.81<--, 0.87<--, 0.88<--        Venous Blood Gas:  02-29 @ 09:27  7.45/43/52/30/84  VBG Lactate: 1.4              MICROBIOLOGY:     RADIOLOGY: Reviewed

## 2020-03-01 NOTE — PROGRESS NOTE ADULT - ASSESSMENT
diarrhea - resolved  anemia  constipation        cont bowel regimen; hold if having loose stool  h/h stable  diet as tolerated, encourage PO intake   colonoscopy 3 years ago, no need to repeat as inpatient  monitor for gi bleed  encourage OOB/ambulation  dvt ppx  further care per MICU appreciated   will follow

## 2020-03-01 NOTE — PROGRESS NOTE ADULT - ASSESSMENT
69M with PMH of HTN, HLD, depression, anxiety, and insignificant degree of pulmonary fibrosis, who initially presented 2/20/20 following a fall at home, admitted with acute hypoxic respiratory failure in setting of influenza with concern for superimposed PNA. Course c/b worsening respiratory failure with high oxygen requirements, transferred to MICU for close monitoring.     NEURO:   - Currently AAOx3, awake, alert, answers questions appropriately  - C/w home clonazepam standing 1mg PO daily and 1mg PO TID PRN for anxiety, hold for sedation  - C/w home buspirone 15mg PO TID  - C/w home paroxetine 30mg PO daily    PULM:   # Acute hypoxic respiratory failure   Multifactorial related to influenza infection with high suspicion for superimposed bacterial PNA in setting of underlying lung disease. May also have component of aspiration pneumonitis.   - C/w HFNC, can wean down on FiO2 as tolerated as long as saturation maintained >90% (currently at FiO2 60%)  - C/w broad-spectrum antibiotics  - C/w Tamiflu for total 10 days (2/20-2/29)  - C/w duonebs q6h PRN  - Encourage Acapella valve use to loosen up secretions    # Pulmonary fibrosis:   - Appreciate Pulm recs  - As per pt's pulmonologist group (Dr. Dia/Dr. Pop): Hx of "insignificant pulmonary fibrosis," Seen for chronic cough, PFTs (2017) was wnl aside from diffusion capacity of 74%, and CT Chest (2013) showed no evidence of pulm fibrosis or other significant pulm pathology  - c/w solumedrol 20mg IV Q8    #Chronic cough  - c/w Tessalon Perles 200mg PO TID  - c/w Robitussin-DM 10mL PO q6h    CARDIOVASCULAR:   # HTN  - No evidence of fluid overload   - Continue to monitor  - c/w Diltiazem 240mg PO daily  - c/w Losartan 100mg PO daily for better BP control (on olmesartan 40mg daily at home)   - Cardiology following (Dr. Lucero), appreciate recs    GI:   - C/w regular diet (1L fluid restriction) for now  - GI following (Dr. Kraft), appreciate recs  # Constipation  - c/w senna 2 tabs qhs and miralax daily    RENAL/:   # Urinary retention:   Likely related to medication side effect s/p Scruggs placement.   - c/w tamsulosin, off home doxazosin  - Scruggs placed  - Lasix 40mg x 1   - Monitor urine output  - Trend BMP    ENDOCRINE:   - Insulin sliding scale for steroid-induced hyperglycemia    HEMATOLOGIC:   # Leukocytosis, improving   Likely secondary to steroids but cannot r/o worsening infection  - c/w broad-spectrum antibiotics  - Continue to trend CBC with differential    ID:   # Influenza with suspected superimposed bacterial PNA:   - c/w vancomycin 1g q12 and cefepime 2g q8  - C/w Tamiflu for total 10 days as above  - Trend WBC count, fever curve  - Reculture and consider repeat RVP if febrile    PROPHYLAXIS:   - DVT ppx: Lovenox  - Out of bed to chair as tolerated    CODE STATUS:  - Wife is HCP and agrees to bring in form from home. Agreeable to trial of intubation if needed. 69M with PMH of HTN, HLD, depression, anxiety, and insignificant degree of pulmonary fibrosis, who initially presented 2/20/20 following a fall at home, admitted with acute hypoxic respiratory failure in setting of influenza with concern for superimposed PNA. Course c/b worsening respiratory failure with high oxygen requirements, transferred to MICU for close monitoring.     NEURO:   - Currently AAOx3, awake, alert, answers questions appropriately  - C/w home clonazepam standing 1mg PO daily and 1mg PO TID PRN for anxiety, hold for sedation  - C/w home buspirone 15mg PO TID  - C/w home paroxetine 30mg PO daily    PULM:   # Acute hypoxic respiratory failure   Multifactorial related to influenza infection with high suspicion for superimposed bacterial PNA in setting of underlying lung disease. May also have component of aspiration pneumonitis.   - C/w HFNC, can wean down on FiO2 as tolerated as long as saturation maintained >90% (currently at FiO2 60%)  - C/w broad-spectrum antibiotics  - C/w Tamiflu for total 10 days (2/20-2/29)  - C/w duonebs q6h PRN  - Encourage Acapella valve use to loosen up secretions    # Pulmonary fibrosis:   - Appreciate Pulm recs  - As per pt's pulmonologist group (Dr. Dia/Dr. Pop): Hx of "insignificant pulmonary fibrosis," Seen for chronic cough, PFTs (2017) was wnl aside from diffusion capacity of 74%, and CT Chest (2013) showed no evidence of pulm fibrosis or other significant pulm pathology  - c/w solumedrol 20mg IV Q8    #Chronic cough  - c/w Tessalon Perles 200mg PO TID  - c/w Robitussin-DM 10mL PO q6h    CARDIOVASCULAR:   # HTN  - No evidence of fluid overload   - Continue to monitor  - c/w Diltiazem 240mg PO daily  - c/w Losartan 100mg PO daily for better BP control (on olmesartan 40mg daily at home)   - Cardiology following (Dr. Lucero), appreciate recs    GI:   - C/w regular diet (1L fluid restriction) for now  - GI following (Dr. Kraft), appreciate recs  # Constipation  - c/w senna 2 tabs qhs and miralax daily    RENAL/:   # Urinary retention:   Likely related to medication side effect s/p Scruggs placement.   - c/w tamsulosin, off home doxazosin  - Scruggs placed  - Lasix 40mg x 1   - Monitor urine output  - Trend BMP    ENDOCRINE:   - Insulin sliding scale for steroid-induced hyperglycemia    HEMATOLOGIC:   # Leukocytosis, improving   Likely secondary to steroids but cannot r/o worsening infection  - c/w broad-spectrum antibiotics  - Continue to trend CBC with differential    ID:   # Influenza with suspected superimposed bacterial PNA:   - c/w vancomycin 1g q12 and cefepime 2g q8  - C/w Tamiflu for total 10 days as above  - Trend WBC count, fever curve  - Reculture and consider repeat RVP if febrile    PROPHYLAXIS:   - DVT ppx: Lovenox  - Out of bed to chair as tolerated    CODE STATUS:  - Wife is HCP and agrees to bring in form from home. Agreeable to trial of intubation if needed.    Rene Kim, PGY-1  MICU  Contact/Pager: [phone] / [phone] 69M with PMH of HTN, HLD, depression, anxiety, and insignificant degree of pulmonary fibrosis, who initially presented 2/20/20 following a fall at home, admitted with acute hypoxic respiratory failure in setting of influenza with concern for superimposed PNA. Course c/b worsening respiratory failure with high oxygen requirements, transferred to MICU for close monitoring.     NEURO:   - Currently AAOx3, awake, alert, answers questions appropriately  - C/w home clonazepam standing 1mg PO daily and 1mg PO TID PRN for anxiety, hold for sedation  - C/w home buspirone 15mg PO TID  - C/w home paroxetine 30mg PO daily    PULM:   # Acute hypoxic respiratory failure   Multifactorial related to influenza infection with high suspicion for superimposed bacterial PNA in setting of underlying lung disease. May also have component of aspiration pneumonitis.   - C/w HFNC, can wean down on FiO2 as tolerated as long as saturation maintained >90% (currently at FiO2 60%)  - C/w broad-spectrum antibiotics  - s/p Tamiflu for total 10 days (2/20-2/29)  - C/w duonebs q6h PRN  - Encourage Acapella valve use to loosen up secretions / incentive spirometry  - repeat ABG today    # Pulmonary fibrosis:   - Appreciate Pulm recs  - As per pt's pulmonologist group (Dr. Dia/Dr. Pop): Hx of "insignificant pulmonary fibrosis," Seen for chronic cough, PFTs (2017) was wnl aside from diffusion capacity of 74%, and CT Chest (2013) showed no evidence of pulm fibrosis or other significant pulm pathology  - c/w solumedrol 20mg IV Q8    #Chronic cough  - c/w Tessalon Perles 200mg PO TID  - c/w Robitussin-DM 10mL PO q6h    CARDIOVASCULAR:   # HTN  - No evidence of fluid overload   - Continue to monitor  - c/w Diltiazem 240mg PO daily  - c/w Losartan 100mg PO daily for better BP control (on olmesartan 40mg daily at home)   - Cardiology following (Dr. Lucero), appreciate recs    GI:   - C/w regular diet (1L fluid restriction) for now  - GI following (Dr. Kraft), appreciate recs  # Constipation  - c/w senna 2 tabs qhs and miralax daily    RENAL/:   # Urinary retention:   Likely related to medication side effect s/p Scruggs placement.   - c/w tamsulosin, off home doxazosin  - Scruggs placed  - Lasix 40mg x 1   - Monitor urine output  - Trend BMP    ENDOCRINE:   - Insulin sliding scale for steroid-induced hyperglycemia    HEMATOLOGIC:   # Leukocytosis, improving   Likely secondary to steroids but cannot r/o worsening infection  - c/w broad-spectrum antibiotics  - Continue to trend CBC with differential    ID:   # Influenza with suspected superimposed bacterial PNA:   - c/w vancomycin 1g q12 and cefepime 2g q8  - C/w Tamiflu for total 10 days as above  - Trend WBC count, fever curve  - Reculture today (last fever yesterday afternoon); consider repeat RVP    PROPHYLAXIS:   - DVT ppx: Lovenox  - Out of bed to chair as tolerated    CODE STATUS:  - Wife is HCP and agrees to bring in form from home. Agreeable to trial of intubation if needed.    Rene Kim, PGY-1  MICU  Contact/Pager: [phone] / [phone] 69M with PMH of HTN, HLD, depression, anxiety, and insignificant degree of pulmonary fibrosis, who initially presented 2/20/20 following a fall at home, admitted with acute hypoxic respiratory failure in setting of influenza with concern for superimposed PNA. Course c/b worsening respiratory failure with high oxygen requirements, transferred to MICU for close monitoring.     NEURO:   - Currently AAOx3, awake, alert, answers questions appropriately  - C/w home clonazepam standing 1mg PO daily and 1mg PO TID PRN for anxiety, hold for sedation  - C/w home buspirone 15mg PO TID  - C/w home paroxetine 30mg PO daily    PULM:   # Acute hypoxic respiratory failure   Multifactorial related to influenza infection with high suspicion for superimposed bacterial PNA in setting of underlying lung disease. May also have component of aspiration pneumonitis.   - C/w HFNC, can wean down on FiO2 as tolerated as long as saturation maintained >90%  - C/w vanc/cefepime (2/25-3/2)  - s/p Tamiflu for total 10 days (2/20-2/29)  - C/w duonebs q6h PRN  - Encourage use of acapella valve use to loosen up secretions / incentive spirometry  - repeat ABG today    # Pulmonary fibrosis:   - Appreciate Pulm recs  - As per pt's pulmonologist group (Dr. Dia/Dr. Pop): Hx of "insignificant pulmonary fibrosis," Seen for chronic cough, PFTs (2017) was wnl aside from diffusion capacity of 74%, and CT Chest (2013) showed no evidence of pulm fibrosis or other significant pulm pathology  - c/w solumedrol 20mg IV Q8    #Chronic cough  - c/w Tessalon Perles 200mg PO TID  - c/w Robitussin-DM 10mL PO q6h    CARDIOVASCULAR:   # HTN  - No evidence of fluid overload   - Continue to monitor  - c/w Diltiazem 240mg PO daily  - c/w Losartan 100mg PO daily for better BP control (on olmesartan 40mg daily at home)   - Cardiology following (Dr. Lucero), appreciate recs    GI:   - C/w regular diet (1L fluid restriction) for now  - GI following (Dr. Kraft), appreciate recs  # Constipation  - c/w senna 2 tabs qhs and miralax daily    RENAL/:   # Urinary retention:   Likely related to medication side effect s/p Scruggs placement.   - c/w tamsulosin, off home doxazosin  - Scruggs placed  - s/p  Lasix 40mg x 1 with significant UOP, continue to monitor UOP  - Trend BMP    ENDOCRINE:   - Insulin sliding scale for steroid-induced hyperglycemia    HEMATOLOGIC:   # Leukocytosis, improving   Likely secondary to steroids but cannot r/o worsening infection  - C/w abx as above  - Continue to trend CBC with differential    ID:   # Influenza with suspected superimposed bacterial PNA:   - Persistent leukocytosis, though pt also on steroids  - c/w vanc/cefepime (2/25-3/2)  - s/p Tamiflu for total 10 days (2/20-2/29)  - Monitor WBC count, fever curve  - Fever of 100.8F on 2/29, f/u BCx (3/1); will consider repeat RVP    PROPHYLAXIS:   - DVT ppx: Lovenox  - Out of bed to chair as tolerated    CODE STATUS:  - Wife is HCP and agrees to bring in form from home. Agreeable to trial of intubation if needed.    Rene Kim, PGY-1  MICU  Contact/Pager: [phone] / [phone] 69M with PMH of HTN, HLD, depression, anxiety, and insignificant degree of pulmonary fibrosis, who initially presented 2/20/20 following a fall at home, admitted with acute hypoxic respiratory failure in setting of influenza with concern for superimposed PNA. Course c/b worsening respiratory failure with high oxygen requirements, transferred to MICU for close monitoring.     NEURO:   - Currently AAOx3, awake, alert, answers questions appropriately  - C/w home clonazepam standing 1mg PO daily and 1mg PO TID PRN for anxiety, hold for sedation  - C/w home buspirone 15mg PO TID  - C/w home paroxetine 30mg PO daily    PULM:   # Acute hypoxic respiratory failure   Multifactorial related to influenza infection with high suspicion for superimposed bacterial PNA in setting of underlying lung disease. May also have component of aspiration pneumonitis.   - C/w HFNC, can wean down on FiO2 as tolerated as long as saturation maintained >90%  - C/w vanc/cefepime (2/25-3/2)  - s/p Tamiflu for total 10 days (2/20-2/29)  - C/w duonebs q6h PRN  - Encourage use of acapella valve and incentive spirometry  - repeat ABG today    # Pulmonary fibrosis:   - Appreciate Pulm recs  - As per pt's pulmonologist group (Dr. Dia/Dr. Pop): Hx of "insignificant pulmonary fibrosis," Seen for chronic cough, PFTs (2017) was wnl aside from diffusion capacity of 74%, and CT Chest (2013) showed no evidence of pulm fibrosis or other significant pulm pathology  - c/w solumedrol 20mg IV Q8    #Chronic cough  - c/w Tessalon Perles 200mg PO TID  - c/w Robitussin-DM 10mL PO q6h    CARDIOVASCULAR:   # HTN  - No evidence of fluid overload   - Continue to monitor  - c/w Diltiazem 240mg PO daily  - c/w Losartan 100mg PO daily for better BP control (on olmesartan 40mg daily at home)   - Cardiology following (Dr. Lucero), appreciate recs    GI:   - C/w regular diet (1L fluid restriction) for now  - GI following (Dr. Kraft), appreciate recs  # Constipation, resolved  - c/w senna 2 tabs qhs and miralax daily    RENAL/:   # Urinary retention:   Likely related to medication side effect s/p Scruggs placement.   - c/w tamsulosin, off home doxazosin  - Scruggs placed  - s/p Lasix 40mg x 1 with significant UOP, continue to monitor UOP  - Trend BMP    ENDOCRINE:   - Insulin sliding scale for steroid-induced hyperglycemia    HEMATOLOGIC:   # Leukocytosis, improving   Likely secondary to steroids but cannot r/o worsening infection  - C/w abx as above  - Continue to trend CBC with differential    ID:   # Influenza with suspected superimposed bacterial PNA:   - Persistent leukocytosis, though pt also on steroids  - c/w vanc/cefepime (2/25-3/2)  - s/p Tamiflu for total 10 days (2/20-2/29)  - Monitor WBC count, fever curve  - Fever of 100.8F on 2/29, f/u BCx (3/1); will consider repeat RVP    PROPHYLAXIS:   - DVT ppx: Lovenox  - Out of bed to chair as tolerated    CODE STATUS:  - Wife is HCP and agrees to bring in form from home. Agreeable to trial of intubation if needed.    Rene Kim, PGY-1  MICU  Contact/Pager: [phone] / [phone] 69M with PMH of HTN, HLD, depression, anxiety, and insignificant degree of pulmonary fibrosis, who initially presented 2/20/20 following a fall at home, admitted with acute hypoxic respiratory failure in setting of influenza with concern for superimposed PNA. Course c/b worsening respiratory failure with high oxygen requirements, transferred to MICU for close monitoring.     NEURO:   - Currently AAOx3, awake, alert, answers questions appropriately  - C/w home clonazepam standing 1mg PO daily and 1mg PO TID PRN for anxiety, hold for sedation  - C/w home buspirone 15mg PO TID  - C/w home paroxetine 30mg PO daily    PULM:   # Acute hypoxic respiratory failure   Multifactorial related to influenza infection with high suspicion for superimposed bacterial PNA in setting of underlying lung disease. May also have component of aspiration pneumonitis.   - C/w HFNC, can wean down on FiO2 as tolerated as long as saturation maintained >90%  - C/w vanc/cefepime (2/25-3/2)  - s/p Tamiflu for total 10 days (2/20-2/29)  - C/w duonebs q6h PRN  - Encourage use of acapella valve and incentive spirometry  - repeat ABG today    # Pulmonary fibrosis:   - Appreciate Pulm recs  - As per pt's pulmonologist group (Dr. Dia/Dr. Pop): Hx of "insignificant pulmonary fibrosis," Seen for chronic cough, PFTs (2017) was wnl aside from diffusion capacity of 74%, and CT Chest (2013) showed no evidence of pulm fibrosis or other significant pulm pathology  - c/w solumedrol 20mg IV Q8    #Chronic cough  - c/w Tessalon Perles 200mg PO TID  - c/w Robitussin-DM 10mL PO q6h    CARDIOVASCULAR:   # HTN  - No evidence of fluid overload   - Continue to monitor  - c/w Diltiazem 240mg PO daily  - c/w Losartan 100mg PO daily for better BP control (on olmesartan 40mg daily at home)   - TTE (3/1): EF 65%; Severe pulmonary hypertension. Severely increased pulmonary vascular resistance; Normal left ventricular systolic function. No segmental wall motion abnormalities. Impaired LV-relaxation with normal filling pressure  - Cardiology following (Dr. Lucero), appreciate recs    GI:   - C/w regular diet (1L fluid restriction) for now  - GI following (Dr. Kraft), appreciate recs  # Constipation, resolved  - c/w senna 2 tabs qhs and miralax daily    RENAL/:   # Urinary retention:   Likely related to medication side effect s/p Scruggs placement.   - c/w tamsulosin, off home doxazosin  - Scruggs placed  - s/p Lasix 40mg x 1 with significant UOP, continue to monitor UOP  - Trend BMP    ENDOCRINE:   - Insulin sliding scale for steroid-induced hyperglycemia    HEMATOLOGIC:   # Leukocytosis, improving   Likely secondary to steroids but cannot r/o worsening infection  - C/w abx as above  - Continue to trend CBC with differential    ID:   # Influenza with suspected superimposed bacterial PNA:   - Persistent leukocytosis, though pt also on steroids  - c/w vanc/cefepime (2/25-3/2)  - s/p Tamiflu for total 10 days (2/20-2/29)  - Monitor WBC count, fever curve  - Fever of 100.8F on 2/29, f/u BCx (3/1); will consider repeat RVP    PROPHYLAXIS:   - DVT ppx: Lovenox  - Out of bed to chair as tolerated    CODE STATUS:  - Wife is HCP and agrees to bring in form from home. Agreeable to trial of intubation if needed.    Rene Kim, PGY-1  MICU  Contact/Pager: [phone] / [phone]

## 2020-03-01 NOTE — PROGRESS NOTE ADULT - SUBJECTIVE AND OBJECTIVE BOX
INTERVAL HPI/OVERNIGHT EVENTS:    patient seen and examined at bedside   events noted; steroids restarted   on HFNC   no bms today, +flatus   tolerating PO but still with poor appetite   denies n/v  abd pain     MEDICATIONS  (STANDING):  atorvastatin 10 milliGRAM(s) Oral at bedtime  benzonatate 200 milliGRAM(s) Oral three times a day  busPIRone 15 milliGRAM(s) Oral three times a day  cefepime   IVPB      cefepime   IVPB 2000 milliGRAM(s) IV Intermittent every 8 hours  chlorhexidine 4% Liquid 1 Application(s) Topical <User Schedule>  clonazePAM  Tablet 1 milliGRAM(s) Oral daily  dextrose 5%. 1000 milliLiter(s) (50 mL/Hr) IV Continuous <Continuous>  dextrose 50% Injectable 12.5 Gram(s) IV Push once  dextrose 50% Injectable 25 Gram(s) IV Push once  dextrose 50% Injectable 25 Gram(s) IV Push once  diltiazem    milliGRAM(s) Oral daily  enoxaparin Injectable 40 milliGRAM(s) SubCutaneous daily  guaifenesin/dextromethorphan  Syrup 10 milliLiter(s) Oral every 6 hours  insulin lispro (HumaLOG) corrective regimen sliding scale   SubCutaneous three times a day before meals  insulin lispro (HumaLOG) corrective regimen sliding scale   SubCutaneous at bedtime  losartan 100 milliGRAM(s) Oral daily  oseltamivir 75 milliGRAM(s) Oral two times a day  PARoxetine 30 milliGRAM(s) Oral daily  polyethylene glycol 3350 17 Gram(s) Oral daily  senna 2 Tablet(s) Oral at bedtime  tamsulosin 0.4 milliGRAM(s) Oral at bedtime  vancomycin  IVPB 1250 milliGRAM(s) IV Intermittent every 12 hours    MEDICATIONS  (PRN):  acetaminophen   Tablet .. 650 milliGRAM(s) Oral every 6 hours PRN Temp greater or equal to 38C (100.4F), Mild Pain (1 - 3), Moderate Pain (4 - 6)  albuterol/ipratropium for Nebulization 3 milliLiter(s) Nebulizer every 6 hours PRN Shortness of Breath and/or Wheezing  clonazePAM  Tablet 1 milliGRAM(s) Oral three times a day PRN anxiety  dextrose 40% Gel 15 Gram(s) Oral once PRN Blood Glucose LESS THAN 70 milliGRAM(s)/deciliter  glucagon  Injectable 1 milliGRAM(s) IntraMuscular once PRN Glucose LESS THAN 70 milligrams/deciliter      Allergies    penicillin (Anaphylaxis)    Intolerances        Review of Systems:    General:  No wt loss, fevers, chills, night sweats,fatigue,   Eyes:  Good vision, no reported pain  ENT:  No sore throat, pain, runny nose, dysphagia  CV:  No pain, palpitatioins, hypo/hypertension  Resp:  No dyspnea, cough, tachypnea, wheezing  GI:  No pain, No nausea, No vomiting, No diarrhea, No constipatiion, No weight loss, No fever, No pruritis, No rectal bleeding, No tarry stools, No dysphagia,  :  No pain, bleeding, incontinence, nocturia  Muscle:  No pain, weakness  Neuro:  No weakness, tingling, memory problems  Psych:  No fatigue, insomnia, mood problems, depression  Endocrine:  No polyuria, polydypsia, cold/heat intolerance  Heme:  No petechiae, ecchymosis, easy bruisability  Skin:  No rash, tattoos, scars, edema      Vital Signs Last 24 Hrs  T(C): 36.9 (28 Feb 2020 08:00), Max: 37.4 (27 Feb 2020 12:00)  T(F): 98.5 (28 Feb 2020 08:00), Max: 99.4 (27 Feb 2020 12:00)  HR: 77 (28 Feb 2020 10:00) (68 - 101)  BP: 154/77 (28 Feb 2020 10:00) (124/62 - 169/74)  BP(mean): 108 (28 Feb 2020 10:00) (86 - 114)  RR: 25 (28 Feb 2020 10:00) (19 - 29)  SpO2: 91% (28 Feb 2020 10:00) (87% - 100%)    PHYSICAL EXAM:    Constitutional: NAD  HEENT: on HFNC  Cardiovascular: S1 and S2, RRR, no M  Gastrointestinal: BS+, obese, soft, NT/ND  Extremities: No peripheral edema  Neurological: A/O x 3, no focal deficits        LABS:                        12.1   22.40 )-----------( 152      ( 28 Feb 2020 01:11 )             36.2     02-28    132<L>  |  96  |  30<H>  ----------------------------<  122<H>  4.7   |  24  |  0.78    Ca    8.2<L>      28 Feb 2020 01:11  Phos  3.1     02-28  Mg     2.4     02-28    TPro  5.9<L>  /  Alb  2.8<L>  /  TBili  0.5  /  DBili  x   /  AST  31  /  ALT  39  /  AlkPhos  79  02-28          RADIOLOGY & ADDITIONAL TESTS:

## 2020-03-01 NOTE — PROGRESS NOTE ADULT - ASSESSMENT
69M with        PMH of HTN,  HLD, , pulmonary fibrosis,  not on home  O 2.   depression/anxiety , on  Klonopin          p/w SOB and cough/  acute resp failure with O2  sat of  80 on arrival to  er  O2  sat is  92  to 99 %,  earlier  and  now  lower  again    Iinfluenza A positive, with  bronchospasm   on Tamiflu. / nebs  and   solumedrol    mild anemia/  GI eval  Hyponatremia ,   stop  hctz  ct chest,    b/l patchy opacities/   traction bronchiectasis/   pulm fibrosis   CAP/   pulm    dr gusman,  for  hypoxia  on  buspar/ paxil  and klonopin/  dvt ppx   hyponatremia,   stable   resp failure from pulm  fibrosis/  wbc of 31,000 / on iv streroid/ /  cefepime   care  per  icu     < from: CT Angio Chest w/ IV Cont (02.20.20 @ 00:14) >  IMPRESSION:   No obvious pulmonary embolus.  Findings reflecting known pulmonary fibrosis. Recommend clinical correlation to assess underlying pneumonia. Recommend comparison to previous outside study and follow-up to exclude potential underlying neoplasm.  Heterogeneous thyroid gland, which is difficult to assess due to artifact and can be further characterizedon a nonemergent ultrasound as clinically indicated.   Additional findings as described  < end of copied text >

## 2020-03-01 NOTE — PROGRESS NOTE ADULT - SUBJECTIVE AND OBJECTIVE BOX
on hi flow/    REVIEW OF SYSTEMS:  GEN: no fever,    no chills  RESP: no SOB,   no cough  CVS: no chest pain,   no palpitations  GI: no abdominal pain,   no nausea,   no vomiting,   no constipation,   no diarrhea  : no dysuria,   no frequency  NEURO: no headache,   no dizziness  PSYCH: no depression,   not anxious  Derm : no rash    MEDICATIONS  (STANDING):  atorvastatin 10 milliGRAM(s) Oral at bedtime  benzonatate 200 milliGRAM(s) Oral three times a day  busPIRone 15 milliGRAM(s) Oral three times a day  cefepime   IVPB      cefepime   IVPB 2000 milliGRAM(s) IV Intermittent every 8 hours  chlorhexidine 4% Liquid 1 Application(s) Topical <User Schedule>  clonazePAM  Tablet 1 milliGRAM(s) Oral daily  dextrose 5%. 1000 milliLiter(s) (50 mL/Hr) IV Continuous <Continuous>  dextrose 50% Injectable 12.5 Gram(s) IV Push once  dextrose 50% Injectable 25 Gram(s) IV Push once  dextrose 50% Injectable 25 Gram(s) IV Push once  diltiazem    milliGRAM(s) Oral daily  enoxaparin Injectable 40 milliGRAM(s) SubCutaneous daily  guaifenesin/dextromethorphan  Syrup 10 milliLiter(s) Oral every 6 hours  insulin lispro (HumaLOG) corrective regimen sliding scale   SubCutaneous three times a day before meals  insulin lispro (HumaLOG) corrective regimen sliding scale   SubCutaneous at bedtime  losartan 100 milliGRAM(s) Oral daily  methylPREDNISolone sodium succinate Injectable 20 milliGRAM(s) IV Push every 8 hours  PARoxetine 30 milliGRAM(s) Oral daily  psyllium Powder 1 Packet(s) Oral daily  senna 2 Tablet(s) Oral at bedtime  tamsulosin 0.4 milliGRAM(s) Oral at bedtime  vancomycin  IVPB 1500 milliGRAM(s) IV Intermittent every 12 hours    MEDICATIONS  (PRN):  acetaminophen   Tablet .. 650 milliGRAM(s) Oral every 6 hours PRN Temp greater or equal to 38C (100.4F), Mild Pain (1 - 3), Moderate Pain (4 - 6)  albuterol/ipratropium for Nebulization 3 milliLiter(s) Nebulizer every 6 hours PRN Shortness of Breath and/or Wheezing  clonazePAM  Tablet 1 milliGRAM(s) Oral three times a day PRN anxiety  dextrose 40% Gel 15 Gram(s) Oral once PRN Blood Glucose LESS THAN 70 milliGRAM(s)/deciliter  glucagon  Injectable 1 milliGRAM(s) IntraMuscular once PRN Glucose LESS THAN 70 milligrams/deciliter  polyethylene glycol 3350 17 Gram(s) Oral daily PRN Constipation      Vital Signs Last 24 Hrs  T(C): 36.8 (01 Mar 2020 08:00), Max: 38.2 (29 Feb 2020 17:00)  T(F): 98.2 (01 Mar 2020 08:00), Max: 100.8 (29 Feb 2020 17:00)  HR: 80 (01 Mar 2020 08:00) (62 - 93)  BP: 145/68 (01 Mar 2020 08:00) (127/61 - 151/67)  BP(mean): 98 (01 Mar 2020 08:00) (82 - 104)  RR: 28 (01 Mar 2020 08:00) (18 - 32)  SpO2: 82% (01 Mar 2020 08:00) (82% - 98%)  CAPILLARY BLOOD GLUCOSE      POCT Blood Glucose.: 123 mg/dL (29 Feb 2020 22:52)  POCT Blood Glucose.: 193 mg/dL (29 Feb 2020 17:55)  POCT Blood Glucose.: 172 mg/dL (29 Feb 2020 14:22)    I&O's Summary    29 Feb 2020 07:01  -  01 Mar 2020 07:00  --------------------------------------------------------  IN: 390 mL / OUT: 2945 mL / NET: -2555 mL    01 Mar 2020 07:01  -  01 Mar 2020 08:11  --------------------------------------------------------  IN: 0 mL / OUT: 300 mL / NET: -300 mL        PHYSICAL EXAM:  HEAD:  Atraumatic, Normocephalic  NECK: Supple, No   JVD  CHEST/LUNG:   no     rales,     no,    rhonchi  HEART: Regular rate and rhythm;         murmur  ABDOMEN: Soft, Nontender, ;   EXTREMITIES:    no    edema  NEUROLOGY:  alert    LABS:                        11.5   31.73 )-----------( 182      ( 01 Mar 2020 01:33 )             35.7     03-01    133<L>  |  95<L>  |  25<H>  ----------------------------<  165<H>  4.6   |  27  |  0.84    Ca    8.7      01 Mar 2020 01:33  Phos  3.7     03-01  Mg     2.0     03-01    TPro  5.6<L>  /  Alb  2.4<L>  /  TBili  0.5  /  DBili  x   /  AST  36  /  ALT  39  /  AlkPhos  79  02-29 02-29 @ 09:27  4.2  52    Hemoglobin A1C, Whole Blood: 6.6 % (02-24 @ 08:40)    Thyroid Stimulating Hormone, Serum: 0.39 uIU/mL (02-21 @ 09:19)          Consultant(s) Notes Reviewed:      Care Discussed with Consultants/Other Providers:

## 2020-03-02 ENCOUNTER — RESULT REVIEW (OUTPATIENT)
Age: 70
End: 2020-03-02

## 2020-03-02 LAB
ALBUMIN SERPL ELPH-MCNC: 2.5 G/DL — LOW (ref 3.3–5)
ALP SERPL-CCNC: 91 U/L — SIGNIFICANT CHANGE UP (ref 40–120)
ALT FLD-CCNC: 99 U/L — HIGH (ref 10–45)
ANION GAP SERPL CALC-SCNC: 10 MMOL/L — SIGNIFICANT CHANGE UP (ref 5–17)
AST SERPL-CCNC: 71 U/L — HIGH (ref 10–40)
B PERT IGG+IGM PNL SER: ABNORMAL
B PERT IGG+IGM PNL SER: ABNORMAL
BILIRUB SERPL-MCNC: 0.3 MG/DL — SIGNIFICANT CHANGE UP (ref 0.2–1.2)
BUN SERPL-MCNC: 24 MG/DL — HIGH (ref 7–23)
CALCIUM SERPL-MCNC: 8.7 MG/DL — SIGNIFICANT CHANGE UP (ref 8.4–10.5)
CHLORIDE SERPL-SCNC: 93 MMOL/L — LOW (ref 96–108)
CO2 SERPL-SCNC: 28 MMOL/L — SIGNIFICANT CHANGE UP (ref 22–31)
COLOR FLD: SIGNIFICANT CHANGE UP
COLOR FLD: SIGNIFICANT CHANGE UP
CREAT SERPL-MCNC: 0.79 MG/DL — SIGNIFICANT CHANGE UP (ref 0.5–1.3)
FLUID INTAKE SUBSTANCE CLASS: SIGNIFICANT CHANGE UP
FLUID INTAKE SUBSTANCE CLASS: SIGNIFICANT CHANGE UP
FLUID SEGMENTED GRANULOCYTES: 51 % — SIGNIFICANT CHANGE UP
FLUID SEGMENTED GRANULOCYTES: 66 % — SIGNIFICANT CHANGE UP
GAS PNL BLDA: SIGNIFICANT CHANGE UP
GAS PNL BLDV: SIGNIFICANT CHANGE UP
GLUCOSE BLDC GLUCOMTR-MCNC: 256 MG/DL — HIGH (ref 70–99)
GLUCOSE SERPL-MCNC: 235 MG/DL — HIGH (ref 70–99)
HCT VFR BLD CALC: 34.3 % — LOW (ref 39–50)
HGB BLD-MCNC: 11.5 G/DL — LOW (ref 13–17)
LYMPHOCYTES # FLD: 10 % — SIGNIFICANT CHANGE UP
LYMPHOCYTES # FLD: 13 % — SIGNIFICANT CHANGE UP
MAGNESIUM SERPL-MCNC: 2.1 MG/DL — SIGNIFICANT CHANGE UP (ref 1.6–2.6)
MCHC RBC-ENTMCNC: 30.7 PG — SIGNIFICANT CHANGE UP (ref 27–34)
MCHC RBC-ENTMCNC: 33.5 GM/DL — SIGNIFICANT CHANGE UP (ref 32–36)
MCV RBC AUTO: 91.5 FL — SIGNIFICANT CHANGE UP (ref 80–100)
MESOTHL CELL # FLD: 4 % — SIGNIFICANT CHANGE UP
MESOTHL CELL # FLD: 7 % — SIGNIFICANT CHANGE UP
MONOS+MACROS # FLD: 20 % — SIGNIFICANT CHANGE UP
MONOS+MACROS # FLD: 29 % — SIGNIFICANT CHANGE UP
NRBC # BLD: 0 /100 WBCS — SIGNIFICANT CHANGE UP (ref 0–0)
PHOSPHATE SERPL-MCNC: 3.6 MG/DL — SIGNIFICANT CHANGE UP (ref 2.5–4.5)
PLATELET # BLD AUTO: 232 K/UL — SIGNIFICANT CHANGE UP (ref 150–400)
POTASSIUM SERPL-MCNC: 4.9 MMOL/L — SIGNIFICANT CHANGE UP (ref 3.5–5.3)
POTASSIUM SERPL-SCNC: 4.9 MMOL/L — SIGNIFICANT CHANGE UP (ref 3.5–5.3)
PROCALCITONIN SERPL-MCNC: 0.22 NG/ML — HIGH (ref 0.02–0.1)
PROT SERPL-MCNC: 6.1 G/DL — SIGNIFICANT CHANGE UP (ref 6–8.3)
RBC # BLD: 3.75 M/UL — LOW (ref 4.2–5.8)
RBC # FLD: 13.6 % — SIGNIFICANT CHANGE UP (ref 10.3–14.5)
RCV VOL RI: 1240 /UL — HIGH (ref 0–0)
RCV VOL RI: 1330 /UL — HIGH (ref 0–0)
SODIUM SERPL-SCNC: 131 MMOL/L — LOW (ref 135–145)
TOTAL NUCLEATED CELL COUNT, BODY FLUID: 163 /UL — SIGNIFICANT CHANGE UP
TOTAL NUCLEATED CELL COUNT, BODY FLUID: 210 /UL — SIGNIFICANT CHANGE UP
TUBE TYPE: SIGNIFICANT CHANGE UP
TUBE TYPE: SIGNIFICANT CHANGE UP
VANCOMYCIN TROUGH SERPL-MCNC: 25.9 UG/ML — CRITICAL HIGH (ref 10–20)
WBC # BLD: 34.86 K/UL — HIGH (ref 3.8–10.5)
WBC # FLD AUTO: 34.86 K/UL — HIGH (ref 3.8–10.5)

## 2020-03-02 PROCEDURE — 88305 TISSUE EXAM BY PATHOLOGIST: CPT | Mod: 26

## 2020-03-02 PROCEDURE — 71045 X-RAY EXAM CHEST 1 VIEW: CPT | Mod: 26,76

## 2020-03-02 PROCEDURE — 31624 DX BRONCHOSCOPE/LAVAGE: CPT | Mod: GC

## 2020-03-02 PROCEDURE — 88112 CYTOPATH CELL ENHANCE TECH: CPT | Mod: 26

## 2020-03-02 PROCEDURE — 99291 CRITICAL CARE FIRST HOUR: CPT | Mod: 25

## 2020-03-02 RX ORDER — TAMSULOSIN HYDROCHLORIDE 0.4 MG/1
0.4 CAPSULE ORAL AT BEDTIME
Refills: 0 | Status: DISCONTINUED | OUTPATIENT
Start: 2020-03-02 | End: 2020-03-02

## 2020-03-02 RX ORDER — PROPOFOL 10 MG/ML
10 INJECTION, EMULSION INTRAVENOUS ONCE
Refills: 0 | Status: DISCONTINUED | OUTPATIENT
Start: 2020-03-02 | End: 2020-03-02

## 2020-03-02 RX ORDER — SENNA PLUS 8.6 MG/1
10 TABLET ORAL AT BEDTIME
Refills: 0 | Status: DISCONTINUED | OUTPATIENT
Start: 2020-03-02 | End: 2020-03-07

## 2020-03-02 RX ORDER — FENTANYL CITRATE 50 UG/ML
100 INJECTION INTRAVENOUS ONCE
Refills: 0 | Status: DISCONTINUED | OUTPATIENT
Start: 2020-03-02 | End: 2020-03-02

## 2020-03-02 RX ORDER — PHENYLEPHRINE HYDROCHLORIDE 10 MG/ML
0.1 INJECTION INTRAVENOUS
Qty: 40 | Refills: 0 | Status: DISCONTINUED | OUTPATIENT
Start: 2020-03-02 | End: 2020-03-04

## 2020-03-02 RX ORDER — CHLORHEXIDINE GLUCONATE 213 G/1000ML
15 SOLUTION TOPICAL EVERY 12 HOURS
Refills: 0 | Status: DISCONTINUED | OUTPATIENT
Start: 2020-03-02 | End: 2020-03-05

## 2020-03-02 RX ORDER — MIDAZOLAM HYDROCHLORIDE 1 MG/ML
3 INJECTION, SOLUTION INTRAMUSCULAR; INTRAVENOUS ONCE
Refills: 0 | Status: DISCONTINUED | OUTPATIENT
Start: 2020-03-02 | End: 2020-03-02

## 2020-03-02 RX ORDER — MIDAZOLAM HYDROCHLORIDE 1 MG/ML
6 INJECTION, SOLUTION INTRAMUSCULAR; INTRAVENOUS ONCE
Refills: 0 | Status: DISCONTINUED | OUTPATIENT
Start: 2020-03-02 | End: 2020-03-02

## 2020-03-02 RX ORDER — FENTANYL CITRATE 50 UG/ML
0.5 INJECTION INTRAVENOUS
Qty: 2500 | Refills: 0 | Status: DISCONTINUED | OUTPATIENT
Start: 2020-03-02 | End: 2020-03-02

## 2020-03-02 RX ORDER — FENTANYL CITRATE 50 UG/ML
2 INJECTION INTRAVENOUS
Qty: 5000 | Refills: 0 | Status: DISCONTINUED | OUTPATIENT
Start: 2020-03-02 | End: 2020-03-04

## 2020-03-02 RX ORDER — INSULIN LISPRO 100/ML
VIAL (ML) SUBCUTANEOUS EVERY 6 HOURS
Refills: 0 | Status: DISCONTINUED | OUTPATIENT
Start: 2020-03-02 | End: 2020-03-07

## 2020-03-02 RX ORDER — CLONAZEPAM 1 MG
1 TABLET ORAL DAILY
Refills: 0 | Status: DISCONTINUED | OUTPATIENT
Start: 2020-03-02 | End: 2020-03-07

## 2020-03-02 RX ORDER — PROPOFOL 10 MG/ML
100 INJECTION, EMULSION INTRAVENOUS ONCE
Refills: 0 | Status: COMPLETED | OUTPATIENT
Start: 2020-03-02 | End: 2020-03-02

## 2020-03-02 RX ORDER — DILTIAZEM HCL 120 MG
60 CAPSULE, EXT RELEASE 24 HR ORAL EVERY 6 HOURS
Refills: 0 | Status: DISCONTINUED | OUTPATIENT
Start: 2020-03-03 | End: 2020-03-02

## 2020-03-02 RX ORDER — PSYLLIUM SEED (WITH DEXTROSE)
1 POWDER (GRAM) ORAL DAILY
Refills: 0 | Status: DISCONTINUED | OUTPATIENT
Start: 2020-03-02 | End: 2020-03-07

## 2020-03-02 RX ORDER — ATORVASTATIN CALCIUM 80 MG/1
10 TABLET, FILM COATED ORAL AT BEDTIME
Refills: 0 | Status: DISCONTINUED | OUTPATIENT
Start: 2020-03-02 | End: 2020-03-07

## 2020-03-02 RX ORDER — POLYETHYLENE GLYCOL 3350 17 G/17G
17 POWDER, FOR SOLUTION ORAL DAILY
Refills: 0 | Status: DISCONTINUED | OUTPATIENT
Start: 2020-03-02 | End: 2020-03-07

## 2020-03-02 RX ORDER — SENNA PLUS 8.6 MG/1
2 TABLET ORAL AT BEDTIME
Refills: 0 | Status: DISCONTINUED | OUTPATIENT
Start: 2020-03-02 | End: 2020-03-02

## 2020-03-02 RX ORDER — DOXAZOSIN MESYLATE 4 MG
2 TABLET ORAL AT BEDTIME
Refills: 0 | Status: DISCONTINUED | OUTPATIENT
Start: 2020-03-02 | End: 2020-03-02

## 2020-03-02 RX ORDER — PROPOFOL 10 MG/ML
15 INJECTION, EMULSION INTRAVENOUS
Qty: 500 | Refills: 0 | Status: DISCONTINUED | OUTPATIENT
Start: 2020-03-02 | End: 2020-03-05

## 2020-03-02 RX ORDER — LOSARTAN POTASSIUM 100 MG/1
100 TABLET, FILM COATED ORAL DAILY
Refills: 0 | Status: DISCONTINUED | OUTPATIENT
Start: 2020-03-02 | End: 2020-03-02

## 2020-03-02 RX ADMIN — Medication 1 PACKET(S): at 11:42

## 2020-03-02 RX ADMIN — FENTANYL CITRATE 11.85 MICROGRAM(S)/KG/HR: 50 INJECTION INTRAVENOUS at 12:02

## 2020-03-02 RX ADMIN — CEFEPIME 100 MILLIGRAM(S): 1 INJECTION, POWDER, FOR SOLUTION INTRAMUSCULAR; INTRAVENOUS at 06:35

## 2020-03-02 RX ADMIN — PROPOFOL 100 MILLIGRAM(S): 10 INJECTION, EMULSION INTRAVENOUS at 07:50

## 2020-03-02 RX ADMIN — ATORVASTATIN CALCIUM 10 MILLIGRAM(S): 80 TABLET, FILM COATED ORAL at 21:36

## 2020-03-02 RX ADMIN — Medication 10 MILLILITER(S): at 06:37

## 2020-03-02 RX ADMIN — CHLORHEXIDINE GLUCONATE 15 MILLILITER(S): 213 SOLUTION TOPICAL at 18:10

## 2020-03-02 RX ADMIN — CEFEPIME 100 MILLIGRAM(S): 1 INJECTION, POWDER, FOR SOLUTION INTRAMUSCULAR; INTRAVENOUS at 21:36

## 2020-03-02 RX ADMIN — FENTANYL CITRATE 100 MICROGRAM(S): 50 INJECTION INTRAVENOUS at 08:10

## 2020-03-02 RX ADMIN — Medication 300 MILLIGRAM(S): at 07:24

## 2020-03-02 RX ADMIN — PHENYLEPHRINE HYDROCHLORIDE 4.44 MICROGRAM(S)/KG/MIN: 10 INJECTION INTRAVENOUS at 18:09

## 2020-03-02 RX ADMIN — Medication 20 MILLIGRAM(S): at 21:37

## 2020-03-02 RX ADMIN — Medication 2: at 17:32

## 2020-03-02 RX ADMIN — Medication 200 MILLIGRAM(S): at 06:37

## 2020-03-02 RX ADMIN — CEFEPIME 100 MILLIGRAM(S): 1 INJECTION, POWDER, FOR SOLUTION INTRAMUSCULAR; INTRAVENOUS at 14:35

## 2020-03-02 RX ADMIN — MIDAZOLAM HYDROCHLORIDE 3 MILLIGRAM(S): 1 INJECTION, SOLUTION INTRAMUSCULAR; INTRAVENOUS at 18:09

## 2020-03-02 RX ADMIN — LOSARTAN POTASSIUM 100 MILLIGRAM(S): 100 TABLET, FILM COATED ORAL at 06:37

## 2020-03-02 RX ADMIN — ENOXAPARIN SODIUM 40 MILLIGRAM(S): 100 INJECTION SUBCUTANEOUS at 11:42

## 2020-03-02 RX ADMIN — CHLORHEXIDINE GLUCONATE 1 APPLICATION(S): 213 SOLUTION TOPICAL at 06:38

## 2020-03-02 RX ADMIN — Medication 650 MILLIGRAM(S): at 07:10

## 2020-03-02 RX ADMIN — Medication 20 MILLIGRAM(S): at 06:37

## 2020-03-02 RX ADMIN — PHENYLEPHRINE HYDROCHLORIDE 4.44 MICROGRAM(S)/KG/MIN: 10 INJECTION INTRAVENOUS at 21:35

## 2020-03-02 RX ADMIN — Medication 20 MILLIGRAM(S): at 14:35

## 2020-03-02 RX ADMIN — Medication 10 MILLILITER(S): at 00:09

## 2020-03-02 RX ADMIN — SENNA PLUS 10 MILLILITER(S): 8.6 TABLET ORAL at 21:37

## 2020-03-02 RX ADMIN — PROPOFOL 10.66 MICROGRAM(S)/KG/MIN: 10 INJECTION, EMULSION INTRAVENOUS at 21:35

## 2020-03-02 RX ADMIN — Medication 15 MILLIGRAM(S): at 21:36

## 2020-03-02 RX ADMIN — MIDAZOLAM HYDROCHLORIDE 6 MILLIGRAM(S): 1 INJECTION, SOLUTION INTRAMUSCULAR; INTRAVENOUS at 07:50

## 2020-03-02 RX ADMIN — FENTANYL CITRATE 11.85 MICROGRAM(S)/KG/HR: 50 INJECTION INTRAVENOUS at 21:35

## 2020-03-02 RX ADMIN — Medication 15 MILLIGRAM(S): at 14:35

## 2020-03-02 RX ADMIN — Medication 6: at 11:45

## 2020-03-02 RX ADMIN — Medication 30 MILLIGRAM(S): at 12:00

## 2020-03-02 RX ADMIN — Medication 15 MILLIGRAM(S): at 06:38

## 2020-03-02 RX ADMIN — Medication 1 MILLIGRAM(S): at 00:10

## 2020-03-02 RX ADMIN — FENTANYL CITRATE 100 MICROGRAM(S): 50 INJECTION INTRAVENOUS at 07:45

## 2020-03-02 RX ADMIN — FENTANYL CITRATE 100 MICROGRAM(S): 50 INJECTION INTRAVENOUS at 08:25

## 2020-03-02 RX ADMIN — PHENYLEPHRINE HYDROCHLORIDE 4.44 MICROGRAM(S)/KG/MIN: 10 INJECTION INTRAVENOUS at 12:02

## 2020-03-02 RX ADMIN — Medication 240 MILLIGRAM(S): at 06:37

## 2020-03-02 RX ADMIN — PROPOFOL 10.66 MICROGRAM(S)/KG/MIN: 10 INJECTION, EMULSION INTRAVENOUS at 12:02

## 2020-03-02 RX ADMIN — Medication 650 MILLIGRAM(S): at 08:10

## 2020-03-02 RX ADMIN — PROPOFOL 10.66 MICROGRAM(S)/KG/MIN: 10 INJECTION, EMULSION INTRAVENOUS at 18:10

## 2020-03-02 NOTE — PROGRESS NOTE ADULT - SUBJECTIVE AND OBJECTIVE BOX
Seen in ICU, intubated    Vital Signs Last 24 Hrs  T(C): 37.3 (03-02-20 @ 16:00), Max: 37.3 (03-02-20 @ 08:00)  T(F): 99.1 (03-02-20 @ 16:00), Max: 99.1 (03-02-20 @ 08:00)  HR: 58 (03-02-20 @ 16:15) (56 - 93)  BP: 112/57 (03-02-20 @ 16:15) (77/38 - 166/84)  BP(mean): 81 (03-02-20 @ 16:15) (53 - 106)  RR: 13 (03-02-20 @ 16:15) (10 - 38)  SpO2: 100% (03-02-20 @ 16:15) (87% - 100%)    PHYSICAL EXAM:    Respiratory: coarse BS b/l  Cardiovascular: S1 S2  Gastrointestinal: soft, ND  Extremities: sm edema                                  11.5   34.86 )-----------( 232      ( 02 Mar 2020 00:19 )             34.3     02 Mar 2020 00:19    131    |  93     |  24     ----------------------------<  235    4.9     |  28     |  0.79     Ca    8.7        02 Mar 2020 00:19  Phos  3.6       02 Mar 2020 00:19  Mg     2.1       02 Mar 2020 00:19    TPro  6.1    /  Alb  2.5    /  TBili  0.3    /  DBili  x      /  AST  71     /  ALT  99     /  AlkPhos  91     02 Mar 2020 00:19    LIVER FUNCTIONS - ( 02 Mar 2020 00:19 )  Alb: 2.5 g/dL / Pro: 6.1 g/dL / ALK PHOS: 91 U/L / ALT: 99 U/L / AST: 71 U/L / GGT: x           albuterol/ipratropium for Nebulization 3 milliLiter(s) Nebulizer every 6 hours PRN  atorvastatin 10 milliGRAM(s) Oral at bedtime  busPIRone 15 milliGRAM(s) Oral three times a day  cefepime   IVPB      cefepime   IVPB 2000 milliGRAM(s) IV Intermittent every 8 hours  chlorhexidine 0.12% Liquid 15 milliLiter(s) Oral Mucosa every 12 hours  chlorhexidine 4% Liquid 1 Application(s) Topical <User Schedule>  clonazePAM  Tablet 1 milliGRAM(s) Oral daily  dextrose 40% Gel 15 Gram(s) Oral once PRN  dextrose 5%. 1000 milliLiter(s) IV Continuous <Continuous>  dextrose 50% Injectable 12.5 Gram(s) IV Push once  dextrose 50% Injectable 25 Gram(s) IV Push once  dextrose 50% Injectable 25 Gram(s) IV Push once  doxazosin 2 milliGRAM(s) Oral at bedtime  enoxaparin Injectable 40 milliGRAM(s) SubCutaneous daily  fentaNYL   Infusion. 2 MICROgram(s)/kG/Hr IV Continuous <Continuous>  glucagon  Injectable 1 milliGRAM(s) IntraMuscular once PRN  insulin lispro (HumaLOG) corrective regimen sliding scale   SubCutaneous every 6 hours  methylPREDNISolone sodium succinate Injectable 20 milliGRAM(s) IV Push every 8 hours  PARoxetine 30 milliGRAM(s) Oral daily  phenylephrine    Infusion 0.1 MICROgram(s)/kG/Min IV Continuous <Continuous>  propofol Infusion 15 MICROgram(s)/kG/Min IV Continuous <Continuous>  psyllium Powder 1 Packet(s) Oral daily  senna 2 Tablet(s) Oral at bedtime  vancomycin  IVPB 1500 milliGRAM(s) IV Intermittent every 12 hours    Assessment and Plan:     S/p flu A, PNA, pulm fibrosis, resp failure, intubated in ICU  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

## 2020-03-02 NOTE — PROGRESS NOTE ADULT - SUBJECTIVE AND OBJECTIVE BOX
[facility] - PROGRESS NOTE    CHIEF COMPLAINT: acute on chronic hypoxic/hypercapnic respiratory failure; influenza; pneumonia; abnormal chest CT    INTERVAL HISTORY: required intubation earlier today for hypoxic respiratory failure; previously had been awake and alert; loza in place for ongoing urinary retention; sedated; hypotensive on pressors; no cough, sputum production, chest congestion or wheeze; no fevers, chills or sweats; no chest pain/pressure or palpitations;    REVIEW OF SYSTEMS:  Constitutional: As per interval history  HEENT: Within normal limits  CV: As per interval history  Resp: As per interval history  GI: Within normal limits   : urinary retention  Musculoskeletal: Within normal limits  Skin: Within normal limits  Neurological: Within normal limits  Psychiatric: anxiety type depression  Endocrine: Within normal limits  Hematologic/Lymphatic: Within normal limits  Allergic/Immunologic: Within normal limits      MEDICATIONS:     Pulmonary "    Anti-microbials:  cefepime   IVPB      cefepime   IVPB 2000 milliGRAM(s) IV Intermittent every 8 hours  vancomycin  IVPB 1500 milliGRAM(s) IV Intermittent every 12 hours    Cardiovascular:  diltiazem    Tablet 60 milliGRAM(s) Oral every 6 hours  doxazosin 2 milliGRAM(s) Oral at bedtime  losartan 100 milliGRAM(s) Oral daily  phenylephrine    Infusion 0.1 MICROgram(s)/kG/Min IV Continuous <Continuous>    Other:  atorvastatin 10 milliGRAM(s) Oral at bedtime  busPIRone 15 milliGRAM(s) Oral three times a day  chlorhexidine 0.12% Liquid 15 milliLiter(s) Oral Mucosa every 12 hours  chlorhexidine 4% Liquid 1 Application(s) Topical <User Schedule>  clonazePAM  Tablet 1 milliGRAM(s) Oral daily  dextrose 5%. 1000 milliLiter(s) IV Continuous <Continuous>  dextrose 50% Injectable 12.5 Gram(s) IV Push once  dextrose 50% Injectable 25 Gram(s) IV Push once  dextrose 50% Injectable 25 Gram(s) IV Push once  enoxaparin Injectable 40 milliGRAM(s) SubCutaneous daily  fentaNYL   Infusion. 2 MICROgram(s)/kG/Hr IV Continuous <Continuous>  insulin lispro (HumaLOG) corrective regimen sliding scale   SubCutaneous every 6 hours  methylPREDNISolone sodium succinate Injectable 20 milliGRAM(s) IV Push every 8 hours  PARoxetine 30 milliGRAM(s) Oral daily  propofol Infusion 15 MICROgram(s)/kG/Min IV Continuous <Continuous>  psyllium Powder 1 Packet(s) Oral daily  senna 2 Tablet(s) Oral at bedtime    MEDICATIONS  (PRN):  albuterol/ipratropium for Nebulization 3 milliLiter(s) Nebulizer every 6 hours PRN Shortness of Breath and/or Wheezing  dextrose 40% Gel 15 Gram(s) Oral once PRN Blood Glucose LESS THAN 70 milliGRAM(s)/deciliter  glucagon  Injectable 1 milliGRAM(s) IntraMuscular once PRN Glucose LESS THAN 70 milligrams/deciliter        OBJECTIVE:  Mode: AC/ CMV (Assist Control/ Continuous Mandatory Ventilation), RR (machine): 18, TV (machine): 450, FiO2: 70, PEEP: 8, ITime: 1, MAP: 13, PIP: 32  I&O's Detail    01 Mar 2020 07:01  -  02 Mar 2020 07:00  --------------------------------------------------------  IN:    Oral Fluid: 840 mL    Solution: 650 mL  Total IN: 1490 mL    OUT:    Indwelling Catheter - Urethral: 4515 mL  Total OUT: 4515 mL    Total NET: -3025 mL      02 Mar 2020 07:01  -  02 Mar 2020 13:23  --------------------------------------------------------  IN:    Enteral Tube Flush: 180 mL    fentaNYL Infusion.: 58 mL    phenylephrine   Infusion: 324.5 mL    propofol Infusion: 167.2 mL    Solution: 500 mL  Total IN: 1229.7 mL    OUT:    Indwelling Catheter - Urethral: 200 mL  Total OUT: 200 mL    Total NET: 1029.7 mL     Daily Weight in k.9 (02 Mar 2020 04:00)    POCT Blood Glucose.: 256 mg/dL (02 Mar 2020 11:44)  POCT Blood Glucose.: 256 mg/dL (01 Mar 2020 21:04)  POCT Blood Glucose.: 237 mg/dL (01 Mar 2020 17:19)      PHYSICAL EXAM:       ICU Vital Signs Last 24 Hrs  T(C): 37.2 (02 Mar 2020 12:00), Max: 37.3 (02 Mar 2020 08:00)  T(F): 99 (02 Mar 2020 12:00), Max: 99.1 (02 Mar 2020 08:00)  HR: 64 (02 Mar 2020 13:00) (56 - 93)  BP: 81/44 (02 Mar 2020 13:00) (77/38 - 166/84)  BP(mean): 60 (02 Mar 2020 13:00) (53 - 113)  ABP: --  ABP(mean): --  RR: 20 (02 Mar 2020 13:) (16 - 38)  SpO2: 97% (02 Mar 2020 13:) (87% - 98%) on 70% FiO2 vent     General: Sedated. Intubated. No distress. Appears stated age. .  HEENT: Atraumatic. Normocephalic. Anicteric. Normal oral mucosa. PERRL. EOMI. OGT  Neck: Supple. Trachea midline. Thyroid without enlargement/tenderness/nodules. No carotid bruit. No JVD.	  Cardiovascular: Regular rate and rhythm. S1 S2 normal. No murmurs, rubs or gallops.  Respiratory: Respirations unlabored. Scattered rales. No curvature.  Abdomen: Soft. Non-tender. Non-distended. No organomegaly. No masses. Normal bowel sounds. Obese.  Extremities: Warm to touch. No clubbing or cyanosis. No pedal edema.  Pulses: 2+ peripheral pulses all extremities.	  Skin: Normal skin color. No rashes or lesions. No ecchymoses. No cyanosis. Warm to touch.  Lymph Nodes: Cervical, supraclavicular and axillary nodes normal  Neurological: Functional quadriplegia. A and O x 0  Psychiatry: Appropriate mood and affect.      LABS:                          11.5   34.86 )-----------( 232      ( 02 Mar 2020 00:19 )             34.3     CBC    WBC  34.86 <==, 31.73 <==, 23.36 <==, 22.40 <==, 20.53 <==, 25.18 <==, 24.08 <==    Hemoglobin  11.5 <<==, 11.5 <<==, 11.6 <<==, 12.1 <<==, 11.5 <<==, 12.0 <<==, 13.1 <<==    Hematocrit  34.3 <==, 35.7 <==, 35.4 <==, 36.2 <==, 34.5 <==, 35.7 <==, 38.4 <==    Platelets  232 <==, 182 <==, 142 <==, 152 <==, 148 <==, 143 <==, 121 <==      131<L>  |  93<L>  |  24<H>  ----------------------------<  235<H>    03-  4.9   |  28  |  0.79      LYTES    sodium  131 <==, 133 <==, 132 <==, 132 <==, 133 <==, 134 <==, 138 <==    potassium   4.9 <==, 4.6 <==, 4.6 <==, 4.7 <==, 5.0 <==, 4.5 <==, 4.5 <==    chloride  93 <==, 95 <==, 95 <==, 96 <==, 95 <==, 94 <==, 97 <==    carbon dioxide  28 <==, 27 <==, 23 <==, 24 <==, 28 <==, 28 <==, 29 <==    =============================================================================================  RENAL FUNCTION:    Creatinine:   0.79  <<==, 0.84  <<==, 0.78  <<==, 0.78  <<==, 0.81  <<==, 0.87  <<==, 0.88  <<==    BUN:   24 <==, 25 <==, 26 <==, 30 <==, 35 <==, 36 <==, 32 <==    ============================================================================================    calcium   8.7 <==, 8.7 <==, 8.4 <==, 8.2 <==, 7.7 <==, 7.9 <==, 8.4 <==    phos   3.6 <==, 3.7 <==, 3.0 <==, 3.1 <==, 3.8 <==, 3.2 <==, 3.2 <==    mag   2.1 <==, 2.0 <==, 2.1 <==, 2.4 <==, 2.7 <==, 2.7 <==, 3.0 <==    ============================================================================================  LFTs    AST:   71 <== , 36 <== , 31 <== , 24 <== , 25 <==     ALT:  99  <== , 39  <== , 39  <== , 37  <== , 33  <==     AP:  91  <=, 79  <=, 79  <=, 76  <=, 76  <=    Bili:  0.3  <=, 0.5  <=, 0.5  <=, 0.5  <=, 0.5  <=    ABG - ( 02 Mar 2020 12:25 )  pH: 7.20  /  pCO2: 84    /  pO2: 71    / HCO3: 31    / Base Excess: 1.2   /  SaO2: 90        ABG - ( 02 Mar 2020 10:09 )  pH: 7.35  /  pCO2: 60    /  pO2: 70    / HCO3: 32    / Base Excess: 5.6   /  SaO2: 91        ABG - ( 02 Mar 2020 07:26 )  pH: 7.47  /  pCO2: 44    /  pO2: 48    / HCO3: 32    / Base Excess: 7.6   /  SaO2: 83        ABG - ( 01 Mar 2020 12:16 )  pH: 7.48  /  pCO2: 41    /  pO2: 69    / HCO3: 30    / Base Excess: 6.2   /  SaO2: 94        ABG - ( 2020 01:07 )  pH: 7.49  /  pCO2: 40    /  pO2: 55    / HCO3: 30    / Base Excess: 6.9   /  SaO2: 86        ABG - ( 2020 00:22 )  pH: 7.51  /  pCO2: 38    /  pO2: 56    / HCO3: 30    / Base Excess: 7.2   /  SaO2: 89     Procalcitonin, Serum: 0.15 ng/mL ( @ 09:25)    Procalcitonin, Serum: 0.69 ng/mL ( @ 07:45)    Serum Pro-Brain Natriuretic Peptide: 364 pg/mL ( @ 22:30)    CARDIAC MARKERS ( 2020 00:57 )  CPK x     /CKMB x     /CKMB Units x        troponin 17 ng/L    CARDIAC MARKERS ( 2020 22:30 )  CPK x     /CKMB x     /CKMB Units x        troponin 19 ng/L      Patient name: DOUGLAS MORROW  YOB: 1950   Age: 69 (M)   MR#: [id number]  Study Date: 3/1/2020  [facility] RDCS  Study quality: Technically fair  Referring Physician: Tari Sommer MD  Blood Pressure: 137/62 mmHg  Height: 183 cm  Weight: 105 kg  BSA: 2.3 m2  ------------------------------------------------------------------------  PROCEDURE: Transthoracic echocardiogram with 2-D, M-Mode  and complete spectral and color flow Doppler.  INDICATION:Dyspnea, unspecified (R06.00)  ------------------------------------------------------------------------  Dimensions:    Normal Values:  LA:     3.8    2.0 - 4.0 cm  Ao:     2.9    2.0 - 3.8 cm  SEPTUM: 1.0    0.6 - 1.2 cm  PWT:    1.1    0.6 - 1.1 cm  LVIDd:  5.3    3.0 - 5.6 cm  LVIDs:  3.4    1.8 - 4.0 cm  Derived variables:  LVMI: 95 g/m2  RWT: 0.41  EF (Visual Estimate): 65 %  Doppler Peak Velocity (m/sec): AoV=1.6 TV=3.9  ------------------------------------------------------------------------  Observations:  Mitral Valve: Normal mitral valve. Minimal mitral  regurgitation.  Aortic Valve/Aorta: Calcified aortic valve with normal  opening.  Normal aortic root size.  Left Atrium: Normal left atrium.  Left Ventricle: Normal left ventricular internal dimensions  and wall thicknesses.  Normal left ventricular systolic function. No segmental  wall motion abnormalities.  Impaired LV-relaxation with normal filling pressure.  Right Heart: Normal right atrium. Suboptimal visualization  of the right heart in all windows.  Normal tricuspid valve.  Mild-moderate tricuspid regurgitation. Normal pulmonic  valve.  Pericardium/Pleura: Normal pericardium with no pericardial  effusion.  Hemodynamic: Severe pulmonary hypertension; severely  increased pulmonary vascular resistance.  Estimated PASP at least 65 mmHg.  ------------------------------------------------------------------------  Conclusions:  Normal left ventricular systolic function. No segmental  wall motion abnormalities.  Impaired LV-relaxation with normal filling pressure.  Suboptimal visualization of the right heart in all windows.    Severe pulmonary hypertension; severely increased pulmonary  vascular resistance.  ------------------------------------------------------------------------  Confirmed on  3/1/2020 - 14:35:54 by Tyrell Brock MD, FASE  ------------------------------------------------------------------------      MICROBIOLOGY:     FLU A B RSV Detection by PCR (20 @ 22:30)    Flu A Result: Detected    Flu B Result: NotDetec    RSV Result: NotDetec    Legionella pneumophila Antigen, Urine (20 @ 09:50)    Legionella Antigen, Urine: Negative    Culture - Blood (20 @ 10:14)    Specimen Source: .Blood Blood-Venous    Culture Results:   No growth to date.    Culture - Blood (20 @ 10:14)    Specimen Source: .Blood Blood-Peripheral    Culture Results:   No growth to date.    RADIOLOGY:  [x] Chest radiographs reviewed and interpreted by me      EXAM:  XR CHEST PORTABLE URGENT 1V                          PROCEDURE DATE:  2020      INTERPRETATION:  CLINICAL INFORMATION: Intubated.    TECHNIQUE: AP radiograph of the chest.    COMPARISON: Chest x-ray 2020.    FINDINGS:    LINES AND TUBES: Endotracheal tube with tip above the kayden. Enteric tube with side-port at the GE junction.    LUNGS: Mild pulmonary edema.    PLEURA: No pleural effusion or pneumothorax.    HEART AND MEDIASTINUM: Heart size is within normal limits.    SKELETON: Unremarkable skeletal structures.      IMPRESSION:     Endotracheal tube with tip above the kayden.    GEN TABOR M.D., RADIOLOGY RESIDENT  This document has been electronically signed.  SADIA RIBEIRO M.D., ATTENDING RADIOLOGIST  This document has been electronically signed. Mar  2 2020 12:09PM  ---------------------------------------------------------------------------------------------------------------    EXAM:  CT CHEST                          PROCEDURE DATE:  2020      INTERPRETATION:  CLINICAL INFORMATION: Dyspnea    COMPARISON: CT chest 2020    PROCEDURE:   CT of the Chest was performed without intravenous contrast.  [place] coronal reformats were performed.      FINDINGS:    LUNGS AND AIRWAYS: The central airways are patent.  Patchy areas of groundglass opacities containing traction bronchiectasis are noted within both lungs. Addition, superimposed patchy groundglass opacities are also noted. They are new when compared to previous exam.    PLEURA: No pleural effusion.    MEDIASTINUM AND CHAVO: There are scattered small hilar and mediastinal lymph nodes.    VESSELS: Within normal limits.    HEART: The heart size is normal and there is no pericardial effusion.     CHEST WALL AND LOWER NECK: Within normal limits.    VISUALIZED UPPER ABDOMEN: Within normal limits.    BONES: There are multilevel degenerative changes of the spine    IMPRESSION:     Patchy groundglass opacities containing traction bronchiectasis bilaterally is suggestive of pulmonary fibrosis of uncertain etiology.    Groundglass opacities are noted within both lungs. Primary consideration is infection.    BRITNEY VERONICA M.D., RADIOLOGY RESIDENT  This document has been electronically signed.  TAMMIE LTOT M.D., ATTENDING RADIOLOGIST  This document has been electronically signed. 2020  5:04PM  ---------------------------------------------------------------------------------------------------------------    EXAM:  CT ANGIO CHEST (W)AW IC                          PROCEDURE DATE:  2020      INTERPRETATION:  CLINICAL INFORMATION: Shortness of breath and fatigue. History of lung disease.    COMPARISON: Chest radiograph 2020    PROCEDURE:   CT Angiography of the Chest.  90 ml of Omnipaque 350 was injected intravenously. 10 ml were discarded.  Sagittal and coronal reformats were performed as well as 3D (MIP) reconstructions.    FINDINGS:    LUNGS AND AIRWAYS: Patent central airways. Areas of architectural distortion, mild traction bronchiectasis and linear/reticular opacities. Nonspecific groundglass/patchy opacities in both lungs.      PLEURA: No pleural effusion or pneumothorax.    MEDIASTINUM AND CHAVO: Subcentimeter mediastinal and hilar lymph nodes without lymphadenopathy.    VESSELS: Adequate contrast opacification of the pulmonary arteries. No obvious pulmonary embolus.    HEART: Normal heart size. No pericardial effusion.    CHEST WALL AND LOWER NECK: Heterogeneous thyroid gland, obscured by artifact.    VISUALIZED UPPER ABDOMEN: Colon diverticulosis.    BONES: Degenerative changes of the spine.    IMPRESSION:     No obvious pulmonary embolus.    Findings reflecting known pulmonary fibrosis. Recommend clinical correlation to assess underlying pneumonia. Recommend comparison to previous outside study and follow-up to exclude potential underlying neoplasm.    Heterogeneous thyroid gland, which is difficult to assess due to artifact and can be further characterized on a nonemergent ultrasound as clinically indicated.     Additional findings as described.    RAMONA REDDY M.D., RADIOLOGY RESIDENT  This document has been electronically signed.  MIAH CHAUDHRY M.D., ATTENDING RADIOLOGIST  This document has been electronically signed. 2020  1:49AM  ---------------------------------------------------------------------------------------------------------------

## 2020-03-02 NOTE — PROGRESS NOTE ADULT - SUBJECTIVE AND OBJECTIVE BOX
INTERVAL HPI/OVERNIGHT EVENTS:    overnight events noted  intubated/sedated 2/2 worsening hypoxemic respiratory failure now requiring ventilatory support     MEDICATIONS  (STANDING):  atorvastatin 10 milliGRAM(s) Oral at bedtime  busPIRone 15 milliGRAM(s) Oral three times a day  cefepime   IVPB      cefepime   IVPB 2000 milliGRAM(s) IV Intermittent every 8 hours  chlorhexidine 0.12% Liquid 15 milliLiter(s) Oral Mucosa every 12 hours  chlorhexidine 4% Liquid 1 Application(s) Topical <User Schedule>  clonazePAM  Tablet 1 milliGRAM(s) Oral daily  dextrose 5%. 1000 milliLiter(s) (50 mL/Hr) IV Continuous <Continuous>  dextrose 50% Injectable 12.5 Gram(s) IV Push once  dextrose 50% Injectable 25 Gram(s) IV Push once  dextrose 50% Injectable 25 Gram(s) IV Push once  diltiazem    Tablet 60 milliGRAM(s) Oral every 6 hours  doxazosin 2 milliGRAM(s) Oral at bedtime  enoxaparin Injectable 40 milliGRAM(s) SubCutaneous daily  fentaNYL   Infusion. 2 MICROgram(s)/kG/Hr (11.85 mL/Hr) IV Continuous <Continuous>  insulin lispro (HumaLOG) corrective regimen sliding scale   SubCutaneous every 6 hours  losartan 100 milliGRAM(s) Oral daily  methylPREDNISolone sodium succinate Injectable 20 milliGRAM(s) IV Push every 8 hours  PARoxetine 30 milliGRAM(s) Oral daily  phenylephrine    Infusion 0.1 MICROgram(s)/kG/Min (4.444 mL/Hr) IV Continuous <Continuous>  propofol Infusion 15 MICROgram(s)/kG/Min (10.665 mL/Hr) IV Continuous <Continuous>  psyllium Powder 1 Packet(s) Oral daily  senna 2 Tablet(s) Oral at bedtime  vancomycin  IVPB 1500 milliGRAM(s) IV Intermittent every 12 hours    MEDICATIONS  (PRN):  albuterol/ipratropium for Nebulization 3 milliLiter(s) Nebulizer every 6 hours PRN Shortness of Breath and/or Wheezing  dextrose 40% Gel 15 Gram(s) Oral once PRN Blood Glucose LESS THAN 70 milliGRAM(s)/deciliter  glucagon  Injectable 1 milliGRAM(s) IntraMuscular once PRN Glucose LESS THAN 70 milligrams/deciliter      Allergies    penicillin (Anaphylaxis)    Intolerances        Review of Systems: unable to obtain         Vital Signs Last 24 Hrs  T(C): 37.2 (02 Mar 2020 12:00), Max: 37.3 (02 Mar 2020 08:00)  T(F): 99 (02 Mar 2020 12:00), Max: 99.1 (02 Mar 2020 08:00)  HR: 72 (02 Mar 2020 12:15) (56 - 93)  BP: 92/47 (02 Mar 2020 12:15) (77/38 - 166/84)  BP(mean): 67 (02 Mar 2020 12:15) (53 - 113)  RR: 18 (02 Mar 2020 12:15) (16 - 38)  SpO2: 93% (02 Mar 2020 12:15) (87% - 98%)    PHYSICAL EXAM:    Constitutional: intubated/sedated   Respiratory: on ventilatory support   Cardiovascular: S1 and S2  Gastrointestinal: BS+, soft, NT/ND  Extremities: No peripheral edema  Neurological: sedated        LABS:                        11.5   34.86 )-----------( 232      ( 02 Mar 2020 00:19 )             34.3     03-02    131<L>  |  93<L>  |  24<H>  ----------------------------<  235<H>  4.9   |  28  |  0.79    Ca    8.7      02 Mar 2020 00:19  Phos  3.6     03-02  Mg     2.1     03-02    TPro  6.1  /  Alb  2.5<L>  /  TBili  0.3  /  DBili  x   /  AST  71<H>  /  ALT  99<H>  /  AlkPhos  91  03-02          RADIOLOGY & ADDITIONAL TESTS:

## 2020-03-02 NOTE — PROGRESS NOTE ADULT - SUBJECTIVE AND OBJECTIVE BOX
CC: f/u for hypoxic respiratory failure    Patient reports: he was intubated earlier, sedated at this point    REVIEW OF SYSTEMS:Limited by vent status  All other review of systems negative (Comprehensive ROS)    Antimicrobials Day #  :day 7/7  cefepime   IVPB      cefepime   IVPB 2000 milliGRAM(s) IV Intermittent every 8 hours  vancomycin  IVPB 1500 milliGRAM(s) IV Intermittent every 12 hours    Other Medications Reviewed  MEDICATIONS  (STANDING):  atorvastatin 10 milliGRAM(s) Oral at bedtime  benzonatate 200 milliGRAM(s) Oral three times a day  busPIRone 15 milliGRAM(s) Oral three times a day  chlorhexidine 0.12% Liquid 15 milliLiter(s) Oral Mucosa every 12 hours  chlorhexidine 4% Liquid 1 Application(s) Topical <User Schedule>  dextrose 5%. 1000 milliLiter(s) (50 mL/Hr) IV Continuous <Continuous>  dextrose 50% Injectable 12.5 Gram(s) IV Push once  dextrose 50% Injectable 25 Gram(s) IV Push once  dextrose 50% Injectable 25 Gram(s) IV Push once  diltiazem    milliGRAM(s) Oral daily  enoxaparin Injectable 40 milliGRAM(s) SubCutaneous daily  fentaNYL   Infusion. 2 MICROgram(s)/kG/Hr (11.85 mL/Hr) IV Continuous <Continuous>  insulin lispro (HumaLOG) corrective regimen sliding scale   SubCutaneous every 6 hours  losartan 100 milliGRAM(s) Oral daily  methylPREDNISolone sodium succinate Injectable 20 milliGRAM(s) IV Push every 8 hours  PARoxetine 30 milliGRAM(s) Oral daily  phenylephrine    Infusion 0.1 MICROgram(s)/kG/Min (4.444 mL/Hr) IV Continuous <Continuous>  propofol Infusion 15 MICROgram(s)/kG/Min (10.665 mL/Hr) IV Continuous <Continuous>  propofol Injectable 100 milliGRAM(s) IV Push once  psyllium Powder 1 Packet(s) Oral daily  senna 2 Tablet(s) Oral at bedtime  tamsulosin 0.4 milliGRAM(s) Oral at bedtime      T(F): 99.1 (03-02-20 @ 08:00), Max: 99.1 (03-02-20 @ 08:00)  HR: 66 (03-02-20 @ 09:00)  BP: 82/44 (03-02-20 @ 09:00)  RR: 17 (03-02-20 @ 09:00)  SpO2: 96% (03-02-20 @ 09:00)  Wt(kg): --    PHYSICAL EXAM:  General: sedated, no acute distress  Eyes:  anicteric, no conjunctival injection, no discharge  Oropharynx: no lesions or injection 	  Neck: supple, without adenopathy  Lungs: distant BS  Heart: regular rate and rhythm; no murmur, rubs or gallops  Abdomen: soft, nondistended, nontender, without mass or organomegaly  Skin: no lesions  Extremities: no clubbing, cyanosis, trace edema  Neurologic: sedated on vent      LAB RESULTS:                        11.5   34.86 )-----------( 232      ( 02 Mar 2020 00:19 )             34.3     03-02    131<L>  |  93<L>  |  24<H>  ----------------------------<  235<H>  4.9   |  28  |  0.79    Ca    8.7      02 Mar 2020 00:19  Phos  3.6     03-02  Mg     2.1     03-02    TPro  6.1  /  Alb  2.5<L>  /  TBili  0.3  /  DBili  x   /  AST  71<H>  /  ALT  99<H>  /  AlkPhos  91  03-02    LIVER FUNCTIONS - ( 02 Mar 2020 00:19 )  Alb: 2.5 g/dL / Pro: 6.1 g/dL / ALK PHOS: 91 U/L / ALT: 99 U/L / AST: 71 U/L / GGT: x             MICROBIOLOGY:  RECENT CULTURES:      RADIOLOGY REVIEWED:

## 2020-03-02 NOTE — PROGRESS NOTE ADULT - SUBJECTIVE AND OBJECTIVE BOX
CHIEF COMPLAINT: SOB, cough and weakness    OVERNIGHT / SUBJECTIVE:  -Overnight, no acute events.  -Today, pt seen and examined at bedside in AM.     REVIEW OF SYSTEMS:    CONSTITUTIONAL: No weakness, fevers or chills  EYES/ENT: No visual changes;  No vertigo or throat pain   NECK: No pain or stiffness  RESPIRATORY: +cough (improved, also has chronic cough); No wheezing, hemoptysis; No shortness of breath  CARDIOVASCULAR: No chest pain or palpitations  GASTROINTESTINAL: No abdominal or epigastric pain. No nausea, vomiting, or hematemesis; No diarrhea or constipation. No melena or hematochezia.  GENITOURINARY: No dysuria, frequency or hematuria  NEUROLOGICAL: No numbness or weakness  SKIN: No itching, burning, rashes, or lesions   All other review of systems is negative unless indicated above.      OBJECTIVE:  ICU Vital Signs Last 24 Hrs  T(C): 37.1 (02 Mar 2020 04:00), Max: 37.2 (01 Mar 2020 20:00)  T(F): 98.8 (02 Mar 2020 04:00), Max: 99 (01 Mar 2020 20:00)  HR: 65 (02 Mar 2020 06:00) (64 - 876)  BP: 143/69 (02 Mar 2020 06:00) (132/67 - 157/86)  BP(mean): 99 (02 Mar 2020 06:00) (92 - 113)  ABP: --  ABP(mean): --  RR: 22 (02 Mar 2020 06:00) (19 - 36)  SpO2: 92% (02 Mar 2020 06:00) (82% - 98%)        03-01 @ 07:01  -  03-02 @ 07:00  --------------------------------------------------------  IN: 1490 mL / OUT: 4515 mL / NET: -3025 mL      CAPILLARY BLOOD GLUCOSE      POCT Blood Glucose.: 256 mg/dL (01 Mar 2020 21:04)  POCT Blood Glucose.: 237 mg/dL (01 Mar 2020 17:19)  POCT Blood Glucose.: 233 mg/dL (01 Mar 2020 12:41)  POCT Blood Glucose.: 197 mg/dL (01 Mar 2020 08:59)    I&O's Summary    01 Mar 2020 07:01  -  02 Mar 2020 07:00  --------------------------------------------------------  IN: 1490 mL / OUT: 4515 mL / NET: -3025 mL        PHYSICAL EXAM  GENERAL: NAD, well-developed  HEAD:  Atraumatic, Normocephalic  EYES: EOMI, PERRLA, conjunctiva and sclera clear  NECK: Supple, No JVD  CHEST/LUNG: Clear to auscultation bilaterally; No wheeze  HEART: Regular rate and rhythm; No murmurs, rubs, or gallops  ABDOMEN: Soft, Nontender, Nondistended; Bowel sounds present  EXTREMITIES:  2+ Peripheral Pulses, No clubbing, cyanosis, or edema  PSYCH: AAOx3  SKIN: No rashes or lesions    LABS:                        11.5   34.86 )-----------( 232      ( 02 Mar 2020 00:19 )             34.3     03-02    131<L>  |  93<L>  |  24<H>  ----------------------------<  235<H>  4.9   |  28  |  0.79    Ca    8.7      02 Mar 2020 00:19  Phos  3.6     03-02  Mg     2.1     03-02    TPro  6.1  /  Alb  2.5<L>  /  TBili  0.3  /  DBili  x   /  AST  71<H>  /  ALT  99<H>  /  AlkPhos  91  03-02        LINES:    HOSPITAL MEDICATIONS:  Standing Meds:  atorvastatin 10 milliGRAM(s) Oral at bedtime  benzonatate 200 milliGRAM(s) Oral three times a day  busPIRone 15 milliGRAM(s) Oral three times a day  cefepime   IVPB      cefepime   IVPB 2000 milliGRAM(s) IV Intermittent every 8 hours  chlorhexidine 4% Liquid 1 Application(s) Topical <User Schedule>  clonazePAM  Tablet 1 milliGRAM(s) Oral daily  dextrose 5%. 1000 milliLiter(s) IV Continuous <Continuous>  dextrose 50% Injectable 12.5 Gram(s) IV Push once  dextrose 50% Injectable 25 Gram(s) IV Push once  dextrose 50% Injectable 25 Gram(s) IV Push once  diltiazem    milliGRAM(s) Oral daily  enoxaparin Injectable 40 milliGRAM(s) SubCutaneous daily  guaifenesin/dextromethorphan  Syrup 10 milliLiter(s) Oral every 6 hours  insulin lispro (HumaLOG) corrective regimen sliding scale   SubCutaneous three times a day before meals  losartan 100 milliGRAM(s) Oral daily  methylPREDNISolone sodium succinate Injectable 20 milliGRAM(s) IV Push every 8 hours  PARoxetine 30 milliGRAM(s) Oral daily  psyllium Powder 1 Packet(s) Oral daily  senna 2 Tablet(s) Oral at bedtime  tamsulosin 0.4 milliGRAM(s) Oral at bedtime  vancomycin  IVPB 1500 milliGRAM(s) IV Intermittent every 12 hours      PRN Meds:  acetaminophen   Tablet .. 650 milliGRAM(s) Oral every 6 hours PRN  albuterol/ipratropium for Nebulization 3 milliLiter(s) Nebulizer every 6 hours PRN  benzocaine 15 mG/menthol 3.6 mG (Sugar-Free) Lozenge 1 Lozenge Oral every 3 hours PRN  clonazePAM  Tablet 1 milliGRAM(s) Oral three times a day PRN  dextrose 40% Gel 15 Gram(s) Oral once PRN  glucagon  Injectable 1 milliGRAM(s) IntraMuscular once PRN  polyethylene glycol 3350 17 Gram(s) Oral daily PRN      LABS:                        11.5   34.86 )-----------( 232      ( 02 Mar 2020 00:19 )             34.3     Hgb Trend: 11.5<--, 11.5<--, 11.6<--, 12.1<--, 11.5<--  03-02    131<L>  |  93<L>  |  24<H>  ----------------------------<  235<H>  4.9   |  28  |  0.79    Ca    8.7      02 Mar 2020 00:19  Phos  3.6     03-02  Mg     2.1     03-02    TPro  6.1  /  Alb  2.5<L>  /  TBili  0.3  /  DBili  x   /  AST  71<H>  /  ALT  99<H>  /  AlkPhos  91  03-02    Creatinine Trend: 0.79<--, 0.84<--, 0.78<--, 0.78<--, 0.81<--, 0.87<--      Arterial Blood Gas:  03-01 @ 12:16  7.48/41/69/30/94/6.2  ABG lactate: --    Venous Blood Gas:  02-29 @ 09:27  7.45/43/52/30/84  VBG Lactate: 1.4              MICROBIOLOGY:     RADIOLOGY: Reviewed CHIEF COMPLAINT: SOB, cough and weakness    OVERNIGHT / SUBJECTIVE:  -Overnight, no acute events.  -Today, pt seen and examined at bedside in AM. Pt desatted to 70% on High flow FiO2 90%, increased FiO2 to 100% and satting 80-85%. ABG obtained showing pO2 of 48. Wife called regarding impending intubation, wife spoke to pt on phone, pt agreed to proceed with intubation. Pt was subsequently intubated successfully.    REVIEW OF SYSTEMS:    CONSTITUTIONAL: No weakness, fevers or chills  EYES/ENT: No visual changes;  No vertigo or throat pain   NECK: No pain or stiffness  RESPIRATORY: +cough (improved, also has chronic cough); No wheezing, hemoptysis; No shortness of breath  CARDIOVASCULAR: No chest pain or palpitations  GASTROINTESTINAL: No abdominal or epigastric pain. No nausea, vomiting, or hematemesis; No diarrhea or constipation. No melena or hematochezia.  GENITOURINARY: No dysuria, frequency or hematuria  NEUROLOGICAL: No numbness or weakness  SKIN: No itching, burning, rashes, or lesions   All other review of systems is negative unless indicated above.      OBJECTIVE:  ICU Vital Signs Last 24 Hrs  T(C): 37.1 (02 Mar 2020 04:00), Max: 37.2 (01 Mar 2020 20:00)  T(F): 98.8 (02 Mar 2020 04:00), Max: 99 (01 Mar 2020 20:00)  HR: 65 (02 Mar 2020 06:00) (64 - 876)  BP: 143/69 (02 Mar 2020 06:00) (132/67 - 157/86)  BP(mean): 99 (02 Mar 2020 06:00) (92 - 113)  ABP: --  ABP(mean): --  RR: 22 (02 Mar 2020 06:00) (19 - 36)  SpO2: 92% (02 Mar 2020 06:00) (82% - 98%)        03-01 @ 07:01  -  03-02 @ 07:00  --------------------------------------------------------  IN: 1490 mL / OUT: 4515 mL / NET: -3025 mL      CAPILLARY BLOOD GLUCOSE      POCT Blood Glucose.: 256 mg/dL (01 Mar 2020 21:04)  POCT Blood Glucose.: 237 mg/dL (01 Mar 2020 17:19)  POCT Blood Glucose.: 233 mg/dL (01 Mar 2020 12:41)  POCT Blood Glucose.: 197 mg/dL (01 Mar 2020 08:59)    I&O's Summary    01 Mar 2020 07:01  -  02 Mar 2020 07:00  --------------------------------------------------------  IN: 1490 mL / OUT: 4515 mL / NET: -3025 mL        PHYSICAL EXAM  General: mild respiratory distress, on high flow nasal cannula  HEENT: Head atraumatic; EOMI, PERRLA, normal sclera and conjunctiva; Dry MM  Neck: Supple  Chest/Lungs: Clear to ausculation anteriorly with mild coarse breath sounds, no wheezing appreciated  Heart: RRR, normal S1, S2, no murmurs or gallops  Abd: +BS, soft, obese, nontender, nondistended  Extremities: 2+ peripheral pulses, trace pedal edema  Skin: No rashes or lesions, warm, well-perfused  Neurologic: AAOx3, follows commands appropriately    LABS:                        11.5   34.86 )-----------( 232      ( 02 Mar 2020 00:19 )             34.3     03-02    131<L>  |  93<L>  |  24<H>  ----------------------------<  235<H>  4.9   |  28  |  0.79    Ca    8.7      02 Mar 2020 00:19  Phos  3.6     03-02  Mg     2.1     03-02    TPro  6.1  /  Alb  2.5<L>  /  TBili  0.3  /  DBili  x   /  AST  71<H>  /  ALT  99<H>  /  AlkPhos  91  03-02        LINES:    HOSPITAL MEDICATIONS:  Standing Meds:  atorvastatin 10 milliGRAM(s) Oral at bedtime  benzonatate 200 milliGRAM(s) Oral three times a day  busPIRone 15 milliGRAM(s) Oral three times a day  cefepime   IVPB      cefepime   IVPB 2000 milliGRAM(s) IV Intermittent every 8 hours  chlorhexidine 4% Liquid 1 Application(s) Topical <User Schedule>  clonazePAM  Tablet 1 milliGRAM(s) Oral daily  dextrose 5%. 1000 milliLiter(s) IV Continuous <Continuous>  dextrose 50% Injectable 12.5 Gram(s) IV Push once  dextrose 50% Injectable 25 Gram(s) IV Push once  dextrose 50% Injectable 25 Gram(s) IV Push once  diltiazem    milliGRAM(s) Oral daily  enoxaparin Injectable 40 milliGRAM(s) SubCutaneous daily  guaifenesin/dextromethorphan  Syrup 10 milliLiter(s) Oral every 6 hours  insulin lispro (HumaLOG) corrective regimen sliding scale   SubCutaneous three times a day before meals  losartan 100 milliGRAM(s) Oral daily  methylPREDNISolone sodium succinate Injectable 20 milliGRAM(s) IV Push every 8 hours  PARoxetine 30 milliGRAM(s) Oral daily  psyllium Powder 1 Packet(s) Oral daily  senna 2 Tablet(s) Oral at bedtime  tamsulosin 0.4 milliGRAM(s) Oral at bedtime  vancomycin  IVPB 1500 milliGRAM(s) IV Intermittent every 12 hours      PRN Meds:  acetaminophen   Tablet .. 650 milliGRAM(s) Oral every 6 hours PRN  albuterol/ipratropium for Nebulization 3 milliLiter(s) Nebulizer every 6 hours PRN  benzocaine 15 mG/menthol 3.6 mG (Sugar-Free) Lozenge 1 Lozenge Oral every 3 hours PRN  clonazePAM  Tablet 1 milliGRAM(s) Oral three times a day PRN  dextrose 40% Gel 15 Gram(s) Oral once PRN  glucagon  Injectable 1 milliGRAM(s) IntraMuscular once PRN  polyethylene glycol 3350 17 Gram(s) Oral daily PRN      LABS:                        11.5   34.86 )-----------( 232      ( 02 Mar 2020 00:19 )             34.3     Hgb Trend: 11.5<--, 11.5<--, 11.6<--, 12.1<--, 11.5<--  03-02    131<L>  |  93<L>  |  24<H>  ----------------------------<  235<H>  4.9   |  28  |  0.79    Ca    8.7      02 Mar 2020 00:19  Phos  3.6     03-02  Mg     2.1     03-02    TPro  6.1  /  Alb  2.5<L>  /  TBili  0.3  /  DBili  x   /  AST  71<H>  /  ALT  99<H>  /  AlkPhos  91  03-02    Creatinine Trend: 0.79<--, 0.84<--, 0.78<--, 0.78<--, 0.81<--, 0.87<--      Arterial Blood Gas:  03-01 @ 12:16  7.48/41/69/30/94/6.2  ABG lactate: --    Venous Blood Gas:  02-29 @ 09:27  7.45/43/52/30/84  VBG Lactate: 1.4              MICROBIOLOGY:     RADIOLOGY: Reviewed

## 2020-03-02 NOTE — PROGRESS NOTE ADULT - ATTENDING COMMENTS
Patient seen and examined, agree with above     69M PMH HTN, HLD, pulmonary fibrosis, anxiety/depression, admitted with acute hypoxic respiratory failure due to Influenza and superimposed bacterial pneumonia. Course complicated by worsening resp failure requiring intubation on 3/2, he had low grade fever on 2/29 and has worsening leucocytosis, albeit on steroids (since admission). Recent resp status deterioration could be due to mucus plugging vs superimposed pneumonia.     Recs:   Perform bronchoscopy today and send cell count, cx and cytology   Favor continuing abx for now until BCx (from the weekend) and bronch cx obtained   Send Fungitell and Galactomannan per ID   He is sedated with propofol and fentanyl   Hypotension due to meds vs severe sepsis - wean Dane for MAP>65  Resume TF after bronch   Continue steroids for now - plan TBD based on clinical course   Lovenox for DVT ppx     Patient remains critically ill due to multiorgan failure Patient seen and examined, agree with above     69M PMH HTN, HLD, pulmonary fibrosis, anxiety/depression, admitted with acute hypoxic respiratory failure due to Influenza and superimposed bacterial pneumonia. Course complicated by worsening resp failure requiring intubation on 3/2, he had low grade fever on 2/29 and has worsening leucocytosis, albeit on steroids (since admission). Recent resp status deterioration could be due to mucus plugging vs superimposed pneumonia.     Recs:   Perform bronchoscopy today and send cell count, cx and cytology   Favor continuing abx for now until BCx (from the weekend) and bronch cx obtained   Send Fungitell and Galactomannan per ID   He is sedated with propofol and fentanyl   Hypotension due to meds vs severe sepsis - wean Dane for MAP>65  Continue vent support, ABG improved after vent settings adjustment, not ready for vent wean today   Resume TF after bronch   Continue steroids for now - plan TBD based on clinical course   Lovenox for DVT ppx     Patient remains critically ill due to multiorgan failure

## 2020-03-02 NOTE — PROCEDURE NOTE - NSTRACHPOSTINTU_RESP_A_CORE
Appropriate capnography/Breath sounds bilateral/Chest excursion noted/CXR ordered/Breath sounds equal/Positive end tidal Co2 noted

## 2020-03-02 NOTE — PROGRESS NOTE ADULT - ASSESSMENT
69M with PMH of HTN, HLD, depression, anxiety, and insignificant degree of pulmonary fibrosis, who initially presented 2/20/20 following a fall at home, admitted with acute hypoxic respiratory failure in setting of influenza with concern for superimposed PNA. Course c/b worsening respiratory failure with high oxygen requirements, transferred to MICU for close monitoring.     NEURO:   - Currently AAOx3, awake, alert, answers questions appropriately  - C/w home clonazepam standing 1mg PO daily and 1mg PO TID PRN for anxiety, hold for sedation  - C/w home buspirone 15mg PO TID  - C/w home paroxetine 30mg PO daily    PULM:   # Acute hypoxic respiratory failure   Multifactorial related to influenza infection with high suspicion for superimposed bacterial PNA in setting of underlying lung disease. May also have component of aspiration pneumonitis.   - C/w HFNC, can wean down on FiO2 as tolerated as long as saturation maintained >90%  - C/w vanc/cefepime (2/25-3/2)  - s/p Tamiflu for total 10 days (2/20-2/29)  - C/w duonebs q6h PRN  - Encourage use of acapella valve and incentive spirometry  - repeat ABG today    # Pulmonary fibrosis:   - Appreciate Pulm recs  - As per pt's pulmonologist group (Dr. Dia/Dr. Pop): Hx of "insignificant pulmonary fibrosis," Seen for chronic cough, PFTs (2017) was wnl aside from diffusion capacity of 74%, and CT Chest (2013) showed no evidence of pulm fibrosis or other significant pulm pathology  - c/w solumedrol 20mg IV Q8    #Chronic cough  - c/w Tessalon Perles 200mg PO TID  - c/w Robitussin-DM 10mL PO q6h    CARDIOVASCULAR:   # HTN  - No evidence of fluid overload   - Continue to monitor  - c/w Diltiazem 240mg PO daily  - c/w Losartan 100mg PO daily for better BP control (on olmesartan 40mg daily at home)   - TTE (3/1): EF 65%; Severe pulmonary hypertension. Severely increased pulmonary vascular resistance; Normal left ventricular systolic function. No segmental wall motion abnormalities. Impaired LV-relaxation with normal filling pressure  - Cardiology following (Dr. Lucero), appreciate recs    GI:   - C/w regular diet (1L fluid restriction) for now  - GI following (Dr. Kraft), appreciate recs  # Constipation, resolved  - c/w senna 2 tabs qhs and miralax daily    RENAL/:   # Urinary retention:   Likely related to medication side effect s/p Scruggs placement.   - c/w tamsulosin, off home doxazosin  - Scruggs placed  - s/p Lasix 40mg x 1 with significant UOP, continue to monitor UOP  - Trend BMP    ENDOCRINE:   - Insulin sliding scale for steroid-induced hyperglycemia    HEMATOLOGIC:   # Leukocytosis  Likely secondary to steroids and infection  - C/w abx as above  - Continue to trend CBC with differential    ID:   # Influenza with suspected superimposed bacterial PNA:   - Persistent leukocytosis, though pt also on steroids  - c/w vanc/cefepime (2/25-3/2)  - s/p Tamiflu for total 10 days (2/20-2/29)  - Monitor WBC count, fever curve  - Fever of 100.8F on 2/29, f/u BCx (3/1); will consider repeat RVP  - f/u procalcitonin level as per ID    PROPHYLAXIS:   - DVT ppx: Lovenox  - Out of bed to chair as tolerated    CODE STATUS:  - Wife is HCP and agrees to bring in form from home. Agreeable to trial of intubation if needed.    Rene Kim, PGY-1  MICU  Contact/Pager: [phone] / [phone] 69M with PMH of HTN, HLD, depression, anxiety, and insignificant degree of pulmonary fibrosis, who initially presented 2/20/20 following a fall at home, admitted with acute hypoxic respiratory failure in setting of influenza with concern for superimposed PNA. Course c/b worsening respiratory failure with high oxygen requirements, transferred to MICU for close monitoring, intubated 3/2.     NEURO:   - Intubated (3/2); sedated on fentanyl and propofol gtt  - C/w home clonazepam standing 1mg PO daily  - C/w home buspirone 15mg PO TID  - C/w home paroxetine 30mg PO daily    PULM:   # Acute hypoxic respiratory failure   Multifactorial related to influenza infection with high suspicion for superimposed bacterial PNA in setting of underlying lung disease. May also have component of aspiration pneumonitis.   - Intubated (3/2) as pt failed use of high flow FiO2 100% (ABG showing pO2 48), acapella valve, incentive spirometry  - s/p Tamiflu for total 10 days (2/20-2/29)  - C/w vanc/cefepime (2/25- )  - C/w duonebs q6h PRN  - serial ABGs  - plan for bronchoscopy today, will send bronch Cx    # Pulmonary fibrosis:   - Appreciate Pulm recs  - As per pt's pulmonologist group (Dr. Dia/Dr. Pop): Hx of "insignificant pulmonary fibrosis," Seen for chronic cough, PFTs (2017) was wnl aside from diffusion capacity of 74%, and CT Chest (2013) showed no evidence of pulm fibrosis or other significant pulm pathology  - c/w solumedrol 20mg IV Q8    CARDIOVASCULAR:   # HTN  - Continue to monitor  - c/w Diltiazem 60mg q6h PO daily  - c/w Losartan 100mg PO daily for better BP control (on olmesartan 40mg daily at home)   - TTE (3/1): EF 65%; Severe pulmonary hypertension. Severely increased pulmonary vascular resistance; Normal left ventricular systolic function. No segmental wall motion abnormalities. Impaired LV-relaxation with normal filling pressure  - Cardiology following (Dr. Lucero), appreciate recs  - c/w phenylephrine gtt, wean as tolerated    GI:   - NPO, OG tube placed  - GI following (Dr. Kraft), appreciate recs  # Constipation, resolved  - c/w senna 2 tabs qhs and metamucil daily    RENAL/:   # Urinary retention:   Likely related to medication side effect s/p Scruggs placement.   - off tamsulosin as pt now intubated, restarted on home doxazosin  - c/w Scruggs  - s/p Lasix IV 40mg x 2 with significant UOP, continue to monitor UOP  - Trend BMP    ENDOCRINE:   - Insulin sliding scale for steroid-induced hyperglycemia    HEMATOLOGIC:   # Leukocytosis  Likely secondary to steroids and infection  - C/w abx as above  - Continue to trend CBC with differential    ID:   # Influenza with suspected superimposed bacterial PNA:   - Persistent leukocytosis, though pt also on steroids  - c/w vanc/cefepime (2/25-3/2)  - s/p Tamiflu for total 10 days (2/20-2/29)  - Monitor WBC count, fever curve  - f/u BCx (3/1); will consider repeat RVP  - obtain procalcitonin level, fungitell, and galactomannan, as per ID  - f/u bronch Cx     PROPHYLAXIS:   - DVT ppx: Lovenox    CODE STATUS:  - Wife and pt agreed to trial of intubation. Currently remains full code.    Rene Kim, PGY-1  MICU  Contact/Pager: [phone] / [phone]

## 2020-03-02 NOTE — PROGRESS NOTE ADULT - ASSESSMENT
68 yo male with HTN, anxiety disorder, and pulmonary fibrosis admitted with hypoxic respiratory failure.  Influenza A diagnosed 2/16 with a rapid test.  CTA is negative for PE.  Started on CTX and azithro along with tamiflu  Legionella urine antigen negative, MRSA screen is negative and his PC is elevated to 0.69.  The elevated PC is suggestive of a bacterial infection  Lack of improvement with tamiflu documented and raised concern of both viral pneumonia and a secondary process such as a bacterial pneumonia - empiric antibiotics cont'd  Repeated CT chest revealed pulm fibrosis & bilateral groundglass opacities concerning for infection-2/24  However remains afebrile & PCT is low at 0.15  Leukocytosis unreliable - given pt on steroids  CTX, azithro were changed to vanco & cefepime on 2/25/20, in case this was HCAP, although low overall suspicion in this afebrile pt with low PCT.  Hypoxemia remains an issue with ongoing requirement for high flow O2 support & steroids.  ECHO with severe pulmonary hypertension  2/29  had a fever of 100.8 - was unaware of it subjectively - significance unclear  No fever since 2/29  Now requiring ventilatory support  Suggest:  Complete vanco & cefepime today cx  Steroid mgmt as per ICU/ pulmonary  Leukocytosis unreliable on IV steroids  Check procalcitonin level in AM  Suspect respiratory failure is multifactorial  If he has any secretions would send a sputum culture  Consider sending both  a fungitell ans a galactomannan, Influenza can be a risk factor for pulmonary aspergillosos

## 2020-03-02 NOTE — PROGRESS NOTE ADULT - ASSESSMENT
ASSESSMENT:    hypoxic respiratory failure - multifactorial - hypercapnia on ABG likely due to iatric hypoventilation on the ventilator - has developed severe pulmonary hypertension with ongoing hypoxemia    1) influenza infection complicated by bronchospasm (resolved) and pneumonia - must be concerned about a superimposed bacterial infection with increasing leukocytosis despite the non-elevated procalcitonin level - at risk for MRSA and aspergillus   2) underlying mild pulmonary fibrosis with traction bronchiectasis    PLAN/RECOMMENDATIONS:    vent support - increase minute ventilation  pressors to keep MAP > 65mmHg  sedation with daily "sedation vacations"  chlorhexidine mouth wash  bronchoscopy for culture  check fungitell level and galactomannan level  check RVP - in still flu positive, would restart tamiflu  solumedrol 20mg IVP q8h - glucose control  vanco/cefepime - blood cultures  would hold cozaar and diltiazem  loza catheter  bowel regimen    Will follow with you. Plan of care discussed with the patient's family at bedside and the MICU team.    Galileo Pop MD, [facility]  [phone]  Pulmonary Medicine

## 2020-03-02 NOTE — PROGRESS NOTE ADULT - ASSESSMENT
diarrhea - resolved  anemia  constipation  hypoxic respiratory failure    acute hypoxic respiratory failure requiring endotracheal intubation. Patient now requiring ventilatory support  management per MICU/pulmonary appreciated  cont bowel regimen; hold if having loose stool  h/h stable  diet as tolerated, encourage PO intake   colonoscopy 3 years ago, no need to repeat as inpatient  monitor for gi bleed  encourage OOB/ambulation  dvt ppx

## 2020-03-02 NOTE — PROGRESS NOTE ADULT - SUBJECTIVE AND OBJECTIVE BOX
CARDIOLOGY     PROGRESS  NOTE   ________________________________________________    CHIEF COMPLAINT:Patient is a 69y old  Male who presents with a chief complaint of SOB, cough and weakness (02 Mar 2020 09:45)    	  REVIEW OF SYSTEMS:  CONSTITUTIONAL: No fever, weight loss, or fatigue  EYES: No eye pain, visual disturbances, or discharge  ENT:  No difficulty hearing, tinnitus, vertigo; No sinus or throat pain  NECK: No pain or stiffness  RESPIRATORY: No cough, wheezing, chills or hemoptysis; No Shortness of Breath  CARDIOVASCULAR: No chest pain, palpitations, passing out, dizziness, or leg swelling  GASTROINTESTINAL: No abdominal or epigastric pain. No nausea, vomiting, or hematemesis; No diarrhea or constipation. No melena or hematochezia.  GENITOURINARY: No dysuria, frequency, hematuria, or incontinence  NEUROLOGICAL: No headaches, memory loss, loss of strength, numbness, or tremors  SKIN: No itching, burning, rashes, or lesions   LYMPH Nodes: No enlarged glands  ENDOCRINE: No heat or cold intolerance; No hair loss  MUSCULOSKELETAL: No joint pain or swelling; No muscle, back, or extremity pain  PSYCHIATRIC: No depression, anxiety, mood swings, or difficulty sleeping  HEME/LYMPH: No easy bruising, or bleeding gums  ALLERGY AND IMMUNOLOGIC: No hives or eczema	    [ ] All others negative	  [ ] Unable to obtain    PHYSICAL EXAM:  T(C): 37.3 (03-02-20 @ 08:00), Max: 37.3 (03-02-20 @ 08:00)  HR: 61 (03-02-20 @ 09:45) (61 - 868)  BP: 111/58 (03-02-20 @ 09:45) (77/38 - 166/84)  RR: 16 (03-02-20 @ 09:45) (16 - 38)  SpO2: 96% (03-02-20 @ 09:45) (87% - 98%)  Wt(kg): --  I&O's Summary    01 Mar 2020 07:01  -  02 Mar 2020 07:00  --------------------------------------------------------  IN: 1490 mL / OUT: 4515 mL / NET: -3025 mL    02 Mar 2020 07:01  -  02 Mar 2020 09:56  --------------------------------------------------------  IN: 573.9 mL / OUT: 75 mL / NET: 498.9 mL        Appearance: Normal	  HEENT:   Normal oral mucosa, PERRL, EOMI	  Lymphatic: No lymphadenopathy  Cardiovascular: Normal S1 S2, No JVD, No murmurs, No edema  Respiratory: Lungs clear to auscultation	  Psychiatry: A & O x 3, Mood & affect appropriate  Gastrointestinal:  Soft, Non-tender, + BS	  Skin: No rashes, No ecchymoses, No cyanosis	  Neurologic: Non-focal  Extremities: Normal range of motion, No clubbing, cyanosis or edema  Vascular: Peripheral pulses palpable 2+ bilaterally    MEDICATIONS  (STANDING):  atorvastatin 10 milliGRAM(s) Oral at bedtime  benzonatate 200 milliGRAM(s) Oral three times a day  busPIRone 15 milliGRAM(s) Oral three times a day  cefepime   IVPB      cefepime   IVPB 2000 milliGRAM(s) IV Intermittent every 8 hours  chlorhexidine 0.12% Liquid 15 milliLiter(s) Oral Mucosa every 12 hours  chlorhexidine 4% Liquid 1 Application(s) Topical <User Schedule>  dextrose 5%. 1000 milliLiter(s) (50 mL/Hr) IV Continuous <Continuous>  dextrose 50% Injectable 12.5 Gram(s) IV Push once  dextrose 50% Injectable 25 Gram(s) IV Push once  dextrose 50% Injectable 25 Gram(s) IV Push once  diltiazem    milliGRAM(s) Oral daily  enoxaparin Injectable 40 milliGRAM(s) SubCutaneous daily  fentaNYL   Infusion. 2 MICROgram(s)/kG/Hr (11.85 mL/Hr) IV Continuous <Continuous>  insulin lispro (HumaLOG) corrective regimen sliding scale   SubCutaneous every 6 hours  losartan 100 milliGRAM(s) Oral daily  methylPREDNISolone sodium succinate Injectable 20 milliGRAM(s) IV Push every 8 hours  PARoxetine 30 milliGRAM(s) Oral daily  phenylephrine    Infusion 0.1 MICROgram(s)/kG/Min (4.444 mL/Hr) IV Continuous <Continuous>  propofol Infusion 15 MICROgram(s)/kG/Min (10.665 mL/Hr) IV Continuous <Continuous>  propofol Injectable 100 milliGRAM(s) IV Push once  psyllium Powder 1 Packet(s) Oral daily  senna 2 Tablet(s) Oral at bedtime  tamsulosin 0.4 milliGRAM(s) Oral at bedtime  vancomycin  IVPB 1500 milliGRAM(s) IV Intermittent every 12 hours      TELEMETRY: 	    ECG:  	  RADIOLOGY:  OTHER: 	  	  LABS:	 	    CARDIAC MARKERS:                                11.5   34.86 )-----------( 232      ( 02 Mar 2020 00:19 )             34.3     03-02    131<L>  |  93<L>  |  24<H>  ----------------------------<  235<H>  4.9   |  28  |  0.79    Ca    8.7      02 Mar 2020 00:19  Phos  3.6     03-02  Mg     2.1     03-02    TPro  6.1  /  Alb  2.5<L>  /  TBili  0.3  /  DBili  x   /  AST  71<H>  /  ALT  99<H>  /  AlkPhos  91  03-02    proBNP: Serum Pro-Brain Natriuretic Peptide: 364 pg/mL (02-19 @ 22:30)    Lipid Profile:   HgA1c: Hemoglobin A1C, Whole Blood: 6.6 % (02-24 @ 08:40)    TSH: Thyroid Stimulating Hormone, Serum: 0.39 uIU/mL (02-21 @ 09:19)  Thyroid Stimulating Hormone, Serum: 0.47 uIU/mL (02-21 @ 00:19)  < from: Transthoracic Echocardiogram (03.01.20 @ 10:15) >  Normal left ventricular systolic function. No segmental  wall motion abnormalities.  Impaired LV-relaxation with normal filling pressure.  Suboptimal visualization of the right heart in all windows.    Severe pulmonary hypertension; severely increased pulmonary  vascular resistance.    < end of copied text >          Assessment and plan  ---------------------------  69M with PMH of HTN, pulmonary fibrosis, depression/anxiety p/w SOB and cough. Pt states his mother passed away about 2 weeks ago and shortly after burying here, started to feel very unwell. He has had progressively worsening cough, non productive, but feels a lot of chest congestion. Associated with ROSE, headache and myalgias and fatigue. Was seen at  earlier this week and was diagnosed with flu. In the past couple of days, has been having very labored breathing; today, had an episode where he fell while trying to reach for [employer] and was so winded and weak, he was unable to get up. Has barely been eating anything due to poor appetite and nausea, but denies vomiting. Denies fevers or chills, no CP, no palpitations, no abdominal pain, had 3 episodes of loose stools today, +decreased UOP, no LE swelling. Does not know of any sick contacts. Denies recent travel.  sob/ respiratory failure sec to hx of pulmonary fibrosis ?pneumoniae.  abx  echo to evaluate pulmonary pressure sec to hx of pulmonary fibrosis  add calcium channel blocker for bp control, increase Cardizem CD if increase bp/hr  dvt prophylaxis  asa daily  awaiting echo, no evidence of heart failure on exam  supportive measures CARDIOLOGY     PROGRESS  NOTE   ________________________________________________    CHIEF COMPLAINT:Patient is a 69y old  Male who presents with a chief complaint of SOB, cough and weakness (02 Mar 2020 09:45)  sedated on vent  	  REVIEW OF SYSTEMS:  CONSTITUTIONAL: No fever, weight loss, or fatigue  EYES: No eye pain, visual disturbances, or discharge  ENT:  No difficulty hearing, tinnitus, vertigo; No sinus or throat pain  NECK: No pain or stiffness  RESPIRATORY: No cough, wheezing, chills or hemoptysis; No Shortness of Breath  CARDIOVASCULAR: No chest pain, palpitations, passing out, dizziness, or leg swelling  GASTROINTESTINAL: No abdominal or epigastric pain. No nausea, vomiting, or hematemesis; No diarrhea or constipation. No melena or hematochezia.  GENITOURINARY: No dysuria, frequency, hematuria, or incontinence  NEUROLOGICAL: No headaches, memory loss, loss of strength, numbness, or tremors  SKIN: No itching, burning, rashes, or lesions   LYMPH Nodes: No enlarged glands  ENDOCRINE: No heat or cold intolerance; No hair loss  MUSCULOSKELETAL: No joint pain or swelling; No muscle, back, or extremity pain  PSYCHIATRIC: No depression, anxiety, mood swings, or difficulty sleeping  HEME/LYMPH: No easy bruising, or bleeding gums  ALLERGY AND IMMUNOLOGIC: No hives or eczema	    [ ] All others negative	  [ x] Unable to obtain    PHYSICAL EXAM:  T(C): 37.3 (03-02-20 @ 08:00), Max: 37.3 (03-02-20 @ 08:00)  HR: 61 (03-02-20 @ 09:45) (61 - 868)  BP: 111/58 (03-02-20 @ 09:45) (77/38 - 166/84)  RR: 16 (03-02-20 @ 09:45) (16 - 38)  SpO2: 96% (03-02-20 @ 09:45) (87% - 98%)  Wt(kg): --  I&O's Summary    01 Mar 2020 07:01  -  02 Mar 2020 07:00  --------------------------------------------------------  IN: 1490 mL / OUT: 4515 mL / NET: -3025 mL    02 Mar 2020 07:01  -  02 Mar 2020 09:56  --------------------------------------------------------  IN: 573.9 mL / OUT: 75 mL / NET: 498.9 mL        Appearance: sedated on vent  HEENT:   Normal oral mucosa, PERRL, EOMI	  Lymphatic: No lymphadenopathy  Cardiovascular: Normal S1 S2, No JVD, No murmurs, No edema  Respiratory: decrease bs  Gastrointestinal:  Soft, Non-tender, + BS	  Skin: No rashes, No ecchymoses, No cyanosis	  Neurologic: Non-focal  Extremities: Normal range of motion, No clubbing, cyanosis or edema  Vascular: Peripheral pulses palpable 2+ bilaterally    MEDICATIONS  (STANDING):  atorvastatin 10 milliGRAM(s) Oral at bedtime  benzonatate 200 milliGRAM(s) Oral three times a day  busPIRone 15 milliGRAM(s) Oral three times a day  cefepime   IVPB      cefepime   IVPB 2000 milliGRAM(s) IV Intermittent every 8 hours  chlorhexidine 0.12% Liquid 15 milliLiter(s) Oral Mucosa every 12 hours  chlorhexidine 4% Liquid 1 Application(s) Topical <User Schedule>  dextrose 5%. 1000 milliLiter(s) (50 mL/Hr) IV Continuous <Continuous>  dextrose 50% Injectable 12.5 Gram(s) IV Push once  dextrose 50% Injectable 25 Gram(s) IV Push once  dextrose 50% Injectable 25 Gram(s) IV Push once  diltiazem    milliGRAM(s) Oral daily  enoxaparin Injectable 40 milliGRAM(s) SubCutaneous daily  fentaNYL   Infusion. 2 MICROgram(s)/kG/Hr (11.85 mL/Hr) IV Continuous <Continuous>  insulin lispro (HumaLOG) corrective regimen sliding scale   SubCutaneous every 6 hours  losartan 100 milliGRAM(s) Oral daily  methylPREDNISolone sodium succinate Injectable 20 milliGRAM(s) IV Push every 8 hours  PARoxetine 30 milliGRAM(s) Oral daily  phenylephrine    Infusion 0.1 MICROgram(s)/kG/Min (4.444 mL/Hr) IV Continuous <Continuous>  propofol Infusion 15 MICROgram(s)/kG/Min (10.665 mL/Hr) IV Continuous <Continuous>  propofol Injectable 100 milliGRAM(s) IV Push once  psyllium Powder 1 Packet(s) Oral daily  senna 2 Tablet(s) Oral at bedtime  tamsulosin 0.4 milliGRAM(s) Oral at bedtime  vancomycin  IVPB 1500 milliGRAM(s) IV Intermittent every 12 hours      TELEMETRY: 	    ECG:  	  RADIOLOGY:  OTHER: 	  	  LABS:	 	    CARDIAC MARKERS:                                11.5   34.86 )-----------( 232      ( 02 Mar 2020 00:19 )             34.3     03-02    131<L>  |  93<L>  |  24<H>  ----------------------------<  235<H>  4.9   |  28  |  0.79    Ca    8.7      02 Mar 2020 00:19  Phos  3.6     03-02  Mg     2.1     03-02    TPro  6.1  /  Alb  2.5<L>  /  TBili  0.3  /  DBili  x   /  AST  71<H>  /  ALT  99<H>  /  AlkPhos  91  03-02    proBNP: Serum Pro-Brain Natriuretic Peptide: 364 pg/mL (02-19 @ 22:30)    Lipid Profile:   HgA1c: Hemoglobin A1C, Whole Blood: 6.6 % (02-24 @ 08:40)    TSH: Thyroid Stimulating Hormone, Serum: 0.39 uIU/mL (02-21 @ 09:19)  Thyroid Stimulating Hormone, Serum: 0.47 uIU/mL (02-21 @ 00:19)  < from: Transthoracic Echocardiogram (03.01.20 @ 10:15) >  Normal left ventricular systolic function. No segmental  wall motion abnormalities.  Impaired LV-relaxation with normal filling pressure.  Suboptimal visualization of the right heart in all windows.    Severe pulmonary hypertension; severely increased pulmonary  vascular resistance.    < end of copied text >          Assessment and plan  ---------------------------  69M with PMH of HTN, pulmonary fibrosis, depression/anxiety p/w SOB and cough. Pt states his mother passed away about 2 weeks ago and shortly after burying here, started to feel very unwell. He has had progressively worsening cough, non productive, but feels a lot of chest congestion. Associated with ROSE, headache and myalgias and fatigue. Was seen at  earlier this week and was diagnosed with flu. In the past couple of days, has been having very labored breathing; today, had an episode where he fell while trying to reach for [employer] and was so winded and weak, he was unable to get up. Has barely been eating anything due to poor appetite and nausea, but denies vomiting. Denies fevers or chills, no CP, no palpitations, no abdominal pain, had 3 episodes of loose stools today, +decreased UOP, no LE swelling. Does not know of any sick contacts. Denies recent travel.  sob/ respiratory failure sec to hx of pulmonary fibrosis ?pneumoniae.  abx  echo noted sever pulmonary htn  sedated on vent  continue abx  steroid  dvt prophylaxis  supportive measures

## 2020-03-02 NOTE — CHART NOTE - NSCHARTNOTEFT_GEN_A_CORE
Pre-Bronchoscopy Tuberculosis Risk Screening Tool  To reduce the risk for airborne transmission of Mycobacterium tuberculosis, this assessment form must be completed prior to bronchoscopy procedures being performed outside of a negative pressure environment.    Procedure Date: 3/2/20  Health Care Provider Name: Alfie Donald  Form Completed By: Naveed Stokes  Reason for the Bronchoscopy: acute hypoxemic respiratory failure  Date of Procedure: 3/2/20  Planned Location for the Procedure:  [ ] Emergency Department     [x] Intensive Care Unit     [ ] Operating Room     Other: ___________________    Risk Assessment  I. I have personally evaluated this patient for Mycobacterium tuberculosis and I determined the following risk:  [x] No Risk for TB     [ ] Low risk or TB     [ ] Significant risk for TB    II. Additional Findings: as below    III. Based on the Determined Risk for TB the following Action(s) are Suggested:  1. If there are no risk factors for TB infection proceed with the procedure.  2. If there is low risk or significant risk for TB infection the following recommendations should be followed:            a. Perform the procedure in a negative pressure room, with N95 respirator.            b. If not feasible to move the patient or defer the procedure:                    i. Use a single-bedded room low traffic area to perform the bronchoscopy procedure.                   ii. Place a portable high-efficiency particulate air (HEPA) filter in the space prior to starting the procedure and keep closed.                       Refer to the INF.1132 titled “Tuberculosis Control Strategy Plan” for additional information.                  iii. All Health Care Providers within the procedure room shall wear an N95 respirator.            c. Documentation of the tuberculosis risk assessment should be included within the patient’s medical record.      Indication: Acute hypoxemic respiratory failure    Operators: Dr. Stoeks (fellow), Dr. Burnett (fellow), Dr. Donald (attending)    Pre-op Dx: pneumonia    History: 69M with HTN, HLD, early pulmonary fibrosis presents with flu and found to be in acute hypoxemic respiratory failure refractory to flu and antibiotic treatment    Preop medications: propofol, fentanyl, versed    Findings:  Bronchoscope inserted through ETT. ETT noted to be in good position. Main kayden identified and appeared normal. Airways were visualized to the subsegmental level bilaterally and appeared normal. Multiple small diverticula noted throughout airways. Scant mucus noted throughout airways b/l. Bronchoalveolar lavage was performed in RML, RLL and LLL using serial aliquots of 30-60cc of saline to a total of 120cc of saline with adequate return. Bronchoscope then removed from the airways.    Specimens:  RML BAL  RLL BAL  LLL BAL    Naveed Stokes PGY-4  Pulmonary/Critical Care Fellow  Pager: [phone] (SARAH) [phone] (NS)  Pulmonary Spectra #[phone] (NS) / [phone] (SARAH) Pre-Bronchoscopy Tuberculosis Risk Screening Tool  To reduce the risk for airborne transmission of Mycobacterium tuberculosis, this assessment form must be completed prior to bronchoscopy procedures being performed outside of a negative pressure environment.    Procedure Date: 3/2/20  Health Care Provider Name: Alfie Donald  Form Completed By: Naveed Stokes  Reason for the Bronchoscopy: acute hypoxemic respiratory failure  Date of Procedure: 3/2/20  Planned Location for the Procedure:  [ ] Emergency Department     [x] Intensive Care Unit     [ ] Operating Room     Other: ___________________    Risk Assessment  I. I have personally evaluated this patient for Mycobacterium tuberculosis and I determined the following risk:  [x] No Risk for TB     [ ] Low risk or TB     [ ] Significant risk for TB    II. Additional Findings: as below    III. Based on the Determined Risk for TB the following Action(s) are Suggested:  1. If there are no risk factors for TB infection proceed with the procedure.  2. If there is low risk or significant risk for TB infection the following recommendations should be followed:            a. Perform the procedure in a negative pressure room, with N95 respirator.            b. If not feasible to move the patient or defer the procedure:                    i. Use a single-bedded room low traffic area to perform the bronchoscopy procedure.                   ii. Place a portable high-efficiency particulate air (HEPA) filter in the space prior to starting the procedure and keep closed.                       Refer to the INF.1132 titled “Tuberculosis Control Strategy Plan” for additional information.                  iii. All Health Care Providers within the procedure room shall wear an N95 respirator.            c. Documentation of the tuberculosis risk assessment should be included within the patient’s medical record.      Indication: Acute hypoxemic respiratory failure    Operators: Dr. Stokes (fellow), Dr. Burnett (fellow), Dr. Donald (attending)    Pre-op Dx: pneumonia    History: 69M with HTN, HLD, early pulmonary fibrosis presents with flu and found to be in acute hypoxemic respiratory failure refractory to flu and antibiotic treatment    Preop medications: propofol, fentanyl, versed    Findings:  Bronchoscope inserted through ETT. ETT noted to be in good position. Main kayden identified and appeared normal. Airways were visualized to the subsegmental level bilaterally and appeared moderately inflamed and collapseable diffusely. Multiple small diverticula noted throughout airways. Scant mucus noted throughout airways b/l. Bronchoalveolar lavage was performed in RML, RLL and LLL using serial aliquots of 30-60cc of saline to a total of 120cc of saline with adequate return. Bronchoscope then removed from the airways.    Specimens:  RML BAL  RLL BAL  LLL BAL    Naveed Stokes PGY-4  Pulmonary/Critical Care Fellow  Pager: [phone] (SARAH) [phone] (NS)  Pulmonary Spectra #[phone] (NS) / [phone] (SARAH)

## 2020-03-03 LAB
ALBUMIN SERPL ELPH-MCNC: 2.3 G/DL — LOW (ref 3.3–5)
ALP SERPL-CCNC: 67 U/L — SIGNIFICANT CHANGE UP (ref 40–120)
ALT FLD-CCNC: 66 U/L — HIGH (ref 10–45)
ANION GAP SERPL CALC-SCNC: 11 MMOL/L — SIGNIFICANT CHANGE UP (ref 5–17)
ANION GAP SERPL CALC-SCNC: 11 MMOL/L — SIGNIFICANT CHANGE UP (ref 5–17)
ANION GAP SERPL CALC-SCNC: 12 MMOL/L — SIGNIFICANT CHANGE UP (ref 5–17)
ANION GAP SERPL CALC-SCNC: 13 MMOL/L — SIGNIFICANT CHANGE UP (ref 5–17)
AST SERPL-CCNC: 23 U/L — SIGNIFICANT CHANGE UP (ref 10–40)
BASOPHILS # BLD AUTO: 0.12 K/UL — SIGNIFICANT CHANGE UP (ref 0–0.2)
BASOPHILS NFR BLD AUTO: 0.4 % — SIGNIFICANT CHANGE UP (ref 0–2)
BILIRUB SERPL-MCNC: 0.2 MG/DL — SIGNIFICANT CHANGE UP (ref 0.2–1.2)
BUN SERPL-MCNC: 40 MG/DL — HIGH (ref 7–23)
BUN SERPL-MCNC: 42 MG/DL — HIGH (ref 7–23)
BUN SERPL-MCNC: 44 MG/DL — HIGH (ref 7–23)
BUN SERPL-MCNC: 45 MG/DL — HIGH (ref 7–23)
CALCIUM SERPL-MCNC: 7.9 MG/DL — LOW (ref 8.4–10.5)
CALCIUM SERPL-MCNC: 8.3 MG/DL — LOW (ref 8.4–10.5)
CALCIUM SERPL-MCNC: 8.5 MG/DL — SIGNIFICANT CHANGE UP (ref 8.4–10.5)
CALCIUM SERPL-MCNC: 8.5 MG/DL — SIGNIFICANT CHANGE UP (ref 8.4–10.5)
CHLORIDE SERPL-SCNC: 95 MMOL/L — LOW (ref 96–108)
CHLORIDE SERPL-SCNC: 96 MMOL/L — SIGNIFICANT CHANGE UP (ref 96–108)
CHLORIDE SERPL-SCNC: 97 MMOL/L — SIGNIFICANT CHANGE UP (ref 96–108)
CHLORIDE SERPL-SCNC: 98 MMOL/L — SIGNIFICANT CHANGE UP (ref 96–108)
CO2 SERPL-SCNC: 25 MMOL/L — SIGNIFICANT CHANGE UP (ref 22–31)
CO2 SERPL-SCNC: 25 MMOL/L — SIGNIFICANT CHANGE UP (ref 22–31)
CO2 SERPL-SCNC: 26 MMOL/L — SIGNIFICANT CHANGE UP (ref 22–31)
CO2 SERPL-SCNC: 27 MMOL/L — SIGNIFICANT CHANGE UP (ref 22–31)
CREAT SERPL-MCNC: 1.34 MG/DL — HIGH (ref 0.5–1.3)
CREAT SERPL-MCNC: 1.4 MG/DL — HIGH (ref 0.5–1.3)
CREAT SERPL-MCNC: 1.5 MG/DL — HIGH (ref 0.5–1.3)
CREAT SERPL-MCNC: 1.58 MG/DL — HIGH (ref 0.5–1.3)
EOSINOPHIL # BLD AUTO: 0.01 K/UL — SIGNIFICANT CHANGE UP (ref 0–0.5)
EOSINOPHIL NFR BLD AUTO: 0 % — SIGNIFICANT CHANGE UP (ref 0–6)
GAS PNL BLDA: SIGNIFICANT CHANGE UP
GAS PNL BLDA: SIGNIFICANT CHANGE UP
GLUCOSE BLDC GLUCOMTR-MCNC: 183 MG/DL — HIGH (ref 70–99)
GLUCOSE BLDC GLUCOMTR-MCNC: 193 MG/DL — HIGH (ref 70–99)
GLUCOSE SERPL-MCNC: 187 MG/DL — HIGH (ref 70–99)
GLUCOSE SERPL-MCNC: 192 MG/DL — HIGH (ref 70–99)
GLUCOSE SERPL-MCNC: 213 MG/DL — HIGH (ref 70–99)
GLUCOSE SERPL-MCNC: 220 MG/DL — HIGH (ref 70–99)
GRAM STN FLD: SIGNIFICANT CHANGE UP
GRAM STN FLD: SIGNIFICANT CHANGE UP
HCT VFR BLD CALC: 34 % — LOW (ref 39–50)
HGB BLD-MCNC: 10.8 G/DL — LOW (ref 13–17)
IMM GRANULOCYTES NFR BLD AUTO: 4 % — HIGH (ref 0–1.5)
LYMPHOCYTES # BLD AUTO: 0.88 K/UL — LOW (ref 1–3.3)
LYMPHOCYTES # BLD AUTO: 2.8 % — LOW (ref 13–44)
MAGNESIUM SERPL-MCNC: 2.3 MG/DL — SIGNIFICANT CHANGE UP (ref 1.6–2.6)
MAGNESIUM SERPL-MCNC: 2.5 MG/DL — SIGNIFICANT CHANGE UP (ref 1.6–2.6)
MCHC RBC-ENTMCNC: 30.3 PG — SIGNIFICANT CHANGE UP (ref 27–34)
MCHC RBC-ENTMCNC: 31.8 GM/DL — LOW (ref 32–36)
MCV RBC AUTO: 95.2 FL — SIGNIFICANT CHANGE UP (ref 80–100)
MONOCYTES # BLD AUTO: 2.01 K/UL — HIGH (ref 0–0.9)
MONOCYTES NFR BLD AUTO: 6.3 % — SIGNIFICANT CHANGE UP (ref 2–14)
NEUTROPHILS # BLD AUTO: 27.38 K/UL — HIGH (ref 1.8–7.4)
NEUTROPHILS NFR BLD AUTO: 86.5 % — HIGH (ref 43–77)
NIGHT BLUE STAIN TISS: SIGNIFICANT CHANGE UP
NIGHT BLUE STAIN TISS: SIGNIFICANT CHANGE UP
NRBC # BLD: 0 /100 WBCS — SIGNIFICANT CHANGE UP (ref 0–0)
PHOSPHATE SERPL-MCNC: 4.3 MG/DL — SIGNIFICANT CHANGE UP (ref 2.5–4.5)
PHOSPHATE SERPL-MCNC: 5.5 MG/DL — HIGH (ref 2.5–4.5)
PHOSPHATE SERPL-MCNC: 5.8 MG/DL — HIGH (ref 2.5–4.5)
PHOSPHATE SERPL-MCNC: 5.9 MG/DL — HIGH (ref 2.5–4.5)
PLATELET # BLD AUTO: 287 K/UL — SIGNIFICANT CHANGE UP (ref 150–400)
POTASSIUM SERPL-MCNC: 5.1 MMOL/L — SIGNIFICANT CHANGE UP (ref 3.5–5.3)
POTASSIUM SERPL-MCNC: 5.5 MMOL/L — HIGH (ref 3.5–5.3)
POTASSIUM SERPL-MCNC: 5.5 MMOL/L — HIGH (ref 3.5–5.3)
POTASSIUM SERPL-MCNC: 6.8 MMOL/L — CRITICAL HIGH (ref 3.5–5.3)
POTASSIUM SERPL-SCNC: 5.1 MMOL/L — SIGNIFICANT CHANGE UP (ref 3.5–5.3)
POTASSIUM SERPL-SCNC: 5.5 MMOL/L — HIGH (ref 3.5–5.3)
POTASSIUM SERPL-SCNC: 5.5 MMOL/L — HIGH (ref 3.5–5.3)
POTASSIUM SERPL-SCNC: 6.8 MMOL/L — CRITICAL HIGH (ref 3.5–5.3)
PROT SERPL-MCNC: 5.7 G/DL — LOW (ref 6–8.3)
RAPID RVP RESULT: SIGNIFICANT CHANGE UP
RBC # BLD: 3.57 M/UL — LOW (ref 4.2–5.8)
RBC # FLD: 14.1 % — SIGNIFICANT CHANGE UP (ref 10.3–14.5)
SODIUM SERPL-SCNC: 131 MMOL/L — LOW (ref 135–145)
SODIUM SERPL-SCNC: 134 MMOL/L — LOW (ref 135–145)
SODIUM SERPL-SCNC: 135 MMOL/L — SIGNIFICANT CHANGE UP (ref 135–145)
SODIUM SERPL-SCNC: 136 MMOL/L — SIGNIFICANT CHANGE UP (ref 135–145)
SPECIMEN SOURCE: SIGNIFICANT CHANGE UP
VANCOMYCIN TROUGH SERPL-MCNC: 16.8 UG/ML — SIGNIFICANT CHANGE UP (ref 10–20)
WBC # BLD: 31.66 K/UL — HIGH (ref 3.8–10.5)
WBC # FLD AUTO: 31.66 K/UL — HIGH (ref 3.8–10.5)

## 2020-03-03 PROCEDURE — 99291 CRITICAL CARE FIRST HOUR: CPT

## 2020-03-03 RX ORDER — CALCIUM GLUCONATE 100 MG/ML
2 VIAL (ML) INTRAVENOUS ONCE
Refills: 0 | Status: COMPLETED | OUTPATIENT
Start: 2020-03-03 | End: 2020-03-03

## 2020-03-03 RX ORDER — SODIUM ZIRCONIUM CYCLOSILICATE 10 G/10G
10 POWDER, FOR SUSPENSION ORAL ONCE
Refills: 0 | Status: COMPLETED | OUTPATIENT
Start: 2020-03-03 | End: 2020-03-03

## 2020-03-03 RX ADMIN — POLYETHYLENE GLYCOL 3350 17 GRAM(S): 17 POWDER, FOR SOLUTION ORAL at 11:02

## 2020-03-03 RX ADMIN — CEFEPIME 100 MILLIGRAM(S): 1 INJECTION, POWDER, FOR SOLUTION INTRAMUSCULAR; INTRAVENOUS at 05:34

## 2020-03-03 RX ADMIN — Medication 1 MILLIGRAM(S): at 11:01

## 2020-03-03 RX ADMIN — SODIUM ZIRCONIUM CYCLOSILICATE 10 GRAM(S): 10 POWDER, FOR SUSPENSION ORAL at 12:02

## 2020-03-03 RX ADMIN — Medication 20 MILLIGRAM(S): at 13:16

## 2020-03-03 RX ADMIN — Medication 1 PACKET(S): at 11:02

## 2020-03-03 RX ADMIN — CHLORHEXIDINE GLUCONATE 15 MILLILITER(S): 213 SOLUTION TOPICAL at 05:34

## 2020-03-03 RX ADMIN — ATORVASTATIN CALCIUM 10 MILLIGRAM(S): 80 TABLET, FILM COATED ORAL at 21:52

## 2020-03-03 RX ADMIN — Medication 2: at 17:01

## 2020-03-03 RX ADMIN — Medication 15 MILLIGRAM(S): at 13:16

## 2020-03-03 RX ADMIN — Medication 15 MILLIGRAM(S): at 21:52

## 2020-03-03 RX ADMIN — FENTANYL CITRATE 11.85 MICROGRAM(S)/KG/HR: 50 INJECTION INTRAVENOUS at 13:16

## 2020-03-03 RX ADMIN — PHENYLEPHRINE HYDROCHLORIDE 4.44 MICROGRAM(S)/KG/MIN: 10 INJECTION INTRAVENOUS at 05:34

## 2020-03-03 RX ADMIN — SENNA PLUS 10 MILLILITER(S): 8.6 TABLET ORAL at 21:52

## 2020-03-03 RX ADMIN — Medication 10 MILLIGRAM(S): at 12:02

## 2020-03-03 RX ADMIN — CHLORHEXIDINE GLUCONATE 15 MILLILITER(S): 213 SOLUTION TOPICAL at 17:01

## 2020-03-03 RX ADMIN — Medication 20 MILLIGRAM(S): at 21:52

## 2020-03-03 RX ADMIN — CHLORHEXIDINE GLUCONATE 1 APPLICATION(S): 213 SOLUTION TOPICAL at 06:36

## 2020-03-03 RX ADMIN — Medication 200 GRAM(S): at 00:57

## 2020-03-03 RX ADMIN — Medication 4: at 06:35

## 2020-03-03 RX ADMIN — Medication 30 MILLIGRAM(S): at 11:01

## 2020-03-03 RX ADMIN — Medication 2: at 11:02

## 2020-03-03 RX ADMIN — PHENYLEPHRINE HYDROCHLORIDE 4.44 MICROGRAM(S)/KG/MIN: 10 INJECTION INTRAVENOUS at 13:16

## 2020-03-03 RX ADMIN — Medication 15 MILLIGRAM(S): at 05:37

## 2020-03-03 RX ADMIN — PROPOFOL 10.66 MICROGRAM(S)/KG/MIN: 10 INJECTION, EMULSION INTRAVENOUS at 13:16

## 2020-03-03 RX ADMIN — ENOXAPARIN SODIUM 40 MILLIGRAM(S): 100 INJECTION SUBCUTANEOUS at 11:01

## 2020-03-03 RX ADMIN — Medication 300 MILLIGRAM(S): at 06:35

## 2020-03-03 RX ADMIN — Medication 20 MILLIGRAM(S): at 05:35

## 2020-03-03 RX ADMIN — Medication 4: at 00:56

## 2020-03-03 NOTE — PROGRESS NOTE ADULT - SUBJECTIVE AND OBJECTIVE BOX
[facility] - [facility] - PROGRESS NOTE    CHIEF COMPLAINT: acute on chronic hypoxic/hypercapnic respiratory failure; influenza; pneumonia; abnormal chest CT    INTERVAL HISTORY: clinically "improved"     REVIEW OF SYSTEMS:  Constitutional: As per interval history  HEENT: Within normal limits  CV: As per interval history  Resp: As per interval history  GI: Within normal limits   : Within normal limits  Musculoskeletal: Within normal limits  Skin: Within normal limits  Neurological: Within normal limits  Psychiatric: Within normal limits  Endocrine: Within normal limits  Hematologic/Lymphatic: Within normal limits  Allergic/Immunologic: Within normal limits    [ ] Unable to assess ROS because     MEDICATIONS:     Pulmonary "    Anti-microbials:  cefepime   IVPB      cefepime   IVPB 2000 milliGRAM(s) IV Intermittent every 8 hours  vancomycin  IVPB 1500 milliGRAM(s) IV Intermittent every 12 hours    Cardiovascular:  phenylephrine    Infusion 0.1 MICROgram(s)/kG/Min IV Continuous <Continuous>    Other:  atorvastatin 10 milliGRAM(s) Oral at bedtime  busPIRone 15 milliGRAM(s) Oral three times a day  chlorhexidine 0.12% Liquid 15 milliLiter(s) Oral Mucosa every 12 hours  chlorhexidine 4% Liquid 1 Application(s) Topical <User Schedule>  clonazePAM  Tablet 1 milliGRAM(s) Oral daily  dextrose 5%. 1000 milliLiter(s) IV Continuous <Continuous>  dextrose 50% Injectable 12.5 Gram(s) IV Push once  dextrose 50% Injectable 25 Gram(s) IV Push once  dextrose 50% Injectable 25 Gram(s) IV Push once  enoxaparin Injectable 40 milliGRAM(s) SubCutaneous daily  fentaNYL   Infusion. 2 MICROgram(s)/kG/Hr IV Continuous <Continuous>  insulin lispro (HumaLOG) corrective regimen sliding scale   SubCutaneous every 6 hours  methylPREDNISolone sodium succinate Injectable 20 milliGRAM(s) IV Push every 8 hours  PARoxetine 30 milliGRAM(s) Oral daily  polyethylene glycol 3350 17 Gram(s) Oral daily  propofol Infusion 15 MICROgram(s)/kG/Min IV Continuous <Continuous>  psyllium Powder 1 Packet(s) Oral daily  senna Syrup 10 milliLiter(s) Oral at bedtime    MEDICATIONS  (PRN):  albuterol/ipratropium for Nebulization 3 milliLiter(s) Nebulizer every 6 hours PRN Shortness of Breath and/or Wheezing  dextrose 40% Gel 15 Gram(s) Oral once PRN Blood Glucose LESS THAN 70 milliGRAM(s)/deciliter  glucagon  Injectable 1 milliGRAM(s) IntraMuscular once PRN Glucose LESS THAN 70 milligrams/deciliter        OBJECTIVE:  Mode: AC/ CMV (Assist Control/ Continuous Mandatory Ventilation), RR (machine): 20, TV (machine): 550, FiO2: 60, PEEP: 8, ITime: 1, MAP: 13, PIP: 29  I&O's Detail    02 Mar 2020 07:01  -  03 Mar 2020 07:00  --------------------------------------------------------  IN:    Enteral Tube Flush: 310 mL    fentaNYL Infusion.: 390.8 mL    Glucerna: 150 mL    phenylephrine   Infusion: 1395.6 mL    propofol Infusion: 506 mL    Solution: 1050 mL  Total IN: 3802.4 mL    OUT:    Indwelling Catheter - Urethral: 1735 mL  Total OUT: 1735 mL    Total NET: 2067.4 mL      03 Mar 2020 07:01  -  03 Mar 2020 11:13  --------------------------------------------------------  IN:    Enteral Tube Flush: 120 mL    fentaNYL Infusion.: 62.2 mL    Glucerna: 30 mL    phenylephrine   Infusion: 77.8 mL    propofol Infusion: 46.2 mL    Solution: 200 mL  Total IN: 536.2 mL    OUT:    Indwelling Catheter - Urethral: 410 mL  Total OUT: 410 mL    Total NET: 126.2 mL          Daily     Daily     POCT Blood Glucose.: 183 mg/dL (03 Mar 2020 10:52)  POCT Blood Glucose.: 256 mg/dL (02 Mar 2020 11:44)      PHYSICAL EXAM:       ICU Vital Signs Last 24 Hrs  T(C): 37.3 (03 Mar 2020 08:00), Max: 37.5 (02 Mar 2020 20:00)  T(F): 99.1 (03 Mar 2020 08:00), Max: 99.5 (02 Mar 2020 20:00)  HR: 47 (03 Mar 2020 11:00) (47 - 89)  BP: 115/59 (03 Mar 2020 11:00) (81/44 - 152/67)  BP(mean): 81 (03 Mar 2020 11:00) (60 - 96)  ABP: --  ABP(mean): --  RR: 20 (03 Mar 2020 11:00) (9 - 25)  SpO2: 100% (03 Mar 2020 11:00) (92% - 100%)     General: Awake. Alert. Cooperative. No distress. Appears stated age.	  HEENT: Atraumatic. Normocephalic. Anicteric. Normal oral mucosa. PERRL. EOMI.  Neck: Supple. Trachea midline. Thyroid without enlargement/tenderness/nodules. No carotid bruit. No JVD.	  Cardiovascular: Regular rate and rhythm. S1 S2 normal. No murmurs, rubs or gallops.  Respiratory: Respirations unlabored. Clear to auscultation and percussion bilaterally. No curvature.  Abdomen: Soft. Non-tender. Non-distended. No organomegaly. No masses. Normal bowel sounds.  Extremities: Warm to touch. No clubbing or cyanosis. No pedal edema.  Pulses: 2+ peripheral pulses all extremities.	  Skin: Normal skin color. No rashes or lesions. No ecchymoses. No cyanosis. Warm to touch.  Lymph Nodes: Cervical, supraclavicular and axillary nodes normal  Neurological: Motor and sensory examination equal and normal. A and O x 3  Psychiatry: Appropriate mood and affect.    LABS:                          10.8   31.66 )-----------( 287      ( 03 Mar 2020 00:33 )             34.0     CBC    WBC  31.66 <==, 34.86 <==, 31.73 <==, 23.36 <==, 22.40 <==, 20.53 <==, 25.18 <==    Hemoglobin  10.8 <<==, 11.5 <<==, 11.5 <<==, 11.6 <<==, 12.1 <<==, 11.5 <<==, 12.0 <<==    Hematocrit  34.0 <==, 34.3 <==, 35.7 <==, 35.4 <==, 36.2 <==, 34.5 <==, 35.7 <==    Platelets  287 <==, 232 <==, 182 <==, 142 <==, 152 <==, 148 <==, 143 <==      135  |  97  |  44<H>  ----------------------------<  213<H>    03-03  5.5<H>   |  26  |  1.50<H>      LYTES    sodium  135 <==, 134 <==, 131 <==, 131 <==, 133 <==, 132 <==, 132 <==    potassium   5.5 <==, 6.8 <==, 5.5 <==, 4.9 <==, 4.6 <==, 4.6 <==, 4.7 <==    chloride  97 <==, 96 <==, 95 <==, 93 <==, 95 <==, 95 <==, 96 <==    carbon dioxide  26 <==, 25 <==, 25 <==, 28 <==, 27 <==, 23 <==, 24 <==    =============================================================================================  RENAL FUNCTION:    Creatinine:   1.50  <<==, 1.40  <<==, 1.58  <<==, 0.79  <<==, 0.84  <<==, 0.78  <<==, 0.78  <<==    BUN:   44 <==, 42 <==, 40 <==, 24 <==, 25 <==, 26 <==, 30 <==    ============================================================================================    calcium   8.5 <==, 8.3 <==, 7.9 <==, 8.7 <==, 8.7 <==, 8.4 <==, 8.2 <==    phos   5.5 <==, 5.9 <==, 5.8 <==, 3.6 <==, 3.7 <==, 3.0 <==, 3.1 <==    mag   2.5 <==, 2.5 <==, 2.3 <==, 2.1 <==, 2.0 <==, 2.1 <==, 2.4 <==    ============================================================================================  LFTs    AST:   23 <== , 71 <== , 36 <== , 31 <== , 24 <== , 25 <==     ALT:  66  <== , 99  <== , 39  <== , 39  <== , 37  <== , 33  <==     AP:  67  <=, 91  <=, 79  <=, 79  <=, 76  <=, 76  <=    Bili:  0.2  <=, 0.3  <=, 0.5  <=, 0.5  <=, 0.5  <=, 0.5  <=            ABG - ( 03 Mar 2020 00:27 )  pH: 7.33  /  pCO2: 58    /  pO2: 141   / HCO3: 30    / Base Excess: 3.6   /  SaO2: 98              ABG - ( 02 Mar 2020 16:59 )  pH: 7.30  /  pCO2: 64    /  pO2: 138   / HCO3: 30    / Base Excess: 3.1   /  SaO2: 99              ABG - ( 02 Mar 2020 14:47 )  pH: 7.28  /  pCO2: 67    /  pO2: 119   / HCO3: 31    / Base Excess: 3.2   /  SaO2: 98              ABG - ( 02 Mar 2020 13:47 )  pH: 7.21  /  pCO2: 80    /  pO2: 89    / HCO3: 31    / Base Excess: 1.5   /  SaO2: 95                Procalcitonin, Serum: 0.22 ng/mL (03-02 @ 17:00)          MICROBIOLOGY:       RADIOLOGY:  [x] Chest radiographs reviewed and interpreted by me [facility] - PROGRESS NOTE    CHIEF COMPLAINT: acute on chronic hypoxic/hypercapnic respiratory failure; influenza; pneumonia; abnormal chest CT    INTERVAL HISTORY: clinically "improved" - requiring much lower dose of pressors to maintain adequate perfusing pressure; down to 60% FiO2 on the ventilator with oxygen saturation 100%; sedated but arousable; loza in place for ongoing urinary retention; no cough, sputum production, chest congestion or wheeze; no fevers, chills or sweats; no chest pain/pressure or palpitations;    REVIEW OF SYSTEMS:  Constitutional: As per interval history  HEENT: Within normal limits  CV: As per interval history  Resp: As per interval history  GI: Within normal limits   : urinary retention  Musculoskeletal: Within normal limits  Skin: Within normal limits  Neurological: Within normal limits  Psychiatric: anxiety type depression  Endocrine: Within normal limits  Hematologic/Lymphatic: Within normal limits  Allergic/Immunologic: Within normal limits    MEDICATIONS:     Pulmonary "    Anti-microbials:  cefepime   IVPB      cefepime   IVPB 2000 milliGRAM(s) IV Intermittent every 8 hours  vancomycin  IVPB 1500 milliGRAM(s) IV Intermittent every 12 hours    Cardiovascular:  phenylephrine    Infusion 0.1 MICROgram(s)/kG/Min IV Continuous <Continuous>    Other:  atorvastatin 10 milliGRAM(s) Oral at bedtime  busPIRone 15 milliGRAM(s) Oral three times a day  chlorhexidine 0.12% Liquid 15 milliLiter(s) Oral Mucosa every 12 hours  chlorhexidine 4% Liquid 1 Application(s) Topical <User Schedule>  clonazePAM  Tablet 1 milliGRAM(s) Oral daily  dextrose 5%. 1000 milliLiter(s) IV Continuous <Continuous>  dextrose 50% Injectable 12.5 Gram(s) IV Push once  dextrose 50% Injectable 25 Gram(s) IV Push once  dextrose 50% Injectable 25 Gram(s) IV Push once  enoxaparin Injectable 40 milliGRAM(s) SubCutaneous daily  fentaNYL   Infusion. 2 MICROgram(s)/kG/Hr IV Continuous <Continuous>  insulin lispro (HumaLOG) corrective regimen sliding scale   SubCutaneous every 6 hours  methylPREDNISolone sodium succinate Injectable 20 milliGRAM(s) IV Push every 8 hours  PARoxetine 30 milliGRAM(s) Oral daily  polyethylene glycol 3350 17 Gram(s) Oral daily  propofol Infusion 15 MICROgram(s)/kG/Min IV Continuous <Continuous>  psyllium Powder 1 Packet(s) Oral daily  senna Syrup 10 milliLiter(s) Oral at bedtime    MEDICATIONS  (PRN):  albuterol/ipratropium for Nebulization 3 milliLiter(s) Nebulizer every 6 hours PRN Shortness of Breath and/or Wheezing  dextrose 40% Gel 15 Gram(s) Oral once PRN Blood Glucose LESS THAN 70 milliGRAM(s)/deciliter  glucagon  Injectable 1 milliGRAM(s) IntraMuscular once PRN Glucose LESS THAN 70 milligrams/deciliter        OBJECTIVE:  Mode: AC/ CMV (Assist Control/ Continuous Mandatory Ventilation), RR (machine): 20, TV (machine): 550, FiO2: 60, PEEP: 8, ITime: 1, MAP: 13, PIP: 29  I&O's Detail    02 Mar 2020 07:01  -  03 Mar 2020 07:00  --------------------------------------------------------  IN:    Enteral Tube Flush: 310 mL    fentaNYL Infusion.: 390.8 mL    Glucerna: 150 mL    phenylephrine   Infusion: 1395.6 mL    propofol Infusion: 506 mL    Solution: 1050 mL  Total IN: 3802.4 mL    OUT:    Indwelling Catheter - Urethral: 1735 mL  Total OUT: 1735 mL    Total NET: 2067.4 mL      03 Mar 2020 07:01  -  03 Mar 2020 11:13  --------------------------------------------------------  IN:    Enteral Tube Flush: 120 mL    fentaNYL Infusion.: 62.2 mL    Glucerna: 30 mL    phenylephrine   Infusion: 77.8 mL    propofol Infusion: 46.2 mL    Solution: 200 mL  Total IN: 536.2 mL    OUT:    Indwelling Catheter - Urethral: 410 mL  Total OUT: 410 mL    Total NET: 126.2 mL    POCT Blood Glucose.: 183 mg/dL (03 Mar 2020 10:52)  POCT Blood Glucose.: 256 mg/dL (02 Mar 2020 11:44)      PHYSICAL EXAM:       ICU Vital Signs Last 24 Hrs  T(C): 37.3 (03 Mar 2020 08:00), Max: 37.5 (02 Mar 2020 20:00)  T(F): 99.1 (03 Mar 2020 08:00), Max: 99.5 (02 Mar 2020 20:00)  HR: 47 (03 Mar 2020 11:00) (47 - 89)  BP: 115/59 (03 Mar 2020 11:00) (81/44 - 152/67)  BP(mean): 81 (03 Mar 2020 11:00) (60 - 96)  ABP: --  ABP(mean): --  RR: 20 (03 Mar 2020 11:00) (9 - 25)  SpO2: 100% (03 Mar 2020 11:00) (92% - 100%) on vent 60% FiO2    General: Sedated. Intubated. No distress. Appears stated age. .  HEENT: Atraumatic. Normocephalic. Anicteric. Normal oral mucosa. PERRL. EOMI. OGT  Neck: Supple. Trachea midline. Thyroid without enlargement/tenderness/nodules. No carotid bruit. No JVD.	  Cardiovascular: Regular rate and rhythm. S1 S2 normal. No murmurs, rubs or gallops.  Respiratory: Respirations unlabored. Scattered rales. No curvature.  Abdomen: Soft. Non-tender. Non-distended. No organomegaly. No masses. Normal bowel sounds. Obese.  Extremities: Warm to touch. No clubbing or cyanosis. No pedal edema.  Pulses: 2+ peripheral pulses all extremities.	  Skin: Normal skin color. No rashes or lesions. No ecchymoses. No cyanosis. Warm to touch.  Lymph Nodes: Cervical, supraclavicular and axillary nodes normal  Neurological: Functional quadriplegia. A and O x 0  Psychiatry: Appropriate mood and affect.      LABS:                          10.8   31.66 )-----------( 287      ( 03 Mar 2020 00:33 )             34.0     CBC    WBC  31.66 <==, 34.86 <==, 31.73 <==, 23.36 <==, 22.40 <==, 20.53 <==, 25.18 <==    Hemoglobin  10.8 <<==, 11.5 <<==, 11.5 <<==, 11.6 <<==, 12.1 <<==, 11.5 <<==, 12.0 <<==    Hematocrit  34.0 <==, 34.3 <==, 35.7 <==, 35.4 <==, 36.2 <==, 34.5 <==, 35.7 <==    Platelets  287 <==, 232 <==, 182 <==, 142 <==, 152 <==, 148 <==, 143 <==      135  |  97  |  44<H>  ----------------------------<  213<H>    03-03  5.5<H>   |  26  |  1.50<H>      LYTES    sodium  135 <==, 134 <==, 131 <==, 131 <==, 133 <==, 132 <==, 132 <==    potassium   5.5 <==, 6.8 <==, 5.5 <==, 4.9 <==, 4.6 <==, 4.6 <==, 4.7 <==    chloride  97 <==, 96 <==, 95 <==, 93 <==, 95 <==, 95 <==, 96 <==    carbon dioxide  26 <==, 25 <==, 25 <==, 28 <==, 27 <==, 23 <==, 24 <==    =============================================================================================  RENAL FUNCTION:    Creatinine:   1.50  <<==, 1.40  <<==, 1.58  <<==, 0.79  <<==, 0.84  <<==, 0.78  <<==, 0.78  <<==    BUN:   44 <==, 42 <==, 40 <==, 24 <==, 25 <==, 26 <==, 30 <==    ============================================================================================    calcium   8.5 <==, 8.3 <==, 7.9 <==, 8.7 <==, 8.7 <==, 8.4 <==, 8.2 <==    phos   5.5 <==, 5.9 <==, 5.8 <==, 3.6 <==, 3.7 <==, 3.0 <==, 3.1 <==    mag   2.5 <==, 2.5 <==, 2.3 <==, 2.1 <==, 2.0 <==, 2.1 <==, 2.4 <==    ============================================================================================  LFTs    AST:   23 <== , 71 <== , 36 <== , 31 <== , 24 <== , 25 <==     ALT:  66  <== , 99  <== , 39  <== , 39  <== , 37  <== , 33  <==     AP:  67  <=, 91  <=, 79  <=, 79  <=, 76  <=, 76  <=    Bili:  0.2  <=, 0.3  <=, 0.5  <=, 0.5  <=, 0.5  <=, 0.5  <=    ABG - ( 03 Mar 2020 00:27 )  pH: 7.33  /  pCO2: 58    /  pO2: 141   / HCO3: 30    / Base Excess: 3.6   /  SaO2: 98        ABG - ( 02 Mar 2020 16:59 )  pH: 7.30  /  pCO2: 64    /  pO2: 138   / HCO3: 30    / Base Excess: 3.1   /  SaO2: 99        ABG - ( 02 Mar 2020 14:47 )  pH: 7.28  /  pCO2: 67    /  pO2: 119   / HCO3: 31    / Base Excess: 3.2   /  SaO2: 98        ABG - ( 02 Mar 2020 13:47 )  pH: 7.21  /  pCO2: 80    /  pO2: 89    / HCO3: 31    / Base Excess: 1.5   /  SaO2: 95        Procalcitonin, Serum: 0.22 ng/mL (03-02 @ 17:00)    Procalcitonin, Serum: 0.15 ng/mL (02-25 @ 09:25)    Procalcitonin, Serum: 0.69 ng/mL (02-21 @ 07:45)    Serum Pro-Brain Natriuretic Peptide: 364 pg/mL (02-19 @ 22:30)    CARDIAC MARKERS ( 20 Feb 2020 00:57 )  CPK x     /CKMB x     /CKMB Units x        troponin 17 ng/L    CARDIAC MARKERS ( 19 Feb 2020 22:30 )  CPK x     /CKMB x     /CKMB Units x        troponin 19 ng/L      Patient name: DOUGLAS MORROW  YOB: 1950   Age: 69 (M)   MR#: [id number]  Study Date: 3/1/2020  [facility]  Study quality: Technically fair  Referring Physician: Tari Sommer MD  Blood Pressure: 137/62 mmHg  Height: 183 cm  Weight: 105 kg  BSA: 2.3 m2  ------------------------------------------------------------------------  PROCEDURE: Transthoracic echocardiogram with 2-D, M-Mode  and complete spectral and color flow Doppler.  INDICATION:Dyspnea, unspecified (R06.00)  ------------------------------------------------------------------------  Dimensions:    Normal Values:  LA:     3.8    2.0 - 4.0 cm  Ao:     2.9    2.0 - 3.8 cm  SEPTUM: 1.0    0.6 - 1.2 cm  PWT:    1.1    0.6 - 1.1 cm  LVIDd:  5.3    3.0 - 5.6 cm  LVIDs:  3.4    1.8 - 4.0 cm  Derived variables:  LVMI: 95 g/m2  RWT: 0.41  EF (Visual Estimate): 65 %  Doppler Peak Velocity (m/sec): AoV=1.6 TV=3.9  ------------------------------------------------------------------------  Observations:  Mitral Valve: Normal mitral valve. Minimal mitral  regurgitation.  Aortic Valve/Aorta: Calcified aortic valve with normal  opening.  Normal aortic root size.  Left Atrium: Normal left atrium.  Left Ventricle: Normal left ventricular internal dimensions  and wall thicknesses.  Normal left ventricular systolic function. No segmental  wall motion abnormalities.  Impaired LV-relaxation with normal filling pressure.  Right Heart: Normal right atrium. Suboptimal visualization  of the right heart in all windows.  Normal tricuspid valve.  Mild-moderate tricuspid regurgitation. Normal pulmonic  valve.  Pericardium/Pleura: Normal pericardium with no pericardial  effusion.  Hemodynamic: Severe pulmonary hypertension; severely  increased pulmonary vascular resistance.  Estimated PASP at least 65 mmHg.  ------------------------------------------------------------------------  Conclusions:  Normal left ventricular systolic function. No segmental  wall motion abnormalities.  Impaired LV-relaxation with normal filling pressure.  Suboptimal visualization of the right heart in all windows.    Severe pulmonary hypertension; severely increased pulmonary  vascular resistance.  ------------------------------------------------------------------------  Confirmed on  3/1/2020 - 14:35:54 by Tyrell Brock MD,  RONNI  ------------------------------------------------------------------------      MICROBIOLOGY:     FLU A B RSV Detection by PCR (02.19.20 @ 22:30)    Flu A Result: Detected    Flu B Result: NotDetec    RSV Result: NotDetec    Legionella pneumophila Antigen, Urine (02.20.20 @ 09:50)    Legionella Antigen, Urine: Negative    Culture - Blood (02.20.20 @ 10:14)    Specimen Source: .Blood Blood-Venous    Culture Results:   No growth to date.    Culture - Blood (02.20.20 @ 10:14)    Specimen Source: .Blood Blood-Peripheral    Culture Results:   No growth to date.    RADIOLOGY:  [x] Chest radiographs reviewed and interpreted by me      EXAM:  XR CHEST PORTABLE URGENT 1V                          PROCEDURE DATE:  03/02/2020      INTERPRETATION:  CLINICAL INFORMATION: Intubated.    TECHNIQUE: AP radiograph of the chest.    COMPARISON: Chest x-ray 2/24/2020.    FINDINGS:    LINES AND TUBES: Endotracheal tube with tip above the kayden. Enteric tube with side-port at the GE junction.    LUNGS: Mild pulmonary edema.    PLEURA: No pleural effusion or pneumothorax.    HEART AND MEDIASTINUM: Heart size is within normal limits.    SKELETON: Unremarkable skeletal structures.      IMPRESSION:     Endotracheal tube with tip above the kayden.    GEN TABOR M.D., RADIOLOGY RESIDENT  This document has been electronically signed.  SADIA RIBEIRO M.D., ATTENDING RADIOLOGIST  This document has been electronically signed. Mar  2 2020 12:09PM  ---------------------------------------------------------------------------------------------------------------    EXAM:  CT CHEST                          PROCEDURE DATE:  02/24/2020      INTERPRETATION:  CLINICAL INFORMATION: Dyspnea    COMPARISON: CT chest 2/20/2020    PROCEDURE:   CT of the Chest was performed without intravenous contrast.  [place] coronal reformats were performed.      FINDINGS:    LUNGS AND AIRWAYS: The central airways are patent.  Patchy areas of groundglass opacities containing traction bronchiectasis are noted within both lungs. Addition, superimposed patchy groundglass opacities are also noted. They are new when compared to previous exam.    PLEURA: No pleural effusion.    MEDIASTINUM AND CHAVO: There are scattered small hilar and mediastinal lymph nodes.    VESSELS: Within normal limits.    HEART: The heart size is normal and there is no pericardial effusion.     CHEST WALL AND LOWER NECK: Within normal limits.    VISUALIZED UPPER ABDOMEN: Within normal limits.    BONES: There are multilevel degenerative changes of the spine    IMPRESSION:     Patchy groundglass opacities containing traction bronchiectasis bilaterally is suggestive of pulmonary fibrosis of uncertain etiology.    Groundglass opacities are noted within both lungs. Primary consideration is infection.    BRITNEY VERONICA M.D., RADIOLOGY RESIDENT  This document has been electronically signed.  TAMMIE LOTT M.D., ATTENDING RADIOLOGIST  This document has been electronically signed. Feb 24 2020  5:04PM  ---------------------------------------------------------------------------------------------------------------    EXAM:  CT ANGIO CHEST (W)AW IC                          PROCEDURE DATE:  02/20/2020      INTERPRETATION:  CLINICAL INFORMATION: Shortness of breath and fatigue. History of lung disease.    COMPARISON: Chest radiograph 2/19/2020    PROCEDURE:   CT Angiography of the Chest.  90 ml of Omnipaque 350 was injected intravenously. 10 ml were discarded.  Sagittal and coronal reformats were performed as well as 3D (MIP) reconstructions.    FINDINGS:    LUNGS AND AIRWAYS: Patent central airways. Areas of architectural distortion, mild traction bronchiectasis and linear/reticular opacities. Nonspecific groundglass/patchy opacities in both lungs.      PLEURA: No pleural effusion or pneumothorax.    MEDIASTINUM AND CHAVO: Subcentimeter mediastinal and hilar lymph nodes without lymphadenopathy.    VESSELS: Adequate contrast opacification of the pulmonary arteries. No obvious pulmonary embolus.    HEART: Normal heart size. No pericardial effusion.    CHEST WALL AND LOWER NECK: Heterogeneous thyroid gland, obscured by artifact.    VISUALIZED UPPER ABDOMEN: Colon diverticulosis.    BONES: Degenerative changes of the spine.    IMPRESSION:     No obvious pulmonary embolus.    Findings reflecting known pulmonary fibrosis. Recommend clinical correlation to assess underlying pneumonia. Recommend comparison to previous outside study and follow-up to exclude potential underlying neoplasm.    Heterogeneous thyroid gland, which is difficult to assess due to artifact and can be further characterized on a nonemergent ultrasound as clinically indicated.     Additional findings as described.    RAMONA REDDY M.D., RADIOLOGY RESIDENT  This document has been electronically signed.  MIAH CHAUDHRY M.D., ATTENDING RADIOLOGIST  This document has been electronically signed. Feb 20 2020  1:49AM  ---------------------------------------------------------------------------------------------------------------

## 2020-03-03 NOTE — PROGRESS NOTE ADULT - ASSESSMENT
68 yo male with HTN, anxiety disorder, and pulmonary fibrosis admitted with hypoxic respiratory failure.  Influenza A diagnosed 2/16 with a rapid test.  CTA is negative for PE.  Started on CTX and azithro along with tamiflu  Legionella urine antigen negative, MRSA screen is negative and his PC is elevated to 0.69.  The elevated PC is suggestive of a bacterial infection  Lack of improvement with tamiflu documented and raised concern of both viral pneumonia and a secondary process such as a bacterial pneumonia - empiric antibiotics cont'd  Repeated CT chest revealed pulm fibrosis & bilateral groundglass opacities concerning for infection-2/24  However remains afebrile & PCT is low at 0.15  Leukocytosis unreliable - given pt on steroids  CTX, azithro were changed to vanco & cefepime on 2/25/20, in case this was HCAP, although low overall suspicion in this afebrile pt with low PCT.  Hypoxemia remains an issue with ongoing requirement for high flow O2 support & steroids.  ECHO with severe pulmonary hypertension  2/29  had a fever of 100.8 - was unaware of it subjectively - significance unclear  No fever since 2/29  Now requiring ventilatory support  S/P vanco and cefepime course on 3/2  preliminary BAL findings noted  Suggest:  Steroid mgmt as per ICU/ pulmonary  Leukocytosis unreliable on IV steroids  Suspect respiratory failure is multifactorial  Consider sending both  a fungitell ans a galactomannan, Influenza can be a risk factor for pulmonary aspergillosis  Supportive care per MICU  I am okay with conservative management off antibiotics  A positive RVP at this point would be difficult to interpret, would likely be non viable influenza vs ? drug resistant strain

## 2020-03-03 NOTE — PROGRESS NOTE ADULT - SUBJECTIVE AND OBJECTIVE BOX
CARDIOLOGY     PROGRESS  NOTE   ________________________________________________    CHIEF COMPLAINT:Patient is a 69y old  Male who presents with a chief complaint of SOB, cough and weakness (03 Mar 2020 07:40)  sedated on vent still.  	  REVIEW OF SYSTEMS:  CONSTITUTIONAL: No fever, weight loss, or fatigue  EYES: No eye pain, visual disturbances, or discharge  ENT:  No difficulty hearing, tinnitus, vertigo; No sinus or throat pain  NECK: No pain or stiffness  RESPIRATORY: No cough, wheezing, chills or hemoptysis; No Shortness of Breath  CARDIOVASCULAR: No chest pain, palpitations, passing out, dizziness, or leg swelling  GASTROINTESTINAL: No abdominal or epigastric pain. No nausea, vomiting, or hematemesis; No diarrhea or constipation. No melena or hematochezia.  GENITOURINARY: No dysuria, frequency, hematuria, or incontinence  NEUROLOGICAL: No headaches, memory loss, loss of strength, numbness, or tremors  SKIN: No itching, burning, rashes, or lesions   LYMPH Nodes: No enlarged glands  ENDOCRINE: No heat or cold intolerance; No hair loss  MUSCULOSKELETAL: No joint pain or swelling; No muscle, back, or extremity pain  PSYCHIATRIC: No depression, anxiety, mood swings, or difficulty sleeping  HEME/LYMPH: No easy bruising, or bleeding gums  ALLERGY AND IMMUNOLOGIC: No hives or eczema	    [ ] All others negative	  [x ] Unable to obtain    PHYSICAL EXAM:  T(C): 37.3 (03-03-20 @ 08:00), Max: 37.5 (03-02-20 @ 20:00)  HR: 83 (03-03-20 @ 09:15) (49 - 83)  BP: 147/67 (03-03-20 @ 09:15) (81/44 - 147/67)  RR: 20 (03-03-20 @ 09:15) (9 - 22)  SpO2: 96% (03-03-20 @ 09:15) (88% - 100%)  Wt(kg): --  I&O's Summary    02 Mar 2020 07:01  -  03 Mar 2020 07:00  --------------------------------------------------------  IN: 3802.4 mL / OUT: 1735 mL / NET: 2067.4 mL    03 Mar 2020 07:01  -  03 Mar 2020 09:35  --------------------------------------------------------  IN: 294.9 mL / OUT: 100 mL / NET: 194.9 mL        Appearance: sedated on vent  HEENT:   Normal oral mucosa, PERRL, EOMI	  Lymphatic: No lymphadenopathy  Cardiovascular: Normal S1 S2, No JVD, + murmurs, No edema  Respiratory: Lungs clear to auscultation	  Psychiatry: A & O x 3, Mood & affect appropriate  Gastrointestinal:  Soft, Non-tender, + BS	  Skin: No rashes, No ecchymoses, No cyanosis	  Neurologic: Non-focal  Extremities: Normal range of motion, No clubbing, cyanosis or edema  Vascular: Peripheral pulses palpable 2+ bilaterally    MEDICATIONS  (STANDING):  atorvastatin 10 milliGRAM(s) Oral at bedtime  busPIRone 15 milliGRAM(s) Oral three times a day  cefepime   IVPB      cefepime   IVPB 2000 milliGRAM(s) IV Intermittent every 8 hours  chlorhexidine 0.12% Liquid 15 milliLiter(s) Oral Mucosa every 12 hours  chlorhexidine 4% Liquid 1 Application(s) Topical <User Schedule>  clonazePAM  Tablet 1 milliGRAM(s) Oral daily  dextrose 5%. 1000 milliLiter(s) (50 mL/Hr) IV Continuous <Continuous>  dextrose 50% Injectable 12.5 Gram(s) IV Push once  dextrose 50% Injectable 25 Gram(s) IV Push once  dextrose 50% Injectable 25 Gram(s) IV Push once  enoxaparin Injectable 40 milliGRAM(s) SubCutaneous daily  fentaNYL   Infusion. 2 MICROgram(s)/kG/Hr (11.85 mL/Hr) IV Continuous <Continuous>  insulin lispro (HumaLOG) corrective regimen sliding scale   SubCutaneous every 6 hours  methylPREDNISolone sodium succinate Injectable 20 milliGRAM(s) IV Push every 8 hours  PARoxetine 30 milliGRAM(s) Oral daily  phenylephrine    Infusion 0.1 MICROgram(s)/kG/Min (4.444 mL/Hr) IV Continuous <Continuous>  polyethylene glycol 3350 17 Gram(s) Oral daily  propofol Infusion 15 MICROgram(s)/kG/Min (10.665 mL/Hr) IV Continuous <Continuous>  psyllium Powder 1 Packet(s) Oral daily  senna Syrup 10 milliLiter(s) Oral at bedtime  vancomycin  IVPB 1500 milliGRAM(s) IV Intermittent every 12 hours      TELEMETRY: 	    ECG:  	  RADIOLOGY:  OTHER: 	  	  LABS:	 	    CARDIAC MARKERS:                                10.8   31.66 )-----------( 287      ( 03 Mar 2020 00:33 )             34.0     03-03    135  |  97  |  44<H>  ----------------------------<  213<H>  5.5<H>   |  26  |  1.50<H>    Ca    8.5      03 Mar 2020 07:00  Phos  5.5     03-03  Mg     2.5     03-03    TPro  5.7<L>  /  Alb  2.3<L>  /  TBili  0.2  /  DBili  x   /  AST  23  /  ALT  66<H>  /  AlkPhos  67  03-03    proBNP: Serum Pro-Brain Natriuretic Peptide: 364 pg/mL (02-19 @ 22:30)    Lipid Profile:   HgA1c: Hemoglobin A1C, Whole Blood: 6.6 % (02-24 @ 08:40)    TSH: Thyroid Stimulating Hormone, Serum: 0.39 uIU/mL (02-21 @ 09:19)  Thyroid Stimulating Hormone, Serum: 0.47 uIU/mL (02-21 @ 00:19)    - Intubated (3/2); sedated on fentanyl and propofol gtt  - C/w home clonazepam standing 1mg PO daily  - C/w home buspirone 15mg PO TID  - C/w home paroxetine 30mg PO daily    PULM:   # Acute hypoxic respiratory failure   Multifactorial related to influenza infection with high suspicion for superimposed bacterial PNA in setting of underlying lung disease. May also have component of aspiration pneumonitis.   - Intubated (3/2) as pt failed use of high flow FiO2 100% (ABG showing pO2 48), acapella valve, incentive spirometry  - s/p Tamiflu for total 10 days (2/20-2/29)  - C/w vanc/cefepime (2/25- )  - C/w duonebs q6h PRN  - serial ABGs  - s/p Bronchoscopy (3/2): airways appeared moderately inflamed and collapsable diffusely, multiple small diverticula, scant mucus b/l; brochoalveolar lavage performed, studies sent.    # Pulmonary fibrosis:   - Appreciate Pulm recs  - As per pt's pulmonologist group (Dr. Dia/Dr. Pop): Hx of "insignificant pulmonary fibrosis," Seen for chronic cough, PFTs (2017) was wnl aside from diffusion capacity of 74%, and CT Chest (2013) showed no evidence of pulm fibrosis or other significant pulm pathology  - c/w solumedrol 20mg IV Q8        Assessment and plan  ---------------------------  69M with PMH of HTN, pulmonary fibrosis, depression/anxiety p/w SOB and cough. Pt states his mother passed away about 2 weeks ago and shortly after burying here, started to feel very unwell. He has had progressively worsening cough, non productive, but feels a lot of chest congestion. Associated with ROSE, headache and myalgias and fatigue. Was seen at  earlier this week and was diagnosed with flu. In the past couple of days, has been having very labored breathing; today, had an episode where he fell while trying to reach for VOLITIONRXt switch and was so winded and weak, he was unable to get up. Has barely been eating anything due to poor appetite and nausea, but denies vomiting. Denies fevers or chills, no CP, no palpitations, no abdominal pain, had 3 episodes of loose stools today, +decreased UOP, no LE swelling. Does not know of any sick contacts. Denies recent travel.  sob/ respiratory failure sec to hx of pulmonary fibrosis ?pneumoniae.  abx  add calcium channel blocker for bp control, increase Cardizem CD if increase bp/hr  dvt prophylaxis  asa daily  echo result noted with severe pulmonary htn  supportive measures  sedated on vent  continue iv abx, sp bronch  taper phenylephrine  dvt priophylaxis

## 2020-03-03 NOTE — PROGRESS NOTE ADULT - ASSESSMENT
ASSESSMENT:    hypoxic respiratory failure - multifactorial - hypercapnia on ABG likely due to iatric hypoventilation on the ventilator - has developed severe pulmonary hypertension with ongoing hypoxemia    1) influenza infection complicated by bronchospasm (resolved) and pneumonia - must be concerned about a superimposed bacterial infection with increasing leukocytosis despite the non-elevated procalcitonin level - at risk for MRSA and aspergillus   2) underlying mild pulmonary fibrosis with traction bronchiectasis    PLAN/RECOMMENDATIONS:    vent support - increase minute ventilation  pressors to keep MAP > 65mmHg  sedation with daily "sedation vacations"  chlorhexidine mouth wash  bronchoscopy for culture  check fungitell level and galactomannan level  check RVP - in still flu positive, would restart tamiflu  solumedrol 20mg IVP q8h - glucose control  vanco/cefepime - blood cultures  would hold cozaar and diltiazem  loza catheter  bowel regimen    Will follow with you. Plan of care discussed with the patient's family at bedside and the MICU team.    Galileo Pop MD, [facility]  [phone]  Pulmonary Medicine ASSESSMENT:    hypoxic and hypercapnic respiratory failure - multifactorial - has developed severe pulmonary hypertension with ongoing hypoxemia    1) influenza infection complicated by bronchospasm (resolved) and pneumonia - must be concerned about a superimposed bacterial infection with increasing leukocytosis despite the non-elevated procalcitonin level - at risk for MRSA and aspergillus   2) underlying mild pulmonary fibrosis now with traction bronchiectasis    PLAN/RECOMMENDATIONS:    vent support - taper FiO2 as tolerated  pressors to keep MAP > 65mmHg -> taper as tolerated  sedation with daily "sedation vacations"  chlorhexidine mouth wash  await bronchoscopy cultures  check fungitell level and galactomannan level  check RVP - if still flu positive, would restart tamiflu  solumedrol 20mg IVP q8h - glucose control  vanco/cefepime - blood cultures  holding cozaar and diltiazem  buspar/klonopin/paxil  loza catheter  bowel regimen    Will follow with you. Plan of care discussed with the patient's family at bedside and the MICU team.    Galileo Pop MD, [facility]  [phone]  Pulmonary Medicine

## 2020-03-03 NOTE — PROGRESS NOTE ADULT - SUBJECTIVE AND OBJECTIVE BOX
Seen in ICU, intubated    Vital Signs Last 24 Hrs  T(C): 37 (03-03-20 @ 16:00), Max: 37.5 (03-02-20 @ 20:00)  T(F): 98.6 (03-03-20 @ 16:00), Max: 99.5 (03-02-20 @ 20:00)  HR: 49 (03-03-20 @ 16:45) (46 - 89)  BP: 110/55 (03-03-20 @ 16:45) (88/55 - 152/67)  BP(mean): 78 (03-03-20 @ 16:45) (62 - 96)  RR: 20 (03-03-20 @ 16:45) (10 - 26)  SpO2: 97% (03-03-20 @ 16:45) (84% - 100%)    I&O's Summary    02 Mar 2020 07:01  -  03 Mar 2020 07:00  --------------------------------------------------------  IN: 3802.4 mL / OUT: 1735 mL / NET: 2067.4 mL    03 Mar 2020 07:01  -  03 Mar 2020 17:08  --------------------------------------------------------  IN: 1002.2 mL / OUT: 880 mL / NET: 122.2 mL    PHYSICAL EXAM:    Respiratory: coarse BS b/l  Cardiovascular: S1 S2  Gastrointestinal: soft, ND  Extremities: sm edema                                           10.8   31.66 )-----------( 287      ( 03 Mar 2020 00:33 )             34.0     03 Mar 2020 15:52    136    |  98     |  45     ----------------------------<  187    5.1     |  27     |  1.34     Ca    8.5        03 Mar 2020 15:52  Phos  4.3       03 Mar 2020 15:52  Mg     2.5       03 Mar 2020 15:52    TPro  5.7    /  Alb  2.3    /  TBili  0.2    /  DBili  x      /  AST  23     /  ALT  66     /  AlkPhos  67     03 Mar 2020 07:00    LIVER FUNCTIONS - ( 03 Mar 2020 07:00 )  Alb: 2.3 g/dL / Pro: 5.7 g/dL / ALK PHOS: 67 U/L / ALT: 66 U/L / AST: 23 U/L / GGT: x           albuterol/ipratropium for Nebulization 3 milliLiter(s) Nebulizer every 6 hours PRN  atorvastatin 10 milliGRAM(s) Oral at bedtime  busPIRone 15 milliGRAM(s) Oral three times a day  chlorhexidine 0.12% Liquid 15 milliLiter(s) Oral Mucosa every 12 hours  chlorhexidine 4% Liquid 1 Application(s) Topical <User Schedule>  clonazePAM  Tablet 1 milliGRAM(s) Oral daily  dextrose 40% Gel 15 Gram(s) Oral once PRN  dextrose 5%. 1000 milliLiter(s) IV Continuous <Continuous>  dextrose 50% Injectable 12.5 Gram(s) IV Push once  dextrose 50% Injectable 25 Gram(s) IV Push once  dextrose 50% Injectable 25 Gram(s) IV Push once  enoxaparin Injectable 40 milliGRAM(s) SubCutaneous daily  fentaNYL   Infusion. 2 MICROgram(s)/kG/Hr IV Continuous <Continuous>  glucagon  Injectable 1 milliGRAM(s) IntraMuscular once PRN  insulin lispro (HumaLOG) corrective regimen sliding scale   SubCutaneous every 6 hours  methylPREDNISolone sodium succinate Injectable 20 milliGRAM(s) IV Push every 8 hours  PARoxetine 30 milliGRAM(s) Oral daily  phenylephrine    Infusion 0.1 MICROgram(s)/kG/Min IV Continuous <Continuous>  polyethylene glycol 3350 17 Gram(s) Oral daily  propofol Infusion 15 MICROgram(s)/kG/Min IV Continuous <Continuous>  psyllium Powder 1 Packet(s) Oral daily  senna Syrup 10 milliLiter(s) Oral at bedtime      Assessment and Plan:     S/p flu A, PNA, hx pulm fibrosis  Resp failure, intubated in ICU  S/p SIADH in setting of pulm process  Resolved  Hemodynamic AURA  Goal SBP > 90  Avoid nephrotoxins  F/u lytes, renal fx, UO  Would change feeds to Nepro for now  D/w ICU team

## 2020-03-03 NOTE — PROGRESS NOTE ADULT - SUBJECTIVE AND OBJECTIVE BOX
CC: f/u for hypoxic respiratory failure    Patient reports: he is sedated on vent.He is with moderate secretions, BAL specimens with just yeast like cells.No fever, not ready for weaning yet but hemodynamically stable    REVIEW OF SYSTEMS:  All other review of systems negative (Comprehensive ROS): limited by underlying condition    Antimicrobials Day #  :off    Other Medications Reviewed    T(F): 98.4 (03-03-20 @ 12:00), Max: 99.5 (03-02-20 @ 20:00)  HR: 49 (03-03-20 @ 14:45)  BP: 113/59 (03-03-20 @ 14:45)  RR: 20 (03-03-20 @ 14:45)  SpO2: 96% (03-03-20 @ 14:45)  Wt(kg): --    PHYSICAL EXAM:  General: sedated, no acute distress  Eyes:  anicteric, no conjunctival injection, no discharge  Oropharynx: ETT	  Neck: supple, without adenopathy  Lungs: coarse BS  Heart: regular rate and rhythm; no murmur, rubs or gallops  Abdomen: soft, nondistended, nontender, without mass or organomegaly  Skin: no lesions  Extremities: no clubbing, cyanosis, trace edema  Neurologic: sedated on vent    LAB RESULTS:                        10.8   31.66 )-----------( 287      ( 03 Mar 2020 00:33 )             34.0     03-03    135  |  97  |  44<H>  ----------------------------<  213<H>  5.5<H>   |  26  |  1.50<H>    Ca    8.5      03 Mar 2020 07:00  Phos  5.5     03-03  Mg     2.5     03-03    TPro  5.7<L>  /  Alb  2.3<L>  /  TBili  0.2  /  DBili  x   /  AST  23  /  ALT  66<H>  /  AlkPhos  67  03-03    LIVER FUNCTIONS - ( 03 Mar 2020 07:00 )  Alb: 2.3 g/dL / Pro: 5.7 g/dL / ALK PHOS: 67 U/L / ALT: 66 U/L / AST: 23 U/L / GGT: x             MICROBIOLOGY:  RECENT CULTURES:  03-02 @ 21:29 BAL Bronchoalveolar Lavage     Testing in progress    Numerous polymorphonuclear leukocytes per low power field  Rare Squamous Epithelial Cells  Few Yeast like cells per oil power field    03-02 @ 21:27 BAL Bronchoalveolar Lavage     Testing in progress    Moderate polymorphonuclear leukocytes per low power field  Rare Squamous Epithelial Cells per low power field  Few Yeast like cells per oil power field    03-01 @ 16:33 .Blood Blood-Venous     No growth to date.          RADIOLOGY REVIEWED:  < from: Xray Chest 1 View- PORTABLE-Urgent (03.02.20 @ 18:27) >  INTERPRETATION:  A single chest x-ray was obtained on March 2, 2020.    Indication: Intubated.    Impression:    Suboptimal study. The right hemithorax is not included on the film. Pulmonary vascular congestion is seen on the left lung. Left lower lobe pneumonia and/or atelectasis cannot be ruled out entirely. Endotracheal tube and NG tube are in good position. No pneumothorax. The overall appearance of the chest remain unchanged compared to previous study done March 2, 2020 at 10:44 AM.    < end of copied text >

## 2020-03-03 NOTE — PROGRESS NOTE ADULT - ASSESSMENT
diarrhea - resolved  anemia  constipation  hypoxic respiratory failure    acute hypoxic respiratory failure requiring endotracheal intubation and ventilatory support  management per MICU/pulmonary appreciated  cont bowel regimen; recommend increasing miralax to BID   h/h stable  cont OGT feeds as tolerated   colonoscopy 3 years ago, no need to repeat as inpatient  monitor for gi bleed  encourage OOB/ambulation  dvt ppx

## 2020-03-03 NOTE — CHART NOTE - NSCHARTNOTEFT_GEN_A_CORE
Nutrition Follow Up Note  Patient seen for: MICU f/u    Chart reviewed, events noted. Adm dx: respiratory failure. Intubated 3/2    Source:     Diet :     Patient reports:     PO intake :     Source for PO intake:     Enteral /Parenteral Nutrition:       Daily Weight in k.9 (), Weight in k.1 (), Weight in k.2 (), Weight in k (), Weight in k.7 (), Weight in k.4 ()  % Weight Change    Pertinent Medications: MEDICATIONS  (STANDING):  atorvastatin 10 milliGRAM(s) Oral at bedtime  busPIRone 15 milliGRAM(s) Oral three times a day  chlorhexidine 0.12% Liquid 15 milliLiter(s) Oral Mucosa every 12 hours  chlorhexidine 4% Liquid 1 Application(s) Topical <User Schedule>  clonazePAM  Tablet 1 milliGRAM(s) Oral daily  dextrose 5%. 1000 milliLiter(s) (50 mL/Hr) IV Continuous <Continuous>  dextrose 50% Injectable 12.5 Gram(s) IV Push once  dextrose 50% Injectable 25 Gram(s) IV Push once  dextrose 50% Injectable 25 Gram(s) IV Push once  enoxaparin Injectable 40 milliGRAM(s) SubCutaneous daily  fentaNYL   Infusion. 2 MICROgram(s)/kG/Hr (11.85 mL/Hr) IV Continuous <Continuous>  insulin lispro (HumaLOG) corrective regimen sliding scale   SubCutaneous every 6 hours  methylPREDNISolone sodium succinate Injectable 20 milliGRAM(s) IV Push every 8 hours  PARoxetine 30 milliGRAM(s) Oral daily  phenylephrine    Infusion 0.1 MICROgram(s)/kG/Min (4.444 mL/Hr) IV Continuous <Continuous>  polyethylene glycol 3350 17 Gram(s) Oral daily  propofol Infusion 15 MICROgram(s)/kG/Min (10.665 mL/Hr) IV Continuous <Continuous>  psyllium Powder 1 Packet(s) Oral daily  senna Syrup 10 milliLiter(s) Oral at bedtime    MEDICATIONS  (PRN):  albuterol/ipratropium for Nebulization 3 milliLiter(s) Nebulizer every 6 hours PRN Shortness of Breath and/or Wheezing  dextrose 40% Gel 15 Gram(s) Oral once PRN Blood Glucose LESS THAN 70 milliGRAM(s)/deciliter  glucagon  Injectable 1 milliGRAM(s) IntraMuscular once PRN Glucose LESS THAN 70 milligrams/deciliter     @ 07:00: Na 135, BUN 44<H>, Cr 1.50<H>, <H>, K+ 5.5<H>, Phos 5.5<H>, Mg 2.5, Alk Phos 67, ALT/SGPT 66<H>, AST/SGOT 23, HbA1c --   @ 05:28: Na 134<L>, BUN 42<H>, Cr 1.40<H>, <H>, K+ 6.8<HH>, Phos 5.9<H>, Mg 2.5, Alk Phos --, ALT/SGPT --, AST/SGOT --, HbA1c --   @ 00:33: Na 131<L>, BUN 40<H>, Cr 1.58<H>, <H>, K+ 5.5<H>, Phos 5.8<H>, Mg 2.3, Alk Phos --, ALT/SGPT --, AST/SGOT --, HbA1c --    Finger Sticks:  POCT Blood Glucose.: 183 mg/dL ( @ 10:52)      Skin per nursing documentation:   Edema:    Estimated Needs:   [ ] no change since previous assessment  [ ] recalculated:     Previous Nutrition Diagnosis:   Nutrition Diagnosis is:    New Nutrition Diagnosis:  Related to:    As evidenced by:      Interventions:     Recommend  1)    Monitoring and Evaluation:     Continue to monitor Nutritional intake, Tolerance to diet prescription, weights, labs, skin integrity    RD remains available upon request and will follow up per protocol Nutrition Follow Up Note  Patient seen for: MICU f/u    Chart reviewed, events noted. Adm dx: respiratory failure. Intubated 3/2.    Source: chart, team    Diet : 3/2 Glucerna 1.2 50cc/hr x 18 hrs via OGT     Enteral /Parenteral Nutrition: 24 hr EN intake 3/3 150cc, 3/3 propofol intake 556 kcals    GI: no vomiting, no abd distention last BM 3/1 (on bowel regimen)    Daily Weight in k.9 (), Weight in k.1 (), Weight in k.2 (-), Weight in k (), Weight in k.7 (), Weight in k.4 ()  wt range 226-238 lb since adm, dosing wt  118.5 kg ? discrepancy  has 2+ generalized edema    Pertinent Medications: MEDICATIONS  (STANDING):  atorvastatin 10 milliGRAM(s) Oral at bedtime  busPIRone 15 milliGRAM(s) Oral three times a day  chlorhexidine 0.12% Liquid 15 milliLiter(s) Oral Mucosa every 12 hours  chlorhexidine 4% Liquid 1 Application(s) Topical <User Schedule>  clonazePAM  Tablet 1 milliGRAM(s) Oral daily  dextrose 5%. 1000 milliLiter(s) (50 mL/Hr) IV Continuous <Continuous>  dextrose 50% Injectable 12.5 Gram(s) IV Push once  dextrose 50% Injectable 25 Gram(s) IV Push once  dextrose 50% Injectable 25 Gram(s) IV Push once  enoxaparin Injectable 40 milliGRAM(s) SubCutaneous daily  fentaNYL   Infusion. 2 MICROgram(s)/kG/Hr (11.85 mL/Hr) IV Continuous <Continuous>  insulin lispro (HumaLOG) corrective regimen sliding scale   SubCutaneous every 6 hours  methylPREDNISolone sodium succinate Injectable 20 milliGRAM(s) IV Push every 8 hours  PARoxetine 30 milliGRAM(s) Oral daily  phenylephrine    Infusion 0.1 MICROgram(s)/kG/Min (4.444 mL/Hr) IV Continuous <Continuous>  polyethylene glycol 3350 17 Gram(s) Oral daily  propofol Infusion 15 MICROgram(s)/kG/Min (10.665 mL/Hr) IV Continuous <Continuous>  psyllium Powder 1 Packet(s) Oral daily  senna Syrup 10 milliLiter(s) Oral at bedtime    MEDICATIONS  (PRN):  albuterol/ipratropium for Nebulization 3 milliLiter(s) Nebulizer every 6 hours PRN Shortness of Breath and/or Wheezing  dextrose 40% Gel 15 Gram(s) Oral once PRN Blood Glucose LESS THAN 70 milliGRAM(s)/deciliter  glucagon  Injectable 1 milliGRAM(s) IntraMuscular once PRN Glucose LESS THAN 70 milligrams/deciliter     @ 07:00: Na 135, BUN 44<H>, Cr 1.50<H>, <H>, K+ 5.5<H>, Phos 5.5<H>, Mg 2.5, Alk Phos 67, ALT/SGPT 66<H>, AST/SGOT 23, HbA1c --   @ 05:28: Na 134<L>, BUN 42<H>, Cr 1.40<H>, <H>, K+ 6.8<HH>, Phos 5.9<H>, Mg 2.5, Alk Phos --, ALT/SGPT --, AST/SGOT --, HbA1c --   @ 00:33: Na 131<L>, BUN 40<H>, Cr 1.58<H>, <H>, K+ 5.5<H>, Phos 5.8<H>, Mg 2.3, Alk Phos --, ALT/SGPT --, AST/SGOT --, HbA1c --    Finger Sticks:  POCT Blood Glucose.: 183 mg/dL ( @ 10:52)      Skin per nursing documentation: no pressure injuries documented     Estimated Needs: reassessed for intubation  energy needs based on 3/2 wt 102.9 kg 9155-7048 kcals (11-14 kcals/kg), protein needs based on IBW 80.9 kg 113-129 gm (1.4-1.6 gm/kg)    Previous Nutrition Diagnosis: inadequate oral intake  Nutrition Diagnosis is: n/a, pt now intubated    New Nutrition Diagnosis: none  Related to:    As evidenced by:      Interventions:     Recommend  1) Vital 1.2 at 60 cc/hr x 18 hrs provides 1296 kcals, 81 gm protein, 876cc free water; No Carb Prosource 2 times/day (120 kcals),  together provides 1416 kcal for 14cal/Kg 3/2 wt 102.9 kg and 111gm protein for 1.4 Gm/Kg protein based on IBW 80.9Kg).     Monitoring and Evaluation:     Continue to monitor Nutritional intake, Tolerance to diet prescription, weights, labs, skin integrity    RD remains available upon request and will follow up per protocol Nutrition Follow Up Note  Patient seen for: MICU f/u    Chart reviewed, events noted. Adm dx: respiratory failure. Intubated 3/2.    Source: chart, team    Diet : 3/2 Glucerna 1.2 50cc/hr x 18 hrs via OGT     Enteral /Parenteral Nutrition: 24 hr EN intake 3/3 150cc, 3/3 propofol intake 556 kcals    GI: no vomiting, no abd distention last BM 3/1 (on bowel regimen)    Daily Weight in k.9 (), Weight in k.1 (), Weight in k.2 (-), Weight in k (), Weight in k.7 (), Weight in k.4 ()  wt range 226-238 lb since adm, dosing wt  118.5 kg ? discrepancy  has 2+ generalized edema    Pertinent Medications: MEDICATIONS  (STANDING):  atorvastatin 10 milliGRAM(s) Oral at bedtime  busPIRone 15 milliGRAM(s) Oral three times a day  chlorhexidine 0.12% Liquid 15 milliLiter(s) Oral Mucosa every 12 hours  chlorhexidine 4% Liquid 1 Application(s) Topical <User Schedule>  clonazePAM  Tablet 1 milliGRAM(s) Oral daily  dextrose 5%. 1000 milliLiter(s) (50 mL/Hr) IV Continuous <Continuous>  dextrose 50% Injectable 12.5 Gram(s) IV Push once  dextrose 50% Injectable 25 Gram(s) IV Push once  dextrose 50% Injectable 25 Gram(s) IV Push once  enoxaparin Injectable 40 milliGRAM(s) SubCutaneous daily  fentaNYL   Infusion. 2 MICROgram(s)/kG/Hr (11.85 mL/Hr) IV Continuous <Continuous>  insulin lispro (HumaLOG) corrective regimen sliding scale   SubCutaneous every 6 hours  methylPREDNISolone sodium succinate Injectable 20 milliGRAM(s) IV Push every 8 hours  PARoxetine 30 milliGRAM(s) Oral daily  phenylephrine    Infusion 0.1 MICROgram(s)/kG/Min (4.444 mL/Hr) IV Continuous <Continuous>  polyethylene glycol 3350 17 Gram(s) Oral daily  propofol Infusion 15 MICROgram(s)/kG/Min (10.665 mL/Hr) IV Continuous <Continuous>  psyllium Powder 1 Packet(s) Oral daily  senna Syrup 10 milliLiter(s) Oral at bedtime    MEDICATIONS  (PRN):  albuterol/ipratropium for Nebulization 3 milliLiter(s) Nebulizer every 6 hours PRN Shortness of Breath and/or Wheezing  dextrose 40% Gel 15 Gram(s) Oral once PRN Blood Glucose LESS THAN 70 milliGRAM(s)/deciliter  glucagon  Injectable 1 milliGRAM(s) IntraMuscular once PRN Glucose LESS THAN 70 milligrams/deciliter     @ 07:00: Na 135, BUN 44<H>, Cr 1.50<H>, <H>, K+ 5.5<H>, Phos 5.5<H>, Mg 2.5, Alk Phos 67, ALT/SGPT 66<H>, AST/SGOT 23, HbA1c --   @ 05:28: Na 134<L>, BUN 42<H>, Cr 1.40<H>, <H>, K+ 6.8<HH>, Phos 5.9<H>, Mg 2.5, Alk Phos --, ALT/SGPT --, AST/SGOT --, HbA1c --   @ 00:33: Na 131<L>, BUN 40<H>, Cr 1.58<H>, <H>, K+ 5.5<H>, Phos 5.8<H>, Mg 2.3, Alk Phos --, ALT/SGPT --, AST/SGOT --, HbA1c --    Finger Sticks:  POCT Blood Glucose.: 183 mg/dL ( @ 10:52)      Skin per nursing documentation: no pressure injuries documented     Estimated Needs: reassessed for intubation  energy needs based on 3/2 wt 102.9 kg 7367-0392 kcals (11-14 kcals/kg), protein needs based on IBW 80.9 kg 113-129 gm (1.4-1.6 gm/kg)    Previous Nutrition Diagnosis: inadequate oral intake  Nutrition Diagnosis is: n/a, pt now intubated    New Nutrition Diagnosis: none  Related to:    As evidenced by:      Interventions:     Recommend  1) Vital 1.2 at 60 cc/hr x 18 hrs provides 1296 kcals, 81 gm protein, 876cc free water; No Carb Prosource 2 times/day (120 kcals),  together provides 1416 kcal for 14cal/Kg 3/2 wt 102.9 kg and 111gm protein for 1.4 Gm/Kg protein based on IBW 80.9Kg).   2) reassess BG control for BG >180    Monitoring and Evaluation:     Continue to monitor Nutritional intake, Tolerance to diet prescription, weights, labs, skin integrity    RD remains available upon request and will follow up per protocol

## 2020-03-03 NOTE — PROGRESS NOTE ADULT - ATTENDING COMMENTS
Patient seen and examined, agree with above     69M PMH HTN, HLD, pulmonary fibrosis, anxiety/depression, admitted with acute hypoxic respiratory failure due to Influenza and superimposed bacterial pneumonia. Course complicated by worsening resp failure requiring intubation on 3/2, he had low grade fever on 2/29 and has worsening leucocytosis, albeit on steroids (since admission). Recent resp status deterioration could be due to mucus plugging vs superimposed pneumonia.     Recs:   Bronch, cell count and gram stain not consistent with bacterial pneumonia - d/c abx and monitor   He likely has pneumonitis from aspiration   Repeat RVP per pulm, will d/w ID prior to repeating Tamiflu course   F/u Fungitell and Galactomannan   Wean propofol and fentanyl, he followed commands and moved all extremities during rounds today   Daen requirement decreasing   Failed PSV trial - continue AC volume   Continue TF, add Dulcolax supp for constipation   Cr worsened but UO improved c/w yesterday, likely stable AURA - monitor for now   Give a dose of Lokelma for hyperkalemia   Continue steroids for now - plan TBD based on clinical course   Lovenox for DVT ppx     Patient remains critically ill due to multiorgan failure

## 2020-03-03 NOTE — PROGRESS NOTE ADULT - SUBJECTIVE AND OBJECTIVE BOX
CHIEF COMPLAINT: SOB, cough and weakness    OVERNIGHT / SUBJECTIVE:  -Overnight, no acute events. Had bronchoscopy yesterday. Vanc trough 25, skipped a dose. Was then 16.8, resumed vanc at current dose. K+ 5.5, pending repeat. Started following commands.  -Today, pt seen and examined at bedside in AM.     REVIEW OF SYSTEMS:    CONSTITUTIONAL: No weakness, fevers or chills  EYES/ENT: No visual changes;  No vertigo or throat pain   NECK: No pain or stiffness  RESPIRATORY: No cough, wheezing, hemoptysis; No shortness of breath  CARDIOVASCULAR: No chest pain or palpitations  GASTROINTESTINAL: No abdominal or epigastric pain. No nausea, vomiting, or hematemesis; No diarrhea or constipation. No melena or hematochezia.  GENITOURINARY: No dysuria, frequency or hematuria  NEUROLOGICAL: No numbness or weakness  SKIN: No itching, burning, rashes, or lesions   All other review of systems is negative unless indicated above.      OBJECTIVE:  ICU Vital Signs Last 24 Hrs  T(C): 37.4 (03 Mar 2020 04:00), Max: 37.5 (02 Mar 2020 20:00)  T(F): 99.3 (03 Mar 2020 04:00), Max: 99.5 (02 Mar 2020 20:00)  HR: 50 (03 Mar 2020 07:00) (49 - 87)  BP: 124/59 (03 Mar 2020 07:00) (77/38 - 166/84)  BP(mean): 86 (03 Mar 2020 07:00) (53 - 106)  ABP: --  ABP(mean): --  RR: 20 (03 Mar 2020 07:00) (9 - 38)  SpO2: 100% (03 Mar 2020 07:00) (88% - 100%)    Mode: AC/ CMV (Assist Control/ Continuous Mandatory Ventilation), RR (machine): 20, TV (machine): 550, FiO2: 70, PEEP: 8, ITime: 1, MAP: 14, PIP: 32    03-02 @ 07:01  -  03-03 @ 07:00  --------------------------------------------------------  IN: 3802.4 mL / OUT: 1735 mL / NET: 2067.4 mL      CAPILLARY BLOOD GLUCOSE      POCT Blood Glucose.: 256 mg/dL (02 Mar 2020 11:44)    I&O's Summary    02 Mar 2020 07:01  -  03 Mar 2020 07:00  --------------------------------------------------------  IN: 3802.4 mL / OUT: 1735 mL / NET: 2067.4 mL        PHYSICAL EXAM  General: intubated  HEENT: Head atraumatic; EOMI, PERRLA, normal sclera and conjunctiva  Neck: Supple  Chest/Lungs: Vent sounds, otherwise clear to ausculation anteriorly, no wheezing appreciated  Heart: RRR, normal S1, S2, no murmurs or gallops  Abd: +BS, soft, obese, nontender, nondistended  Extremities: 2+ peripheral pulses, trace pedal edema  Skin: No rashes or lesions, warm, well-perfused    LABS:                        10.8   31.66 )-----------( 287      ( 03 Mar 2020 00:33 )             34.0     03-03    134<L>  |  96  |  42<H>  ----------------------------<  192<H>  6.8<HH>   |  25  |  1.40<H>    Ca    8.3<L>      03 Mar 2020 05:28  Phos  5.9     03-03  Mg     2.5     03-03    TPro  6.1  /  Alb  2.5<L>  /  TBili  0.3  /  DBili  x   /  AST  71<H>  /  ALT  99<H>  /  AlkPhos  91  03-02        LINES:    HOSPITAL MEDICATIONS:  Standing Meds:  atorvastatin 10 milliGRAM(s) Oral at bedtime  busPIRone 15 milliGRAM(s) Oral three times a day  cefepime   IVPB      cefepime   IVPB 2000 milliGRAM(s) IV Intermittent every 8 hours  chlorhexidine 0.12% Liquid 15 milliLiter(s) Oral Mucosa every 12 hours  chlorhexidine 4% Liquid 1 Application(s) Topical <User Schedule>  clonazePAM  Tablet 1 milliGRAM(s) Oral daily  dextrose 5%. 1000 milliLiter(s) IV Continuous <Continuous>  dextrose 50% Injectable 12.5 Gram(s) IV Push once  dextrose 50% Injectable 25 Gram(s) IV Push once  dextrose 50% Injectable 25 Gram(s) IV Push once  enoxaparin Injectable 40 milliGRAM(s) SubCutaneous daily  fentaNYL   Infusion. 2 MICROgram(s)/kG/Hr IV Continuous <Continuous>  insulin lispro (HumaLOG) corrective regimen sliding scale   SubCutaneous every 6 hours  methylPREDNISolone sodium succinate Injectable 20 milliGRAM(s) IV Push every 8 hours  PARoxetine 30 milliGRAM(s) Oral daily  phenylephrine    Infusion 0.1 MICROgram(s)/kG/Min IV Continuous <Continuous>  polyethylene glycol 3350 17 Gram(s) Oral daily  propofol Infusion 15 MICROgram(s)/kG/Min IV Continuous <Continuous>  psyllium Powder 1 Packet(s) Oral daily  senna Syrup 10 milliLiter(s) Oral at bedtime  vancomycin  IVPB 1500 milliGRAM(s) IV Intermittent every 12 hours      PRN Meds:  albuterol/ipratropium for Nebulization 3 milliLiter(s) Nebulizer every 6 hours PRN  dextrose 40% Gel 15 Gram(s) Oral once PRN  glucagon  Injectable 1 milliGRAM(s) IntraMuscular once PRN      LABS:                        10.8   31.66 )-----------( 287      ( 03 Mar 2020 00:33 )             34.0     Hgb Trend: 10.8<--, 11.5<--, 11.5<--, 11.6<--, 12.1<--  03-03    134<L>  |  96  |  42<H>  ----------------------------<  192<H>  6.8<HH>   |  25  |  1.40<H>    Ca    8.3<L>      03 Mar 2020 05:28  Phos  5.9     03-03  Mg     2.5     03-03    TPro  6.1  /  Alb  2.5<L>  /  TBili  0.3  /  DBili  x   /  AST  71<H>  /  ALT  99<H>  /  AlkPhos  91  03-02    Creatinine Trend: 1.40<--, 1.58<--, 0.79<--, 0.84<--, 0.78<--, 0.78<--      Arterial Blood Gas:  03-03 @ 00:27  7.33/58/141/30/98/3.6  ABG lactate: --  Arterial Blood Gas:  03-02 @ 16:59  7.30/64/138/30/99/3.1  ABG lactate: --  Arterial Blood Gas:  03-02 @ 14:47  7.28/67/119/31/98/3.2  ABG lactate: --  Arterial Blood Gas:  03-02 @ 13:47  7.21/80/89/31/95/1.5  ABG lactate: --  Arterial Blood Gas:  03-02 @ 12:25  7.20/84/71/31/90/1.2  ABG lactate: --  Arterial Blood Gas:  03-02 @ 10:09  7.35/60/70/32/91/5.6  ABG lactate: --  Arterial Blood Gas:  03-02 @ 07:26  7.47/44/48/32/83/7.6  ABG lactate: --  Arterial Blood Gas:  03-01 @ 12:16  7.48/41/69/30/94/6.2  ABG lactate: --                MICROBIOLOGY:     RADIOLOGY: Reviewed CHIEF COMPLAINT: SOB, cough and weakness    OVERNIGHT / SUBJECTIVE:  -Overnight, no acute events. Had bronchoscopy yesterday. Vanc trough 25, skipped a dose. Was then 16.8, resumed vanc at current dose. K+ 5.5, pending repeat. Started following commands.  -Today, pt seen and examined at bedside in AM. Pt is intubated and sedated, but grimaces and reacts to name calling and during examination.    REVIEW OF SYSTEMS:    CONSTITUTIONAL: No weakness, fevers or chills  EYES/ENT: No visual changes;  No vertigo or throat pain   NECK: No pain or stiffness  RESPIRATORY: No cough, wheezing, hemoptysis; No shortness of breath  CARDIOVASCULAR: No chest pain or palpitations  GASTROINTESTINAL: No abdominal or epigastric pain. No nausea, vomiting, or hematemesis; No diarrhea or constipation. No melena or hematochezia.  GENITOURINARY: No dysuria, frequency or hematuria  NEUROLOGICAL: No numbness or weakness  SKIN: No itching, burning, rashes, or lesions   All other review of systems is negative unless indicated above.      OBJECTIVE:  ICU Vital Signs Last 24 Hrs  T(C): 37.4 (03 Mar 2020 04:00), Max: 37.5 (02 Mar 2020 20:00)  T(F): 99.3 (03 Mar 2020 04:00), Max: 99.5 (02 Mar 2020 20:00)  HR: 50 (03 Mar 2020 07:00) (49 - 87)  BP: 124/59 (03 Mar 2020 07:00) (77/38 - 166/84)  BP(mean): 86 (03 Mar 2020 07:00) (53 - 106)  ABP: --  ABP(mean): --  RR: 20 (03 Mar 2020 07:00) (9 - 38)  SpO2: 100% (03 Mar 2020 07:00) (88% - 100%)    Mode: AC/ CMV (Assist Control/ Continuous Mandatory Ventilation), RR (machine): 20, TV (machine): 550, FiO2: 70, PEEP: 8, ITime: 1, MAP: 14, PIP: 32    03-02 @ 07:01  -  03-03 @ 07:00  --------------------------------------------------------  IN: 3802.4 mL / OUT: 1735 mL / NET: 2067.4 mL      CAPILLARY BLOOD GLUCOSE      POCT Blood Glucose.: 256 mg/dL (02 Mar 2020 11:44)    I&O's Summary    02 Mar 2020 07:01  -  03 Mar 2020 07:00  --------------------------------------------------------  IN: 3802.4 mL / OUT: 1735 mL / NET: 2067.4 mL        PHYSICAL EXAM  General: intubated  HEENT: Head atraumatic; EOMI, PERRLA, normal sclera and conjunctiva  Neck: Supple  Chest/Lungs: Vent sounds, otherwise clear to ausculation anteriorly, no wheezing appreciated  Heart: RRR, normal S1, S2, no murmurs or gallops  Abd: +BS, soft, obese, nontender, nondistended  Extremities: 2+ peripheral pulses, trace pedal edema  Skin: No rashes or lesions, warm, well-perfused    LABS:                        10.8   31.66 )-----------( 287      ( 03 Mar 2020 00:33 )             34.0     03-03    134<L>  |  96  |  42<H>  ----------------------------<  192<H>  6.8<HH>   |  25  |  1.40<H>    Ca    8.3<L>      03 Mar 2020 05:28  Phos  5.9     03-03  Mg     2.5     03-03    TPro  6.1  /  Alb  2.5<L>  /  TBili  0.3  /  DBili  x   /  AST  71<H>  /  ALT  99<H>  /  AlkPhos  91  03-02        LINES:    HOSPITAL MEDICATIONS:  Standing Meds:  atorvastatin 10 milliGRAM(s) Oral at bedtime  busPIRone 15 milliGRAM(s) Oral three times a day  cefepime   IVPB      cefepime   IVPB 2000 milliGRAM(s) IV Intermittent every 8 hours  chlorhexidine 0.12% Liquid 15 milliLiter(s) Oral Mucosa every 12 hours  chlorhexidine 4% Liquid 1 Application(s) Topical <User Schedule>  clonazePAM  Tablet 1 milliGRAM(s) Oral daily  dextrose 5%. 1000 milliLiter(s) IV Continuous <Continuous>  dextrose 50% Injectable 12.5 Gram(s) IV Push once  dextrose 50% Injectable 25 Gram(s) IV Push once  dextrose 50% Injectable 25 Gram(s) IV Push once  enoxaparin Injectable 40 milliGRAM(s) SubCutaneous daily  fentaNYL   Infusion. 2 MICROgram(s)/kG/Hr IV Continuous <Continuous>  insulin lispro (HumaLOG) corrective regimen sliding scale   SubCutaneous every 6 hours  methylPREDNISolone sodium succinate Injectable 20 milliGRAM(s) IV Push every 8 hours  PARoxetine 30 milliGRAM(s) Oral daily  phenylephrine    Infusion 0.1 MICROgram(s)/kG/Min IV Continuous <Continuous>  polyethylene glycol 3350 17 Gram(s) Oral daily  propofol Infusion 15 MICROgram(s)/kG/Min IV Continuous <Continuous>  psyllium Powder 1 Packet(s) Oral daily  senna Syrup 10 milliLiter(s) Oral at bedtime  vancomycin  IVPB 1500 milliGRAM(s) IV Intermittent every 12 hours      PRN Meds:  albuterol/ipratropium for Nebulization 3 milliLiter(s) Nebulizer every 6 hours PRN  dextrose 40% Gel 15 Gram(s) Oral once PRN  glucagon  Injectable 1 milliGRAM(s) IntraMuscular once PRN      LABS:                        10.8   31.66 )-----------( 287      ( 03 Mar 2020 00:33 )             34.0     Hgb Trend: 10.8<--, 11.5<--, 11.5<--, 11.6<--, 12.1<--  03-03    134<L>  |  96  |  42<H>  ----------------------------<  192<H>  6.8<HH>   |  25  |  1.40<H>    Ca    8.3<L>      03 Mar 2020 05:28  Phos  5.9     03-03  Mg     2.5     03-03    TPro  6.1  /  Alb  2.5<L>  /  TBili  0.3  /  DBili  x   /  AST  71<H>  /  ALT  99<H>  /  AlkPhos  91  03-02    Creatinine Trend: 1.40<--, 1.58<--, 0.79<--, 0.84<--, 0.78<--, 0.78<--      Arterial Blood Gas:  03-03 @ 00:27  7.33/58/141/30/98/3.6  ABG lactate: --  Arterial Blood Gas:  03-02 @ 16:59  7.30/64/138/30/99/3.1  ABG lactate: --  Arterial Blood Gas:  03-02 @ 14:47  7.28/67/119/31/98/3.2  ABG lactate: --  Arterial Blood Gas:  03-02 @ 13:47  7.21/80/89/31/95/1.5  ABG lactate: --  Arterial Blood Gas:  03-02 @ 12:25  7.20/84/71/31/90/1.2  ABG lactate: --  Arterial Blood Gas:  03-02 @ 10:09  7.35/60/70/32/91/5.6  ABG lactate: --  Arterial Blood Gas:  03-02 @ 07:26  7.47/44/48/32/83/7.6  ABG lactate: --  Arterial Blood Gas:  03-01 @ 12:16  7.48/41/69/30/94/6.2  ABG lactate: --                MICROBIOLOGY:     RADIOLOGY: Reviewed CHIEF COMPLAINT: SOB, cough and weakness    OVERNIGHT / SUBJECTIVE:  -Overnight, no acute events. Had bronchoscopy yesterday. Vanc trough 25, skipped a dose. Was then 16.8, resumed vanc at current dose. K+ 5.5, pending repeat. Started following some commands such as hand gripping and moving legs.  -Today, pt seen and examined at bedside in AM. Pt is intubated and sedated, but grimaces/reacts to name calling and during examination.    REVIEW OF SYSTEMS:  -Unable to obtain ROS as pt is intubated and sedated    OBJECTIVE:  ICU Vital Signs Last 24 Hrs  T(C): 37.4 (03 Mar 2020 04:00), Max: 37.5 (02 Mar 2020 20:00)  T(F): 99.3 (03 Mar 2020 04:00), Max: 99.5 (02 Mar 2020 20:00)  HR: 50 (03 Mar 2020 07:00) (49 - 87)  BP: 124/59 (03 Mar 2020 07:00) (77/38 - 166/84)  BP(mean): 86 (03 Mar 2020 07:00) (53 - 106)  ABP: --  ABP(mean): --  RR: 20 (03 Mar 2020 07:00) (9 - 38)  SpO2: 100% (03 Mar 2020 07:00) (88% - 100%)    Mode: AC/ CMV (Assist Control/ Continuous Mandatory Ventilation), RR (machine): 20, TV (machine): 550, FiO2: 70, PEEP: 8, ITime: 1, MAP: 14, PIP: 32    03-02 @ 07:01  -  03-03 @ 07:00  --------------------------------------------------------  IN: 3802.4 mL / OUT: 1735 mL / NET: 2067.4 mL      CAPILLARY BLOOD GLUCOSE      POCT Blood Glucose.: 256 mg/dL (02 Mar 2020 11:44)    I&O's Summary    02 Mar 2020 07:01  -  03 Mar 2020 07:00  --------------------------------------------------------  IN: 3802.4 mL / OUT: 1735 mL / NET: 2067.4 mL        PHYSICAL EXAM  General: intubated and sedated  HEENT: PERRL, normal sclera and conjunctiva  Neck: Supple  Chest/Lungs: Clear to ausculation anteriorly, no wheezing appreciated  Heart: RRR, normal S1, S2, no murmurs or gallops  Abd: +BS, soft, obese, nontender, nondistended  Extremities: 2+ peripheral pulses, trace pedal edema  Skin: No rashes or lesions, warm, well-perfused    LABS:                        10.8   31.66 )-----------( 287      ( 03 Mar 2020 00:33 )             34.0     03-03    134<L>  |  96  |  42<H>  ----------------------------<  192<H>  6.8<HH>   |  25  |  1.40<H>    Ca    8.3<L>      03 Mar 2020 05:28  Phos  5.9     03-03  Mg     2.5     03-03    TPro  6.1  /  Alb  2.5<L>  /  TBili  0.3  /  DBili  x   /  AST  71<H>  /  ALT  99<H>  /  AlkPhos  91  03-02        LINES:    HOSPITAL MEDICATIONS:  Standing Meds:  atorvastatin 10 milliGRAM(s) Oral at bedtime  busPIRone 15 milliGRAM(s) Oral three times a day  cefepime   IVPB      cefepime   IVPB 2000 milliGRAM(s) IV Intermittent every 8 hours  chlorhexidine 0.12% Liquid 15 milliLiter(s) Oral Mucosa every 12 hours  chlorhexidine 4% Liquid 1 Application(s) Topical <User Schedule>  clonazePAM  Tablet 1 milliGRAM(s) Oral daily  dextrose 5%. 1000 milliLiter(s) IV Continuous <Continuous>  dextrose 50% Injectable 12.5 Gram(s) IV Push once  dextrose 50% Injectable 25 Gram(s) IV Push once  dextrose 50% Injectable 25 Gram(s) IV Push once  enoxaparin Injectable 40 milliGRAM(s) SubCutaneous daily  fentaNYL   Infusion. 2 MICROgram(s)/kG/Hr IV Continuous <Continuous>  insulin lispro (HumaLOG) corrective regimen sliding scale   SubCutaneous every 6 hours  methylPREDNISolone sodium succinate Injectable 20 milliGRAM(s) IV Push every 8 hours  PARoxetine 30 milliGRAM(s) Oral daily  phenylephrine    Infusion 0.1 MICROgram(s)/kG/Min IV Continuous <Continuous>  polyethylene glycol 3350 17 Gram(s) Oral daily  propofol Infusion 15 MICROgram(s)/kG/Min IV Continuous <Continuous>  psyllium Powder 1 Packet(s) Oral daily  senna Syrup 10 milliLiter(s) Oral at bedtime  vancomycin  IVPB 1500 milliGRAM(s) IV Intermittent every 12 hours      PRN Meds:  albuterol/ipratropium for Nebulization 3 milliLiter(s) Nebulizer every 6 hours PRN  dextrose 40% Gel 15 Gram(s) Oral once PRN  glucagon  Injectable 1 milliGRAM(s) IntraMuscular once PRN      LABS:                        10.8   31.66 )-----------( 287      ( 03 Mar 2020 00:33 )             34.0     Hgb Trend: 10.8<--, 11.5<--, 11.5<--, 11.6<--, 12.1<--  03-03    134<L>  |  96  |  42<H>  ----------------------------<  192<H>  6.8<HH>   |  25  |  1.40<H>    Ca    8.3<L>      03 Mar 2020 05:28  Phos  5.9     03-03  Mg     2.5     03-03    TPro  6.1  /  Alb  2.5<L>  /  TBili  0.3  /  DBili  x   /  AST  71<H>  /  ALT  99<H>  /  AlkPhos  91  03-02    Creatinine Trend: 1.40<--, 1.58<--, 0.79<--, 0.84<--, 0.78<--, 0.78<--      Arterial Blood Gas:  03-03 @ 00:27  7.33/58/141/30/98/3.6  ABG lactate: --  Arterial Blood Gas:  03-02 @ 16:59  7.30/64/138/30/99/3.1  ABG lactate: --  Arterial Blood Gas:  03-02 @ 14:47  7.28/67/119/31/98/3.2  ABG lactate: --  Arterial Blood Gas:  03-02 @ 13:47  7.21/80/89/31/95/1.5  ABG lactate: --  Arterial Blood Gas:  03-02 @ 12:25  7.20/84/71/31/90/1.2  ABG lactate: --  Arterial Blood Gas:  03-02 @ 10:09  7.35/60/70/32/91/5.6  ABG lactate: --  Arterial Blood Gas:  03-02 @ 07:26  7.47/44/48/32/83/7.6  ABG lactate: --  Arterial Blood Gas:  03-01 @ 12:16  7.48/41/69/30/94/6.2  ABG lactate: --                MICROBIOLOGY:     RADIOLOGY:    < from: Xray Chest 1 View- PORTABLE-Urgent (03.02.20 @ 18:27) >  Impression:    Suboptimal study. The right hemithorax is not included on the film. Pulmonary vascular congestion is seen on the left lung. Left lower lobe pneumonia and/or atelectasis cannot be ruled out entirely. Endotracheal tube and NG tube are in good position. No pneumothorax. The overall appearance of the chest remain unchanged compared to previous study done March 2, 2020 at 10:44 AM.    < end of copied text > CHIEF COMPLAINT: SOB, cough and weakness    OVERNIGHT / SUBJECTIVE:  -Overnight, no acute events. Had bronchoscopy yesterday. Vanc trough 25, skipped a dose. Was then 16.8, resumed vanc at current dose. K+ 5.5, repeat also 5.5. Started following some commands such as hand gripping and moving legs.  -Today, pt seen and examined at bedside in AM. Pt is intubated and sedated, but grimaces/reacts to name calling and during examination. As per nursing, no BMs for past 2 days.    REVIEW OF SYSTEMS:  -Unable to obtain ROS as pt is intubated and sedated    OBJECTIVE:  ICU Vital Signs Last 24 Hrs  T(C): 37.4 (03 Mar 2020 04:00), Max: 37.5 (02 Mar 2020 20:00)  T(F): 99.3 (03 Mar 2020 04:00), Max: 99.5 (02 Mar 2020 20:00)  HR: 50 (03 Mar 2020 07:00) (49 - 87)  BP: 124/59 (03 Mar 2020 07:00) (77/38 - 166/84)  BP(mean): 86 (03 Mar 2020 07:00) (53 - 106)  ABP: --  ABP(mean): --  RR: 20 (03 Mar 2020 07:00) (9 - 38)  SpO2: 100% (03 Mar 2020 07:00) (88% - 100%)    Mode: AC/ CMV (Assist Control/ Continuous Mandatory Ventilation), RR (machine): 20, TV (machine): 550, FiO2: 70, PEEP: 8, ITime: 1, MAP: 14, PIP: 32    03-02 @ 07:01  -  03-03 @ 07:00  --------------------------------------------------------  IN: 3802.4 mL / OUT: 1735 mL / NET: 2067.4 mL      CAPILLARY BLOOD GLUCOSE      POCT Blood Glucose.: 256 mg/dL (02 Mar 2020 11:44)    I&O's Summary    02 Mar 2020 07:01  -  03 Mar 2020 07:00  --------------------------------------------------------  IN: 3802.4 mL / OUT: 1735 mL / NET: 2067.4 mL        PHYSICAL EXAM  General: intubated and sedated  HEENT: PERRL, normal sclera and conjunctiva  Neck: Supple  Chest/Lungs: Clear to ausculation anteriorly, no wheezing appreciated  Heart: RRR, normal S1, S2, no murmurs or gallops  Abd: +BS, soft, obese, nontender, nondistended  Extremities: 2+ peripheral pulses, trace pedal edema  Skin: No rashes or lesions, warm, well-perfused    LABS:                        10.8   31.66 )-----------( 287      ( 03 Mar 2020 00:33 )             34.0     03-03    134<L>  |  96  |  42<H>  ----------------------------<  192<H>  6.8<HH>   |  25  |  1.40<H>    Ca    8.3<L>      03 Mar 2020 05:28  Phos  5.9     03-03  Mg     2.5     03-03    TPro  6.1  /  Alb  2.5<L>  /  TBili  0.3  /  DBili  x   /  AST  71<H>  /  ALT  99<H>  /  AlkPhos  91  03-02        LINES:    HOSPITAL MEDICATIONS:  Standing Meds:  atorvastatin 10 milliGRAM(s) Oral at bedtime  busPIRone 15 milliGRAM(s) Oral three times a day  cefepime   IVPB      cefepime   IVPB 2000 milliGRAM(s) IV Intermittent every 8 hours  chlorhexidine 0.12% Liquid 15 milliLiter(s) Oral Mucosa every 12 hours  chlorhexidine 4% Liquid 1 Application(s) Topical <User Schedule>  clonazePAM  Tablet 1 milliGRAM(s) Oral daily  dextrose 5%. 1000 milliLiter(s) IV Continuous <Continuous>  dextrose 50% Injectable 12.5 Gram(s) IV Push once  dextrose 50% Injectable 25 Gram(s) IV Push once  dextrose 50% Injectable 25 Gram(s) IV Push once  enoxaparin Injectable 40 milliGRAM(s) SubCutaneous daily  fentaNYL   Infusion. 2 MICROgram(s)/kG/Hr IV Continuous <Continuous>  insulin lispro (HumaLOG) corrective regimen sliding scale   SubCutaneous every 6 hours  methylPREDNISolone sodium succinate Injectable 20 milliGRAM(s) IV Push every 8 hours  PARoxetine 30 milliGRAM(s) Oral daily  phenylephrine    Infusion 0.1 MICROgram(s)/kG/Min IV Continuous <Continuous>  polyethylene glycol 3350 17 Gram(s) Oral daily  propofol Infusion 15 MICROgram(s)/kG/Min IV Continuous <Continuous>  psyllium Powder 1 Packet(s) Oral daily  senna Syrup 10 milliLiter(s) Oral at bedtime  vancomycin  IVPB 1500 milliGRAM(s) IV Intermittent every 12 hours      PRN Meds:  albuterol/ipratropium for Nebulization 3 milliLiter(s) Nebulizer every 6 hours PRN  dextrose 40% Gel 15 Gram(s) Oral once PRN  glucagon  Injectable 1 milliGRAM(s) IntraMuscular once PRN      LABS:                        10.8   31.66 )-----------( 287      ( 03 Mar 2020 00:33 )             34.0     Hgb Trend: 10.8<--, 11.5<--, 11.5<--, 11.6<--, 12.1<--  03-03    134<L>  |  96  |  42<H>  ----------------------------<  192<H>  6.8<HH>   |  25  |  1.40<H>    Ca    8.3<L>      03 Mar 2020 05:28  Phos  5.9     03-03  Mg     2.5     03-03    TPro  6.1  /  Alb  2.5<L>  /  TBili  0.3  /  DBili  x   /  AST  71<H>  /  ALT  99<H>  /  AlkPhos  91  03-02    Creatinine Trend: 1.40<--, 1.58<--, 0.79<--, 0.84<--, 0.78<--, 0.78<--      Arterial Blood Gas:  03-03 @ 00:27  7.33/58/141/30/98/3.6  ABG lactate: --  Arterial Blood Gas:  03-02 @ 16:59  7.30/64/138/30/99/3.1  ABG lactate: --  Arterial Blood Gas:  03-02 @ 14:47  7.28/67/119/31/98/3.2  ABG lactate: --  Arterial Blood Gas:  03-02 @ 13:47  7.21/80/89/31/95/1.5  ABG lactate: --  Arterial Blood Gas:  03-02 @ 12:25  7.20/84/71/31/90/1.2  ABG lactate: --  Arterial Blood Gas:  03-02 @ 10:09  7.35/60/70/32/91/5.6  ABG lactate: --  Arterial Blood Gas:  03-02 @ 07:26  7.47/44/48/32/83/7.6  ABG lactate: --  Arterial Blood Gas:  03-01 @ 12:16  7.48/41/69/30/94/6.2  ABG lactate: --                MICROBIOLOGY:     RADIOLOGY:    < from: Xray Chest 1 View- PORTABLE-Urgent (03.02.20 @ 18:27) >  Impression:    Suboptimal study. The right hemithorax is not included on the film. Pulmonary vascular congestion is seen on the left lung. Left lower lobe pneumonia and/or atelectasis cannot be ruled out entirely. Endotracheal tube and NG tube are in good position. No pneumothorax. The overall appearance of the chest remain unchanged compared to previous study done March 2, 2020 at 10:44 AM.    < end of copied text >

## 2020-03-03 NOTE — PROGRESS NOTE ADULT - SUBJECTIVE AND OBJECTIVE BOX
INTERVAL HPI/OVERNIGHT EVENTS:    patient seen and examined   intubated/sedated   on vent support  tolerating OGT feeds @ 40cc/hr  no bms x 2 days     MEDICATIONS  (STANDING):  atorvastatin 10 milliGRAM(s) Oral at bedtime  busPIRone 15 milliGRAM(s) Oral three times a day  cefepime   IVPB      cefepime   IVPB 2000 milliGRAM(s) IV Intermittent every 8 hours  chlorhexidine 0.12% Liquid 15 milliLiter(s) Oral Mucosa every 12 hours  chlorhexidine 4% Liquid 1 Application(s) Topical <User Schedule>  clonazePAM  Tablet 1 milliGRAM(s) Oral daily  dextrose 5%. 1000 milliLiter(s) (50 mL/Hr) IV Continuous <Continuous>  dextrose 50% Injectable 12.5 Gram(s) IV Push once  dextrose 50% Injectable 25 Gram(s) IV Push once  dextrose 50% Injectable 25 Gram(s) IV Push once  diltiazem    Tablet 60 milliGRAM(s) Oral every 6 hours  doxazosin 2 milliGRAM(s) Oral at bedtime  enoxaparin Injectable 40 milliGRAM(s) SubCutaneous daily  fentaNYL   Infusion. 2 MICROgram(s)/kG/Hr (11.85 mL/Hr) IV Continuous <Continuous>  insulin lispro (HumaLOG) corrective regimen sliding scale   SubCutaneous every 6 hours  losartan 100 milliGRAM(s) Oral daily  methylPREDNISolone sodium succinate Injectable 20 milliGRAM(s) IV Push every 8 hours  PARoxetine 30 milliGRAM(s) Oral daily  phenylephrine    Infusion 0.1 MICROgram(s)/kG/Min (4.444 mL/Hr) IV Continuous <Continuous>  propofol Infusion 15 MICROgram(s)/kG/Min (10.665 mL/Hr) IV Continuous <Continuous>  psyllium Powder 1 Packet(s) Oral daily  senna 2 Tablet(s) Oral at bedtime  vancomycin  IVPB 1500 milliGRAM(s) IV Intermittent every 12 hours    MEDICATIONS  (PRN):  albuterol/ipratropium for Nebulization 3 milliLiter(s) Nebulizer every 6 hours PRN Shortness of Breath and/or Wheezing  dextrose 40% Gel 15 Gram(s) Oral once PRN Blood Glucose LESS THAN 70 milliGRAM(s)/deciliter  glucagon  Injectable 1 milliGRAM(s) IntraMuscular once PRN Glucose LESS THAN 70 milligrams/deciliter      Allergies    penicillin (Anaphylaxis)    Intolerances        Review of Systems: unable to obtain         Vital Signs Last 24 Hrs  T(C): 37.2 (02 Mar 2020 12:00), Max: 37.3 (02 Mar 2020 08:00)  T(F): 99 (02 Mar 2020 12:00), Max: 99.1 (02 Mar 2020 08:00)  HR: 72 (02 Mar 2020 12:15) (56 - 93)  BP: 92/47 (02 Mar 2020 12:15) (77/38 - 166/84)  BP(mean): 67 (02 Mar 2020 12:15) (53 - 113)  RR: 18 (02 Mar 2020 12:15) (16 - 38)  SpO2: 93% (02 Mar 2020 12:15) (87% - 98%)    PHYSICAL EXAM:    Constitutional: intubated/sedated   Respiratory: on ventilatory support   Cardiovascular: S1 and S2  Gastrointestinal: BS+, soft, NT/ND  Extremities: No peripheral edema  Neurological: sedated        LABS:                        11.5   34.86 )-----------( 232      ( 02 Mar 2020 00:19 )             34.3     03-02    131<L>  |  93<L>  |  24<H>  ----------------------------<  235<H>  4.9   |  28  |  0.79    Ca    8.7      02 Mar 2020 00:19  Phos  3.6     03-02  Mg     2.1     03-02    TPro  6.1  /  Alb  2.5<L>  /  TBili  0.3  /  DBili  x   /  AST  71<H>  /  ALT  99<H>  /  AlkPhos  91  03-02          RADIOLOGY & ADDITIONAL TESTS:

## 2020-03-03 NOTE — PROGRESS NOTE ADULT - ASSESSMENT
69M with PMH of HTN, HLD, depression, anxiety, and insignificant degree of pulmonary fibrosis, who initially presented 2/20/20 following a fall at home, admitted with acute hypoxic respiratory failure in setting of influenza with concern for superimposed PNA. Course c/b worsening respiratory failure with high oxygen requirements, transferred to MICU for close monitoring, intubated 3/2.     NEURO:   - Intubated (3/2); sedated on fentanyl and propofol gtt  - C/w home clonazepam standing 1mg PO daily  - C/w home buspirone 15mg PO TID  - C/w home paroxetine 30mg PO daily    PULM:   # Acute hypoxic respiratory failure   Multifactorial related to influenza infection with high suspicion for superimposed bacterial PNA in setting of underlying lung disease. May also have component of aspiration pneumonitis.   - Intubated (3/2) as pt failed use of high flow FiO2 100% (ABG showing pO2 48), acapella valve, incentive spirometry  - s/p Tamiflu for total 10 days (2/20-2/29)  - C/w vanc/cefepime (2/25- )  - C/w duonebs q6h PRN  - serial ABGs  - s/p Bronchoscopy (3/2): airways appeared moderately inflamed and collapsable diffusely, multiple small diverticula, scant mucus b/l; brochoalveolar lavage performed, studies sent.    # Pulmonary fibrosis:   - Appreciate Pulm recs  - As per pt's pulmonologist group (Dr. Dia/Dr. Pop): Hx of "insignificant pulmonary fibrosis," Seen for chronic cough, PFTs (2017) was wnl aside from diffusion capacity of 74%, and CT Chest (2013) showed no evidence of pulm fibrosis or other significant pulm pathology  - c/w solumedrol 20mg IV Q8    CARDIOVASCULAR:   # HTN  - Continue to monitor  - c/w Diltiazem 60mg q6h PO daily  - c/w Losartan 100mg PO daily for better BP control (on olmesartan 40mg daily at home)   - TTE (3/1): EF 65%; Severe pulmonary hypertension. Severely increased pulmonary vascular resistance; Normal left ventricular systolic function. No segmental wall motion abnormalities. Impaired LV-relaxation with normal filling pressure  - Cardiology following (Dr. Lucero), appreciate recs  - c/w phenylephrine gtt, wean as tolerated    GI:   - NPO, OG tube placed  - GI following (Dr. Kraft), appreciate recs  # Constipation, resolved  - c/w senna 2 tabs qhs and metamucil daily    RENAL/:   # Urinary retention:   - Possibly 2/2 SIADH in setting of pulm process, as per Renal  - off tamsulosin as pt now intubated, restarted on home doxazosin  - c/w Scruggs  - s/p Lasix IV 40mg x 2 with significant UOP, continue to monitor UOP  - Trend BMP    ENDOCRINE:   - Insulin sliding scale for steroid-induced hyperglycemia    HEMATOLOGIC:   # Leukocytosis  Likely secondary to steroids and infection  - C/w abx as above  - Continue to trend CBC with differential    ID:   # Influenza with suspected superimposed bacterial PNA:   - Persistent leukocytosis, though pt also on steroids  - c/w vanc/cefepime (2/25-3/2)  - s/p Tamiflu for total 10 days (2/20-2/29)  - Monitor WBC count, fever curve  - f/u BCx (3/1); will consider repeat RVP  - f/u procalcitonin level, fungitell, and galactomannan, as per ID  - f/u bronch Cx     PROPHYLAXIS:   - DVT ppx: Lovenox    CODE STATUS:  - Wife and pt agreed to trial of intubation. Currently remains full code.    Rene Kim, PGY-1  MICU  Contact/Pager: [phone] / [phone] 69M with PMH of HTN, HLD, depression, anxiety, and insignificant degree of pulmonary fibrosis, who initially presented 2/20/20 following a fall at home, admitted with acute hypoxic respiratory failure in setting of influenza with concern for superimposed PNA. Course c/b worsening respiratory failure with high oxygen requirements, transferred to MICU for close monitoring, intubated 3/2.     NEURO:   Hx of Anxiety/Depression  - Intubated (3/2); sedated on fentanyl and propofol gtt  - C/w home clonazepam standing 1mg PO daily  - C/w home buspirone 15mg PO TID  - C/w home paroxetine 30mg PO daily    PULM:   # Acute hypoxic respiratory failure   Multifactorial related to influenza infection with high suspicion for superimposed bacterial PNA in setting of underlying lung disease. May also have component of aspiration pneumonitis.   - Intubated (3/2) as pt failed use of high flow FiO2 100% (ABG showing pO2 48), acapella valve, incentive spirometry  - C/w duonebs q6h PRN  - s/p Tamiflu for total 10 days (2/20-2/29)  - s/p vanc/cefepime (2/25-3/3), off abx as bronch studies does not appear to be infectious and appears to be more inflammatory  - serial ABGs  - s/p Bronchoscopy (3/2): airways appeared moderately inflamed and collapsable diffusely, multiple small diverticula, scant mucus b/l; brochoalveolar lavage performed, studies sent.    # Pulmonary fibrosis:   - Appreciate Pulm recs  - As per pt's pulmonologist group (Dr. Dia/Dr. Pop): Hx of "insignificant pulmonary fibrosis," Seen for chronic cough, PFTs (2017) was wnl aside from diffusion capacity of 74%, and CT Chest (2013) showed no evidence of pulm fibrosis or other significant pulm pathology  - c/w solumedrol 20mg IV Q8    CARDIOVASCULAR:   # HTN  - Continue to monitor  - c/w Diltiazem 60mg q6h PO daily  - c/w Losartan 100mg PO daily for better BP control (on olmesartan 40mg daily at home)   - TTE (3/1): EF 65%; Severe pulmonary hypertension. Severely increased pulmonary vascular resistance; Normal left ventricular systolic function. No segmental wall motion abnormalities. Impaired LV-relaxation with normal filling pressure  - Cardiology following (Dr. Lucero), appreciate recs  - c/w phenylephrine gtt, wean as tolerated    GI:   - NPO, OG tube placed  - GI following (Dr. Kraft), appreciate recs  # Constipation, resolved  - c/w senna 2 tabs qhs and metamucil daily    RENAL/:   # Urinary retention:   - Possibly 2/2 SIADH in setting of pulm process, as per Renal  - off tamsulosin as pt now intubated, restarted on home doxazosin  - c/w Scruggs  - s/p Lasix IV 40mg x 2 with significant UOP, continue to monitor UOP  - Trend BMP    ENDOCRINE:   - Insulin sliding scale for steroid-induced hyperglycemia    HEMATOLOGIC:   # Leukocytosis  Likely secondary to steroids and infection  - C/w abx as above  - Continue to trend CBC with differential    ID:   # Influenza with suspected superimposed bacterial PNA:   - Persistent leukocytosis, though pt also on steroids  - c/w vanc/cefepime (2/25-3/2)  - s/p Tamiflu for total 10 days (2/20-2/29)  - Monitor WBC count, fever curve  - f/u BCx (3/1); will consider repeat RVP  - f/u procalcitonin level, fungitell, and galactomannan, as per ID  - f/u bronch Cx     PROPHYLAXIS:   - DVT ppx: Lovenox    CODE STATUS:  - Wife and pt agreed to trial of intubation. Currently remains full code.    Rene Kim, PGY-1  MICU  Contact/Pager: [phone] / [phone] 69M with PMH of HTN, HLD, depression, anxiety, and insignificant degree of pulmonary fibrosis, who initially presented 2/20/20 following a fall at home, admitted with acute hypoxic respiratory failure in setting of influenza with concern for superimposed PNA. Course c/b worsening respiratory failure with high oxygen requirements, transferred to MICU for close monitoring, intubated 3/2.     NEURO:   Hx of Anxiety/Depression  - Intubated (3/2); sedated on fentanyl and propofol gtt  - C/w home clonazepam standing 1mg PO daily  - C/w home buspirone 15mg PO TID  - C/w home paroxetine 30mg PO daily    PULM:   # Acute hypoxic respiratory failure   Multifactorial related to influenza infection with high suspicion for superimposed bacterial PNA in setting of underlying lung disease. May also have component of aspiration pneumonitis.   - Intubated (3/2) as pt failed use of high flow FiO2 100% (ABG showing pO2 48), acapella valve, incentive spirometry  - C/w duonebs q6h PRN  - s/p Tamiflu for total 10 days (2/20-2/29)  - s/p vanc/cefepime (2/25-3/3), off abx as bronch studies does not appear to be infectious and appears to be more inflammatory  - serial ABGs  - s/p Bronchoscopy (3/2): airways appeared moderately inflamed and collapsable diffusely, multiple small diverticula, scant mucus b/l; brochoalveolar lavage performed, studies sent.    # Pulmonary fibrosis:   - Appreciate Pulm recs  - As per pt's pulmonologist group (Dr. Dia/Dr. Pop): Hx of "insignificant pulmonary fibrosis," Seen for chronic cough, PFTs (2017) was wnl aside from diffusion capacity of 74%, and CT Chest (2013) showed no evidence of pulm fibrosis or other significant pulm pathology  - c/w solumedrol 20mg IV Q8    CARDIOVASCULAR:   # HTN  - Continue to monitor  - c/w Diltiazem 60mg q6h PO daily  - c/w Losartan 100mg PO daily for better BP control (on olmesartan 40mg daily at home)   - TTE (3/1): EF 65%; Severe pulmonary hypertension. Severely increased pulmonary vascular resistance; Normal left ventricular systolic function. No segmental wall motion abnormalities. Impaired LV-relaxation with normal filling pressure  - Cardiology following (Dr. Lucero), appreciate recs  - c/w phenylephrine gtt, wean as tolerated    GI:   - NPO, OG tube placed  - GI following (Dr. Kraft), appreciate recs  # Constipation  - c/w senna 10mg PO qhs, metamucil, daily  - dulcolax     RENAL/:   # Urinary retention:   - Possibly 2/2 SIADH in setting of pulm process, as per Renal  - off tamsulosin as pt now intubated, restarted on home doxazosin  - c/w Scruggs  - s/p Lasix IV 40mg x 2 with significant UOP, continue to monitor UOP  - Trend BMP    ENDOCRINE:   - Insulin sliding scale for steroid-induced hyperglycemia    HEMATOLOGIC:   # Leukocytosis  Likely secondary to steroids and infection  - C/w abx as above  - Continue to trend CBC with differential    ID:   # Influenza with suspected superimposed bacterial PNA:   - Persistent leukocytosis, though pt also on steroids  - c/w vanc/cefepime (2/25-3/2)  - s/p Tamiflu for total 10 days (2/20-2/29)  - Monitor WBC count, fever curve  - f/u BCx (3/1); will consider repeat RVP  - f/u procalcitonin level, fungitell, and galactomannan, as per ID  - f/u bronch Cx     PROPHYLAXIS:   - DVT ppx: Lovenox    CODE STATUS:  - Wife and pt agreed to trial of intubation. Currently remains full code.    Rene Kim, PGY-1  MICU  Contact/Pager: [phone] / [phone] 69M with PMH of HTN, HLD, depression, anxiety, and insignificant degree of pulmonary fibrosis, who initially presented 2/20/20 following a fall at home, admitted with acute hypoxic respiratory failure in setting of influenza with concern for superimposed PNA. Course c/b worsening respiratory failure with high oxygen requirements, transferred to MICU for close monitoring, intubated 3/2.     NEURO:   Hx of Anxiety/Depression  - Intubated (3/2); sedated on fentanyl and propofol gtt  - C/w home clonazepam standing 1mg PO daily  - C/w home buspirone 15mg PO TID  - C/w home paroxetine 30mg PO daily    PULM:   # Acute hypoxic respiratory failure   Multifactorial related to influenza infection with high suspicion for superimposed bacterial PNA in setting of underlying lung disease. May also have component of aspiration pneumonitis.   - Intubated (3/2) as pt failed use of high flow FiO2 100% (ABG showing pO2 48), acapella valve, incentive spirometry  - C/w duonebs q6h PRN  - s/p Tamiflu for total 10 days (2/20-2/29)  - s/p vanc/cefepime (2/25-3/3), off abx as bronch studies does not appear to be infectious and appears to be more inflammatory  - serial ABGs  - s/p Bronchoscopy (3/2): airways appeared moderately inflamed and collapsable diffusely, multiple small diverticula, scant mucus b/l; brochoalveolar lavage performed, studies sent.    # Pulmonary fibrosis:   - Appreciate Pulm recs  - As per pt's pulmonologist group (Dr. Dia/Dr. Pop): Hx of "insignificant pulmonary fibrosis," Seen for chronic cough, PFTs (2017) was wnl aside from diffusion capacity of 74%, and CT Chest (2013) showed no evidence of pulm fibrosis or other significant pulm pathology  - c/w solumedrol 20mg IV Q8    CARDIOVASCULAR:   # HTN  - Continue to monitor  - off home Diltiazem and ARB as pt on pressor  - TTE (3/1): EF 65%; Severe pulmonary hypertension. Severely increased pulmonary vascular resistance; Normal left ventricular systolic function. No segmental wall motion abnormalities. Impaired LV-relaxation with normal filling pressure  - Cardiology following (Dr. Lucero), appreciate recs  - c/w phenylephrine gtt, wean as tolerated    GI:   - NPO, OG tube placed  - GI following (Dr. Kraft), appreciate recs  # Constipation  - c/w senna 10mg PO qhs, metamucil daily, and miralax daily  - dulcolax suppository x1 given    RENAL/:   # Urinary retention:   - Possibly 2/2 SIADH in setting of pulm process, as per Renal  - off tamsulosin as pt now intubated, restarted on home doxazosin  - c/w Scruggs  - s/p Lasix IV 40mg x 2 with significant UOP, continue to monitor UOP  - Trend BMP    ENDOCRINE:   - Insulin sliding scale for steroid-induced hyperglycemia    HEMATOLOGIC:   # Leukocytosis  Likely secondary to steroids and infection  - monitor off abx  - Continue to trend CBC with differential    ID:   # Influenza with suspected superimposed bacterial PNA:   - Persistent leukocytosis, though pt also on steroids  - s/p vanc/cefepime (2/25-3/3), off abx as bronch studies does not appear to be infectious and appears to be more inflammatory  - s/p Tamiflu for total 10 days (2/20-2/29)  - Monitor WBC count, fever curve  - BCx (3/1): NGTD  - f/u procalcitonin level, fungitell, and galactomannan, as per ID  - f/u bronch Cx, gram stain unremarkable   - repeat RVP (3/3) negative    PROPHYLAXIS:   - DVT ppx: Lovenox    CODE STATUS:  - Wife and pt agreed to trial of intubation. Currently remains full code.    Rene Kim, PGY-1  MIC[facility]  Contact/Pager: [phone] / [phone] 69M with PMH of HTN, HLD, depression, anxiety, and insignificant degree of pulmonary fibrosis, who initially presented 2/20/20 following a fall at home, admitted with acute hypoxic respiratory failure in setting of influenza with concern for superimposed PNA. Course c/b worsening respiratory failure with high oxygen requirements, transferred to MICU for close monitoring, intubated 3/2.     NEURO:   Hx of Anxiety/Depression  - Intubated (3/2); sedated on fentanyl and propofol gtt  - C/w home clonazepam standing 1mg PO daily  - C/w home buspirone 15mg PO TID  - C/w home paroxetine 30mg PO daily    PULM:   # Acute hypoxic respiratory failure   Multifactorial related to influenza infection with high suspicion for superimposed bacterial PNA in setting of underlying lung disease. May also have component of aspiration pneumonitis.   - Intubated (3/2) as pt failed use of high flow FiO2 100% (ABG showing pO2 48), acapella valve, incentive spirometry  - C/w duonebs q6h PRN  - s/p Tamiflu for total 10 days (2/20-2/29)  - s/p vanc/cefepime (2/25-3/3), off abx as bronch studies does not appear to be infectious and appears to be more inflammatory  - serial ABGs  - s/p Bronchoscopy (3/2): airways appeared moderately inflamed and collapsable diffusely, multiple small diverticula, scant mucus b/l; brochoalveolar lavage performed, studies sent.    # Pulmonary fibrosis:   - Appreciate Pulm recs  - As per pt's pulmonologist group (Dr. Dia/Dr. Pop): Hx of "insignificant pulmonary fibrosis," Seen for chronic cough, PFTs (2017) was wnl aside from diffusion capacity of 74%, and CT Chest (2013) showed no evidence of pulm fibrosis or other significant pulm pathology  - c/w solumedrol 20mg IV Q8    CARDIOVASCULAR:   # HTN  - Continue to monitor  - off home Diltiazem and ARB as pt on pressor  - TTE (3/1): EF 65%; Severe pulmonary hypertension. Severely increased pulmonary vascular resistance; Normal left ventricular systolic function. No segmental wall motion abnormalities. Impaired LV-relaxation with normal filling pressure  - Cardiology following (Dr. Lucero), appreciate recs  - c/w phenylephrine gtt, wean as tolerated    GI:   - NPO, OG tube placed  - GI following (Dr. Kraft), appreciate recs  # Constipation  - c/w senna 10mg PO qhs, metamucil daily, and miralax daily  - dulcolax suppository x1 given    RENAL/:   # Urinary retention:   - Possibly 2/2 SIADH in setting of pulm process, as per Renal  - off tamsulosin as pt now intubated, restarted on home doxazosin  - c/w Scruggs  - s/p Lasix IV 40mg x 2 with significant UOP, continue to monitor UOP  - Trend BMP  #Hyperkalemia  -     ENDOCRINE:   - Insulin sliding scale for steroid-induced hyperglycemia    HEMATOLOGIC:   # Leukocytosis  Likely secondary to steroids and infection  - monitor off abx  - Continue to trend CBC with differential    ID:   # Influenza with suspected superimposed bacterial PNA:   - Persistent leukocytosis, though pt also on steroids  - s/p vanc/cefepime (2/25-3/3), off abx as bronch studies does not appear to be infectious and appears to be more inflammatory  - s/p Tamiflu for total 10 days (2/20-2/29)  - Monitor WBC count, fever curve  - BCx (3/1): NGTD  - f/u procalcitonin level, fungitell, and galactomannan, as per ID  - f/u bronch Cx, gram stain unremarkable   - repeat RVP (3/3) negative    PROPHYLAXIS:   - DVT ppx: Lovenox    CODE STATUS:  - Wife and pt agreed to trial of intubation. Currently remains full code.    Rene Kim, PGY-1  MICU  Contact/Pager: [phone] / [phone] 69M with PMH of HTN, HLD, depression, anxiety, and insignificant degree of pulmonary fibrosis, who initially presented 2/20/20 following a fall at home, admitted with acute hypoxic respiratory failure in setting of influenza with concern for superimposed PNA. Course c/b worsening respiratory failure with high oxygen requirements, transferred to MICU for close monitoring, intubated 3/2.     NEURO:   Hx of Anxiety/Depression  - Intubated (3/2); sedated on fentanyl and propofol gtt  - C/w home clonazepam standing 1mg PO daily  - C/w home buspirone 15mg PO TID  - C/w home paroxetine 30mg PO daily    PULM:   # Acute hypoxic respiratory failure   Multifactorial related to influenza infection with high suspicion for superimposed bacterial PNA in setting of underlying lung disease. May also have component of aspiration pneumonitis.   - Intubated (3/2) as pt failed use of high flow FiO2 100% (ABG showing pO2 48), acapella valve, incentive spirometry  - C/w duonebs q6h PRN  - s/p Tamiflu for total 10 days (2/20-2/29)  - s/p vanc/cefepime (2/25-3/3), off abx as bronch studies does not appear to be infectious and appears to be more inflammatory  - serial ABGs  - s/p Bronchoscopy (3/2): airways appeared moderately inflamed and collapsable diffusely, multiple small diverticula, scant mucus b/l; brochoalveolar lavage performed, studies sent.    # Pulmonary fibrosis:   - Appreciate Pulm recs  - As per pt's pulmonologist group (Dr. Dia/Dr. Pop): Hx of "insignificant pulmonary fibrosis," Seen for chronic cough, PFTs (2017) was wnl aside from diffusion capacity of 74%, and CT Chest (2013) showed no evidence of pulm fibrosis or other significant pulm pathology  - c/w solumedrol 20mg IV Q8    CARDIOVASCULAR:   # HTN  - Continue to monitor  - off home Diltiazem and ARB as pt on pressor  - TTE (3/1): EF 65%; Severe pulmonary hypertension. Severely increased pulmonary vascular resistance; Normal left ventricular systolic function. No segmental wall motion abnormalities. Impaired LV-relaxation with normal filling pressure  - Cardiology following (Dr. Lucero), appreciate recs  - c/w phenylephrine gtt, wean as tolerated    GI:   - NPO, OG tube placed  - GI following (Dr. Kraft), appreciate recs  # Constipation  - c/w senna 10mg PO qhs, metamucil daily, and miralax daily  - dulcolax suppository x1 given    RENAL/:   # Urinary retention  - Possibly 2/2 SIADH in setting of pulm process, as per Renal  - off tamsulosin as pt now intubated, restarted on home doxazosin  - c/w Scruggs  - s/p Lasix IV 40mg x 2 with significant UOP, continue to monitor UOP  #AURA  - SCr doubled (baseline SCr ~0.7-0.8)  #Hyperkalemia  - given lokelma x1  - continue to monitor BMP    ENDOCRINE:   - Insulin sliding scale for steroid-induced hyperglycemia    HEMATOLOGIC:   # Leukocytosis  Likely secondary to steroids and infection  - monitor off abx  - Continue to trend CBC with differential    ID:   # Influenza with suspected superimposed bacterial PNA:   - Persistent leukocytosis, though pt also on steroids  - s/p vanc/cefepime (2/25-3/3), off abx as bronch studies does not appear to be infectious and appears to be more inflammatory  - s/p Tamiflu for total 10 days (2/20-2/29)  - Monitor WBC count, fever curve  - BCx (3/1): NGTD  - f/u procalcitonin level, fungitell, and galactomannan, as per ID  - f/u bronch Cx, gram stain unremarkable   - repeat RVP (3/3) negative    PROPHYLAXIS:   - DVT ppx: Lovenox    CODE STATUS:  - Wife and pt agreed to trial of intubation. Currently remains full code.    Rene Kim, PGY-1  MICU  Contact/Pager: [phone] / [phone]

## 2020-03-04 LAB
ALBUMIN SERPL ELPH-MCNC: 2.2 G/DL — LOW (ref 3.3–5)
ALP SERPL-CCNC: 56 U/L — SIGNIFICANT CHANGE UP (ref 40–120)
ALT FLD-CCNC: 47 U/L — HIGH (ref 10–45)
ANION GAP SERPL CALC-SCNC: 10 MMOL/L — SIGNIFICANT CHANGE UP (ref 5–17)
ANION GAP SERPL CALC-SCNC: 14 MMOL/L — SIGNIFICANT CHANGE UP (ref 5–17)
AST SERPL-CCNC: 16 U/L — SIGNIFICANT CHANGE UP (ref 10–40)
BILIRUB SERPL-MCNC: 0.2 MG/DL — SIGNIFICANT CHANGE UP (ref 0.2–1.2)
BUN SERPL-MCNC: 53 MG/DL — HIGH (ref 7–23)
BUN SERPL-MCNC: 58 MG/DL — HIGH (ref 7–23)
CALCIUM SERPL-MCNC: 8.4 MG/DL — SIGNIFICANT CHANGE UP (ref 8.4–10.5)
CALCIUM SERPL-MCNC: 8.9 MG/DL — SIGNIFICANT CHANGE UP (ref 8.4–10.5)
CHLORIDE SERPL-SCNC: 97 MMOL/L — SIGNIFICANT CHANGE UP (ref 96–108)
CHLORIDE SERPL-SCNC: 97 MMOL/L — SIGNIFICANT CHANGE UP (ref 96–108)
CO2 SERPL-SCNC: 25 MMOL/L — SIGNIFICANT CHANGE UP (ref 22–31)
CO2 SERPL-SCNC: 28 MMOL/L — SIGNIFICANT CHANGE UP (ref 22–31)
CREAT SERPL-MCNC: 1.31 MG/DL — HIGH (ref 0.5–1.3)
CREAT SERPL-MCNC: 1.36 MG/DL — HIGH (ref 0.5–1.3)
CULTURE RESULTS: SIGNIFICANT CHANGE UP
CULTURE RESULTS: SIGNIFICANT CHANGE UP
FUNGITELL: <31 PG/ML — SIGNIFICANT CHANGE UP
GALACTOMANNAN AG SERPL-ACNC: <0.5 INDEX — SIGNIFICANT CHANGE UP
GAS PNL BLDA: SIGNIFICANT CHANGE UP
GLUCOSE BLDC GLUCOMTR-MCNC: 172 MG/DL — HIGH (ref 70–99)
GLUCOSE BLDC GLUCOMTR-MCNC: 218 MG/DL — HIGH (ref 70–99)
GLUCOSE BLDC GLUCOMTR-MCNC: 230 MG/DL — HIGH (ref 70–99)
GLUCOSE SERPL-MCNC: 224 MG/DL — HIGH (ref 70–99)
GLUCOSE SERPL-MCNC: 236 MG/DL — HIGH (ref 70–99)
HCT VFR BLD CALC: 29.9 % — LOW (ref 39–50)
HGB BLD-MCNC: 9.3 G/DL — LOW (ref 13–17)
MAGNESIUM SERPL-MCNC: 2.4 MG/DL — SIGNIFICANT CHANGE UP (ref 1.6–2.6)
MAGNESIUM SERPL-MCNC: 2.6 MG/DL — SIGNIFICANT CHANGE UP (ref 1.6–2.6)
MCHC RBC-ENTMCNC: 29.6 PG — SIGNIFICANT CHANGE UP (ref 27–34)
MCHC RBC-ENTMCNC: 31.1 GM/DL — LOW (ref 32–36)
MCV RBC AUTO: 95.2 FL — SIGNIFICANT CHANGE UP (ref 80–100)
NON-GYNECOLOGICAL CYTOLOGY STUDY: SIGNIFICANT CHANGE UP
NRBC # BLD: 0 /100 WBCS — SIGNIFICANT CHANGE UP (ref 0–0)
PHOSPHATE SERPL-MCNC: 4.3 MG/DL — SIGNIFICANT CHANGE UP (ref 2.5–4.5)
PHOSPHATE SERPL-MCNC: 4.7 MG/DL — HIGH (ref 2.5–4.5)
PLATELET # BLD AUTO: 227 K/UL — SIGNIFICANT CHANGE UP (ref 150–400)
POTASSIUM SERPL-MCNC: 4.4 MMOL/L — SIGNIFICANT CHANGE UP (ref 3.5–5.3)
POTASSIUM SERPL-MCNC: 4.8 MMOL/L — SIGNIFICANT CHANGE UP (ref 3.5–5.3)
POTASSIUM SERPL-SCNC: 4.4 MMOL/L — SIGNIFICANT CHANGE UP (ref 3.5–5.3)
POTASSIUM SERPL-SCNC: 4.8 MMOL/L — SIGNIFICANT CHANGE UP (ref 3.5–5.3)
PROT SERPL-MCNC: 5.2 G/DL — LOW (ref 6–8.3)
RBC # BLD: 3.14 M/UL — LOW (ref 4.2–5.8)
RBC # FLD: 14.4 % — SIGNIFICANT CHANGE UP (ref 10.3–14.5)
SODIUM SERPL-SCNC: 132 MMOL/L — LOW (ref 135–145)
SODIUM SERPL-SCNC: 139 MMOL/L — SIGNIFICANT CHANGE UP (ref 135–145)
SPECIMEN SOURCE: SIGNIFICANT CHANGE UP
SPECIMEN SOURCE: SIGNIFICANT CHANGE UP
WBC # BLD: 23.49 K/UL — HIGH (ref 3.8–10.5)
WBC # FLD AUTO: 23.49 K/UL — HIGH (ref 3.8–10.5)

## 2020-03-04 PROCEDURE — 99291 CRITICAL CARE FIRST HOUR: CPT | Mod: 25

## 2020-03-04 PROCEDURE — 76604 US EXAM CHEST: CPT | Mod: 26,GC

## 2020-03-04 PROCEDURE — 93308 TTE F-UP OR LMTD: CPT | Mod: 26,GC

## 2020-03-04 RX ORDER — FUROSEMIDE 40 MG
40 TABLET ORAL ONCE
Refills: 0 | Status: COMPLETED | OUTPATIENT
Start: 2020-03-04 | End: 2020-03-04

## 2020-03-04 RX ORDER — HUMAN INSULIN 100 [IU]/ML
4 INJECTION, SUSPENSION SUBCUTANEOUS EVERY 6 HOURS
Refills: 0 | Status: DISCONTINUED | OUTPATIENT
Start: 2020-03-04 | End: 2020-03-05

## 2020-03-04 RX ADMIN — Medication 30 MILLIGRAM(S): at 11:02

## 2020-03-04 RX ADMIN — Medication 1 PACKET(S): at 11:02

## 2020-03-04 RX ADMIN — ATORVASTATIN CALCIUM 10 MILLIGRAM(S): 80 TABLET, FILM COATED ORAL at 21:35

## 2020-03-04 RX ADMIN — Medication 4: at 17:27

## 2020-03-04 RX ADMIN — Medication 40 MILLIGRAM(S): at 10:15

## 2020-03-04 RX ADMIN — ENOXAPARIN SODIUM 40 MILLIGRAM(S): 100 INJECTION SUBCUTANEOUS at 11:02

## 2020-03-04 RX ADMIN — HUMAN INSULIN 4 UNIT(S): 100 INJECTION, SUSPENSION SUBCUTANEOUS at 11:16

## 2020-03-04 RX ADMIN — Medication 15 MILLIGRAM(S): at 21:35

## 2020-03-04 RX ADMIN — Medication 1 MILLIGRAM(S): at 11:02

## 2020-03-04 RX ADMIN — HUMAN INSULIN 4 UNIT(S): 100 INJECTION, SUSPENSION SUBCUTANEOUS at 17:27

## 2020-03-04 RX ADMIN — SENNA PLUS 10 MILLILITER(S): 8.6 TABLET ORAL at 21:35

## 2020-03-04 RX ADMIN — Medication 40 MILLIGRAM(S): at 13:29

## 2020-03-04 RX ADMIN — CHLORHEXIDINE GLUCONATE 1 APPLICATION(S): 213 SOLUTION TOPICAL at 05:07

## 2020-03-04 RX ADMIN — CHLORHEXIDINE GLUCONATE 15 MILLILITER(S): 213 SOLUTION TOPICAL at 17:27

## 2020-03-04 RX ADMIN — PROPOFOL 10.66 MICROGRAM(S)/KG/MIN: 10 INJECTION, EMULSION INTRAVENOUS at 18:22

## 2020-03-04 RX ADMIN — CHLORHEXIDINE GLUCONATE 15 MILLILITER(S): 213 SOLUTION TOPICAL at 05:06

## 2020-03-04 RX ADMIN — Medication 4: at 05:26

## 2020-03-04 RX ADMIN — POLYETHYLENE GLYCOL 3350 17 GRAM(S): 17 POWDER, FOR SOLUTION ORAL at 11:02

## 2020-03-04 RX ADMIN — Medication 15 MILLIGRAM(S): at 05:06

## 2020-03-04 RX ADMIN — Medication 2: at 11:16

## 2020-03-04 RX ADMIN — Medication 40 MILLIGRAM(S): at 21:35

## 2020-03-04 RX ADMIN — Medication 15 MILLIGRAM(S): at 13:29

## 2020-03-04 RX ADMIN — Medication 20 MILLIGRAM(S): at 05:06

## 2020-03-04 RX ADMIN — Medication 4: at 00:23

## 2020-03-04 NOTE — PROGRESS NOTE ADULT - ASSESSMENT
ASSESSMENT:    hypoxic and hypercapnic respiratory failure - multifactorial - has developed severe pulmonary hypertension with ongoing hypoxemia    1) influenza infection complicated by bronchospasm (resolved) and pneumonia - little evidence of a superimposed bacterial infection with negative bronchoscopy cultures and non-elevated procalcitonin level - at   2) underlying mild pulmonary fibrosis now with traction bronchiectasis    PLAN/RECOMMENDATIONS:    vent support - taper FiO2 as tolerated - weaning trial  pressors to keep MAP > 65mmHg -> taper as tolerated  sedation with daily "sedation vacations"  chlorhexidine mouth wash  diurese as tolerated by renal function and hemodynamics (POCUS with "pulmonary edema" and small pleural effusions  fungitell level is not elevated  galactomannan level is pending  RVP is negative - observe off tamiflu  solumedrol 40mg IVP q8h - glucose control  has completed a course of vanco/cefepime  holding cozaar and diltiazem  buspar/klonopin/paxil  loza catheter  bowel regimen    Will follow with you. Plan of care discussed with the patient's family at bedside and the MICU team.    Galileo Pop MD, [facility]  [phone]  Pulmonary Medicine

## 2020-03-04 NOTE — PROGRESS NOTE ADULT - SUBJECTIVE AND OBJECTIVE BOX
[facility] - PROGRESS NOTE    CHIEF COMPLAINT: acute on chronic hypoxic/hypercapnic respiratory failure; influenza; pneumonia; abnormal chest CT    INTERVAL HISTORY: clinically "improved" - hemodynamically stable off pressors; down to 40% FiO2 on the ventilator with oxygen saturation 88%; moderating leukocytosis; resolved respiratory acidosis with pCO2 ~ 50mmHg; sedated but arousable; loza in place for ongoing urinary retention; no cough, sputum production, chest congestion or wheeze; no fevers, chills or sweats; no chest pain/pressure or palpitations;    REVIEW OF SYSTEMS:  Constitutional: As per interval history  HEENT: Within normal limits  CV: As per interval history  Resp: As per interval history  GI: Within normal limits   : urinary retention  Musculoskeletal: Within normal limits  Skin: Within normal limits  Neurological: Within normal limits  Psychiatric: anxiety type depression  Endocrine: Within normal limits  Hematologic/Lymphatic: Within normal limits  Allergic/Immunologic: Within normal limits    MEDICATIONS:     Pulmonary "    Anti-microbials:    Cardiovascular:    Other:  atorvastatin 10 milliGRAM(s) Oral at bedtime  busPIRone 15 milliGRAM(s) Oral three times a day  chlorhexidine 0.12% Liquid 15 milliLiter(s) Oral Mucosa every 12 hours  chlorhexidine 4% Liquid 1 Application(s) Topical <User Schedule>  clonazePAM  Tablet 1 milliGRAM(s) Oral daily  dextrose 5%. 1000 milliLiter(s) IV Continuous <Continuous>  dextrose 50% Injectable 12.5 Gram(s) IV Push once  dextrose 50% Injectable 25 Gram(s) IV Push once  dextrose 50% Injectable 25 Gram(s) IV Push once  enoxaparin Injectable 40 milliGRAM(s) SubCutaneous daily  insulin lispro (HumaLOG) corrective regimen sliding scale   SubCutaneous every 6 hours  insulin NPH human recombinant 4 Unit(s) SubCutaneous every 6 hours  methylPREDNISolone sodium succinate Injectable 40 milliGRAM(s) IV Push every 8 hours  PARoxetine 30 milliGRAM(s) Oral daily  polyethylene glycol 3350 17 Gram(s) Oral daily  propofol Infusion 15 MICROgram(s)/kG/Min IV Continuous <Continuous>  psyllium Powder 1 Packet(s) Oral daily  senna Syrup 10 milliLiter(s) Oral at bedtime    MEDICATIONS  (PRN):  albuterol/ipratropium for Nebulization 3 milliLiter(s) Nebulizer every 6 hours PRN Shortness of Breath and/or Wheezing  dextrose 40% Gel 15 Gram(s) Oral once PRN Blood Glucose LESS THAN 70 milliGRAM(s)/deciliter  glucagon  Injectable 1 milliGRAM(s) IntraMuscular once PRN Glucose LESS THAN 70 milligrams/deciliter        OBJECTIVE:  Mode: AC/ CMV (Assist Control/ Continuous Mandatory Ventilation), RR (machine): 20, TV (machine): 550, FiO2: 40, PEEP: 8, ITime: 1, MAP: 14, PIP: 35  I&O's Detail    03 Mar 2020 07:01  -  04 Mar 2020 07:00  --------------------------------------------------------  IN:    Enteral Tube Flush: 180 mL    fentaNYL Infusion.: 314 mL    Glucerna: 150 mL    Nepro: 520 mL    phenylephrine   Infusion: 151 mL    propofol Infusion: 448.1 mL    Solution: 200 mL  Total IN: 1963.1 mL    OUT:    Indwelling Catheter - Urethral: 1850 mL  Total OUT: 1850 mL    Total NET: 113.1 mL      04 Mar 2020 07:01  -  04 Mar 2020 11:58  --------------------------------------------------------  IN:    Enteral Tube Flush: 60 mL    fentaNYL Infusion.: 11.8 mL    propofol Infusion: 71.1 mL  Total IN: 142.9 mL    OUT:    Indwelling Catheter - Urethral: 300 mL  Total OUT: 300 mL    Total NET: -157.1 mL      Daily Weight in k.6 (04 Mar 2020 04:00)    POCT Blood Glucose.: 172 mg/dL (04 Mar 2020 11:13)  POCT Blood Glucose.: 218 mg/dL (04 Mar 2020 05:16)  POCT Blood Glucose.: 193 mg/dL (03 Mar 2020 16:57)      PHYSICAL EXAM:       ICU Vital Signs Last 24 Hrs  T(C): 36.5 (04 Mar 2020 08:00), Max: 37.1 (03 Mar 2020 20:00)  T(F): 97.7 (04 Mar 2020 08:00), Max: 98.8 (03 Mar 2020 20:00)  HR: 90 (04 Mar 2020 11:00) (46 - 90)  BP: 148/66 (04 Mar 2020 11:00) (90/51 - 162/75)  BP(mean): 95 (04 Mar 2020 11:00) (66 - 108)  ABP: --  ABP(mean): --  RR: 20 (04 Mar 2020 11:00) (20 - 33)  SpO2: 89% (04 Mar 2020 11:00) (84% - 100%) on vent FiO2 40%     General: Sedated. Follow simple commands. Intubated. No distress. Appears stated age. .  HEENT: Atraumatic. Normocephalic. Anicteric. Normal oral mucosa. PERRL. EOMI. OGT  Neck: Supple. Trachea midline. Thyroid without enlargement/tenderness/nodules. No carotid bruit. No JVD.	  Cardiovascular: Regular rate and rhythm. S1 S2 normal. No murmurs, rubs or gallops.  Respiratory: Respirations unlabored. Scattered rales. No curvature.  Abdomen: Soft. Non-tender. Non-distended. No organomegaly. No masses. Normal bowel sounds. Obese. Loza catheter.  Extremities: Warm to touch. No clubbing or cyanosis. No pedal edema.  Pulses: 2+ peripheral pulses all extremities.	  Skin: Normal skin color. No rashes or lesions. No ecchymoses. No cyanosis. Warm to touch.  Lymph Nodes: Cervical, supraclavicular and axillary nodes normal  Neurological: Moving all 4 extremities. A and O x 1  Psychiatry: Appropriate mood and affect.    LABS:                          9.3    23.49 )-----------( 227      ( 04 Mar 2020 00:24 )             29.9     CBC    WBC  23.49 <==, 31.66 <==, 34.86 <==, 31.73 <==, 23.36 <==, 22.40 <==, 20.53 <==    Hemoglobin  9.3 <<==, 10.8 <<==, 11.5 <<==, 11.5 <<==, 11.6 <<==, 12.1 <<==, 11.5 <<==    Hematocrit  29.9 <==, 34.0 <==, 34.3 <==, 35.7 <==, 35.4 <==, 36.2 <==, 34.5 <==    Platelets  227 <==, 287 <==, 232 <==, 182 <==, 142 <==, 152 <==, 148 <==      132<L>  |  97  |  53<H>  ----------------------------<  224<H>    03-04  4.8   |  25  |  1.31<H>      LYTES    sodium  132 <==, 136 <==, 135 <==, 134 <==, 131 <==, 131 <==, 133 <==    potassium   4.8 <==, 5.1 <==, 5.5 <==, 6.8 <==, 5.5 <==, 4.9 <==, 4.6 <==    chloride  97 <==, 98 <==, 97 <==, 96 <==, 95 <==, 93 <==, 95 <==    carbon dioxide  25 <==, 27 <==, 26 <==, 25 <==, 25 <==, 28 <==, 27 <==    =============================================================================================  RENAL FUNCTION:    Creatinine:   1.31  <<==, 1.34  <<==, 1.50  <<==, 1.40  <<==, 1.58  <<==, 0.79  <<==, 0.84  <<==    BUN:   53 <==, 45 <==, 44 <==, 42 <==, 40 <==, 24 <==, 25 <==    ============================================================================================    calcium   8.4 <==, 8.5 <==, 8.5 <==, 8.3 <==, 7.9 <==, 8.7 <==, 8.7 <==    phos   4.3 <==, 4.3 <==, 5.5 <==, 5.9 <==, 5.8 <==, 3.6 <==, 3.7 <==    mag   2.6 <==, 2.5 <==, 2.5 <==, 2.5 <==, 2.3 <==, 2.1 <==, 2.0 <==    ============================================================================================  LFTs    AST:   16 <== , 23 <== , 71 <== , 36 <== , 31 <== , 24 <==     ALT:  47  <== , 66  <== , 99  <== , 39  <== , 39  <== , 37  <==     AP:  56  <=, 67  <=, 91  <=, 79  <=, 79  <=, 76  <=    Bili:  0.2  <=, 0.2  <=, 0.3  <=, 0.5  <=, 0.5  <=, 0.5  <=    ABG - ( 04 Mar 2020 00:11 )  pH: 7.40  /  pCO2: 49    /  pO2: 74    / HCO3: 30    / Base Excess: 4.7   /  SaO2: 94        ABG - ( 03 Mar 2020 15:46 )  pH: 7.38  /  pCO2: 52    /  pO2: 75    / HCO3: 30    / Base Excess: 4.7   /  SaO2: 96        ABG - ( 03 Mar 2020 00:27 )  pH: 7.33  /  pCO2: 58    /  pO2: 141   / HCO3: 30    / Base Excess: 3.6   /  SaO2: 98        ABG - ( 02 Mar 2020 16:59 )  pH: 7.30  /  pCO2: 64    /  pO2: 138   / HCO3: 30    / Base Excess: 3.1   /  SaO2: 99        ABG - ( 02 Mar 2020 14:47 )  pH: 7.28  /  pCO2: 67    /  pO2: 119   / HCO3: 31    / Base Excess: 3.2   /  SaO2: 98        ABG - ( 02 Mar 2020 13:47 )  pH: 7.21  /  pCO2: 80    /  pO2: 89    / HCO3: 31    / Base Excess: 1.5   /  SaO2: 95        Procalcitonin, Serum: 0.22 ng/mL ( @ 17:00)    Procalcitonin, Serum: 0.15 ng/mL ( @ 09:25)    Procalcitonin, Serum: 0.69 ng/mL ( @ 07:45)    Serum Pro-Brain Natriuretic Peptide: 364 pg/mL ( @ 22:30)    CARDIAC MARKERS ( 2020 00:57 )  CPK x     /CKMB x     /CKMB Units x        troponin 17 ng/L    CARDIAC MARKERS ( 2020 22:30 )  CPK x     /CKMB x     /CKMB Units x        troponin 19 ng/L      Patient name: DOUGLAS MORROW  YOB: 1950   Age: 69 (M)   MR#: [id number]  Study Date: 3/1/2020  [facility] RD[facility]  Study quality: Technically fair  Referring Physician: Tari Sommer MD  Blood Pressure: 137/62 mmHg  Height: 183 cm  Weight: 105 kg  BSA: 2.3 m2  ------------------------------------------------------------------------  PROCEDURE: Transthoracic echocardiogram with 2-D, M-Mode  and complete spectral and color flow Doppler.  INDICATION:Dyspnea, unspecified (R06.00)  ------------------------------------------------------------------------  Dimensions:    Normal Values:  LA:     3.8    2.0 - 4.0 cm  Ao:     2.9    2.0 - 3.8 cm  SEPTUM: 1.0    0.6 - 1.2 cm  PWT:    1.1    0.6 - 1.1 cm  LVIDd:  5.3    3.0 - 5.6 cm  LVIDs:  3.4    1.8 - 4.0 cm  Derived variables:  LVMI: 95 g/m2  RWT: 0.41  EF (Visual Estimate): 65 %  Doppler Peak Velocity (m/sec): AoV=1.6 TV=3.9  ------------------------------------------------------------------------  Observations:  Mitral Valve: Normal mitral valve. Minimal mitral  regurgitation.  Aortic Valve/Aorta: Calcified aortic valve with normal  opening.  Normal aortic root size.  Left Atrium: Normal left atrium.  Left Ventricle: Normal left ventricular internal dimensions  and wall thicknesses.  Normal left ventricular systolic function. No segmental  wall motion abnormalities.  Impaired LV-relaxation with normal filling pressure.  Right Heart: Normal right atrium. Suboptimal visualization  of the right heart in all windows.  Normal tricuspid valve.  Mild-moderate tricuspid regurgitation. Normal pulmonic  valve.  Pericardium/Pleura: Normal pericardium with no pericardial  effusion.  Hemodynamic: Severe pulmonary hypertension; severely  increased pulmonary vascular resistance.  Estimated PASP at least 65 mmHg.  ------------------------------------------------------------------------  Conclusions:  Normal left ventricular systolic function. No segmental  wall motion abnormalities.  Impaired LV-relaxation with normal filling pressure.  Suboptimal visualization of the right heart in all windows.    Severe pulmonary hypertension; severely increased pulmonary  vascular resistance.  ------------------------------------------------------------------------  Confirmed on  3/1/2020 - 14:35:54 by Tyrell Brock MD, FASE  ------------------------------------------------------------------------      MICROBIOLOGY:     Culture - Bronchial (20 @ 21:29)    Gram Stain:   Numerous polymorphonuclear leukocytes per low power field  Rare Squamous Epithelial Cells  Few Yeast like cells per oil power field    Specimen Source: Bronch Wash Bronchoalveolar Lavage    Culture Results:   Normal Respiratory Grecia present    FLU A B RSV Detection by PCR (20 @ 22:30)    Flu A Result: Detected    Flu B Result: NotDetec    RSV Result: NotDetec    Legionella pneumophila Antigen, Urine (20 @ 09:50)    Legionella Antigen, Urine: Negative    Culture - Blood (20 @ 10:14)    Specimen Source: .Blood Blood-Venous    Culture Results:   No growth to date.    Culture - Blood (20 @ 10:14)    Specimen Source: .Blood Blood-Peripheral    Culture Results:   No growth to date.    RADIOLOGY:  [x] Chest radiographs reviewed and interpreted by me      EXAM:  XR CHEST PORTABLE URGENT 1V                          PROCEDURE DATE:  2020      INTERPRETATION:  CLINICAL INFORMATION: Intubated.    TECHNIQUE: AP radiograph of the chest.    COMPARISON: Chest x-ray 2020.    FINDINGS:    LINES AND TUBES: Endotracheal tube with tip above the kayden. Enteric tube with side-port at the GE junction.    LUNGS: Mild pulmonary edema.    PLEURA: No pleural effusion or pneumothorax.    HEART AND MEDIASTINUM: Heart size is within normal limits.    SKELETON: Unremarkable skeletal structures.      IMPRESSION:     Endotracheal tube with tip above the kayden.    GEN TABOR M.D., RADIOLOGY RESIDENT  This document has been electronically signed.  SADIA RIBEIRO M.D., ATTENDING RADIOLOGIST  This document has been electronically signed. Mar  2 2020 12:09PM  ---------------------------------------------------------------------------------------------------------------    EXAM:  CT CHEST                          PROCEDURE DATE:  2020      INTERPRETATION:  CLINICAL INFORMATION: Dyspnea    COMPARISON: CT chest 2020    PROCEDURE:   CT of the Chest was performed without intravenous contrast.  [place] coronal reformats were performed.      FINDINGS:    LUNGS AND AIRWAYS: The central airways are patent.  Patchy areas of groundglass opacities containing traction bronchiectasis are noted within both lungs. Addition, superimposed patchy groundglass opacities are also noted. They are new when compared to previous exam.    PLEURA: No pleural effusion.    MEDIASTINUM AND CHAVO: There are scattered small hilar and mediastinal lymph nodes.    VESSELS: Within normal limits.    HEART: The heart size is normal and there is no pericardial effusion.     CHEST WALL AND LOWER NECK: Within normal limits.    VISUALIZED UPPER ABDOMEN: Within normal limits.    BONES: There are multilevel degenerative changes of the spine    IMPRESSION:     Patchy groundglass opacities containing traction bronchiectasis bilaterally is suggestive of pulmonary fibrosis of uncertain etiology.    Groundglass opacities are noted within both lungs. Primary consideration is infection.    BRITNEY VERONICA M.D., RADIOLOGY RESIDENT  This document has been electronically signed.  TAMMIE LOTT M.D., ATTENDING RADIOLOGIST  This document has been electronically signed. 2020  5:04PM  ---------------------------------------------------------------------------------------------------------------    EXAM:  CT ANGIO CHEST (W)AW IC                          PROCEDURE DATE:  2020      INTERPRETATION:  CLINICAL INFORMATION: Shortness of breath and fatigue. History of lung disease.    COMPARISON: Chest radiograph 2020    PROCEDURE:   CT Angiography of the Chest.  90 ml of Omnipaque 350 was injected intravenously. 10 ml were discarded.  Sagittal and coronal reformats were performed as well as 3D (MIP) reconstructions.    FINDINGS:    LUNGS AND AIRWAYS: Patent central airways. Areas of architectural distortion, mild traction bronchiectasis and linear/reticular opacities. Nonspecific groundglass/patchy opacities in both lungs.      PLEURA: No pleural effusion or pneumothorax.    MEDIASTINUM AND CHAVO: Subcentimeter mediastinal and hilar lymph nodes without lymphadenopathy.    VESSELS: Adequate contrast opacification of the pulmonary arteries. No obvious pulmonary embolus.    HEART: Normal heart size. No pericardial effusion.    CHEST WALL AND LOWER NECK: Heterogeneous thyroid gland, obscured by artifact.    VISUALIZED UPPER ABDOMEN: Colon diverticulosis.    BONES: Degenerative changes of the spine.    IMPRESSION:     No obvious pulmonary embolus.    Findings reflecting known pulmonary fibrosis. Recommend clinical correlation to assess underlying pneumonia. Recommend comparison to previous outside study and follow-up to exclude potential underlying neoplasm.    Heterogeneous thyroid gland, which is difficult to assess due to artifact and can be further characterized on a nonemergent ultrasound as clinically indicated.     Additional findings as described.    RAMONA REDDY M.D., RADIOLOGY RESIDENT  This document has been electronically signed.  MIAH CHAUDHRY M.D., ATTENDING RADIOLOGIST  This document has been electronically signed. 2020  1:49AM  ---------------------------------------------------------------------------------------------------------------

## 2020-03-04 NOTE — PROGRESS NOTE ADULT - ATTENDING COMMENTS
Patient seen and examined, agree with above     69M PMH HTN, HLD, pulmonary fibrosis, anxiety/depression, admitted with acute hypoxic respiratory failure due to Influenza and superimposed bacterial pneumonia. Course complicated by worsening resp failure requiring intubation on 3/2, he had low grade fever on 2/29 and has worsening leucocytosis, albeit on steroids (since admission). Recent resp status deterioration could be due to mucus plugging vs superimposed pneumonia.     No evidence of new infection - bronch studies, RVP, fungitell neg, resp failure likely due to pneumonitis/acute pulmonary edema     Recs:   On propofol, fentanyl, more responsive today and follows simple commands, appears comfortable - wean sedation as he tolerates   Not a candidate for Precedex due to bradycardia   Off Dane  Didn't tolerate PEEP/FiO2 wean today - will give Lasix 40 mg and increase Solumedrol to 40 mg q8   Tolerates TF, had a BM yesterday   Hyperglycemia - add NPH 3 units q6, continue Humalog sliding scale   Renal fn slowly improving, hyperkalemia resolved, maintain Scruggs today for retention   Lovenox for DVT ppx     Patient remains critically ill due to resp failure requiring mechanical ventilation

## 2020-03-04 NOTE — CHART NOTE - NSCHARTNOTEFT_GEN_A_CORE
: Myself, Naveed Stokes MD, and Alfie Donald MD    INDICATION: intubated, sedated    PROCEDURE:  [ x] LIMITED ECHO  [ x] LIMITED CHEST  [ ] LIMITED RETROPERITONEAL  [ ] LIMITED ABDOMINAL  [ ] LIMITED DVT  [ ] NEEDLE GUIDANCE VASCULAR  [ ] NEEDLE GUIDANCE THORACENTESIS  [ ] NEEDLE GUIDANCE PARACENTESIS  [ ] NEEDLE GUIDANCE PERICARDIOCENTESIS  [ ] OTHER    FINDINGS: B lines b/l, small pleural effusions left sided>right sided      INTERPRETATION: pulmonary edema, cardiac function wnl

## 2020-03-04 NOTE — PROGRESS NOTE ADULT - SUBJECTIVE AND OBJECTIVE BOX
INTERVAL HPI/OVERNIGHT EVENTS:    patient seen and examined   events noted   intubated/sedated   on vent support  tolerating OGT feeds @ 40cc/hr  +bm this morning    MEDICATIONS  (STANDING):  atorvastatin 10 milliGRAM(s) Oral at bedtime  busPIRone 15 milliGRAM(s) Oral three times a day  cefepime   IVPB      cefepime   IVPB 2000 milliGRAM(s) IV Intermittent every 8 hours  chlorhexidine 0.12% Liquid 15 milliLiter(s) Oral Mucosa every 12 hours  chlorhexidine 4% Liquid 1 Application(s) Topical <User Schedule>  clonazePAM  Tablet 1 milliGRAM(s) Oral daily  dextrose 5%. 1000 milliLiter(s) (50 mL/Hr) IV Continuous <Continuous>  dextrose 50% Injectable 12.5 Gram(s) IV Push once  dextrose 50% Injectable 25 Gram(s) IV Push once  dextrose 50% Injectable 25 Gram(s) IV Push once  diltiazem    Tablet 60 milliGRAM(s) Oral every 6 hours  doxazosin 2 milliGRAM(s) Oral at bedtime  enoxaparin Injectable 40 milliGRAM(s) SubCutaneous daily  fentaNYL   Infusion. 2 MICROgram(s)/kG/Hr (11.85 mL/Hr) IV Continuous <Continuous>  insulin lispro (HumaLOG) corrective regimen sliding scale   SubCutaneous every 6 hours  losartan 100 milliGRAM(s) Oral daily  methylPREDNISolone sodium succinate Injectable 20 milliGRAM(s) IV Push every 8 hours  PARoxetine 30 milliGRAM(s) Oral daily  phenylephrine    Infusion 0.1 MICROgram(s)/kG/Min (4.444 mL/Hr) IV Continuous <Continuous>  propofol Infusion 15 MICROgram(s)/kG/Min (10.665 mL/Hr) IV Continuous <Continuous>  psyllium Powder 1 Packet(s) Oral daily  senna 2 Tablet(s) Oral at bedtime  vancomycin  IVPB 1500 milliGRAM(s) IV Intermittent every 12 hours    MEDICATIONS  (PRN):  albuterol/ipratropium for Nebulization 3 milliLiter(s) Nebulizer every 6 hours PRN Shortness of Breath and/or Wheezing  dextrose 40% Gel 15 Gram(s) Oral once PRN Blood Glucose LESS THAN 70 milliGRAM(s)/deciliter  glucagon  Injectable 1 milliGRAM(s) IntraMuscular once PRN Glucose LESS THAN 70 milligrams/deciliter      Allergies    penicillin (Anaphylaxis)    Intolerances        Review of Systems: unable to obtain         Vital Signs Last 24 Hrs  T(C): 37.2 (02 Mar 2020 12:00), Max: 37.3 (02 Mar 2020 08:00)  T(F): 99 (02 Mar 2020 12:00), Max: 99.1 (02 Mar 2020 08:00)  HR: 72 (02 Mar 2020 12:15) (56 - 93)  BP: 92/47 (02 Mar 2020 12:15) (77/38 - 166/84)  BP(mean): 67 (02 Mar 2020 12:15) (53 - 113)  RR: 18 (02 Mar 2020 12:15) (16 - 38)  SpO2: 93% (02 Mar 2020 12:15) (87% - 98%)    PHYSICAL EXAM:    Constitutional: intubated/sedated   Respiratory: on ventilatory support   Cardiovascular: S1 and S2  Gastrointestinal: BS+, soft, NT/ND  Extremities: No peripheral edema  Neurological: sedated        LABS:                        11.5   34.86 )-----------( 232      ( 02 Mar 2020 00:19 )             34.3     03-02    131<L>  |  93<L>  |  24<H>  ----------------------------<  235<H>  4.9   |  28  |  0.79    Ca    8.7      02 Mar 2020 00:19  Phos  3.6     03-02  Mg     2.1     03-02    TPro  6.1  /  Alb  2.5<L>  /  TBili  0.3  /  DBili  x   /  AST  71<H>  /  ALT  99<H>  /  AlkPhos  91  03-02          RADIOLOGY & ADDITIONAL TESTS:

## 2020-03-04 NOTE — PROGRESS NOTE ADULT - ASSESSMENT
68 yo male with HTN, anxiety disorder, and pulmonary fibrosis admitted with hypoxic respiratory failure.  Influenza A diagnosed 2/16 with a rapid test.  CTA is negative for PE.  Started on CTX and azithro along with tamiflu  Legionella urine antigen negative, MRSA screen is negative and his PC is elevated to 0.69.  The elevated PC was  suggestive of a bacterial infection  Lack of improvement with tamiflu documented and raised concern of both viral pneumonia and a secondary process such as a bacterial pneumonia - empiric antibiotics cont'd  Repeated CT chest revealed pulm fibrosis & bilateral groundglass opacities concerning for infection-2/24  However remains afebrile & PCT was  low at 0.15  Leukocytosis unreliable - given pt on steroids  CTX, azithro were changed to vanco & cefepime on 2/25/20, in case this was HCAP, although low overall suspicion in this afebrile pt with low PCT.  Hypoxemia remains an issue with ongoing requirement for high flow O2 support & steroids.  ECHO with severe pulmonary hypertension  2/29  had a fever of 100.8 - was unaware of it subjectively - significance unclear  No fever since 2/29  Now requiring ventilatory support  S/P vanco and cefepime course on 3/2  preliminary BAL findings noted, culture with MURF, AFB smears negative  PC 3/2 low at .22, fungitell less than  31, 3rd RVP now negative  He has been stable off antibiotics x 48 hours  Suggest:  Steroid mgmt as per ICU/ pulmonary  Leukocytosis unreliable on IV steroids, is moderating  Suspect respiratory failure is multifactorial  Supportive care per MICU  I am okay with conservative management off antibiotics  Monitor off antibiotics

## 2020-03-04 NOTE — PROGRESS NOTE ADULT - SUBJECTIVE AND OBJECTIVE BOX
CC: f/u for respiratory failure    Patient reports: stable night, no acute events.    REVIEW OF SYSTEMS:  All other review of systems negative (Comprehensive ROS): limited, sedated on vent    Antimicrobials Day #  :off    Other Medications Reviewed    T(F): 97.7 (03-04-20 @ 08:00), Max: 98.8 (03-03-20 @ 20:00)  HR: 47 (03-04-20 @ 08:00)  BP: 104/55 (03-04-20 @ 08:00)  RR: 20 (03-04-20 @ 08:00)  SpO2: 98% (03-04-20 @ 08:00)  Wt(kg): --    PHYSICAL EXAM:  General: sedated, no acute distress  Eyes:  anicteric, no conjunctival injection, no discharge  Oropharynx: ETT  Neck: supple, without adenopathy  Lungs: clear to auscultation, distant BS  Heart: regular rate and rhythm; no murmur, rubs or gallops  Abdomen: soft, nondistended, nontender, without mass or organomegaly  Skin: no lesions  Extremities: no clubbing, cyanosis, or edema  Neurologic: sedated, poorly interactive  : loza    LAB RESULTS:                        9.3    23.49 )-----------( 227      ( 04 Mar 2020 00:24 )             29.9     03-04    132<L>  |  97  |  53<H>  ----------------------------<  224<H>  4.8   |  25  |  1.31<H>    Ca    8.4      04 Mar 2020 00:24  Phos  4.3     03-04  Mg     2.6     03-04    TPro  5.2<L>  /  Alb  2.2<L>  /  TBili  0.2  /  DBili  x   /  AST  16  /  ALT  47<H>  /  AlkPhos  56  03-04    LIVER FUNCTIONS - ( 04 Mar 2020 00:24 )  Alb: 2.2 g/dL / Pro: 5.2 g/dL / ALK PHOS: 56 U/L / ALT: 47 U/L / AST: 16 U/L / GGT: x             MICROBIOLOGY:  RECENT CULTURES:  03-02 @ 21:29 BAL Bronchoalveolar Lavage     Normal Respiratory Grecia present    Numerous polymorphonuclear leukocytes per low power field  Rare Squamous Epithelial Cells  Few Yeast like cells per oil power field    03-02 @ 21:27 BAL Bronchoalveolar Lavage     Normal Respiratory Grecia present    Moderate polymorphonuclear leukocytes per low power field  Rare Squamous Epithelial Cells per low power field  Few Yeast like cells per oil power field    03-01 @ 16:33 .Blood Blood-Venous     No growth to date.      PC .22  RVP: negative    RADIOLOGY REVIEWED:  < from: Xray Chest 1 View- PORTABLE-Urgent (03.02.20 @ 18:27) >  INTERPRETATION:  A single chest x-ray was obtained on March 2, 2020.    Indication: Intubated.    Impression:    Suboptimal study. The right hemithorax is not included on the film. Pulmonary vascular congestion is seen on the left lung. Left lower lobe pneumonia and/or atelectasis cannot be ruled out entirely. Endotracheal tube and NG tube are in good position. No pneumothorax. The overall appearance of the chest remain unchanged compared to previous study done March 2, 2020 at 10:44 AM.

## 2020-03-04 NOTE — PROGRESS NOTE ADULT - SUBJECTIVE AND OBJECTIVE BOX
CHIEF COMPLAINT: SOB, cough and weakness    OVERNIGHT / SUBJECTIVE:  -Overnight, no acute events. Was bradycardic throughout the night. Believed to be due to propofol, lowered it a little, and then jeffrey improved slightly.  -Today, pt seen and examined at bedside in AM. Pt is intubated and sedated, but grimaces/reacts to name calling and during examination.    REVIEW OF SYSTEMS:  -Unable to obtain ROS as pt is intubated and sedated    OBJECTIVE:  ICU Vital Signs Last 24 Hrs  T(C): 36.4 (04 Mar 2020 04:00), Max: 37.3 (03 Mar 2020 08:00)  T(F): 97.5 (04 Mar 2020 04:00), Max: 99.1 (03 Mar 2020 08:00)  HR: 48 (04 Mar 2020 07:15) (46 - 89)  BP: 104/55 (04 Mar 2020 07:15) (90/51 - 152/67)  BP(mean): 75 (04 Mar 2020 07:15) (66 - 96)  ABP: --  ABP(mean): --  RR: 20 (04 Mar 2020 07:15) (20 - 33)  SpO2: 97% (04 Mar 2020 07:15) (84% - 100%)    Mode: AC/ CMV (Assist Control/ Continuous Mandatory Ventilation), RR (machine): 20, TV (machine): 550, FiO2: 40, PEEP: 8, ITime: 1, MAP: 13, PIP: 32    03-03 @ 07:01  -  03-04 @ 07:00  --------------------------------------------------------  IN: 1963.1 mL / OUT: 1850 mL / NET: 113.1 mL      CAPILLARY BLOOD GLUCOSE      POCT Blood Glucose.: 218 mg/dL (04 Mar 2020 05:16)  POCT Blood Glucose.: 193 mg/dL (03 Mar 2020 16:57)  POCT Blood Glucose.: 183 mg/dL (03 Mar 2020 10:52)    I&O's Summary    03 Mar 2020 07:01  -  04 Mar 2020 07:00  --------------------------------------------------------  IN: 1963.1 mL / OUT: 1850 mL / NET: 113.1 mL        PHYSICAL EXAM  General: intubated and sedated  HEENT: PERRL, normal sclera and conjunctiva  Neck: Supple  Chest/Lungs: Clear to ausculation anteriorly, no wheezing appreciated  Heart: RRR, normal S1, S2, no murmurs or gallops  Abd: +BS, soft, obese, nontender, nondistended  Extremities: 2+ peripheral pulses, trace pedal edema  Skin: No rashes or lesions, warm, well-perfused    LABS:                        9.3    23.49 )-----------( 227      ( 04 Mar 2020 00:24 )             29.9     03-04    132<L>  |  97  |  53<H>  ----------------------------<  224<H>  4.8   |  25  |  1.31<H>    Ca    8.4      04 Mar 2020 00:24  Phos  4.3     03-04  Mg     2.6     03-04    TPro  5.2<L>  /  Alb  2.2<L>  /  TBili  0.2  /  DBili  x   /  AST  16  /  ALT  47<H>  /  AlkPhos  56  03-04        LINES:    HOSPITAL MEDICATIONS:  Standing Meds:  atorvastatin 10 milliGRAM(s) Oral at bedtime  busPIRone 15 milliGRAM(s) Oral three times a day  chlorhexidine 0.12% Liquid 15 milliLiter(s) Oral Mucosa every 12 hours  chlorhexidine 4% Liquid 1 Application(s) Topical <User Schedule>  clonazePAM  Tablet 1 milliGRAM(s) Oral daily  dextrose 5%. 1000 milliLiter(s) IV Continuous <Continuous>  dextrose 50% Injectable 12.5 Gram(s) IV Push once  dextrose 50% Injectable 25 Gram(s) IV Push once  dextrose 50% Injectable 25 Gram(s) IV Push once  enoxaparin Injectable 40 milliGRAM(s) SubCutaneous daily  fentaNYL   Infusion. 2 MICROgram(s)/kG/Hr IV Continuous <Continuous>  insulin lispro (HumaLOG) corrective regimen sliding scale   SubCutaneous every 6 hours  methylPREDNISolone sodium succinate Injectable 20 milliGRAM(s) IV Push every 8 hours  PARoxetine 30 milliGRAM(s) Oral daily  phenylephrine    Infusion 0.1 MICROgram(s)/kG/Min IV Continuous <Continuous>  polyethylene glycol 3350 17 Gram(s) Oral daily  propofol Infusion 15 MICROgram(s)/kG/Min IV Continuous <Continuous>  psyllium Powder 1 Packet(s) Oral daily  senna Syrup 10 milliLiter(s) Oral at bedtime      PRN Meds:  albuterol/ipratropium for Nebulization 3 milliLiter(s) Nebulizer every 6 hours PRN  dextrose 40% Gel 15 Gram(s) Oral once PRN  glucagon  Injectable 1 milliGRAM(s) IntraMuscular once PRN      LABS:                        9.3    23.49 )-----------( 227      ( 04 Mar 2020 00:24 )             29.9     Hgb Trend: 9.3<--, 10.8<--, 11.5<--, 11.5<--, 11.6<--  03-04    132<L>  |  97  |  53<H>  ----------------------------<  224<H>  4.8   |  25  |  1.31<H>    Ca    8.4      04 Mar 2020 00:24  Phos  4.3     03-04  Mg     2.6     03-04    TPro  5.2<L>  /  Alb  2.2<L>  /  TBili  0.2  /  DBili  x   /  AST  16  /  ALT  47<H>  /  AlkPhos  56  03-04    Creatinine Trend: 1.31<--, 1.34<--, 1.50<--, 1.40<--, 1.58<--, 0.79<--      Arterial Blood Gas:  03-04 @ 00:11  7.40/49/74/30/94/4.7  ABG lactate: --  Arterial Blood Gas:  03-03 @ 15:46  7.38/52/75/30/96/4.7  ABG lactate: --  Arterial Blood Gas:  03-03 @ 00:27  7.33/58/141/30/98/3.6  ABG lactate: --  Arterial Blood Gas:  03-02 @ 16:59  7.30/64/138/30/99/3.1  ABG lactate: --  Arterial Blood Gas:  03-02 @ 14:47  7.28/67/119/31/98/3.2  ABG lactate: --  Arterial Blood Gas:  03-02 @ 13:47  7.21/80/89/31/95/1.5  ABG lactate: --  Arterial Blood Gas:  03-02 @ 12:25  7.20/84/71/31/90/1.2  ABG lactate: --  Arterial Blood Gas:  03-02 @ 10:09  7.35/60/70/32/91/5.6  ABG lactate: --                MICROBIOLOGY:     RADIOLOGY: Reviewed

## 2020-03-04 NOTE — PROGRESS NOTE ADULT - ASSESSMENT
69M with PMH of HTN, HLD, depression, anxiety, and insignificant degree of pulmonary fibrosis, who initially presented 2/20/20 following a fall at home, admitted with acute hypoxic respiratory failure in setting of influenza with concern for superimposed PNA. Course c/b worsening respiratory failure with high oxygen requirements, transferred to MICU for close monitoring, intubated 3/2.     NEURO:   Hx of Anxiety/Depression  - Intubated (3/2); sedated on fentanyl and propofol gtt  - C/w home clonazepam standing 1mg PO daily  - C/w home buspirone 15mg PO TID  - C/w home paroxetine 30mg PO daily    PULM:   # Acute hypoxic respiratory failure   Multifactorial related to influenza infection with suspicion for superimposed bacterial PNA in setting of underlying lung disease. May also have component of aspiration pneumonitis.   - Intubated (3/2) as pt failed use of high flow FiO2 100% (ABG showing pO2 48), acapella valve, incentive spirometry  - c/w duonebs q6h PRN  - s/p Tamiflu for total 10 days (2/20-2/29)  - s/p vanc/cefepime (2/25-3/3), off abx as bronch studies does not appear to be infectious and appears to be more inflammatory  - s/p Bronchoscopy (3/2): airways appeared moderately inflamed and collapsable diffusely, multiple small diverticula, scant mucus b/l; brochoalveolar lavage performed, studies sent.  - serial ABGs    # Pulmonary fibrosis:   - Appreciate Pulm recs  - As per pt's pulmonologist group (Dr. Dia/Dr. Pop): Hx of "insignificant pulmonary fibrosis," Seen for chronic cough, PFTs (2017) was wnl aside from diffusion capacity of 74%, and CT Chest (2013) showed no evidence of pulm fibrosis or other significant pulm pathology  - c/w solumedrol 20mg IV Q8    CARDIOVASCULAR:   # HTN  - Continue to monitor  - off home Diltiazem and ARB as pt on pressor  - TTE (3/1): EF 65%; Severe pulmonary hypertension. Severely increased pulmonary vascular resistance; Normal left ventricular systolic function. No segmental wall motion abnormalities. Impaired LV-relaxation with normal filling pressure  - Cardiology following (Dr. Lucero), appreciate recs  - c/w phenylephrine gtt, wean as tolerated    GI:   - NPO, OG tube placed  - GI following (Dr. Kraft), appreciate recs  # Constipation  - c/w senna 10mg PO qhs, metamucil daily, and miralax daily  - dulcolax suppository x1 given    RENAL/:   # Urinary retention  - Possibly 2/2 SIADH in setting of pulm process, as per Renal  - off tamsulosin as pt now intubated, restarted on home doxazosin  - c/w Scruggs  - s/p Lasix IV 40mg x 2 with significant UOP, continue to monitor UOP  #AURA  - SCr doubled (baseline SCr ~0.7-0.8)  #Hyperkalemia  - given lokelma x1  - continue to monitor BMP    ENDOCRINE:   - Insulin sliding scale for steroid-induced hyperglycemia    HEMATOLOGIC:   # Leukocytosis  Likely secondary to steroids and infection  - monitor off abx  - Continue to trend CBC with differential    ID:   # Influenza with suspected superimposed bacterial PNA:   - Persistent leukocytosis, though pt also on steroids  - s/p vanc/cefepime (2/25-3/3), off abx as bronch studies does not appear to be infectious and appears to be more inflammatory  - s/p Tamiflu for total 10 days (2/20-2/29)  - Monitor WBC count, fever curve  - BCx (3/1): NGTD  - f/u procalcitonin level, fungitell, and galactomannan, as per ID  - f/u bronch Cx, gram stain unremarkable   - repeat RVP (3/3) negative    PROPHYLAXIS:   - DVT ppx: Lovenox    CODE STATUS:  - Wife and pt agreed to trial of intubation. Currently remains full code.    Rene Kim, PGY-1  MICU  Contact/Pager: [phone] / [phone] 69M with PMH of HTN, HLD, depression, anxiety, and insignificant degree of pulmonary fibrosis, who initially presented 2/20/20 following a fall at home, admitted with acute hypoxic respiratory failure in setting of influenza with concern for superimposed PNA. Course c/b worsening respiratory failure with high oxygen requirements, transferred to MICU for close monitoring, intubated 3/2.     NEURO:   Hx of Anxiety/Depression  - Intubated (3/2); sedated on fentanyl and propofol gtt, will attempt to wean as tolerated  - C/w home clonazepam standing 1mg PO daily  - C/w home buspirone 15mg PO TID  - C/w home paroxetine 30mg PO daily    PULM:   # Acute hypoxic respiratory failure   Multifactorial related to influenza infection with suspicion for superimposed bacterial PNA in setting of underlying lung disease. May also have component of aspiration pneumonitis.   - Intubated (3/2) as pt failed use of high flow FiO2 100% (ABG showing pO2 48), acapella valve, incentive spirometry  - c/w duonebs q6h PRN  - s/p Tamiflu for total 10 days (2/20-2/29)  - s/p vanc/cefepime (2/25-3/3), off abx as bronch studies does not appear to be infectious and appears to be more inflammatory  - s/p Bronchoscopy (3/2): airways appeared moderately inflamed and collapsable diffusely, multiple small diverticula, scant mucus b/l; brochoalveolar lavage performed, studies sent.  - serial ABGs    # Pulmonary fibrosis:   - Appreciate Pulm recs  - As per pt's pulmonologist group (Dr. Dia/Dr. Pop): Hx of "insignificant pulmonary fibrosis," Seen for chronic cough, PFTs (2017) was wnl aside from diffusion capacity of 74%, and CT Chest (2013) showed no evidence of pulm fibrosis or other significant pulm pathology  - increased solumedrol 40mg IV Q8    CARDIOVASCULAR:   # HTN  - Continue to monitor  - home Diltiazem and ARB held as pt was on pressor, will consider restarting if BP maintains off pressor  - TTE (3/1): EF 65%; Severe pulmonary hypertension. Severely increased pulmonary vascular resistance; Normal left ventricular systolic function. No segmental wall motion abnormalities. Impaired LV-relaxation with normal filling pressure  - Cardiology following (Dr. Lucero), appreciate recs  - off pressors    GI:   - NPO, OG tube placed  - GI following (Dr. Kraft), appreciate recs  # Constipation  - c/w senna 10mg PO qhs, metamucil daily, and miralax daily  - dulcolax suppository x1 given    RENAL/:   # Urinary retention  - Possibly 2/2 SIADH in setting of pulm process, as per Renal  - off tamsulosin as pt now intubated, restarted on home doxazosin  - c/w Scruggs  - s/p Lasix IV 40mg x3 with significant UOP, continue to monitor UOP  #AURA  - SCr doubled (baseline SCr ~0.7-0.8)  #Hyperkalemia  - given lokelma x1  - continue to monitor BMP    ENDOCRINE:   - Insulin sliding scale for steroid-induced hyperglycemia  - start NPH 4u q6h as steroid dose increased    HEMATOLOGIC:   # Leukocytosis  Likely secondary to steroids and infection  - monitor off abx  - Continue to trend CBC with differential    ID:   # Influenza with suspected superimposed bacterial PNA:   - Persistent leukocytosis, though pt also on steroids  - s/p vanc/cefepime (2/25-3/3), off abx as bronch studies does not appear to be infectious and appears to be more inflammatory  - s/p Tamiflu for total 10 days (2/20-2/29)  - Monitor WBC count, fever curve  - BCx (3/1): NGTD  - f/u procalcitonin level, fungitell, and galactomannan, as per ID  - f/u bronch Cx, gram stain unremarkable   - repeat RVP (3/3) negative    PROPHYLAXIS:   - DVT ppx: Lovenox    CODE STATUS:  - Wife and pt agreed to trial of intubation. Currently remains full code.    Rene Kim, PGY-1  MICU  Contact/Pager: [phone] / [phone]

## 2020-03-04 NOTE — PROGRESS NOTE ADULT - ASSESSMENT
diarrhea - resolved  anemia  constipation  hypoxic respiratory failure    acute hypoxic respiratory failure requiring endotracheal intubation and ventilatory support  management per MICU/pulmonary appreciated  cont bowel regimen  h/h stable  cont OGT feeds as tolerated   colonoscopy 3 years ago, no need to repeat as inpatient  monitor for gi bleed  encourage OOB/ambulation  dvt ppx

## 2020-03-04 NOTE — PROGRESS NOTE ADULT - SUBJECTIVE AND OBJECTIVE BOX
Seen in ICU, intubated    Vital Signs Last 24 Hrs  T(C): 36.8 (03-04-20 @ 16:00), Max: 37.1 (03-03-20 @ 20:00)  T(F): 98.2 (03-04-20 @ 16:00), Max: 98.8 (03-03-20 @ 20:00)  HR: 53 (03-04-20 @ 17:00) (46 - 90)  BP: 109/57 (03-04-20 @ 17:00) (90/51 - 162/75)  BP(mean): 80 (03-04-20 @ 17:00) (66 - 108)  RR: 20 (03-04-20 @ 17:00) (20 - 33)  SpO2: 95% (03-04-20 @ 17:00) (87% - 99%)    I&O's Summary    03 Mar 2020 07:01  -  04 Mar 2020 07:00  --------------------------------------------------------  IN: 1963.1 mL / OUT: 1850 mL / NET: 113.1 mL    04 Mar 2020 07:01  -  04 Mar 2020 17:56  --------------------------------------------------------  IN: 894.9 mL / OUT: 1550 mL / NET: -655.1 mL    PHYSICAL EXAM:    Respiratory: coarse BS b/l  Cardiovascular: S1 S2  Gastrointestinal: soft, ND  Extremities: sm edema                                                    9.3    23.49 )-----------( 227      ( 04 Mar 2020 00:24 )             29.9     04 Mar 2020 16:51    139    |  97     |  58     ----------------------------<  236    4.4     |  28     |  1.36     Ca    8.9        04 Mar 2020 16:51  Phos  4.7       04 Mar 2020 16:51  Mg     2.4       04 Mar 2020 16:51    TPro  5.2    /  Alb  2.2    /  TBili  0.2    /  DBili  x      /  AST  16     /  ALT  47     /  AlkPhos  56     04 Mar 2020 00:24    LIVER FUNCTIONS - ( 04 Mar 2020 00:24 )  Alb: 2.2 g/dL / Pro: 5.2 g/dL / ALK PHOS: 56 U/L / ALT: 47 U/L / AST: 16 U/L / GGT: x           albuterol/ipratropium for Nebulization 3 milliLiter(s) Nebulizer every 6 hours PRN  atorvastatin 10 milliGRAM(s) Oral at bedtime  busPIRone 15 milliGRAM(s) Oral three times a day  chlorhexidine 0.12% Liquid 15 milliLiter(s) Oral Mucosa every 12 hours  chlorhexidine 4% Liquid 1 Application(s) Topical <User Schedule>  clonazePAM  Tablet 1 milliGRAM(s) Oral daily  dextrose 40% Gel 15 Gram(s) Oral once PRN  dextrose 5%. 1000 milliLiter(s) IV Continuous <Continuous>  dextrose 50% Injectable 12.5 Gram(s) IV Push once  dextrose 50% Injectable 25 Gram(s) IV Push once  dextrose 50% Injectable 25 Gram(s) IV Push once  enoxaparin Injectable 40 milliGRAM(s) SubCutaneous daily  glucagon  Injectable 1 milliGRAM(s) IntraMuscular once PRN  insulin lispro (HumaLOG) corrective regimen sliding scale   SubCutaneous every 6 hours  insulin NPH human recombinant 4 Unit(s) SubCutaneous every 6 hours  methylPREDNISolone sodium succinate Injectable 40 milliGRAM(s) IV Push every 8 hours  PARoxetine 30 milliGRAM(s) Oral daily  polyethylene glycol 3350 17 Gram(s) Oral daily  propofol Infusion 15 MICROgram(s)/kG/Min IV Continuous <Continuous>  psyllium Powder 1 Packet(s) Oral daily  senna Syrup 10 milliLiter(s) Oral at bedtime      Assessment and Plan:     S/p flu A, PNA, hx pulm fibrosis  Resp failure, intubated in ICU  S/p SIADH in setting of pulm process  Resolved  Hemodynamic AURA, better  Goal SBP > 90  Avoid nephrotoxins  F/u lytes, renal fx, UO  D/w family at bedside

## 2020-03-04 NOTE — PHARMACOTHERAPY INTERVENTION NOTE - COMMENTS
Patient was on moderate dose insulin sliding scale. His POCT BG is between 180 to 200's while on enteral tube feeding. Methylprednisolone dose will be increased from 20mg IV q8h to 40 mg I q8h. suggested to add NPH 4 units SQ q6h to target BG <180.

## 2020-03-04 NOTE — PROGRESS NOTE ADULT - SUBJECTIVE AND OBJECTIVE BOX
CARDIOLOGY     PROGRESS  NOTE   ________________________________________________    CHIEF COMPLAINT:Patient is a 69y old  Male who presents with a chief complaint of SOB, cough and weakness (04 Mar 2020 08:19)  sedated on vent.  	  REVIEW OF SYSTEMS:  CONSTITUTIONAL: No fever, weight loss, or fatigue  EYES: No eye pain, visual disturbances, or discharge  ENT:  No difficulty hearing, tinnitus, vertigo; No sinus or throat pain  NECK: No pain or stiffness  RESPIRATORY: No cough, wheezing, chills or hemoptysis; No Shortness of Breath  CARDIOVASCULAR: No chest pain, palpitations, passing out, dizziness, or leg swelling  GASTROINTESTINAL: No abdominal or epigastric pain. No nausea, vomiting, or hematemesis; No diarrhea or constipation. No melena or hematochezia.  GENITOURINARY: No dysuria, frequency, hematuria, or incontinence  NEUROLOGICAL: No headaches, memory loss, loss of strength, numbness, or tremors  SKIN: No itching, burning, rashes, or lesions   LYMPH Nodes: No enlarged glands  ENDOCRINE: No heat or cold intolerance; No hair loss  MUSCULOSKELETAL: No joint pain or swelling; No muscle, back, or extremity pain  PSYCHIATRIC: No depression, anxiety, mood swings, or difficulty sleeping  HEME/LYMPH: No easy bruising, or bleeding gums  ALLERGY AND IMMUNOLOGIC: No hives or eczema	    [ ] All others negative	  [ x] Unable to obtain    PHYSICAL EXAM:  T(C): 36.5 (03-04-20 @ 08:00), Max: 37.1 (03-03-20 @ 20:00)  HR: 46 (03-04-20 @ 09:00) (46 - 79)  BP: 104/56 (03-04-20 @ 09:00) (90/51 - 138/70)  RR: 20 (03-04-20 @ 09:00) (20 - 33)  SpO2: 99% (03-04-20 @ 09:00) (84% - 100%)  Wt(kg): --  I&O's Summary    03 Mar 2020 07:01  -  04 Mar 2020 07:00  --------------------------------------------------------  IN: 1963.1 mL / OUT: 1850 mL / NET: 113.1 mL    04 Mar 2020 07:01  -  04 Mar 2020 09:36  --------------------------------------------------------  IN: 121.6 mL / OUT: 150 mL / NET: -28.4 mL        Appearance: Normal	  HEENT:   Normal oral mucosa, PERRL, EOMI	  Lymphatic: No lymphadenopathy  Cardiovascular: Normal S1 S2, No JVD, + murmurs, No edema  Respiratory: Lungs clear to auscultation	  Psychiatry: A & O x 3, Mood & affect appropriate  Gastrointestinal:  Soft, Non-tender, + BS	  Skin: No rashes, No ecchymoses, No cyanosis	  Neurologic: Non-focal  Extremities: Normal range of motion, No clubbing, cyanosis or edema  Vascular: Peripheral pulses palpable 2+ bilaterally    MEDICATIONS  (STANDING):  atorvastatin 10 milliGRAM(s) Oral at bedtime  busPIRone 15 milliGRAM(s) Oral three times a day  chlorhexidine 0.12% Liquid 15 milliLiter(s) Oral Mucosa every 12 hours  chlorhexidine 4% Liquid 1 Application(s) Topical <User Schedule>  clonazePAM  Tablet 1 milliGRAM(s) Oral daily  dextrose 5%. 1000 milliLiter(s) (50 mL/Hr) IV Continuous <Continuous>  dextrose 50% Injectable 12.5 Gram(s) IV Push once  dextrose 50% Injectable 25 Gram(s) IV Push once  dextrose 50% Injectable 25 Gram(s) IV Push once  enoxaparin Injectable 40 milliGRAM(s) SubCutaneous daily  fentaNYL   Infusion. 2 MICROgram(s)/kG/Hr (11.85 mL/Hr) IV Continuous <Continuous>  insulin lispro (HumaLOG) corrective regimen sliding scale   SubCutaneous every 6 hours  methylPREDNISolone sodium succinate Injectable 20 milliGRAM(s) IV Push every 8 hours  PARoxetine 30 milliGRAM(s) Oral daily  phenylephrine    Infusion 0.1 MICROgram(s)/kG/Min (4.444 mL/Hr) IV Continuous <Continuous>  polyethylene glycol 3350 17 Gram(s) Oral daily  propofol Infusion 15 MICROgram(s)/kG/Min (10.665 mL/Hr) IV Continuous <Continuous>  psyllium Powder 1 Packet(s) Oral daily  senna Syrup 10 milliLiter(s) Oral at bedtime      TELEMETRY: 	    ECG:  	  RADIOLOGY:  OTHER: 	  	  LABS:	 	    CARDIAC MARKERS:                                9.3    23.49 )-----------( 227      ( 04 Mar 2020 00:24 )             29.9     03-04    132<L>  |  97  |  53<H>  ----------------------------<  224<H>  4.8   |  25  |  1.31<H>    Ca    8.4      04 Mar 2020 00:24  Phos  4.3     03-04  Mg     2.6     03-04    TPro  5.2<L>  /  Alb  2.2<L>  /  TBili  0.2  /  DBili  x   /  AST  16  /  ALT  47<H>  /  AlkPhos  56  03-04    proBNP: Serum Pro-Brain Natriuretic Peptide: 364 pg/mL (02-19 @ 22:30)    Lipid Profile:   HgA1c: Hemoglobin A1C, Whole Blood: 6.6 % (02-24 @ 08:40)    TSH: Thyroid Stimulating Hormone, Serum: 0.39 uIU/mL (02-21 @ 09:19)  Thyroid Stimulating Hormone, Serum: 0.47 uIU/mL (02-21 @ 00:19)          Assessment and plan  ---------------------------  69M with PMH of HTN, pulmonary fibrosis, depression/anxiety p/w SOB and cough. Pt states his mother passed away about 2 weeks ago and shortly after burying here, started to feel very unwell. He has had progressively worsening cough, non productive, but feels a lot of chest congestion. Associated with ROSE, headache and myalgias and fatigue. Was seen at  earlier this week and was diagnosed with flu. In the past couple of days, has been having very labored breathing; today, had an episode where he fell while trying to reach for alight switch and was so winded and weak, he was unable to get up. Has barely been eating anything due to poor appetite and nausea, but denies vomiting. Denies fevers or chills, no CP, no palpitations, no abdominal pain, had 3 episodes of loose stools today, +decreased UOP, no LE swelling. Does not know of any sick contacts. Denies recent travel.  sob/ respiratory failure sec to hx of pulmonary fibrosis ?pneumoniae.  abx  dvt prophylaxis  asa daily  echo result noted with severe pulmonary htn  supportive measures  sedated on vent  continue iv abx, sp bronch  dvt prophylaxis  check LE venous Doppler r/o dvt  bradycardia off all bp meds/ cardizem

## 2020-03-05 LAB
ALBUMIN SERPL ELPH-MCNC: 2.5 G/DL — LOW (ref 3.3–5)
ALP SERPL-CCNC: 76 U/L — SIGNIFICANT CHANGE UP (ref 40–120)
ALT FLD-CCNC: 41 U/L — SIGNIFICANT CHANGE UP (ref 10–45)
ANION GAP SERPL CALC-SCNC: 11 MMOL/L — SIGNIFICANT CHANGE UP (ref 5–17)
AST SERPL-CCNC: 18 U/L — SIGNIFICANT CHANGE UP (ref 10–40)
BILIRUB SERPL-MCNC: 0.2 MG/DL — SIGNIFICANT CHANGE UP (ref 0.2–1.2)
BUN SERPL-MCNC: 62 MG/DL — HIGH (ref 7–23)
CALCIUM SERPL-MCNC: 8.6 MG/DL — SIGNIFICANT CHANGE UP (ref 8.4–10.5)
CHLORIDE SERPL-SCNC: 97 MMOL/L — SIGNIFICANT CHANGE UP (ref 96–108)
CO2 SERPL-SCNC: 26 MMOL/L — SIGNIFICANT CHANGE UP (ref 22–31)
CREAT SERPL-MCNC: 1.3 MG/DL — SIGNIFICANT CHANGE UP (ref 0.5–1.3)
GAS PNL BLDA: SIGNIFICANT CHANGE UP
GLUCOSE BLDC GLUCOMTR-MCNC: 135 MG/DL — HIGH (ref 70–99)
GLUCOSE BLDC GLUCOMTR-MCNC: 141 MG/DL — HIGH (ref 70–99)
GLUCOSE BLDC GLUCOMTR-MCNC: 198 MG/DL — HIGH (ref 70–99)
GLUCOSE BLDC GLUCOMTR-MCNC: 200 MG/DL — HIGH (ref 70–99)
GLUCOSE SERPL-MCNC: 279 MG/DL — HIGH (ref 70–99)
HCT VFR BLD CALC: 30.5 % — LOW (ref 39–50)
HGB BLD-MCNC: 9.8 G/DL — LOW (ref 13–17)
MAGNESIUM SERPL-MCNC: 2.5 MG/DL — SIGNIFICANT CHANGE UP (ref 1.6–2.6)
MCHC RBC-ENTMCNC: 30.2 PG — SIGNIFICANT CHANGE UP (ref 27–34)
MCHC RBC-ENTMCNC: 32.1 GM/DL — SIGNIFICANT CHANGE UP (ref 32–36)
MCV RBC AUTO: 93.8 FL — SIGNIFICANT CHANGE UP (ref 80–100)
NRBC # BLD: 0 /100 WBCS — SIGNIFICANT CHANGE UP (ref 0–0)
PHOSPHATE SERPL-MCNC: 4.1 MG/DL — SIGNIFICANT CHANGE UP (ref 2.5–4.5)
PLATELET # BLD AUTO: 240 K/UL — SIGNIFICANT CHANGE UP (ref 150–400)
POTASSIUM SERPL-MCNC: 4.4 MMOL/L — SIGNIFICANT CHANGE UP (ref 3.5–5.3)
POTASSIUM SERPL-SCNC: 4.4 MMOL/L — SIGNIFICANT CHANGE UP (ref 3.5–5.3)
PROT SERPL-MCNC: 5.4 G/DL — LOW (ref 6–8.3)
RBC # BLD: 3.25 M/UL — LOW (ref 4.2–5.8)
RBC # FLD: 14.4 % — SIGNIFICANT CHANGE UP (ref 10.3–14.5)
SODIUM SERPL-SCNC: 134 MMOL/L — LOW (ref 135–145)
WBC # BLD: 24.05 K/UL — HIGH (ref 3.8–10.5)
WBC # FLD AUTO: 24.05 K/UL — HIGH (ref 3.8–10.5)

## 2020-03-05 PROCEDURE — 93970 EXTREMITY STUDY: CPT | Mod: 26

## 2020-03-05 PROCEDURE — 76604 US EXAM CHEST: CPT | Mod: 26,GC

## 2020-03-05 PROCEDURE — 99291 CRITICAL CARE FIRST HOUR: CPT | Mod: 25

## 2020-03-05 PROCEDURE — 93308 TTE F-UP OR LMTD: CPT | Mod: 26,GC

## 2020-03-05 RX ORDER — HYDRALAZINE HCL 50 MG
10 TABLET ORAL ONCE
Refills: 0 | Status: COMPLETED | OUTPATIENT
Start: 2020-03-05 | End: 2020-03-05

## 2020-03-05 RX ORDER — ACETAMINOPHEN 500 MG
1000 TABLET ORAL ONCE
Refills: 0 | Status: COMPLETED | OUTPATIENT
Start: 2020-03-05 | End: 2020-03-05

## 2020-03-05 RX ORDER — OLANZAPINE 15 MG/1
5 TABLET, FILM COATED ORAL ONCE
Refills: 0 | Status: COMPLETED | OUTPATIENT
Start: 2020-03-05 | End: 2020-03-05

## 2020-03-05 RX ORDER — FUROSEMIDE 40 MG
40 TABLET ORAL ONCE
Refills: 0 | Status: COMPLETED | OUTPATIENT
Start: 2020-03-05 | End: 2020-03-05

## 2020-03-05 RX ORDER — DEXMEDETOMIDINE HYDROCHLORIDE IN 0.9% SODIUM CHLORIDE 4 UG/ML
0.2 INJECTION INTRAVENOUS
Qty: 200 | Refills: 0 | Status: DISCONTINUED | OUTPATIENT
Start: 2020-03-05 | End: 2020-03-07

## 2020-03-05 RX ORDER — IPRATROPIUM/ALBUTEROL SULFATE 18-103MCG
3 AEROSOL WITH ADAPTER (GRAM) INHALATION EVERY 6 HOURS
Refills: 0 | Status: DISCONTINUED | OUTPATIENT
Start: 2020-03-05 | End: 2020-03-07

## 2020-03-05 RX ORDER — ENOXAPARIN SODIUM 100 MG/ML
120 INJECTION SUBCUTANEOUS
Refills: 0 | Status: DISCONTINUED | OUTPATIENT
Start: 2020-03-05 | End: 2020-03-06

## 2020-03-05 RX ADMIN — Medication 400 MILLIGRAM(S): at 14:46

## 2020-03-05 RX ADMIN — Medication 10 MILLIGRAM(S): at 23:24

## 2020-03-05 RX ADMIN — Medication 2: at 05:36

## 2020-03-05 RX ADMIN — Medication 15 MILLIGRAM(S): at 05:38

## 2020-03-05 RX ADMIN — ENOXAPARIN SODIUM 40 MILLIGRAM(S): 100 INJECTION SUBCUTANEOUS at 13:04

## 2020-03-05 RX ADMIN — CHLORHEXIDINE GLUCONATE 1 APPLICATION(S): 213 SOLUTION TOPICAL at 05:37

## 2020-03-05 RX ADMIN — Medication 0.5 MILLIGRAM(S): at 22:12

## 2020-03-05 RX ADMIN — DEXMEDETOMIDINE HYDROCHLORIDE IN 0.9% SODIUM CHLORIDE 5.92 MICROGRAM(S)/KG/HR: 4 INJECTION INTRAVENOUS at 21:33

## 2020-03-05 RX ADMIN — Medication 30 MILLIGRAM(S): at 15:20

## 2020-03-05 RX ADMIN — Medication 10 MILLIGRAM(S): at 12:15

## 2020-03-05 RX ADMIN — Medication 10 MILLIGRAM(S): at 20:39

## 2020-03-05 RX ADMIN — CHLORHEXIDINE GLUCONATE 15 MILLILITER(S): 213 SOLUTION TOPICAL at 05:43

## 2020-03-05 RX ADMIN — Medication 40 MILLIGRAM(S): at 05:36

## 2020-03-05 RX ADMIN — Medication 3 MILLILITER(S): at 19:10

## 2020-03-05 RX ADMIN — Medication 40 MILLIGRAM(S): at 13:07

## 2020-03-05 RX ADMIN — Medication 3 MILLILITER(S): at 23:35

## 2020-03-05 RX ADMIN — Medication 1000 MILLIGRAM(S): at 15:21

## 2020-03-05 RX ADMIN — Medication 40 MILLIGRAM(S): at 21:33

## 2020-03-05 RX ADMIN — Medication 2: at 13:03

## 2020-03-05 RX ADMIN — ENOXAPARIN SODIUM 120 MILLIGRAM(S): 100 INJECTION SUBCUTANEOUS at 21:33

## 2020-03-05 RX ADMIN — Medication 6: at 00:38

## 2020-03-05 RX ADMIN — HUMAN INSULIN 4 UNIT(S): 100 INJECTION, SUSPENSION SUBCUTANEOUS at 00:38

## 2020-03-05 RX ADMIN — Medication 15 MILLIGRAM(S): at 15:17

## 2020-03-05 RX ADMIN — Medication 1 MILLIGRAM(S): at 15:19

## 2020-03-05 RX ADMIN — HUMAN INSULIN 4 UNIT(S): 100 INJECTION, SUSPENSION SUBCUTANEOUS at 05:37

## 2020-03-05 RX ADMIN — HUMAN INSULIN 4 UNIT(S): 100 INJECTION, SUSPENSION SUBCUTANEOUS at 13:03

## 2020-03-05 RX ADMIN — Medication 0: at 18:08

## 2020-03-05 RX ADMIN — Medication 40 MILLIGRAM(S): at 10:18

## 2020-03-05 NOTE — CHART NOTE - NSCHARTNOTEFT_GEN_A_CORE
: Myself, Naveed Stokes DO, Alfie Donald MD    INDICATION: hypoxic and hypercapnic respiratory failure    PROCEDURE:  [x ] LIMITED ECHO  [x ] LIMITED CHEST  [ ] LIMITED RETROPERITONEAL  [ ] LIMITED ABDOMINAL  [ ] LIMITED DVT  [ ] NEEDLE GUIDANCE VASCULAR  [ ] NEEDLE GUIDANCE THORACENTESIS  [ ] NEEDLE GUIDANCE PARACENTESIS  [ ] NEEDLE GUIDANCE PERICARDIOCENTESIS  [ ] OTHER    FINDINGS: b-lines b/l anteriorly, LV function wnl, IVC difficult to visualize, +curtain sign      INTERPRETATION: pulmonary edema, ECHO wnl, did not visualize any pleural effusions

## 2020-03-05 NOTE — AIRWAY REMOVAL NOTE  ADULT & PEDS - ARTIFICAL AIRWAY REMOVAL COMMENTS
Written order for extubation verified. The patient was identified by full name and birth date compared to the identification band.  Present during the procedure was  the  RN and MD

## 2020-03-05 NOTE — PROGRESS NOTE ADULT - SUBJECTIVE AND OBJECTIVE BOX
CARDIOLOGY     PROGRESS  NOTE   ________________________________________________    CHIEF COMPLAINT:Patient is a 69y old  Male who presents with a chief complaint of SOB, cough and weakness (05 Mar 2020 07:26)  sedated on vent.  	  REVIEW OF SYSTEMS:  CONSTITUTIONAL: No fever, weight loss, or fatigue  EYES: No eye pain, visual disturbances, or discharge  ENT:  No difficulty hearing, tinnitus, vertigo; No sinus or throat pain  NECK: No pain or stiffness  RESPIRATORY: No cough, wheezing, chills or hemoptysis; No Shortness of Breath  CARDIOVASCULAR: No chest pain, palpitations, passing out, dizziness, or leg swelling  GASTROINTESTINAL: No abdominal or epigastric pain. No nausea, vomiting, or hematemesis; No diarrhea or constipation. No melena or hematochezia.  GENITOURINARY: No dysuria, frequency, hematuria, or incontinence  NEUROLOGICAL: No headaches, memory loss, loss of strength, numbness, or tremors  SKIN: No itching, burning, rashes, or lesions   LYMPH Nodes: No enlarged glands  ENDOCRINE: No heat or cold intolerance; No hair loss  MUSCULOSKELETAL: No joint pain or swelling; No muscle, back, or extremity pain  PSYCHIATRIC: No depression, anxiety, mood swings, or difficulty sleeping  HEME/LYMPH: No easy bruising, or bleeding gums  ALLERGY AND IMMUNOLOGIC: No hives or eczema	    [ ] All others negative	  [ x] Unable to obtain    PHYSICAL EXAM:  T(C): 37.3 (03-05-20 @ 08:00), Max: 37.3 (03-05-20 @ 00:00)  HR: 56 (03-05-20 @ 09:00) (50 - 99)  BP: 115/58 (03-05-20 @ 09:00) (96/51 - 168/78)  RR: 20 (03-05-20 @ 09:00) (20 - 45)  SpO2: 99% (03-05-20 @ 09:00) (88% - 100%)  Wt(kg): --  I&O's Summary    04 Mar 2020 07:01  -  05 Mar 2020 07:00  --------------------------------------------------------  IN: 1999.8 mL / OUT: 2775 mL / NET: -775.2 mL    05 Mar 2020 07:01  -  05 Mar 2020 09:27  --------------------------------------------------------  IN: 106.8 mL / OUT: 200 mL / NET: -93.2 mL        Appearance: Normal	  HEENT:   Normal oral mucosa, PERRL, EOMI	  Lymphatic: No lymphadenopathy  Cardiovascular: Normal S1 S2, No JVD, +murmurs, No edema  Respiratory: Lungs clear to auscultation	  Psychiatry: A & O x 3, Mood & affect appropriate  Gastrointestinal:  Soft, Non-tender, + BS	  Skin: No rashes, No ecchymoses, No cyanosis	  Neurologic: Non-focal  Extremities: Normal range of motion, No clubbing, cyanosis or edema  Vascular: Peripheral pulses palpable 2+ bilaterally    MEDICATIONS  (STANDING):  atorvastatin 10 milliGRAM(s) Oral at bedtime  busPIRone 15 milliGRAM(s) Oral three times a day  chlorhexidine 0.12% Liquid 15 milliLiter(s) Oral Mucosa every 12 hours  chlorhexidine 4% Liquid 1 Application(s) Topical <User Schedule>  clonazePAM  Tablet 1 milliGRAM(s) Oral daily  dextrose 5%. 1000 milliLiter(s) (50 mL/Hr) IV Continuous <Continuous>  dextrose 50% Injectable 12.5 Gram(s) IV Push once  dextrose 50% Injectable 25 Gram(s) IV Push once  dextrose 50% Injectable 25 Gram(s) IV Push once  enoxaparin Injectable 40 milliGRAM(s) SubCutaneous daily  insulin lispro (HumaLOG) corrective regimen sliding scale   SubCutaneous every 6 hours  insulin NPH human recombinant 4 Unit(s) SubCutaneous every 6 hours  methylPREDNISolone sodium succinate Injectable 40 milliGRAM(s) IV Push every 8 hours  PARoxetine 30 milliGRAM(s) Oral daily  polyethylene glycol 3350 17 Gram(s) Oral daily  propofol Infusion 15 MICROgram(s)/kG/Min (10.665 mL/Hr) IV Continuous <Continuous>  psyllium Powder 1 Packet(s) Oral daily  senna Syrup 10 milliLiter(s) Oral at bedtime      TELEMETRY: 	    ECG:  	  RADIOLOGY:  OTHER: 	  	  LABS:	 	    CARDIAC MARKERS:                                9.8    24.05 )-----------( 240      ( 05 Mar 2020 00:20 )             30.5     03-05    134<L>  |  97  |  62<H>  ----------------------------<  279<H>  4.4   |  26  |  1.30    Ca    8.6      05 Mar 2020 00:20  Phos  4.1     03-05  Mg     2.5     03-05    TPro  5.4<L>  /  Alb  2.5<L>  /  TBili  0.2  /  DBili  x   /  AST  18  /  ALT  41  /  AlkPhos  76  03-05    proBNP: Serum Pro-Brain Natriuretic Peptide: 364 pg/mL (02-19 @ 22:30)    Lipid Profile:   HgA1c: Hemoglobin A1C, Whole Blood: 6.6 % (02-24 @ 08:40)    TSH: Thyroid Stimulating Hormone, Serum: 0.39 uIU/mL (02-21 @ 09:19)  Thyroid Stimulating Hormone, Serum: 0.47 uIU/mL (02-21 @ 00:19)          Assessment and plan  ---------------------------  69M with PMH of HTN, pulmonary fibrosis, depression/anxiety p/w SOB and cough. Pt states his mother passed away about 2 weeks ago and shortly after burying here, started to feel very unwell. He has had progressively worsening cough, non productive, but feels a lot of chest congestion. Associated with ROSE, headache and myalgias and fatigue. Was seen at  earlier this week and was diagnosed with flu. In the past couple of days, has been having very labored breathing; today, had an episode where he fell while trying to reach for alight switch and was so winded and weak, he was unable to get up. Has barely been eating anything due to poor appetite and nausea, but denies vomiting. Denies fevers or chills, no CP, no palpitations, no abdominal pain, had 3 episodes of loose stools today, +decreased UOP, no LE swelling. Does not know of any sick contacts. Denies recent travel.  sob/ respiratory failure sec to hx of pulmonary fibrosis ?pneumoniae.  abx  dvt prophylaxis  asa daily  echo result noted with severe pulmonary htn  supportive measures  sedated on vent  continue iv abx, sp bronch  dvt prophylaxis  check LE venous Doppler r/o dvt  off all mallorie meds  vent as per MICU

## 2020-03-05 NOTE — PROGRESS NOTE ADULT - SUBJECTIVE AND OBJECTIVE BOX
CC: f/u for  pneumonia  Patient reports nothing on precedex, just extubated a little while before    REVIEW OF SYSTEMS:  All other review of systems cannot get (Comprehensive ROS)    Antimicrobials Day #  :    Other Medications Reviewed    T(F): 99.1 (03-05-20 @ 08:00), Max: 99.1 (03-05-20 @ 00:00)  HR: 64 (03-05-20 @ 18:00)  BP: 159/75 (03-05-20 @ 18:00)  RR: 19 (03-05-20 @ 18:00)  SpO2: 95% (03-05-20 @ 18:00)  Wt(kg): --    PHYSICAL EXAM:  General: sleepy on hi flow, sweaty,  no acute distress  Eyes:  anicteric, no conjunctival injection, no discharge  Oropharynx: no lesions or injection 	  Neck: supple, without adenopathy  Lungs: rales,  to auscultation  Heart: regular rate and rhythm; no murmur, rubs or gallops  Abdomen: soft, nondistended, nontender, without mass or organomegaly  Skin: no lesions  Extremities: no clubbing, cyanosis, or edema  Neurologic: lethargic, moves all extremities    LAB RESULTS:                        9.8    24.05 )-----------( 240      ( 05 Mar 2020 00:20 )             30.5     03-05    134<L>  |  97  |  62<H>  ----------------------------<  279<H>  4.4   |  26  |  1.30    Ca    8.6      05 Mar 2020 00:20  Phos  4.1     03-05  Mg     2.5     03-05    TPro  5.4<L>  /  Alb  2.5<L>  /  TBili  0.2  /  DBili  x   /  AST  18  /  ALT  41  /  AlkPhos  76  03-05    LIVER FUNCTIONS - ( 05 Mar 2020 00:20 )  Alb: 2.5 g/dL / Pro: 5.4 g/dL / ALK PHOS: 76 U/L / ALT: 41 U/L / AST: 18 U/L / GGT: x             MICROBIOLOGY:  RECENT CULTURES:  03-02 @ 21:29 BAL Bronchoalveolar Lavage     Culture is being performed.    Numerous polymorphonuclear leukocytes per low power field  Rare Squamous Epithelial Cells  Few Yeast like cells per oil power field    03-02 @ 21:27 BAL Bronchoalveolar Lavage     Culture is being performed.    Moderate polymorphonuclear leukocytes per low power field  Rare Squamous Epithelial Cells per low power field  Few Yeast like cells per oil power field    03-01 @ 16:33 .Blood Blood-Venous     No growth to date.          RADIOLOGY REVIEWED:  < from: Xray Chest 1 View- PORTABLE-Urgent (03.02.20 @ 18:27) >  Impression:    Suboptimal study. The right hemithorax is not included on the film. Pulmonary vascular congestion is seen on the left lung. Left lower lobe pneumonia and/or atelectasis cannot be ruled out entirely. Endotracheal tube and NG tube are in good position. No pneumothorax. The overall appearance of the chest remain unchanged compared to previous study done March 2, 2020 at 10:44 AM.      < end of copied text >        < from: CT Chest No Cont (02.24.20 @ 14:39) >    EXAM:  CT CHEST                            PROCEDURE DATE:  02/24/2020            INTERPRETATION:  CLINICAL INFORMATION: Dyspnea    COMPARISON: CT chest 2/20/2020    PROCEDURE:   CT of the Chest was performed without intravenous contrast.  [place] coronal reformats were performed.      FINDINGS:    LUNGS AND AIRWAYS: The central airways are patent.  Patchy areas of groundglass opacities containing traction bronchiectasis are noted within both lungs. Addition, superimposed patchy groundglass opacities are also noted. They are new when compared to previous exam.    PLEURA: No pleural effusion.    MEDIASTINUM AND CHAVO: There are scattered small hilar and mediastinal lymph nodes.    VESSELS: Within normal limits.    HEART: The heart size is normal and there is no pericardial effusion.     CHEST WALL AND LOWER NECK: Within normal limits.    VISUALIZED UPPER ABDOMEN: Within normal limits.    BONES: There are multilevel degenerative changes of the spine    IMPRESSION:     Patchy groundglass opacities containing traction bronchiectasis bilaterally is suggestive of pulmonary fibrosis of uncertain etiology.    Groundglass opacities are noted within both lungs. Primary consideration is infection.      < end of copied text >        Assessment:  Patient admitted about 2 weeks ago after fall, had flu on rapid test a few days before admission and had been at time of presentation  on tamiflu, . Found to be in respiratory failure requiring hi flow, got extended tamiflu , ctx/zithro then vanco cefepime for possible bacterial pneumonia cap and hcap. Contiinued to need high oxygen so intubated 3 days ago, diffuse infiltrates on ct. fungitel low. Today extubated and on hi flow. he is very sleepy on precedex so hard to assess how he is feeling. Has ongoing leukocytosis from steroids.   Plan:  continue to monitor off antibiotics  supportive care per micu

## 2020-03-05 NOTE — PROGRESS NOTE ADULT - SUBJECTIVE AND OBJECTIVE BOX
CHIEF COMPLAINT: SOB, cough and weakness    OVERNIGHT / SUBJECTIVE:  -Overnight, desatted on 30% FiO2, increased to 40%. b/l LE duplex ordered to r/o DVT.  -Today, pt seen and examined at bedside in AM. Pt is intubated and sedated, but grimaces/reacts to name calling and during examination.    REVIEW OF SYSTEMS:  -Unable to obtain ROS as pt is intubated and sedated    OBJECTIVE:  ICU Vital Signs Last 24 Hrs  T(C): 37.3 (05 Mar 2020 04:00), Max: 37.3 (05 Mar 2020 00:00)  T(F): 99.1 (05 Mar 2020 04:00), Max: 99.1 (05 Mar 2020 00:00)  HR: 55 (05 Mar 2020 07:00) (46 - 99)  BP: 117/59 (05 Mar 2020 07:00) (96/51 - 168/78)  BP(mean): 85 (05 Mar 2020 07:00) (70 - 119)  ABP: --  ABP(mean): --  RR: 20 (05 Mar 2020 07:00) (20 - 45)  SpO2: 98% (05 Mar 2020 07:00) (88% - 100%)    Mode: AC/ CMV (Assist Control/ Continuous Mandatory Ventilation), RR (machine): 20, TV (machine): 550, FiO2: 40, PEEP: 6, ITime: 0.1, MAP: 13, PIP: 33    03-04 @ 07:01  -  03-05 @ 07:00  --------------------------------------------------------  IN: 1999.8 mL / OUT: 2775 mL / NET: -775.2 mL      CAPILLARY BLOOD GLUCOSE      POCT Blood Glucose.: 198 mg/dL (05 Mar 2020 05:30)  POCT Blood Glucose.: 230 mg/dL (04 Mar 2020 17:14)  POCT Blood Glucose.: 172 mg/dL (04 Mar 2020 11:13)    I&O's Summary    04 Mar 2020 07:01  -  05 Mar 2020 07:00  --------------------------------------------------------  IN: 1999.8 mL / OUT: 2775 mL / NET: -775.2 mL        PHYSICAL EXAM  General: intubated and sedated  HEENT: PERRL, normal sclera and conjunctiva  Neck: Supple  Chest/Lungs: Clear to ausculation anteriorly, no wheezing appreciated  Heart: RRR, normal S1, S2, no murmurs or gallops  Abd: +BS, soft, obese, nontender, nondistended  Extremities: 2+ peripheral pulses, trace pedal edema  Skin: No rashes or lesions, warm, well-perfused    LABS:                        9.8    24.05 )-----------( 240      ( 05 Mar 2020 00:20 )             30.5     03-05    134<L>  |  97  |  62<H>  ----------------------------<  279<H>  4.4   |  26  |  1.30    Ca    8.6      05 Mar 2020 00:20  Phos  4.1     03-05  Mg     2.5     03-05    TPro  5.4<L>  /  Alb  2.5<L>  /  TBili  0.2  /  DBili  x   /  AST  18  /  ALT  41  /  AlkPhos  76  03-05        LINES:    HOSPITAL MEDICATIONS:  Standing Meds:  atorvastatin 10 milliGRAM(s) Oral at bedtime  busPIRone 15 milliGRAM(s) Oral three times a day  chlorhexidine 0.12% Liquid 15 milliLiter(s) Oral Mucosa every 12 hours  chlorhexidine 4% Liquid 1 Application(s) Topical <User Schedule>  clonazePAM  Tablet 1 milliGRAM(s) Oral daily  dextrose 5%. 1000 milliLiter(s) IV Continuous <Continuous>  dextrose 50% Injectable 12.5 Gram(s) IV Push once  dextrose 50% Injectable 25 Gram(s) IV Push once  dextrose 50% Injectable 25 Gram(s) IV Push once  enoxaparin Injectable 40 milliGRAM(s) SubCutaneous daily  insulin lispro (HumaLOG) corrective regimen sliding scale   SubCutaneous every 6 hours  insulin NPH human recombinant 4 Unit(s) SubCutaneous every 6 hours  methylPREDNISolone sodium succinate Injectable 40 milliGRAM(s) IV Push every 8 hours  PARoxetine 30 milliGRAM(s) Oral daily  polyethylene glycol 3350 17 Gram(s) Oral daily  propofol Infusion 15 MICROgram(s)/kG/Min IV Continuous <Continuous>  psyllium Powder 1 Packet(s) Oral daily  senna Syrup 10 milliLiter(s) Oral at bedtime      PRN Meds:  albuterol/ipratropium for Nebulization 3 milliLiter(s) Nebulizer every 6 hours PRN  dextrose 40% Gel 15 Gram(s) Oral once PRN  glucagon  Injectable 1 milliGRAM(s) IntraMuscular once PRN      LABS:                        9.8    24.05 )-----------( 240      ( 05 Mar 2020 00:20 )             30.5     Hgb Trend: 9.8<--, 9.3<--, 10.8<--, 11.5<--, 11.5<--  03-05    134<L>  |  97  |  62<H>  ----------------------------<  279<H>  4.4   |  26  |  1.30    Ca    8.6      05 Mar 2020 00:20  Phos  4.1     03-05  Mg     2.5     03-05    TPro  5.4<L>  /  Alb  2.5<L>  /  TBili  0.2  /  DBili  x   /  AST  18  /  ALT  41  /  AlkPhos  76  03-05    Creatinine Trend: 1.30<--, 1.36<--, 1.31<--, 1.34<--, 1.50<--, 1.40<--      Arterial Blood Gas:  03-05 @ 00:17  7.41/49/76/31/94/5.7  ABG lactate: --  Arterial Blood Gas:  03-04 @ 00:11  7.40/49/74/30/94/4.7  ABG lactate: --  Arterial Blood Gas:  03-03 @ 15:46  7.38/52/75/30/96/4.7  ABG lactate: --                MICROBIOLOGY:     RADIOLOGY: Reviewed CHIEF COMPLAINT: SOB, cough and weakness    OVERNIGHT / SUBJECTIVE:  -Overnight, desatted on 30% FiO2, increased to 40%. b/l LE duplex ordered to r/o DVT.  -Today, pt seen and examined at bedside in AM. Pt is intubated and sedated, but grimaces/reacts to name calling and during examination. Pt had 1 BM yesterday, but none since then. CPAPing well on PEEP 5, FiO2 35%. Plan to extubate today.    REVIEW OF SYSTEMS:  -Unable to obtain ROS as pt is intubated and sedated    OBJECTIVE:  ICU Vital Signs Last 24 Hrs  T(C): 37.3 (05 Mar 2020 04:00), Max: 37.3 (05 Mar 2020 00:00)  T(F): 99.1 (05 Mar 2020 04:00), Max: 99.1 (05 Mar 2020 00:00)  HR: 55 (05 Mar 2020 07:00) (46 - 99)  BP: 117/59 (05 Mar 2020 07:00) (96/51 - 168/78)  BP(mean): 85 (05 Mar 2020 07:00) (70 - 119)  ABP: --  ABP(mean): --  RR: 20 (05 Mar 2020 07:00) (20 - 45)  SpO2: 98% (05 Mar 2020 07:00) (88% - 100%)    Mode: AC/ CMV (Assist Control/ Continuous Mandatory Ventilation), RR (machine): 20, TV (machine): 550, FiO2: 40, PEEP: 6, ITime: 0.1, MAP: 13, PIP: 33    03-04 @ 07:01  -  03-05 @ 07:00  --------------------------------------------------------  IN: 1999.8 mL / OUT: 2775 mL / NET: -775.2 mL      CAPILLARY BLOOD GLUCOSE      POCT Blood Glucose.: 198 mg/dL (05 Mar 2020 05:30)  POCT Blood Glucose.: 230 mg/dL (04 Mar 2020 17:14)  POCT Blood Glucose.: 172 mg/dL (04 Mar 2020 11:13)    I&O's Summary    04 Mar 2020 07:01  -  05 Mar 2020 07:00  --------------------------------------------------------  IN: 1999.8 mL / OUT: 2775 mL / NET: -775.2 mL        PHYSICAL EXAM  General: intubated and sedated  HEENT: PERRL, normal sclera and conjunctiva  Neck: Supple  Chest/Lungs: Coarse breath sounds throughout anteriorly on auscultation, no wheezing appreciated  Heart: RRR, normal S1, S2, no murmurs or gallops  Abd: +BS, soft, obese, nontender, nondistended  Extremities: 2+ peripheral pulses, trace pedal edema  Skin: No rashes or lesions, warm, well-perfused    LABS:                        9.8    24.05 )-----------( 240      ( 05 Mar 2020 00:20 )             30.5     03-05    134<L>  |  97  |  62<H>  ----------------------------<  279<H>  4.4   |  26  |  1.30    Ca    8.6      05 Mar 2020 00:20  Phos  4.1     03-05  Mg     2.5     03-05    TPro  5.4<L>  /  Alb  2.5<L>  /  TBili  0.2  /  DBili  x   /  AST  18  /  ALT  41  /  AlkPhos  76  03-05        LINES:    HOSPITAL MEDICATIONS:  Standing Meds:  atorvastatin 10 milliGRAM(s) Oral at bedtime  busPIRone 15 milliGRAM(s) Oral three times a day  chlorhexidine 0.12% Liquid 15 milliLiter(s) Oral Mucosa every 12 hours  chlorhexidine 4% Liquid 1 Application(s) Topical <User Schedule>  clonazePAM  Tablet 1 milliGRAM(s) Oral daily  dextrose 5%. 1000 milliLiter(s) IV Continuous <Continuous>  dextrose 50% Injectable 12.5 Gram(s) IV Push once  dextrose 50% Injectable 25 Gram(s) IV Push once  dextrose 50% Injectable 25 Gram(s) IV Push once  enoxaparin Injectable 40 milliGRAM(s) SubCutaneous daily  insulin lispro (HumaLOG) corrective regimen sliding scale   SubCutaneous every 6 hours  insulin NPH human recombinant 4 Unit(s) SubCutaneous every 6 hours  methylPREDNISolone sodium succinate Injectable 40 milliGRAM(s) IV Push every 8 hours  PARoxetine 30 milliGRAM(s) Oral daily  polyethylene glycol 3350 17 Gram(s) Oral daily  propofol Infusion 15 MICROgram(s)/kG/Min IV Continuous <Continuous>  psyllium Powder 1 Packet(s) Oral daily  senna Syrup 10 milliLiter(s) Oral at bedtime      PRN Meds:  albuterol/ipratropium for Nebulization 3 milliLiter(s) Nebulizer every 6 hours PRN  dextrose 40% Gel 15 Gram(s) Oral once PRN  glucagon  Injectable 1 milliGRAM(s) IntraMuscular once PRN      LABS:                        9.8    24.05 )-----------( 240      ( 05 Mar 2020 00:20 )             30.5     Hgb Trend: 9.8<--, 9.3<--, 10.8<--, 11.5<--, 11.5<--  03-05    134<L>  |  97  |  62<H>  ----------------------------<  279<H>  4.4   |  26  |  1.30    Ca    8.6      05 Mar 2020 00:20  Phos  4.1     03-05  Mg     2.5     03-05    TPro  5.4<L>  /  Alb  2.5<L>  /  TBili  0.2  /  DBili  x   /  AST  18  /  ALT  41  /  AlkPhos  76  03-05    Creatinine Trend: 1.30<--, 1.36<--, 1.31<--, 1.34<--, 1.50<--, 1.40<--      Arterial Blood Gas:  03-05 @ 00:17  7.41/49/76/31/94/5.7  ABG lactate: --  Arterial Blood Gas:  03-04 @ 00:11  7.40/49/74/30/94/4.7  ABG lactate: --  Arterial Blood Gas:  03-03 @ 15:46  7.38/52/75/30/96/4.7  ABG lactate: --                MICROBIOLOGY:     RADIOLOGY: Reviewed

## 2020-03-05 NOTE — PROGRESS NOTE ADULT - SUBJECTIVE AND OBJECTIVE BOX
[facility] - PROGRESS NOTE    CHIEF COMPLAINT: acute on chronic hypoxic/hypercapnic respiratory failure; influenza; pneumonia; abnormal chest CT    INTERVAL HISTORY: clinically "improved" - hemodynamically stable off pressors; down to 35% FiO2 on the ventilator with oxygen saturation 93% - oxygen desaturation overnight when placed on 30% FiO2; moderating leukocytosis (on steroids); resolved respiratory acidosis with pCO2 ~ 50mmHg; sedated but arousable; loza in place for ongoing urinary retention; no cough, sputum production, chest congestion or wheeze; no fevers, chills or sweats; no chest pain/pressure or palpitations; following simple commands    REVIEW OF SYSTEMS:  Constitutional: As per interval history  HEENT: Within normal limits  CV: As per interval history  Resp: As per interval history  GI: Within normal limits   : urinary retention  Musculoskeletal: Within normal limits  Skin: Within normal limits  Neurological: Within normal limits  Psychiatric: anxiety type depression  Endocrine: Within normal limits  Hematologic/Lymphatic: Within normal limits  Allergic/Immunologic: Within normal limits    MEDICATIONS:     Pulmonary "    Anti-microbials:    Cardiovascular:  hydrALAZINE Injectable 10 milliGRAM(s) IV Push once    Other:  atorvastatin 10 milliGRAM(s) Oral at bedtime  busPIRone 15 milliGRAM(s) Oral three times a day  chlorhexidine 4% Liquid 1 Application(s) Topical <User Schedule>  clonazePAM  Tablet 1 milliGRAM(s) Oral daily  dexMEDEtomidine Infusion 0.2 MICROgram(s)/kG/Hr IV Continuous <Continuous>  dextrose 5%. 1000 milliLiter(s) IV Continuous <Continuous>  dextrose 50% Injectable 12.5 Gram(s) IV Push once  dextrose 50% Injectable 25 Gram(s) IV Push once  dextrose 50% Injectable 25 Gram(s) IV Push once  enoxaparin Injectable 40 milliGRAM(s) SubCutaneous daily  insulin lispro (HumaLOG) corrective regimen sliding scale   SubCutaneous every 6 hours  insulin NPH human recombinant 4 Unit(s) SubCutaneous every 6 hours  methylPREDNISolone sodium succinate Injectable 40 milliGRAM(s) IV Push every 8 hours  PARoxetine 30 milliGRAM(s) Oral daily  polyethylene glycol 3350 17 Gram(s) Oral daily  propofol Infusion 15 MICROgram(s)/kG/Min IV Continuous <Continuous>  psyllium Powder 1 Packet(s) Oral daily  senna Syrup 10 milliLiter(s) Oral at bedtime    MEDICATIONS  (PRN):  albuterol/ipratropium for Nebulization 3 milliLiter(s) Nebulizer every 6 hours PRN Shortness of Breath and/or Wheezing  dextrose 40% Gel 15 Gram(s) Oral once PRN Blood Glucose LESS THAN 70 milliGRAM(s)/deciliter  glucagon  Injectable 1 milliGRAM(s) IntraMuscular once PRN Glucose LESS THAN 70 milligrams/deciliter      OBJECTIVE:  Mode: CPAP with PS, FiO2: 40, PEEP: 5, PS: 10, MAP: 13, PIP: 32  I&O's Detail    04 Mar 2020 07:01  -  05 Mar 2020 07:00  --------------------------------------------------------  IN:    Enteral Tube Flush: 260 mL    fentaNYL Infusion.: 11.8 mL    Nepro: 1020 mL    propofol Infusion: 708 mL  Total IN: 1999.8 mL    OUT:    Indwelling Catheter - Urethral: 2775 mL  Total OUT: 2775 mL    Total NET: -775.2 mL      05 Mar 2020 07:01  -  05 Mar 2020 12:32  --------------------------------------------------------  IN:    propofol Infusion: 106.8 mL  Total IN: 106.8 mL    OUT:    Indwelling Catheter - Urethral: 300 mL  Total OUT: 300 mL    Total NET: -193.2 mL    Daily Weight in k (05 Mar 2020 04:00)    POCT Blood Glucose.: 198 mg/dL (05 Mar 2020 05:30)  POCT Blood Glucose.: 230 mg/dL (04 Mar 2020 17:14)      PHYSICAL EXAM:       ICU Vital Signs Last 24 Hrs  T(C): 37.3 (05 Mar 2020 08:00), Max: 37.3 (05 Mar 2020 00:00)  T(F): 99.1 (05 Mar 2020 08:00), Max: 99.1 (05 Mar 2020 00:00)  HR: 102 (05 Mar 2020 12:00) (50 - 102)  BP: 195/72 (05 Mar 2020 12:00) (96/51 - 195/72)  BP(mean): 120 (05 Mar 2020 12:00) (70 - 124)  ABP: --  ABP(mean): --  RR: 27 (05 Mar 2020 12:00) (20 - 45)  SpO2: 93% (05 Mar 2020 12:00) (88% - 100%) on vent FiO2 30 - 35%     General: Sedated. Follows simple commands. Intubated. No distress. Appears stated age. .  HEENT: Atraumatic. Normocephalic. Anicteric. Normal oral mucosa. PERRL. EOMI. OGT  Neck: Supple. Trachea midline. Thyroid without enlargement/tenderness/nodules. No carotid bruit. No JVD.	  Cardiovascular: Regular rate and rhythm. S1 S2 normal. No murmurs, rubs or gallops.  Respiratory: Respirations unlabored. Scattered rales. No curvature.  Abdomen: Soft. Non-tender. Non-distended. No organomegaly. No masses. Normal bowel sounds. Obese. Loza catheter.  Extremities: Warm to touch. No clubbing or cyanosis. No pedal edema.  Pulses: 2+ peripheral pulses all extremities.	  Skin: Normal skin color. No rashes or lesions. No ecchymoses. No cyanosis. Warm to touch.  Lymph Nodes: Cervical, supraclavicular and axillary nodes normal  Neurological: Moving all 4 extremities. A and O x 1  Psychiatry: Appropriate mood and affect.    LABS:                          9.8    24.05 )-----------( 240      ( 05 Mar 2020 00:20 )             30.5     CBC    WBC  24.05 <==, 23.49 <==, 31.66 <==, 34.86 <==, 31.73 <==, 23.36 <==, 22.40 <==    Hemoglobin  9.8 <<==, 9.3 <<==, 10.8 <<==, 11.5 <<==, 11.5 <<==, 11.6 <<==, 12.1 <<==    Hematocrit  30.5 <==, 29.9 <==, 34.0 <==, 34.3 <==, 35.7 <==, 35.4 <==, 36.2 <==    Platelets  240 <==, 227 <==, 287 <==, 232 <==, 182 <==, 142 <==, 152 <==      134<L>  |  97  |  62<H>  ----------------------------<  279<H>    03-05  4.4   |  26  |  1.30      LYTES    sodium  134 <==, 139 <==, 132 <==, 136 <==, 135 <==, 134 <==, 131 <==    potassium   4.4 <==, 4.4 <==, 4.8 <==, 5.1 <==, 5.5 <==, 6.8 <==, 5.5 <==    chloride  97 <==, 97 <==, 97 <==, 98 <==, 97 <==, 96 <==, 95 <==    carbon dioxide  26 <==, 28 <==, 25 <==, 27 <==, 26 <==, 25 <==, 25 <==    =============================================================================================  RENAL FUNCTION:    Creatinine:   1.30  <<==, 1.36  <<==, 1.31  <<==, 1.34  <<==, 1.50  <<==, 1.40  <<==, 1.58  <<==    BUN:   62 <==, 58 <==, 53 <==, 45 <==, 44 <==, 42 <==, 40 <==    ============================================================================================    calcium   8.6 <==, 8.9 <==, 8.4 <==, 8.5 <==, 8.5 <==, 8.3 <==, 7.9 <==    phos   4.1 <==, 4.7 <==, 4.3 <==, 4.3 <==, 5.5 <==, 5.9 <==, 5.8 <==    mag   2.5 <==, 2.4 <==, 2.6 <==, 2.5 <==, 2.5 <==, 2.5 <==, 2.3 <==    ============================================================================================  LFTs    AST:   18 <== , 16 <== , 23 <== , 71 <== , 36 <== , 31 <==     ALT:  41  <== , 47  <== , 66  <== , 99  <== , 39  <== , 39  <==     AP:  76  <=, 56  <=, 67  <=, 91  <=, 79  <=, 79  <=    Bili:  0.2  <=, 0.2  <=, 0.2  <=, 0.3  <=, 0.5  <=, 0.5  <=    ABG - ( 05 Mar 2020 00:17 )  pH: 7.41  /  pCO2: 49    /  pO2: 76    / HCO3: 31    / Base Excess: 5.7   /  SaO2: 94        ABG - ( 04 Mar 2020 00:11 )  pH: 7.40  /  pCO2: 49    /  pO2: 74    / HCO3: 30    / Base Excess: 4.7   /  SaO2: 94        ABG - ( 03 Mar 2020 15:46 )  pH: 7.38  /  pCO2: 52    /  pO2: 75    / HCO3: 30    / Base Excess: 4.7   /  SaO2: 96        ABG - ( 03 Mar 2020 00:27 )  pH: 7.33  /  pCO2: 58    /  pO2: 141   / HCO3: 30    / Base Excess: 3.6   /  SaO2: 98        ABG - ( 02 Mar 2020 16:59 )  pH: 7.30  /  pCO2: 64    /  pO2: 138   / HCO3: 30    / Base Excess: 3.1   /  SaO2: 99        ABG - ( 02 Mar 2020 14:47 )  pH: 7.28  /  pCO2: 67    /  pO2: 119   / HCO3: 31    / Base Excess: 3.2   /  SaO2: 98        ABG - ( 02 Mar 2020 13:47 )  pH: 7.21  /  pCO2: 80    /  pO2: 89    / HCO3: 31    / Base Excess: 1.5   /  SaO2: 95        Procalcitonin, Serum: 0.22 ng/mL ( @ 17:00)    Procalcitonin, Serum: 0.15 ng/mL ( @ 09:25)    Procalcitonin, Serum: 0.69 ng/mL ( @ 07:45)    Serum Pro-Brain Natriuretic Peptide: 364 pg/mL ( @ 22:30)    CARDIAC MARKERS ( 2020 00:57 )  CPK x     /CKMB x     /CKMB Units x        troponin 17 ng/L    CARDIAC MARKERS ( 2020 22:30 )  CPK x     /CKMB x     /CKMB Units x        troponin 19 ng/L      Patient name: DOUGLAS MORROW  YOB: 1950   Age: 69 (M)   MR#: [id number]  Study Date: 3/1/2020  [facility]  Study quality: Technically fair  Referring Physician: Tari Sommer MD  Blood Pressure: 137/62 mmHg  Height: 183 cm  Weight: 105 kg  BSA: 2.3 m2  ------------------------------------------------------------------------  PROCEDURE: Transthoracic echocardiogram with 2-D, M-Mode  and complete spectral and color flow Doppler.  INDICATION:Dyspnea, unspecified (R06.00)  ------------------------------------------------------------------------  Dimensions:    Normal Values:  LA:     3.8    2.0 - 4.0 cm  Ao:     2.9    2.0 - 3.8 cm  SEPTUM: 1.0    0.6 - 1.2 cm  PWT:    1.1    0.6 - 1.1 cm  LVIDd:  5.3    3.0 - 5.6 cm  LVIDs:  3.4    1.8 - 4.0 cm  Derived variables:  LVMI: 95 g/m2  RWT: 0.41  EF (Visual Estimate): 65 %  Doppler Peak Velocity (m/sec): AoV=1.6 TV=3.9  ------------------------------------------------------------------------  Observations:  Mitral Valve: Normal mitral valve. Minimal mitral  regurgitation.  Aortic Valve/Aorta: Calcified aortic valve with normal  opening.  Normal aortic root size.  Left Atrium: Normal left atrium.  Left Ventricle: Normal left ventricular internal dimensions  and wall thicknesses.  Normal left ventricular systolic function. No segmental  wall motion abnormalities.  Impaired LV-relaxation with normal filling pressure.  Right Heart: Normal right atrium. Suboptimal visualization  of the right heart in all windows.  Normal tricuspid valve.  Mild-moderate tricuspid regurgitation. Normal pulmonic  valve.  Pericardium/Pleura: Normal pericardium with no pericardial  effusion.  Hemodynamic: Severe pulmonary hypertension; severely  increased pulmonary vascular resistance.  Estimated PASP at least 65 mmHg.  ------------------------------------------------------------------------  Conclusions:  Normal left ventricular systolic function. No segmental  wall motion abnormalities.  Impaired LV-relaxation with normal filling pressure.  Suboptimal visualization of the right heart in all windows.    Severe pulmonary hypertension; severely increased pulmonary  vascular resistance.  ------------------------------------------------------------------------  Confirmed on  3/1/2020 - 14:35:54 by Tyrell Brock MD, FASE  ------------------------------------------------------------------------      MICROBIOLOGY:     Culture - Bronchial (20 @ 21:29)    Gram Stain:   Numerous polymorphonuclear leukocytes per low power field  Rare Squamous Epithelial Cells  Few Yeast like cells per oil power field    Specimen Source: Bronch Wash Bronchoalveolar Lavage    Culture Results:   Normal Respiratory Grecia present    FLU A B RSV Detection by PCR (20 @ 22:30)    Flu A Result: Detected    Flu B Result: NotDetec    RSV Result: NotDetec    Legionella pneumophila Antigen, Urine (20 @ 09:50)    Legionella Antigen, Urine: Negative    Culture - Blood (20 @ 10:14)    Specimen Source: .Blood Blood-Venous    Culture Results:   No growth to date.    Culture - Blood (20 @ 10:14)    Specimen Source: .Blood Blood-Peripheral    Culture Results:   No growth to date.    RADIOLOGY:  [x] Chest radiographs reviewed and interpreted by me      EXAM:  XR CHEST PORTABLE URGENT 1V                          PROCEDURE DATE:  2020      INTERPRETATION:  CLINICAL INFORMATION: Intubated.    TECHNIQUE: AP radiograph of the chest.    COMPARISON: Chest x-ray 2020.    FINDINGS:    LINES AND TUBES: Endotracheal tube with tip above the kayden. Enteric tube with side-port at the GE junction.    LUNGS: Mild pulmonary edema.    PLEURA: No pleural effusion or pneumothorax.    HEART AND MEDIASTINUM: Heart size is within normal limits.    SKELETON: Unremarkable skeletal structures.      IMPRESSION:     Endotracheal tube with tip above the kayden.    GEN TABOR M.D., RADIOLOGY RESIDENT  This document has been electronically signed.  SADIA RIBEIRO M.D., ATTENDING RADIOLOGIST  This document has been electronically signed. Mar  2 2020 12:09PM  ---------------------------------------------------------------------------------------------------------------    EXAM:  CT CHEST                          PROCEDURE DATE:  2020      INTERPRETATION:  CLINICAL INFORMATION: Dyspnea    COMPARISON: CT chest 2020    PROCEDURE:   CT of the Chest was performed without intravenous contrast.  [place] coronal reformats were performed.      FINDINGS:    LUNGS AND AIRWAYS: The central airways are patent.  Patchy areas of groundglass opacities containing traction bronchiectasis are noted within both lungs. Addition, superimposed patchy groundglass opacities are also noted. They are new when compared to previous exam.    PLEURA: No pleural effusion.    MEDIASTINUM AND CHAVO: There are scattered small hilar and mediastinal lymph nodes.    VESSELS: Within normal limits.    HEART: The heart size is normal and there is no pericardial effusion.     CHEST WALL AND LOWER NECK: Within normal limits.    VISUALIZED UPPER ABDOMEN: Within normal limits.    BONES: There are multilevel degenerative changes of the spine    IMPRESSION:     Patchy groundglass opacities containing traction bronchiectasis bilaterally is suggestive of pulmonary fibrosis of uncertain etiology.    Groundglass opacities are noted within both lungs. Primary consideration is infection.    BRITNEY VERONICA M.D., RADIOLOGY RESIDENT  This document has been electronically signed.  TAMMIE LOTT M.D., ATTENDING RADIOLOGIST  This document has been electronically signed. 2020  5:04PM  ---------------------------------------------------------------------------------------------------------------    EXAM:  CT ANGIO CHEST (W)AW IC                          PROCEDURE DATE:  2020      INTERPRETATION:  CLINICAL INFORMATION: Shortness of breath and fatigue. History of lung disease.    COMPARISON: Chest radiograph 2020    PROCEDURE:   CT Angiography of the Chest.  90 ml of Omnipaque 350 was injected intravenously. 10 ml were discarded.  Sagittal and coronal reformats were performed as well as 3D (MIP) reconstructions.    FINDINGS:    LUNGS AND AIRWAYS: Patent central airways. Areas of architectural distortion, mild traction bronchiectasis and linear/reticular opacities. Nonspecific groundglass/patchy opacities in both lungs.      PLEURA: No pleural effusion or pneumothorax.    MEDIASTINUM AND CHAVO: Subcentimeter mediastinal and hilar lymph nodes without lymphadenopathy.    VESSELS: Adequate contrast opacification of the pulmonary arteries. No obvious pulmonary embolus.    HEART: Normal heart size. No pericardial effusion.    CHEST WALL AND LOWER NECK: Heterogeneous thyroid gland, obscured by artifact.    VISUALIZED UPPER ABDOMEN: Colon diverticulosis.    BONES: Degenerative changes of the spine.    IMPRESSION:     No obvious pulmonary embolus.    Findings reflecting known pulmonary fibrosis. Recommend clinical correlation to assess underlying pneumonia. Recommend comparison to previous outside study and follow-up to exclude potential underlying neoplasm.    Heterogeneous thyroid gland, which is difficult to assess due to artifact and can be further characterized on a nonemergent ultrasound as clinically indicated.     Additional findings as described.    RAMONA REDDY M.D., RADIOLOGY RESIDENT  This document has been electronically signed.  MIAH CHAUDHRY M.D., ATTENDING RADIOLOGIST  This document has been electronically signed. 2020  1:49AM  ---------------------------------------------------------------------------------------------------------------

## 2020-03-05 NOTE — PROGRESS NOTE ADULT - ASSESSMENT
ASSESSMENT:    hypoxic and hypercapnic respiratory failure - multifactorial - has developed severe pulmonary hypertension with ongoing hypoxemia    1) influenza infection complicated by bronchospasm (resolved) and pneumonia - little evidence of a superimposed bacterial infection with negative bronchoscopy cultures and non-elevated procalcitonin level   2) underlying mild pulmonary fibrosis now with traction bronchiectasis    PLAN/RECOMMENDATIONS:    vent support - taper FiO2 as tolerated - trial of extubation to high flow nasal canula or NIV as needed  hemodynamically stable off pressors  discontinue sedation for extubation  chlorhexidine mouth wash  fungitell level is not elevated  galactomannan level is not elevated  RVP is negative - observe off tamiflu  solumedrol 40mg IVP q8h - glucose control  has completed a course of vanco/cefepime  hydralazine  buspar/klonopin/paxil  loza catheter  bowel regimen    Will follow with you. Plan of care discussed with the patient's family at bedside and the MICU team.    Galileo Pop MD, [facility]  [phone]  Pulmonary Medicine

## 2020-03-05 NOTE — PROGRESS NOTE ADULT - ASSESSMENT
69M with PMH of HTN, HLD, depression, anxiety, and insignificant degree of pulmonary fibrosis, who initially presented 2/20/20 following a fall at home, admitted with acute hypoxic respiratory failure in setting of influenza with concern for superimposed PNA. Course c/b worsening respiratory failure with high oxygen requirements, transferred to MICU for close monitoring, intubated 3/2.     NEURO:   Hx of Anxiety/Depression  - Intubated (3/2); sedated on fentanyl and propofol gtt, will attempt to wean as tolerated  - C/w home clonazepam standing 1mg PO daily  - C/w home buspirone 15mg PO TID  - C/w home paroxetine 30mg PO daily    PULM:   # Acute hypoxic respiratory failure   Multifactorial related to influenza infection with suspicion for superimposed bacterial PNA in setting of underlying lung disease. May also have component of aspiration pneumonitis.   - Intubated (3/2) as pt failed use of high flow FiO2 100% (ABG showing pO2 48), acapella valve, incentive spirometry  - c/w duonebs q6h PRN  - s/p Tamiflu for total 10 days (2/20-2/29)  - s/p vanc/cefepime (2/25-3/3), off abx as bronch studies does not appear to be infectious and appears to be more inflammatory  - s/p Bronchoscopy (3/2): airways appeared moderately inflamed and collapsable diffusely, multiple small diverticula, scant mucus b/l; brochoalveolar lavage performed, studies sent.  - wean off vent as tolerated  - serial ABGs    # Pulmonary fibrosis:   - Appreciate Pulm recs  - As per pt's pulmonologist group (Dr. Dia/Dr. Pop): Hx of "insignificant pulmonary fibrosis," Seen for chronic cough, PFTs (2017) was wnl aside from diffusion capacity of 74%, and CT Chest (2013) showed no evidence of pulm fibrosis or other significant pulm pathology  - increased solumedrol 40mg IV Q8    CARDIOVASCULAR:   # HTN  - Continue to monitor  - home Diltiazem and ARB held as pt was on pressor, will consider restarting if BP maintains off pressor  - TTE (3/1): EF 65%; Severe pulmonary hypertension. Severely increased pulmonary vascular resistance; Normal left ventricular systolic function. No segmental wall motion abnormalities. Impaired LV-relaxation with normal filling pressure  - Cardiology following (Dr. Lucero), appreciate recs  - off pressors    GI:   - NPO, OG tube placed  - GI following (Dr. Kraft), appreciate recs  # Constipation  - c/w senna 10mg PO qhs, metamucil daily, and miralax daily  - dulcolax suppository x1 given    RENAL/:   # Urinary retention  - Possibly 2/2 SIADH in setting of pulm process, as per Renal  - off tamsulosin as pt now intubated, restarted on home doxazosin  - c/w Scruggs  - s/p Lasix IV 40mg x3 with significant UOP, continue to monitor UOP  #AURA  - SCr doubled (baseline SCr ~0.7-0.8)  #Hyperkalemia  - given lokelma x1  - continue to monitor BMP    ENDOCRINE:   - Insulin sliding scale for steroid-induced hyperglycemia  - start NPH 4u q6h as steroid dose increased    HEMATOLOGIC:   # Leukocytosis  Likely secondary to steroids and infection  - monitor off abx  - Continue to trend CBC with differential    ID:   # Influenza with suspected superimposed bacterial PNA:   - Persistent leukocytosis, though pt also on steroids  - s/p vanc/cefepime (2/25-3/3), off abx as bronch studies does not appear to be infectious and appears to be more inflammatory  - s/p Tamiflu for total 10 days (2/20-2/29)  - Monitor WBC count, fever curve  - BCx (3/1): NGTD  - f/u procalcitonin level, fungitell, and galactomannan, as per ID  - f/u bronch Cx, gram stain unremarkable   - repeat RVP (3/3) negative    PROPHYLAXIS:   - DVT ppx: Lovenox    CODE STATUS:  - Wife and pt agreed to trial of intubation. Currently remains full code.    Rene Kim, PGY-1  MICU  Contact/Pager: [phone] / [phone] 69M with PMH of HTN, HLD, depression, anxiety, and insignificant degree of pulmonary fibrosis, who initially presented 2/20/20 following a fall at home, admitted with acute hypoxic respiratory failure in setting of influenza with concern for superimposed PNA. Course c/b worsening respiratory failure with high oxygen requirements, transferred to MICU for close monitoring, intubated 3/2.     NEURO:   Hx of Anxiety/Depression  - Intubated (3/2); sedated on fentanyl and propofol gtt, will attempt to wean as tolerated  - C/w home clonazepam standing 1mg PO daily  - C/w home buspirone 15mg PO TID  - C/w home paroxetine 30mg PO daily    PULM:   # Acute hypoxic respiratory failure   Multifactorial related to influenza infection with suspicion for superimposed bacterial PNA in setting of underlying lung disease. May also have component of aspiration pneumonitis.   - Intubated (3/2) as pt failed use of high flow FiO2 100% (ABG showing pO2 48), acapella valve, incentive spirometry  - c/w duonebs q6h PRN  - s/p Tamiflu for total 10 days (2/20-2/29)  - s/p vanc/cefepime (2/25-3/3), off abx as bronch studies does not appear to be infectious and appears to be more inflammatory  - s/p Bronchoscopy (3/2): airways appeared moderately inflamed and collapsable diffusely, multiple small diverticula, scant mucus b/l; brochoalveolar lavage performed, studies sent.  - wean off vent as tolerated  - serial ABGs    # Pulmonary fibrosis:   - Appreciate Pulm recs  - As per pt's pulmonologist group (Dr. Dia/Dr. Pop): Hx of "insignificant pulmonary fibrosis," Seen for chronic cough, PFTs (2017) was wnl aside from diffusion capacity of 74%, and CT Chest (2013) showed no evidence of pulm fibrosis or other significant pulm pathology  - c/w solumedrol 40mg IV Q8    CARDIOVASCULAR:   # HTN  - Continue to monitor  - home Diltiazem and ARB held as pt was on pressor, will consider restarting if BP maintains off pressor  - TTE (3/1): EF 65%; Severe pulmonary hypertension. Severely increased pulmonary vascular resistance; Normal left ventricular systolic function. No segmental wall motion abnormalities. Impaired LV-relaxation with normal filling pressure  - Cardiology following (Dr. Lucero), appreciate recs  - off pressors    GI:   - NPO, OG tube placed, receiving tube feeds  - GI following (Dr. Kraft), appreciate recs  # Constipation  - c/w senna 10mg PO qhs, metamucil daily, and miralax daily  - dulcolax suppository x1 given with subsequent BMs, will consider giving PRN    RENAL/:   # Urinary retention  - Possibly 2/2 SIADH in setting of pulm process, as per Renal  - off tamsulosin as pt now intubated, restarted on home doxazosin  - c/w Scruggs  - s/p Lasix IV 40mg x3 with significant UOP, continue to monitor UOP  - another Lasix IV 40mg x1 today prior to extubation  #AURA  - SCr doubled (baseline SCr ~0.7-0.8)  #Hyperkalemia, resolved  - s/p lokelma x1  - continue to monitor BMP    ENDOCRINE:   - Insulin sliding scale for steroid-induced hyperglycemia  - c/w NPH 4u q6h as steroid dose increased; dc once extubated    HEMATOLOGIC:   # Leukocytosis  Likely secondary to steroids and infection  - monitor off abx  - Continue to trend CBC with differential    ID:   # Influenza with suspected superimposed bacterial PNA:   - Persistent leukocytosis, though pt also on steroids  - s/p vanc/cefepime (2/25-3/3), off abx as bronch studies does not appear to be infectious and appears to be more inflammatory  - s/p Tamiflu for total 10 days (2/20-2/29)  - Monitor WBC count, fever curve  - BCx (3/1): NGTD  - f/u procalcitonin level, fungitell, and galactomannan, as per ID  - f/u bronch Cx, gram stain unremarkable   - repeat RVP (3/3) negative    PROPHYLAXIS:   - DVT ppx: Lovenox    CODE STATUS:  - Wife and pt agreed to trial of intubation. Currently remains full code.    Rene Kim, PGY-1  MICU  Contact/Pager: [phone] / [phone] 69M with PMH of HTN, HLD, depression, anxiety, and insignificant degree of pulmonary fibrosis, who initially presented 2/20/20 following a fall at home, admitted with acute hypoxic respiratory failure in setting of influenza with concern for superimposed PNA. Course c/b worsening respiratory failure with high oxygen requirements, transferred to MICU for close monitoring, intubated 3/2.     NEURO:   Hx of Anxiety/Depression  - Intubated (3/2); sedated on fentanyl and propofol gtt, will attempt to wean as tolerated  - C/w home clonazepam standing 1mg PO daily  - C/w home buspirone 15mg PO TID  - C/w home paroxetine 30mg PO daily    PULM:   # Acute hypoxic respiratory failure   Multifactorial related to influenza infection with suspicion for superimposed bacterial PNA in setting of underlying lung disease. May also have component of aspiration pneumonitis.   - Intubated (3/2) as pt failed use of high flow FiO2 100% (ABG showing pO2 48), acapella valve, incentive spirometry  - c/w duonebs q6h PRN  - s/p Tamiflu for total 10 days (2/20-2/29)  - s/p vanc/cefepime (2/25-3/3), off abx as bronch studies does not appear to be infectious and appears to be more inflammatory  - s/p Bronchoscopy (3/2): airways appeared moderately inflamed and collapsable diffusely, multiple small diverticula, scant mucus b/l; brochoalveolar lavage performed, studies sent.  - wean off vent as tolerated  - serial ABGs    # Pulmonary fibrosis:   - Appreciate Pulm recs  - As per pt's pulmonologist group (Dr. Dia/Dr. Pop): Hx of "insignificant pulmonary fibrosis," Seen for chronic cough, PFTs (2017) was wnl aside from diffusion capacity of 74%, and CT Chest (2013) showed no evidence of pulm fibrosis or other significant pulm pathology  - c/w solumedrol 40mg IV Q8    CARDIOVASCULAR:   # HTN  - Continue to monitor  - home Diltiazem and ARB held as pt was on pressor, will consider restarting if BP maintains off pressor  - TTE (3/1): EF 65%; Severe pulmonary hypertension. Severely increased pulmonary vascular resistance; Normal left ventricular systolic function. No segmental wall motion abnormalities. Impaired LV-relaxation with normal filling pressure  - Cardiology following (Dr. Lucero), appreciate recs  - off pressors    GI:   - NPO, OG tube placed, receiving tube feeds  - GI following (Dr. Kraft), appreciate recs  # Constipation  - c/w senna 10mg PO qhs, metamucil daily, and miralax daily  - dulcolax suppository x1 given with subsequent BMs, will consider giving PRN    RENAL/:   # Urinary retention  - Possibly 2/2 SIADH in setting of pulm process, as per Renal  - off tamsulosin as pt now intubated, restarted on home doxazosin  - c/w Scruggs  - s/p Lasix IV 40mg x3 with significant UOP, continue to monitor UOP  - another Lasix IV 40mg x1 today prior to extubation  #AURA  - SCr doubled (baseline SCr ~0.7-0.8)  #Hyperkalemia, resolved  - s/p lokelma x1  - continue to monitor BMP    ENDOCRINE:   - Insulin sliding scale for steroid-induced hyperglycemia  - c/w NPH 4u q6h as steroid dose increased; dc once extubated    HEMATOLOGIC:   # Leukocytosis  Likely secondary to steroids and infection  - monitor off abx  - Continue to trend CBC with differential    ID:   # Influenza with suspected superimposed bacterial PNA:   - Persistent leukocytosis, though pt also on steroids  - s/p vanc/cefepime (2/25-3/3), off abx as bronch studies does not appear to be infectious and appears to be more inflammatory  - s/p Tamiflu for total 10 days (2/20-2/29)  - BCx (3/1): NGTD  - (3/2) procalcitonin level, fungitell, and galactomannan: all negative/unremarkable   - Bronch Cx/gram stain (3/2): unremarkable   - repeat RVP (3/3) negative  - Monitor WBC count, fever curve    PROPHYLAXIS:   - DVT ppx: Lovenox    CODE STATUS:  - Wife and pt agreed to trial of intubation. Currently remains full code.    Rene Kim, PGY-1  MICU  Contact/Pager: [phone] / [phone]

## 2020-03-05 NOTE — PROGRESS NOTE ADULT - SUBJECTIVE AND OBJECTIVE BOX
Seen in ICU, extubated today, on high flow O2    Vital Signs Last 24 Hrs  T(C): 37.3 (03-05-20 @ 08:00), Max: 37.3 (03-05-20 @ 00:00)  T(F): 99.1 (03-05-20 @ 08:00), Max: 99.1 (03-05-20 @ 00:00)  HR: 64 (03-05-20 @ 18:00) (55 - 102)  BP: 159/75 (03-05-20 @ 18:00) (108/52 - 195/72)  BP(mean): 108 (03-05-20 @ 18:00) (75 - 124)  RR: 19 (03-05-20 @ 18:00) (19 - 45)  SpO2: 95% (03-05-20 @ 18:00) (88% - 100%)    I&O's Summary    04 Mar 2020 07:01  -  05 Mar 2020 07:00  --------------------------------------------------------  IN: 1999.8 mL / OUT: 2775 mL / NET: -775.2 mL    05 Mar 2020 07:01  -  05 Mar 2020 19:00  --------------------------------------------------------  IN: 190 mL / OUT: 2875 mL / NET: -2685 mL    PHYSICAL EXAM:    Respiratory: decr BS b/l  Cardiovascular: S1 S2  Gastrointestinal: soft, ND  Extremities: sm edema                                                             9.8    24.05 )-----------( 240      ( 05 Mar 2020 00:20 )             30.5     05 Mar 2020 00:20    134    |  97     |  62     ----------------------------<  279    4.4     |  26     |  1.30     Ca    8.6        05 Mar 2020 00:20  Phos  4.1       05 Mar 2020 00:20  Mg     2.5       05 Mar 2020 00:20    TPro  5.4    /  Alb  2.5    /  TBili  0.2    /  DBili  x      /  AST  18     /  ALT  41     /  AlkPhos  76     05 Mar 2020 00:20    LIVER FUNCTIONS - ( 05 Mar 2020 00:20 )  Alb: 2.5 g/dL / Pro: 5.4 g/dL / ALK PHOS: 76 U/L / ALT: 41 U/L / AST: 18 U/L / GGT: x           albuterol/ipratropium for Nebulization 3 milliLiter(s) Nebulizer every 6 hours  atorvastatin 10 milliGRAM(s) Oral at bedtime  busPIRone 15 milliGRAM(s) Oral three times a day  chlorhexidine 4% Liquid 1 Application(s) Topical <User Schedule>  clonazePAM  Tablet 1 milliGRAM(s) Oral daily  dexMEDEtomidine Infusion 0.2 MICROgram(s)/kG/Hr IV Continuous <Continuous>  dextrose 40% Gel 15 Gram(s) Oral once PRN  dextrose 5%. 1000 milliLiter(s) IV Continuous <Continuous>  dextrose 50% Injectable 12.5 Gram(s) IV Push once  dextrose 50% Injectable 25 Gram(s) IV Push once  dextrose 50% Injectable 25 Gram(s) IV Push once  enoxaparin Injectable 40 milliGRAM(s) SubCutaneous daily  glucagon  Injectable 1 milliGRAM(s) IntraMuscular once PRN  insulin lispro (HumaLOG) corrective regimen sliding scale   SubCutaneous every 6 hours  methylPREDNISolone sodium succinate Injectable 40 milliGRAM(s) IV Push every 8 hours  PARoxetine 30 milliGRAM(s) Oral daily  polyethylene glycol 3350 17 Gram(s) Oral daily  psyllium Powder 1 Packet(s) Oral daily  senna Syrup 10 milliLiter(s) Oral at bedtime      Assessment and Plan:     S/p flu A, PNA, hx pulm fibrosis  Resp failure, s/p intubated   Extubated today  S/p SIADH in setting of pulm process  Improved  Hemodynamic AURA, resolving  Avoid nephrotoxins  Diuresis as needed  Retention, loza  F/u lytes, renal fx, UO  D/w family at bedside

## 2020-03-06 LAB
ALBUMIN SERPL ELPH-MCNC: 2.9 G/DL — LOW (ref 3.3–5)
ALP SERPL-CCNC: 69 U/L — SIGNIFICANT CHANGE UP (ref 40–120)
ALT FLD-CCNC: 34 U/L — SIGNIFICANT CHANGE UP (ref 10–45)
ANION GAP SERPL CALC-SCNC: 14 MMOL/L — SIGNIFICANT CHANGE UP (ref 5–17)
APPEARANCE UR: ABNORMAL
AST SERPL-CCNC: 19 U/L — SIGNIFICANT CHANGE UP (ref 10–40)
BACTERIA # UR AUTO: NEGATIVE — SIGNIFICANT CHANGE UP
BASOPHILS # BLD AUTO: 0.04 K/UL — SIGNIFICANT CHANGE UP (ref 0–0.2)
BASOPHILS NFR BLD AUTO: 0.2 % — SIGNIFICANT CHANGE UP (ref 0–2)
BILIRUB SERPL-MCNC: 0.6 MG/DL — SIGNIFICANT CHANGE UP (ref 0.2–1.2)
BILIRUB UR-MCNC: NEGATIVE — SIGNIFICANT CHANGE UP
BUN SERPL-MCNC: 61 MG/DL — HIGH (ref 7–23)
CALCIUM SERPL-MCNC: 8.5 MG/DL — SIGNIFICANT CHANGE UP (ref 8.4–10.5)
CHLORIDE SERPL-SCNC: 100 MMOL/L — SIGNIFICANT CHANGE UP (ref 96–108)
CO2 SERPL-SCNC: 29 MMOL/L — SIGNIFICANT CHANGE UP (ref 22–31)
COLOR SPEC: ABNORMAL
CREAT SERPL-MCNC: 1.17 MG/DL — SIGNIFICANT CHANGE UP (ref 0.5–1.3)
CULTURE RESULTS: SIGNIFICANT CHANGE UP
CULTURE RESULTS: SIGNIFICANT CHANGE UP
DIFF PNL FLD: ABNORMAL
EOSINOPHIL # BLD AUTO: 0 K/UL — SIGNIFICANT CHANGE UP (ref 0–0.5)
EOSINOPHIL NFR BLD AUTO: 0 % — SIGNIFICANT CHANGE UP (ref 0–6)
EPI CELLS # UR: 0 /HPF — SIGNIFICANT CHANGE UP
GLUCOSE BLDC GLUCOMTR-MCNC: 161 MG/DL — HIGH (ref 70–99)
GLUCOSE BLDC GLUCOMTR-MCNC: 169 MG/DL — HIGH (ref 70–99)
GLUCOSE BLDC GLUCOMTR-MCNC: 198 MG/DL — HIGH (ref 70–99)
GLUCOSE BLDC GLUCOMTR-MCNC: 273 MG/DL — HIGH (ref 70–99)
GLUCOSE SERPL-MCNC: 188 MG/DL — HIGH (ref 70–99)
GLUCOSE UR QL: ABNORMAL
GRAN CASTS # UR COMP ASSIST: ABNORMAL /LPF
HCT VFR BLD CALC: 34.2 % — LOW (ref 39–50)
HGB BLD-MCNC: 11.1 G/DL — LOW (ref 13–17)
HYALINE CASTS # UR AUTO: 5 /LPF — SIGNIFICANT CHANGE UP (ref 0–7)
IMM GRANULOCYTES NFR BLD AUTO: 1.8 % — HIGH (ref 0–1.5)
KETONES UR-MCNC: NEGATIVE — SIGNIFICANT CHANGE UP
LEUKOCYTE ESTERASE UR-ACNC: NEGATIVE — SIGNIFICANT CHANGE UP
LYMPHOCYTES # BLD AUTO: 0.58 K/UL — LOW (ref 1–3.3)
LYMPHOCYTES # BLD AUTO: 2.6 % — LOW (ref 13–44)
MAGNESIUM SERPL-MCNC: 2.4 MG/DL — SIGNIFICANT CHANGE UP (ref 1.6–2.6)
MCHC RBC-ENTMCNC: 29.8 PG — SIGNIFICANT CHANGE UP (ref 27–34)
MCHC RBC-ENTMCNC: 32.5 GM/DL — SIGNIFICANT CHANGE UP (ref 32–36)
MCV RBC AUTO: 91.7 FL — SIGNIFICANT CHANGE UP (ref 80–100)
MONOCYTES # BLD AUTO: 1.59 K/UL — HIGH (ref 0–0.9)
MONOCYTES NFR BLD AUTO: 7.2 % — SIGNIFICANT CHANGE UP (ref 2–14)
NEUTROPHILS # BLD AUTO: 19.35 K/UL — HIGH (ref 1.8–7.4)
NEUTROPHILS NFR BLD AUTO: 88.2 % — HIGH (ref 43–77)
NITRITE UR-MCNC: NEGATIVE — SIGNIFICANT CHANGE UP
NRBC # BLD: 0 /100 WBCS — SIGNIFICANT CHANGE UP (ref 0–0)
PH UR: 6 — SIGNIFICANT CHANGE UP (ref 5–8)
PHOSPHATE SERPL-MCNC: 3.6 MG/DL — SIGNIFICANT CHANGE UP (ref 2.5–4.5)
PLATELET # BLD AUTO: 290 K/UL — SIGNIFICANT CHANGE UP (ref 150–400)
POTASSIUM SERPL-MCNC: 3.6 MMOL/L — SIGNIFICANT CHANGE UP (ref 3.5–5.3)
POTASSIUM SERPL-SCNC: 3.6 MMOL/L — SIGNIFICANT CHANGE UP (ref 3.5–5.3)
PROT SERPL-MCNC: 6.4 G/DL — SIGNIFICANT CHANGE UP (ref 6–8.3)
PROT UR-MCNC: ABNORMAL
RBC # BLD: 3.73 M/UL — LOW (ref 4.2–5.8)
RBC # FLD: 14.3 % — SIGNIFICANT CHANGE UP (ref 10.3–14.5)
RBC CASTS # UR COMP ASSIST: >50 /HPF — HIGH (ref 0–4)
SODIUM SERPL-SCNC: 143 MMOL/L — SIGNIFICANT CHANGE UP (ref 135–145)
SP GR SPEC: 1.03 — HIGH (ref 1.01–1.02)
SPECIMEN SOURCE: SIGNIFICANT CHANGE UP
SPECIMEN SOURCE: SIGNIFICANT CHANGE UP
URATE CRY FLD QL MICRO: ABNORMAL
UROBILINOGEN FLD QL: NEGATIVE — SIGNIFICANT CHANGE UP
WBC # BLD: 21.95 K/UL — HIGH (ref 3.8–10.5)
WBC # FLD AUTO: 21.95 K/UL — HIGH (ref 3.8–10.5)
WBC UR QL: 6 /HPF — HIGH (ref 0–5)

## 2020-03-06 PROCEDURE — 99291 CRITICAL CARE FIRST HOUR: CPT

## 2020-03-06 PROCEDURE — 93010 ELECTROCARDIOGRAM REPORT: CPT

## 2020-03-06 PROCEDURE — 36620 INSERTION CATHETER ARTERY: CPT | Mod: GC

## 2020-03-06 PROCEDURE — 99233 SBSQ HOSP IP/OBS HIGH 50: CPT

## 2020-03-06 RX ORDER — HYDRALAZINE HCL 50 MG
10 TABLET ORAL ONCE
Refills: 0 | Status: COMPLETED | OUTPATIENT
Start: 2020-03-06 | End: 2020-03-06

## 2020-03-06 RX ORDER — SODIUM CHLORIDE 9 MG/ML
500 INJECTION, SOLUTION INTRAVENOUS ONCE
Refills: 0 | Status: DISCONTINUED | OUTPATIENT
Start: 2020-03-06 | End: 2020-03-06

## 2020-03-06 RX ORDER — ACETAMINOPHEN 500 MG
1000 TABLET ORAL ONCE
Refills: 0 | Status: COMPLETED | OUTPATIENT
Start: 2020-03-06 | End: 2020-03-06

## 2020-03-06 RX ORDER — SODIUM CHLORIDE 9 MG/ML
1000 INJECTION, SOLUTION INTRAVENOUS ONCE
Refills: 0 | Status: COMPLETED | OUTPATIENT
Start: 2020-03-06 | End: 2020-03-06

## 2020-03-06 RX ORDER — CHLORHEXIDINE GLUCONATE 213 G/1000ML
15 SOLUTION TOPICAL EVERY 12 HOURS
Refills: 0 | Status: DISCONTINUED | OUTPATIENT
Start: 2020-03-06 | End: 2020-03-07

## 2020-03-06 RX ORDER — PANTOPRAZOLE SODIUM 20 MG/1
40 TABLET, DELAYED RELEASE ORAL ONCE
Refills: 0 | Status: DISCONTINUED | OUTPATIENT
Start: 2020-03-06 | End: 2020-03-07

## 2020-03-06 RX ORDER — ONDANSETRON 8 MG/1
4 TABLET, FILM COATED ORAL ONCE
Refills: 0 | Status: COMPLETED | OUTPATIENT
Start: 2020-03-06 | End: 2020-03-06

## 2020-03-06 RX ORDER — ENOXAPARIN SODIUM 100 MG/ML
100 INJECTION SUBCUTANEOUS
Refills: 0 | Status: DISCONTINUED | OUTPATIENT
Start: 2020-03-06 | End: 2020-03-07

## 2020-03-06 RX ORDER — LABETALOL HCL 100 MG
10 TABLET ORAL ONCE
Refills: 0 | Status: COMPLETED | OUTPATIENT
Start: 2020-03-06 | End: 2020-03-06

## 2020-03-06 RX ADMIN — Medication 3 MILLILITER(S): at 11:23

## 2020-03-06 RX ADMIN — Medication 1 MILLIGRAM(S): at 12:50

## 2020-03-06 RX ADMIN — Medication 400 MILLIGRAM(S): at 17:00

## 2020-03-06 RX ADMIN — Medication 40 MILLIGRAM(S): at 06:05

## 2020-03-06 RX ADMIN — Medication 15 MILLIGRAM(S): at 13:04

## 2020-03-06 RX ADMIN — Medication 2: at 13:02

## 2020-03-06 RX ADMIN — ENOXAPARIN SODIUM 100 MILLIGRAM(S): 100 INJECTION SUBCUTANEOUS at 17:07

## 2020-03-06 RX ADMIN — Medication 1000 MILLIGRAM(S): at 17:15

## 2020-03-06 RX ADMIN — Medication 2: at 00:39

## 2020-03-06 RX ADMIN — Medication 10 MILLIGRAM(S): at 09:10

## 2020-03-06 RX ADMIN — Medication 6: at 17:07

## 2020-03-06 RX ADMIN — Medication 10 MILLIGRAM(S): at 16:45

## 2020-03-06 RX ADMIN — Medication 30 MILLIGRAM(S): at 12:50

## 2020-03-06 RX ADMIN — Medication 2: at 06:05

## 2020-03-06 RX ADMIN — Medication 40 MILLIGRAM(S): at 13:03

## 2020-03-06 RX ADMIN — SODIUM CHLORIDE 2000 MILLILITER(S): 9 INJECTION, SOLUTION INTRAVENOUS at 19:00

## 2020-03-06 RX ADMIN — ONDANSETRON 4 MILLIGRAM(S): 8 TABLET, FILM COATED ORAL at 17:00

## 2020-03-06 RX ADMIN — Medication 10 MILLIGRAM(S): at 07:55

## 2020-03-06 RX ADMIN — ENOXAPARIN SODIUM 120 MILLIGRAM(S): 100 INJECTION SUBCUTANEOUS at 06:05

## 2020-03-06 RX ADMIN — Medication 10 MILLIGRAM(S): at 02:40

## 2020-03-06 RX ADMIN — Medication 3 MILLILITER(S): at 05:45

## 2020-03-06 RX ADMIN — OLANZAPINE 5 MILLIGRAM(S): 15 TABLET, FILM COATED ORAL at 00:10

## 2020-03-06 RX ADMIN — Medication 3 MILLILITER(S): at 17:44

## 2020-03-06 RX ADMIN — CHLORHEXIDINE GLUCONATE 1 APPLICATION(S): 213 SOLUTION TOPICAL at 06:05

## 2020-03-06 RX ADMIN — DEXMEDETOMIDINE HYDROCHLORIDE IN 0.9% SODIUM CHLORIDE 5.92 MICROGRAM(S)/KG/HR: 4 INJECTION INTRAVENOUS at 08:09

## 2020-03-06 NOTE — PROGRESS NOTE ADULT - ASSESSMENT
diarrhea - resolved  anemia  constipation  hypoxic respiratory failure    acute hypoxic respiratory failure requiring endotracheal intubation and ventilatory support. S/p extubation yesterday, currently on HFNC  management per MICU/pulmonary appreciated  cont bowel regimen  h/h stable  diet as tolerated, aspiration precautions   colonoscopy 3 years ago, no need to repeat as inpatient  encourage OOB/ambulation  dvt ppx

## 2020-03-06 NOTE — PROGRESS NOTE ADULT - SUBJECTIVE AND OBJECTIVE BOX
CARDIOLOGY     PROGRESS  NOTE   ________________________________________________    CHIEF COMPLAINT:Patient is a 69y old  Male who presents with a chief complaint of SOB, cough and weakness (06 Mar 2020 07:14)  sedated on vent  events noted.  	  REVIEW OF SYSTEMS:  CONSTITUTIONAL: No fever, weight loss, or fatigue  EYES: No eye pain, visual disturbances, or discharge  ENT:  No difficulty hearing, tinnitus, vertigo; No sinus or throat pain  NECK: No pain or stiffness  RESPIRATORY: No cough, wheezing, chills or hemoptysis; No Shortness of Breath  CARDIOVASCULAR: No chest pain, palpitations, passing out, dizziness, or leg swelling  GASTROINTESTINAL: No abdominal or epigastric pain. No nausea, vomiting, or hematemesis; No diarrhea or constipation. No melena or hematochezia.  GENITOURINARY: No dysuria, frequency, hematuria, or incontinence  NEUROLOGICAL: No headaches, memory loss, loss of strength, numbness, or tremors  SKIN: No itching, burning, rashes, or lesions   LYMPH Nodes: No enlarged glands  ENDOCRINE: No heat or cold intolerance; No hair loss  MUSCULOSKELETAL: No joint pain or swelling; No muscle, back, or extremity pain  PSYCHIATRIC: No depression, anxiety, mood swings, or difficulty sleeping  HEME/LYMPH: No easy bruising, or bleeding gums  ALLERGY AND IMMUNOLOGIC: No hives or eczema	    [ ] All others negative	  [x ] Unable to obtain    PHYSICAL EXAM:  T(C): 37.1 (03-06-20 @ 08:00), Max: 37.1 (03-06-20 @ 08:00)  HR: 99 (03-06-20 @ 09:08) (55 - 102)  BP: 188/86 (03-06-20 @ 09:00) (121/58 - 195/72)  RR: 30 (03-06-20 @ 09:00) (19 - 30)  SpO2: 93% (03-06-20 @ 09:08) (89% - 99%)  Wt(kg): --  I&O's Summary    05 Mar 2020 07:01  -  06 Mar 2020 07:00  --------------------------------------------------------  IN: 317.5 mL / OUT: 3845 mL / NET: -3527.5 mL    06 Mar 2020 07:01  -  06 Mar 2020 09:27  --------------------------------------------------------  IN: 20.8 mL / OUT: 150 mL / NET: -129.2 mL        Appearance: agitated on vent  HEENT:   Normal oral mucosa, PERRL, EOMI	  Lymphatic: No lymphadenopathy  Cardiovascular: Normal S1 S2, No JVD, +murmurs, No edema  Respiratory: decrease bs  Psychiatry: A & O x 3, Mood & affect appropriate  Gastrointestinal:  Soft, Non-tender, + BS	  Skin: No rashes, No ecchymoses, No cyanosis	  Neurologic: Non-focal  Extremities: Normal range of motion, No clubbing, cyanosis or edema  Vascular: Peripheral pulses palpable 2+ bilaterally    MEDICATIONS  (STANDING):  albuterol/ipratropium for Nebulization 3 milliLiter(s) Nebulizer every 6 hours  atorvastatin 10 milliGRAM(s) Oral at bedtime  busPIRone 15 milliGRAM(s) Oral three times a day  chlorhexidine 4% Liquid 1 Application(s) Topical <User Schedule>  clonazePAM  Tablet 1 milliGRAM(s) Oral daily  dexMEDEtomidine Infusion 0.2 MICROgram(s)/kG/Hr (5.925 mL/Hr) IV Continuous <Continuous>  dextrose 5%. 1000 milliLiter(s) (50 mL/Hr) IV Continuous <Continuous>  dextrose 50% Injectable 12.5 Gram(s) IV Push once  dextrose 50% Injectable 25 Gram(s) IV Push once  dextrose 50% Injectable 25 Gram(s) IV Push once  enoxaparin Injectable 100 milliGRAM(s) SubCutaneous two times a day  insulin lispro (HumaLOG) corrective regimen sliding scale   SubCutaneous every 6 hours  methylPREDNISolone sodium succinate Injectable 40 milliGRAM(s) IV Push every 8 hours  pantoprazole  Injectable 40 milliGRAM(s) IV Push once  PARoxetine 30 milliGRAM(s) Oral daily  polyethylene glycol 3350 17 Gram(s) Oral daily  psyllium Powder 1 Packet(s) Oral daily  senna Syrup 10 milliLiter(s) Oral at bedtime      TELEMETRY: 	    ECG:  	  RADIOLOGY:  OTHER: 	  	  LABS:	 	    CARDIAC MARKERS:                                11.1   21.95 )-----------( 290      ( 06 Mar 2020 00:52 )             34.2     03-06    143  |  100  |  61<H>  ----------------------------<  188<H>  3.6   |  29  |  1.17    Ca    8.5      06 Mar 2020 00:52  Phos  3.6     03-06  Mg     2.4     03-06    TPro  6.4  /  Alb  2.9<L>  /  TBili  0.6  /  DBili  x   /  AST  19  /  ALT  34  /  AlkPhos  69  03-06    proBNP: Serum Pro-Brain Natriuretic Peptide: 364 pg/mL (02-19 @ 22:30)    Lipid Profile:   HgA1c: Hemoglobin A1C, Whole Blood: 6.6 % (02-24 @ 08:40)    TSH: Thyroid Stimulating Hormone, Serum: 0.39 uIU/mL (02-21 @ 09:19)  Thyroid Stimulating Hormone, Serum: 0.47 uIU/mL (02-21 @ 00:19)          Assessment and plan  ---------------------------  69M with PMH of HTN, pulmonary fibrosis, depression/anxiety p/w SOB and cough. Pt states his mother passed away about 2 weeks ago and shortly after burying here, started to feel very unwell. He has had progressively worsening cough, non productive, but feels a lot of chest congestion. Associated with ROSE, headache and myalgias and fatigue. Was seen at  earlier this week and was diagnosed with flu. In the past couple of days, has been having very labored breathing; today, had an episode where he fell while trying to reach for alight switch and was so winded and weak, he was unable to get up. Has barely been eating anything due to poor appetite and nausea, but denies vomiting. Denies fevers or chills, no CP, no palpitations, no abdominal pain, had 3 episodes of loose stools today, +decreased UOP, no LE swelling. Does not know of any sick contacts. Denies recent travel.  sob/ respiratory failure sec to hx of pulmonary fibrosis ?pneumoniae.  abx  dvt prophylaxis  asa daily  echo result noted with severe pulmonary htn  supportive measures  sedated on vent  continue iv abx, sp bronch  dvt prophylaxis  check LE venous Doppler r/o dvt  off all mallorie meds  vent as per MICU  psych CARDIOLOGY     PROGRESS  NOTE   ________________________________________________    CHIEF COMPLAINT:Patient is a 69y old  Male who presents with a chief complaint of SOB, cough and weakness (06 Mar 2020 07:14)  events noted.  	  REVIEW OF SYSTEMS:  CONSTITUTIONAL: No fever, weight loss, or fatigue  EYES: No eye pain, visual disturbances, or discharge  ENT:  No difficulty hearing, tinnitus, vertigo; No sinus or throat pain  NECK: No pain or stiffness  RESPIRATORY: No cough, wheezing, chills or hemoptysis; No Shortness of Breath  CARDIOVASCULAR: No chest pain, palpitations, passing out, dizziness, or leg swelling  GASTROINTESTINAL: No abdominal or epigastric pain. No nausea, vomiting, or hematemesis; No diarrhea or constipation. No melena or hematochezia.  GENITOURINARY: No dysuria, frequency, hematuria, or incontinence  NEUROLOGICAL: No headaches, memory loss, loss of strength, numbness, or tremors  SKIN: No itching, burning, rashes, or lesions   LYMPH Nodes: No enlarged glands  ENDOCRINE: No heat or cold intolerance; No hair loss  MUSCULOSKELETAL: No joint pain or swelling; No muscle, back, or extremity pain  PSYCHIATRIC: No depression, anxiety, mood swings, or difficulty sleeping  HEME/LYMPH: No easy bruising, or bleeding gums  ALLERGY AND IMMUNOLOGIC: No hives or eczema	    [ ] All others negative	  [x ] Unable to obtain    PHYSICAL EXAM:  T(C): 37.1 (03-06-20 @ 08:00), Max: 37.1 (03-06-20 @ 08:00)  HR: 99 (03-06-20 @ 09:08) (55 - 102)  BP: 188/86 (03-06-20 @ 09:00) (121/58 - 195/72)  RR: 30 (03-06-20 @ 09:00) (19 - 30)  SpO2: 93% (03-06-20 @ 09:08) (89% - 99%)  Wt(kg): --  I&O's Summary    05 Mar 2020 07:01  -  06 Mar 2020 07:00  --------------------------------------------------------  IN: 317.5 mL / OUT: 3845 mL / NET: -3527.5 mL    06 Mar 2020 07:01  -  06 Mar 2020 09:27  --------------------------------------------------------  IN: 20.8 mL / OUT: 150 mL / NET: -129.2 mL        Appearance: agitated on vent  HEENT:   Normal oral mucosa, PERRL, EOMI	  Lymphatic: No lymphadenopathy  Cardiovascular: Normal S1 S2, No JVD, +murmurs, No edema  Respiratory: decrease bs  Psychiatry: A & O x 3, Mood & affect appropriate  Gastrointestinal:  Soft, Non-tender, + BS	  Skin: No rashes, No ecchymoses, No cyanosis	  Neurologic: Non-focal  Extremities: Normal range of motion, No clubbing, cyanosis or edema  Vascular: Peripheral pulses palpable 2+ bilaterally    MEDICATIONS  (STANDING):  albuterol/ipratropium for Nebulization 3 milliLiter(s) Nebulizer every 6 hours  atorvastatin 10 milliGRAM(s) Oral at bedtime  busPIRone 15 milliGRAM(s) Oral three times a day  chlorhexidine 4% Liquid 1 Application(s) Topical <User Schedule>  clonazePAM  Tablet 1 milliGRAM(s) Oral daily  dexMEDEtomidine Infusion 0.2 MICROgram(s)/kG/Hr (5.925 mL/Hr) IV Continuous <Continuous>  dextrose 5%. 1000 milliLiter(s) (50 mL/Hr) IV Continuous <Continuous>  dextrose 50% Injectable 12.5 Gram(s) IV Push once  dextrose 50% Injectable 25 Gram(s) IV Push once  dextrose 50% Injectable 25 Gram(s) IV Push once  enoxaparin Injectable 100 milliGRAM(s) SubCutaneous two times a day  insulin lispro (HumaLOG) corrective regimen sliding scale   SubCutaneous every 6 hours  methylPREDNISolone sodium succinate Injectable 40 milliGRAM(s) IV Push every 8 hours  pantoprazole  Injectable 40 milliGRAM(s) IV Push once  PARoxetine 30 milliGRAM(s) Oral daily  polyethylene glycol 3350 17 Gram(s) Oral daily  psyllium Powder 1 Packet(s) Oral daily  senna Syrup 10 milliLiter(s) Oral at bedtime      TELEMETRY: 	    ECG:  	  RADIOLOGY:  OTHER: 	  	  LABS:	 	    CARDIAC MARKERS:                                11.1   21.95 )-----------( 290      ( 06 Mar 2020 00:52 )             34.2     03-06    143  |  100  |  61<H>  ----------------------------<  188<H>  3.6   |  29  |  1.17    Ca    8.5      06 Mar 2020 00:52  Phos  3.6     03-06  Mg     2.4     03-06    TPro  6.4  /  Alb  2.9<L>  /  TBili  0.6  /  DBili  x   /  AST  19  /  ALT  34  /  AlkPhos  69  03-06    proBNP: Serum Pro-Brain Natriuretic Peptide: 364 pg/mL (02-19 @ 22:30)    Lipid Profile:   HgA1c: Hemoglobin A1C, Whole Blood: 6.6 % (02-24 @ 08:40)    TSH: Thyroid Stimulating Hormone, Serum: 0.39 uIU/mL (02-21 @ 09:19)  Thyroid Stimulating Hormone, Serum: 0.47 uIU/mL (02-21 @ 00:19)          Assessment and plan  ---------------------------  69M with PMH of HTN, pulmonary fibrosis, depression/anxiety p/w SOB and cough. Pt states his mother passed away about 2 weeks ago and shortly after burying here, started to feel very unwell. He has had progressively worsening cough, non productive, but feels a lot of chest congestion. Associated with ROSE, headache and myalgias and fatigue. Was seen at  earlier this week and was diagnosed with flu. In the past couple of days, has been having very labored breathing; today, had an episode where he fell while trying to reach for alight switch and was so winded and weak, he was unable to get up. Has barely been eating anything due to poor appetite and nausea, but denies vomiting. Denies fevers or chills, no CP, no palpitations, no abdominal pain, had 3 episodes of loose stools today, +decreased UOP, no LE swelling. Does not know of any sick contacts. Denies recent travel.  sob/ respiratory failure sec to hx of pulmonary fibrosis ?pneumoniae.  abx  dvt prophylaxis  asa daily  echo result noted with severe pulmonary htn  supportive measures  sedated on vent  continue iv abx, sp bronch  dvt prophylaxis  check LE venous Doppler r/o dvt  off all mallorie meds  vent as per MICU  psych

## 2020-03-06 NOTE — PROGRESS NOTE ADULT - ASSESSMENT
69M with        PMH of HTN,  HLD, , pulmonary fibrosis,  not on home  O 2.   depression/anxiety , on  Klonopin          p/w SOB and cough/  acute resp failure with O2  sat of  80 on arrival to  er    Iinfluenza A positive, with  bronchospasm   on Tamiflu. / nebs  and   solumedrol    mild anemia/  GI eval  Hyponatremia ,   stopped  hctz  ct chest,    b/l patchy opacities/   traction bronchiectasis/   pulm fibrosis. CAP/   pulm    dr gusman,   resp failure from pulm  fibrosis/  wbc of 2 1,000 / on iv streroid/ /   completed cefepime   care  per  icu/  intubated,  on  weaning trials     < from: CT Angio Chest w/ IV Cont (02.20.20 @ 00:14) >  IMPRESSION:   No obvious pulmonary embolus.  Findings reflecting known pulmonary fibrosis. Recommend clinical correlation to assess underlying pneumonia. Recommend comparison to previous outside study and follow-up to exclude potential underlying neoplasm.  Heterogeneous thyroid gland, which is difficult to assess due to artifact and can be further characterizedon a nonemergent ultrasound as clinically indicated.   Additional findings as described  < end of copied text >

## 2020-03-06 NOTE — PROGRESS NOTE ADULT - SUBJECTIVE AND OBJECTIVE BOX
CC: f/u for hypoxic respiratory failure    Patient reports: he is lethargic on BIPAP.    REVIEW OF SYSTEMS:  All other review of systems negative (Comprehensive ROS): limited, poorly interactive    Antimicrobials Day #  :off    Other Medications Reviewed    T(F): 98.8 (03-06-20 @ 08:00), Max: 98.8 (03-06-20 @ 08:00)  HR: 99 (03-06-20 @ 09:08)  BP: 188/86 (03-06-20 @ 09:00)  RR: 30 (03-06-20 @ 09:00)  SpO2: 93% (03-06-20 @ 09:08)  Wt(kg): --    PHYSICAL EXAM:  General: alert, moderate respiratory distress  Eyes:  anicteric, no conjunctival injection, no discharge  Oropharynx: no lesions or injection 	  Neck: supple, without adenopathy  Lungs: coarse BS  Heart: regular rate and rhythm; no murmur, rubs or gallops  Abdomen: soft, nondistended, nontender, without mass or organomegaly  Skin: no lesions  Extremities: no clubbing, cyanosis, + edema  Neurologic: alert, oriented, moves all extremities  Gu loza  LAB RESULTS:                        11.1   21.95 )-----------( 290      ( 06 Mar 2020 00:52 )             34.2     03-06    143  |  100  |  61<H>  ----------------------------<  188<H>  3.6   |  29  |  1.17    Ca    8.5      06 Mar 2020 00:52  Phos  3.6     03-06  Mg     2.4     03-06    TPro  6.4  /  Alb  2.9<L>  /  TBili  0.6  /  DBili  x   /  AST  19  /  ALT  34  /  AlkPhos  69  03-06    LIVER FUNCTIONS - ( 06 Mar 2020 00:52 )  Alb: 2.9 g/dL / Pro: 6.4 g/dL / ALK PHOS: 69 U/L / ALT: 34 U/L / AST: 19 U/L / GGT: x             MICROBIOLOGY:  RECENT CULTURES:  03-02 @ 21:29 BAL Bronchoalveolar Lavage     Culture is being performed.    Numerous polymorphonuclear leukocytes per low power field  Rare Squamous Epithelial Cells  Few Yeast like cells per oil power field    03-02 @ 21:27 BAL Bronchoalveolar Lavage     Culture is being performed.    Moderate polymorphonuclear leukocytes per low power field  Rare Squamous Epithelial Cells per low power field  Few Yeast like cells per oil power field    03-01 @ 16:33 .Blood Blood-Venous     No growth to date.          RADIOLOGY REVIEWED:  < from: [facility] Duplex Lower Ext Vein Scan, Bilat (03.05.20 @ 19:03) >    IMPRESSION:     Acute deep venous thrombosis: above and below the knee.    Findings were discussed with AMOR Calzada 3/5/2020 6:49PM  PM.

## 2020-03-06 NOTE — PROGRESS NOTE ADULT - REASON FOR ADMISSION
SOB, cough and weakness

## 2020-03-06 NOTE — PROGRESS NOTE ADULT - SUBJECTIVE AND OBJECTIVE BOX
Seen in ICU, on high flow O2    Vital Signs Last 24 Hrs  T(C): 37.7 (03-06-20 @ 16:00), Max: 37.7 (03-06-20 @ 16:00)  T(F): 99.9 (03-06-20 @ 16:00), Max: 99.9 (03-06-20 @ 16:00)  HR: 102 (03-06-20 @ 15:00) (60 - 102)  BP: 168/102 (03-06-20 @ 15:00) (145/70 - 192/83)  BP(mean): 120 (03-06-20 @ 15:00) (101 - 124)  RR: 25 (03-06-20 @ 15:00) (19 - 30)  SpO2: 95% (03-06-20 @ 15:00) (89% - 100%)    I&O's Summary    05 Mar 2020 07:01  -  06 Mar 2020 07:00  --------------------------------------------------------  IN: 317.5 mL / OUT: 3845 mL / NET: -3527.5 mL    06 Mar 2020 07:01  -  06 Mar 2020 16:08  --------------------------------------------------------  IN: 97.5 mL / OUT: 600 mL / NET: -502.5 mL    PHYSICAL EXAM:    Respiratory: decr BS b/l  Cardiovascular: S1 S2  Gastrointestinal: soft, ND  Extremities: sm edema                                                                     11.1   21.95 )-----------( 290      ( 06 Mar 2020 00:52 )             34.2     06 Mar 2020 00:52    143    |  100    |  61     ----------------------------<  188    3.6     |  29     |  1.17     Ca    8.5        06 Mar 2020 00:52  Phos  3.6       06 Mar 2020 00:52  Mg     2.4       06 Mar 2020 00:52    TPro  6.4    /  Alb  2.9    /  TBili  0.6    /  DBili  x      /  AST  19     /  ALT  34     /  AlkPhos  69     06 Mar 2020 00:52    LIVER FUNCTIONS - ( 06 Mar 2020 00:52 )  Alb: 2.9 g/dL / Pro: 6.4 g/dL / ALK PHOS: 69 U/L / ALT: 34 U/L / AST: 19 U/L / GGT: x           albuterol/ipratropium for Nebulization 3 milliLiter(s) Nebulizer every 6 hours  atorvastatin 10 milliGRAM(s) Oral at bedtime  bisacodyl Suppository 10 milliGRAM(s) Rectal once  busPIRone 15 milliGRAM(s) Oral three times a day  chlorhexidine 4% Liquid 1 Application(s) Topical <User Schedule>  clonazePAM  Tablet 1 milliGRAM(s) Oral daily  dexMEDEtomidine Infusion 0.2 MICROgram(s)/kG/Hr IV Continuous <Continuous>  dextrose 40% Gel 15 Gram(s) Oral once PRN  dextrose 5%. 1000 milliLiter(s) IV Continuous <Continuous>  dextrose 50% Injectable 12.5 Gram(s) IV Push once  dextrose 50% Injectable 25 Gram(s) IV Push once  dextrose 50% Injectable 25 Gram(s) IV Push once  enoxaparin Injectable 100 milliGRAM(s) SubCutaneous two times a day  glucagon  Injectable 1 milliGRAM(s) IntraMuscular once PRN  insulin lispro (HumaLOG) corrective regimen sliding scale   SubCutaneous every 6 hours  methylPREDNISolone sodium succinate Injectable 40 milliGRAM(s) IV Push every 8 hours  pantoprazole  Injectable 40 milliGRAM(s) IV Push once  PARoxetine 30 milliGRAM(s) Oral daily  polyethylene glycol 3350 17 Gram(s) Oral daily  psyllium Powder 1 Packet(s) Oral daily  senna Syrup 10 milliLiter(s) Oral at bedtime      Assessment and Plan:     S/p flu A, PNA, hx pulm fibrosis  Resp failure, s/p intubated, now extubated on high flow O2  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

## 2020-03-06 NOTE — PROGRESS NOTE ADULT - ASSESSMENT
70 yo male with HTN, anxiety disorder, and pulmonary fibrosis admitted with hypoxic respiratory failure.  Influenza A diagnosed 2/16 with a rapid test.  CTA is negative for PE.  Started on CTX and azithro along with tamiflu  Legionella urine antigen negative, MRSA screen is negative and his PC is elevated to 0.69.  The elevated PC was  suggestive of a bacterial infection  Lack of improvement with tamiflu documented and raised concern of both viral pneumonia and a secondary process such as a bacterial pneumonia - empiric antibiotics cont'd  Repeated CT chest revealed pulm fibrosis & bilateral groundglass opacities concerning for infection-2/24  However remains afebrile & PCT was  low at 0.15  Leukocytosis unreliable - given pt on steroids  CTX, azithro were changed to vanco & cefepime on 2/25/20, in case this was HCAP, although low overall suspicion in this afebrile pt with low PCT.  Hypoxemia remains an issue with ongoing requirement for high flow O2 support & steroids.  ECHO with severe pulmonary hypertension  2/29  had a fever of 100.8 - was unaware of it subjectively - significance unclear  No fever since 2/29  Now requiring ventilatory support  S/P vanco and cefepime course on 3/2  preliminary BAL findings noted, culture with MURF, AFB smears negative  PC 3/2 low at .22, fungitell less than  31, 3rd RVP now negative  He has been stable off antibiotics x 96 hours  Suggest:  Steroid mgmt as per ICU/ pulmonary  Leukocytosis unreliable on IV steroids, is moderating  Suspect respiratory failure is multifactorial  Supportive care per MICU  I am okay with conservative management off antibiotics

## 2020-03-06 NOTE — PROGRESS NOTE ADULT - SUBJECTIVE AND OBJECTIVE BOX
intubated/    REVIEW OF SYSTEMS:  GEN: no fever,    no chills  RESP: no SOB,   no cough  MEDICATIONS  (STANDING):  albuterol/ipratropium for Nebulization 3 milliLiter(s) Nebulizer every 6 hours  atorvastatin 10 milliGRAM(s) Oral at bedtime  busPIRone 15 milliGRAM(s) Oral three times a day  chlorhexidine 4% Liquid 1 Application(s) Topical <User Schedule>  clonazePAM  Tablet 1 milliGRAM(s) Oral daily  dexMEDEtomidine Infusion 0.2 MICROgram(s)/kG/Hr (5.925 mL/Hr) IV Continuous <Continuous>  dextrose 5%. 1000 milliLiter(s) (50 mL/Hr) IV Continuous <Continuous>  dextrose 50% Injectable 12.5 Gram(s) IV Push once  dextrose 50% Injectable 25 Gram(s) IV Push once  dextrose 50% Injectable 25 Gram(s) IV Push once  enoxaparin Injectable 100 milliGRAM(s) SubCutaneous two times a day  insulin lispro (HumaLOG) corrective regimen sliding scale   SubCutaneous every 6 hours  methylPREDNISolone sodium succinate Injectable 40 milliGRAM(s) IV Push every 8 hours  pantoprazole  Injectable 40 milliGRAM(s) IV Push once  PARoxetine 30 milliGRAM(s) Oral daily  polyethylene glycol 3350 17 Gram(s) Oral daily  psyllium Powder 1 Packet(s) Oral daily  senna Syrup 10 milliLiter(s) Oral at bedtime    MEDICATIONS  (PRN):  dextrose 40% Gel 15 Gram(s) Oral once PRN Blood Glucose LESS THAN 70 milliGRAM(s)/deciliter  glucagon  Injectable 1 milliGRAM(s) IntraMuscular once PRN Glucose LESS THAN 70 milligrams/deciliter      Vital Signs Last 24 Hrs  T(C): 37.1 (06 Mar 2020 08:00), Max: 37.1 (06 Mar 2020 08:00)  T(F): 98.8 (06 Mar 2020 08:00), Max: 98.8 (06 Mar 2020 08:00)  HR: 99 (06 Mar 2020 09:08) (55 - 102)  BP: 188/86 (06 Mar 2020 09:00) (121/58 - 195/72)  BP(mean): 124 (06 Mar 2020 09:00) (84 - 124)  RR: 30 (06 Mar 2020 09:00) (19 - 30)  SpO2: 93% (06 Mar 2020 09:08) (89% - 99%)  CAPILLARY BLOOD GLUCOSE      POCT Blood Glucose.: 161 mg/dL (06 Mar 2020 05:48)  POCT Blood Glucose.: 169 mg/dL (06 Mar 2020 00:37)  POCT Blood Glucose.: 141 mg/dL (05 Mar 2020 18:02)  POCT Blood Glucose.: 135 mg/dL (05 Mar 2020 16:19)  POCT Blood Glucose.: 200 mg/dL (05 Mar 2020 13:02)    I&O's Summary    05 Mar 2020 07:01  -  06 Mar 2020 07:00  --------------------------------------------------------  IN: 317.5 mL / OUT: 3845 mL / NET: -3527.5 mL    06 Mar 2020 07:01  -  06 Mar 2020 09:34  --------------------------------------------------------  IN: 20.8 mL / OUT: 150 mL / NET: -129.2 mL        PHYSICAL EXAM:  HEAD:  Atraumatic, Normocephalic  NECK: Supple, No   JVD  CHEST/LUNG:   no     rales,     no,    rhonchi  HEART: Regular rate and rhythm;         murmur  ABDOMEN: Soft, Nontender, ;   EXTREMITIES:        edema  NEUROLOGY:    intubated    LABS:                        11.1   21.95 )-----------( 290      ( 06 Mar 2020 00:52 )             34.2     03-06    143  |  100  |  61<H>  ----------------------------<  188<H>  3.6   |  29  |  1.17    Ca    8.5      06 Mar 2020 00:52  Phos  3.6     03-06  Mg     2.4     03-06    TPro  6.4  /  Alb  2.9<L>  /  TBili  0.6  /  DBili  x   /  AST  19  /  ALT  34  /  AlkPhos  69  03-06              ABG - ( 05 Mar 2020 00:17 )  pH, Arterial: 7.41  pH, Blood: x     /  pCO2: 49    /  pO2: 76    / HCO3: 31    / Base Excess: 5.7   /  SaO2: 94                  Hemoglobin A1C, Whole Blood: 6.6 % (02-24 @ 08:40)    Thyroid Stimulating Hormone, Serum: 0.39 uIU/mL (02-21 @ 09:19)          Consultant(s) Notes Reviewed:      Care Discussed with Consultants/Other Providers:

## 2020-03-06 NOTE — PROGRESS NOTE ADULT - ASSESSMENT
ASSESSMENT:    hypoxic and hypercapnic respiratory failure - multifactorial - has developed severe pulmonary hypertension with ongoing hypoxemia    1) influenza infection complicated by bronchospasm (resolved) and pneumonia - little evidence of a superimposed bacterial infection with negative bronchoscopy cultures and non-elevated procalcitonin level   2) underlying mild pulmonary fibrosis now with traction bronchiectasis  3) extensive bilateral lower extremity DVT - "rule out" PE    PLAN/RECOMMENDATIONS:    oxygen supplementation using a high flow nasal canula - taper as tolerated  consider CTA of the chest to better evaluate the lung parenchyma and to "rule out" pulmonary embolism as a cause of ongoing hypoxic respiratory failure  hemodynamically stable off pressors  observe off antibiotics - has completed a course of vanco/cefepime  fungitell level is not elevated  galactomannan level is not elevated  RVP is negative - observe off tamiflu  full dose lovenox  solumedrol 40mg IVP q8h - glucose control  albuterol/atrovent nebs  buspar/klonopin/paxil  loza catheter  bowel regimen  dysphagia III diet with nectar thickened fluids    Will follow with you. Plan of care discussed with the patient's family at bedside and the MICU team.    Galileo Pop MD, [facility]– [place]  [phone]  Pulmonary Medicine

## 2020-03-06 NOTE — PROGRESS NOTE ADULT - SUBJECTIVE AND OBJECTIVE BOX
INTERVAL HPI/OVERNIGHT EVENTS:    patient seen and examined   events noted   s/p extubation yesterday, currently on HFNC  diet advanced to dysphagia 3  +flatus, no bms        MEDICATIONS  (STANDING):  atorvastatin 10 milliGRAM(s) Oral at bedtime  busPIRone 15 milliGRAM(s) Oral three times a day  cefepime   IVPB      cefepime   IVPB 2000 milliGRAM(s) IV Intermittent every 8 hours  chlorhexidine 0.12% Liquid 15 milliLiter(s) Oral Mucosa every 12 hours  chlorhexidine 4% Liquid 1 Application(s) Topical <User Schedule>  clonazePAM  Tablet 1 milliGRAM(s) Oral daily  dextrose 5%. 1000 milliLiter(s) (50 mL/Hr) IV Continuous <Continuous>  dextrose 50% Injectable 12.5 Gram(s) IV Push once  dextrose 50% Injectable 25 Gram(s) IV Push once  dextrose 50% Injectable 25 Gram(s) IV Push once  diltiazem    Tablet 60 milliGRAM(s) Oral every 6 hours  doxazosin 2 milliGRAM(s) Oral at bedtime  enoxaparin Injectable 40 milliGRAM(s) SubCutaneous daily  fentaNYL   Infusion. 2 MICROgram(s)/kG/Hr (11.85 mL/Hr) IV Continuous <Continuous>  insulin lispro (HumaLOG) corrective regimen sliding scale   SubCutaneous every 6 hours  losartan 100 milliGRAM(s) Oral daily  methylPREDNISolone sodium succinate Injectable 20 milliGRAM(s) IV Push every 8 hours  PARoxetine 30 milliGRAM(s) Oral daily  phenylephrine    Infusion 0.1 MICROgram(s)/kG/Min (4.444 mL/Hr) IV Continuous <Continuous>  propofol Infusion 15 MICROgram(s)/kG/Min (10.665 mL/Hr) IV Continuous <Continuous>  psyllium Powder 1 Packet(s) Oral daily  senna 2 Tablet(s) Oral at bedtime  vancomycin  IVPB 1500 milliGRAM(s) IV Intermittent every 12 hours    MEDICATIONS  (PRN):  albuterol/ipratropium for Nebulization 3 milliLiter(s) Nebulizer every 6 hours PRN Shortness of Breath and/or Wheezing  dextrose 40% Gel 15 Gram(s) Oral once PRN Blood Glucose LESS THAN 70 milliGRAM(s)/deciliter  glucagon  Injectable 1 milliGRAM(s) IntraMuscular once PRN Glucose LESS THAN 70 milligrams/deciliter      Allergies    penicillin (Anaphylaxis)    Intolerances        Review of Systems: unable to obtain in entirety        Vital Signs Last 24 Hrs  T(C): 37.2 (02 Mar 2020 12:00), Max: 37.3 (02 Mar 2020 08:00)  T(F): 99 (02 Mar 2020 12:00), Max: 99.1 (02 Mar 2020 08:00)  HR: 72 (02 Mar 2020 12:15) (56 - 93)  BP: 92/47 (02 Mar 2020 12:15) (77/38 - 166/84)  BP(mean): 67 (02 Mar 2020 12:15) (53 - 113)  RR: 18 (02 Mar 2020 12:15) (16 - 38)  SpO2: 93% (02 Mar 2020 12:15) (87% - 98%)    PHYSICAL EXAM:    Constitutional: NAD   Respiratory: on HFNC  Cardiovascular: S1 and S2  Gastrointestinal: BS+, soft, NT/ND  Extremities: No peripheral edema  Neurological: lethargic but arousable         LABS:                        11.5   34.86 )-----------( 232      ( 02 Mar 2020 00:19 )             34.3     03-02    131<L>  |  93<L>  |  24<H>  ----------------------------<  235<H>  4.9   |  28  |  0.79    Ca    8.7      02 Mar 2020 00:19  Phos  3.6     03-02  Mg     2.1     03-02    TPro  6.1  /  Alb  2.5<L>  /  TBili  0.3  /  DBili  x   /  AST  71<H>  /  ALT  99<H>  /  AlkPhos  91  03-02          RADIOLOGY & ADDITIONAL TESTS:

## 2020-03-06 NOTE — PROGRESS NOTE ADULT - ASSESSMENT
69M with PMH of HTN, HLD, depression, anxiety, and insignificant degree of pulmonary fibrosis, who initially presented 2/20/20 following a fall at home, admitted with acute hypoxic respiratory failure in setting of influenza with concern for superimposed PNA. Course c/b worsening respiratory failure with high oxygen requirements, transferred to MICU for close monitoring, intubated 3/2.     NEURO:   Hx of Anxiety/Depression  - Intubated (3/2); sedated on fentanyl and propofol gtt, will attempt to wean as tolerated  - C/w home clonazepam standing 1mg PO daily  - C/w home buspirone 15mg PO TID  - C/w home paroxetine 30mg PO daily    PULM:   # Acute hypoxic respiratory failure   Multifactorial related to influenza infection with suspicion for superimposed bacterial PNA in setting of underlying lung disease. May also have component of aspiration pneumonitis.   - Intubated (3/2) as pt failed use of high flow FiO2 100% (ABG showing pO2 48), acapella valve, incentive spirometry  - c/w duonebs q6h PRN  - s/p Tamiflu for total 10 days (2/20-2/29)  - s/p vanc/cefepime (2/25-3/3), off abx as bronch studies does not appear to be infectious and appears to be more inflammatory  - s/p Bronchoscopy (3/2): airways appeared moderately inflamed and collapsable diffusely, multiple small diverticula, scant mucus b/l; brochoalveolar lavage performed, studies sent.  - wean off vent as tolerated  - serial ABGs    # Pulmonary fibrosis:   - Appreciate Pulm recs  - As per pt's pulmonologist group (Dr. Dia/Dr. Pop): Hx of "insignificant pulmonary fibrosis," Seen for chronic cough, PFTs (2017) was wnl aside from diffusion capacity of 74%, and CT Chest (2013) showed no evidence of pulm fibrosis or other significant pulm pathology  - c/w solumedrol 40mg IV Q8    CARDIOVASCULAR:   # HTN  - Continue to monitor  - home Diltiazem and ARB held as pt was on pressor, will consider restarting if BP maintains off pressor  - TTE (3/1): EF 65%; Severe pulmonary hypertension. Severely increased pulmonary vascular resistance; Normal left ventricular systolic function. No segmental wall motion abnormalities. Impaired LV-relaxation with normal filling pressure  - Cardiology following (Dr. Lucero), appreciate recs  - off pressors    GI:   - NPO, OG tube placed, receiving tube feeds  - GI following (Dr. Kraft), appreciate recs  # Constipation  - c/w senna 10mg PO qhs, metamucil daily, and miralax daily  - dulcolax suppository x1 given with subsequent BMs, will consider giving PRN    RENAL/:   # Urinary retention  - Possibly 2/2 SIADH in setting of pulm process, as per Renal  - off tamsulosin as pt now intubated, restarted on home doxazosin  - c/w Scruggs  - s/p Lasix IV 40mg x3 with significant UOP, continue to monitor UOP  - another Lasix IV 40mg x1 today prior to extubation  #AURA  - SCr doubled (baseline SCr ~0.7-0.8)  #Hyperkalemia, resolved  - s/p lokelma x1  - continue to monitor BMP    ENDOCRINE:   - Insulin sliding scale for steroid-induced hyperglycemia  - c/w NPH 4u q6h as steroid dose increased; dc once extubated    HEMATOLOGIC:   # Leukocytosis  Likely secondary to steroids and infection  - monitor off abx  - Continue to trend CBC with differential    ID:   # Influenza with suspected superimposed bacterial PNA:   - Persistent leukocytosis, though pt also on steroids  - s/p vanc/cefepime (2/25-3/3), off abx as bronch studies does not appear to be infectious and appears to be more inflammatory  - s/p Tamiflu for total 10 days (2/20-2/29)  - BCx (3/1): NGTD  - (3/2) procalcitonin level, fungitell, and galactomannan: all negative/unremarkable   - Bronch Cx/gram stain (3/2): unremarkable   - repeat RVP (3/3) negative  - Monitor WBC count, fever curve    PROPHYLAXIS:   - DVT ppx: Lovenox    CODE STATUS:  - Wife and pt agreed to trial of intubation. Currently remains full code.    Rene Kim, PGY-1  MICU  Contact/Pager: [phone] / [phone] 69M with PMH of HTN, HLD, depression, anxiety, and insignificant degree of pulmonary fibrosis, who initially presented 2/20/20 following a fall at home, admitted with acute hypoxic respiratory failure in setting of influenza with concern for superimposed PNA. Course c/b worsening respiratory failure with high oxygen requirements, transferred to MICU for close monitoring, intubated 3/2 and extubated 3/5.    NEURO:   Hx of Anxiety/Depression  - c/w precedex   - Once able to tolerate PO, c/w home clonazepam, buspirone, and paroxetine  - can use IV ativan for anxiety/concerns of withdrawal while NPO    PULM:   # Acute hypoxic respiratory failure   Multifactorial related to influenza infection with suspicion for superimposed bacterial PNA in setting of underlying lung disease. May also have component of aspiration pneumonitis.   - Intubated (3/2), now extubated (3/5)  - c/w duonebs q6h standing  - s/p Tamiflu for total 10 days (2/20-2/29)  - s/p vanc/cefepime (2/25-3/3), off abx as bronch studies does not appear to be infectious and appears to be more inflammatory  - s/p Bronchoscopy (3/2): airways appeared moderately inflamed and collapsable diffusely, multiple small diverticula, scant mucus b/l; brochoalveolar lavage studies sent and were largely unremarkable.    # Pulmonary fibrosis:   - Appreciate Pulm recs  - As per pt's pulmonologist group (Dr. Dia/Dr. Pop): Hx of "insignificant pulmonary fibrosis," Seen for chronic cough, PFTs (2017) was wnl aside from diffusion capacity of 74%, and CT Chest (2013) showed no evidence of pulm fibrosis or other significant pulm pathology  - TTE (3/1) showed severe pulmonary hypertension  - c/w solumedrol 40mg IV Q8    CARDIOVASCULAR:   # HTN  - Continue to monitor  - home Diltiazem and ARB held as pt was on pressor, will consider restarting if BP maintains off pressor  - TTE (3/1): EF 65%; Severe pulmonary hypertension. Severely increased pulmonary vascular resistance; Normal left ventricular systolic function. No segmental wall motion abnormalities. Impaired LV-relaxation with normal filling pressure  - Cardiology following (Dr. Lucero), appreciate recs  - off pressors    GI:   - NPO, OG tube placed, receiving tube feeds  - GI following (Dr. Kraft), appreciate recs  # Constipation  - c/w senna 10mg PO qhs, metamucil daily, and miralax daily  - dulcolax suppository x1 given with subsequent BMs, will consider giving more PRN    RENAL/:   # Urinary retention  - Possibly 2/2 SIADH in setting of pulm process, as per Renal  - off tamsulosin as pt now intubated, restarted on home doxazosin  - c/w Scruggs  - s/p Lasix IV 40mg x4 with adequate UOP, continue to monitor UOP    #AURA  - SCr doubled (baseline SCr ~0.7-0.8)  #Hyperkalemia, resolved  - s/p lokelma x1  - continue to monitor BMP    ENDOCRINE:   - Insulin sliding scale for steroid-induced hyperglycemia    HEMATOLOGIC:   # Leukocytosis  Likely secondary to steroids and infection/inflammation  - monitor off abx  - Continue to trend CBC with differential    ID:   # Influenza with suspected superimposed bacterial PNA:   - Persistent leukocytosis, though pt also on steroids  - s/p vanc/cefepime (2/25-3/3), off abx as bronch studies does not appear to be infectious and appears to be more inflammatory  - s/p Tamiflu for total 10 days (2/20-2/29)  - BCx (3/1): NGTD  - (3/2) procalcitonin level, fungitell, and galactomannan: all negative/unremarkable   - Bronch Cx/gram stain (3/2): unremarkable   - repeat RVP (3/3) negative  - Monitor WBC count, fever curve    PROPHYLAXIS:   - DVT ppx: Lovenox    CODE STATUS:  - Wife and pt agreed to trial of intubation. Currently remains full code.    Rene Kim, PGY-1  MICU  Contact/Pager: [phone] / [phone] 69M with PMH of HTN, HLD, depression, anxiety, and insignificant degree of pulmonary fibrosis, who initially presented 2/20/20 following a fall at home, admitted with acute hypoxic respiratory failure in setting of influenza with concern for superimposed PNA. Course c/b worsening respiratory failure with high oxygen requirements, transferred to MICU for close monitoring, intubated 3/2 and extubated 3/5.    NEURO:   Hx of Anxiety/Depression  - c/w precedex, wean as tolerated  - Once able to tolerate PO, c/w home clonazepam, buspirone, and paroxetine  - can use IV ativan for anxiety/concerns of withdrawal while NPO    PULM:   # Acute hypoxic respiratory failure   Multifactorial related to influenza infection with suspicion for superimposed bacterial PNA in setting of underlying lung disease. May also have component of aspiration pneumonitis.   - Intubated (3/2), now extubated (3/5)  - c/w duonebs q6h standing  - s/p Tamiflu for total 10 days (2/20-2/29)  - s/p vanc/cefepime (2/25-3/3), off abx as bronch studies does not appear to be infectious and appears to be more inflammatory  - s/p Bronchoscopy (3/2): airways appeared moderately inflamed and collapsable diffusely, multiple small diverticula, scant mucus b/l; brochoalveolar lavage studies sent and were largely unremarkable.    # Pulmonary fibrosis:   - Appreciate Pulm recs  - As per pt's pulmonologist group (Dr. Dia/Dr. Pop): Hx of "insignificant pulmonary fibrosis," Seen for chronic cough, PFTs (2017) was wnl aside from diffusion capacity of 74%, and CT Chest (2013) showed no evidence of pulm fibrosis or other significant pulm pathology  - TTE (3/1) showed severe pulmonary hypertension  - c/w solumedrol 40mg IV Q8    CARDIOVASCULAR:   # HTN  - Continue to monitor  - home Diltiazem and ARB held as pt was on pressor, will consider restarting if BP maintains off pressor  - TTE (3/1): EF 65%; Severe pulmonary hypertension. Severely increased pulmonary vascular resistance; Normal left ventricular systolic function. No segmental wall motion abnormalities. Impaired LV-relaxation with normal filling pressure  - Cardiology following (Dr. Lucero), appreciate recs  - off pressors    GI:   - Bedside swallow, can restart PO meds if passes  - GI following (Dr. Kraft), appreciate recs  # Constipation  - c/w senna 10mg PO qhs, metamucil daily, and miralax daily  - dulcolax suppository x1 given with subsequent BMs, will consider giving more PRN    RENAL/:   # Urinary retention  - Possibly 2/2 SIADH in setting of pulm process, as per Renal  - off tamsulosin as pt now intubated, restarted on home doxazosin  - c/w Scruggs  - s/p Lasix IV 40mg x4 with adequate UOP, continue to monitor UOP    #AURA  - SCr doubled (baseline SCr ~0.7-0.8)  #Hyperkalemia, resolved  - s/p lokelma x1  - continue to monitor BMP    ENDOCRINE:   - Insulin sliding scale for steroid-induced hyperglycemia    HEMATOLOGIC:   # Leukocytosis  Likely secondary to steroids and infection/inflammation  - monitor off abx  - Continue to trend CBC with differential    ID:   # Influenza with suspected superimposed bacterial PNA:   - Persistent leukocytosis, though pt also on steroids  - s/p vanc/cefepime (2/25-3/3), off abx as bronch studies does not appear to be infectious and appears to be more inflammatory  - s/p Tamiflu for total 10 days (2/20-2/29)  - BCx (3/1): NGTD  - (3/2) procalcitonin level, fungitell, and galactomannan: all negative/unremarkable   - Bronch Cx/gram stain (3/2): unremarkable   - repeat RVP (3/3) negative  - Monitor WBC count, fever curve    DVT:  # b/l DVTs on LE duplex  - VA duplex LE (3/5): RLE-extensive thrombus extending from the popliteal vein distally throughout the posterior tibial vein, peroneal vein and gastrocnemius vein; LLE-thrombus is seen with in the distal aspect of the common femoral vein and extends distally to the popliteal, peroneal and gastrocnemius veins.  - c/w Lovenox 120mg IV BID  - pt and pt's wife denies any recent travel history    MSK:  -PT/OT    CODE STATUS:  - Wife and pt agreed to trial of intubation. Currently remains full code.    Rene Kim, PGY-1  MICU  Contact/Pager: [phone] / [phone]

## 2020-03-06 NOTE — PROGRESS NOTE ADULT - SUBJECTIVE AND OBJECTIVE BOX
CHIEF COMPLAINT: SOB, cough and weakness    OVERNIGHT / SUBJECTIVE:  -Overnight, no acute events. Given zyprexa 5 IM x1 and ativan 0.5mg IV x1.   -Today, pt seen and examined at bedside in AM. Pt is intubated and sedated, but grimaces/reacts to name calling and during examination. Pt had 1 BM yesterday, but none since then. CPAPing well on PEEP 5, FiO2 35%. Plan to extubate today.    REVIEW OF SYSTEMS:  -Unable to obtain ROS as pt is intubated and sedated    OBJECTIVE:  ICU Vital Signs Last 24 Hrs  T(C): 36.4 (06 Mar 2020 04:00), Max: 37.3 (05 Mar 2020 08:00)  T(F): 97.5 (06 Mar 2020 04:00), Max: 99.1 (05 Mar 2020 08:00)  HR: 73 (06 Mar 2020 06:00) (55 - 102)  BP: 174/78 (06 Mar 2020 06:00) (115/58 - 195/72)  BP(mean): 112 (06 Mar 2020 06:00) (83 - 124)  ABP: --  ABP(mean): --  RR: 22 (06 Mar 2020 06:00) (19 - 30)  SpO2: 97% (06 Mar 2020 06:00) (89% - 99%)    Mode: CPAP with PS, FiO2: 40, PEEP: 5, PS: 10, MAP: 13, PIP: 32    03-05 @ 07:01  -  03-06 @ 07:00  --------------------------------------------------------  IN: 308.6 mL / OUT: 3795 mL / NET: -3486.4 mL      CAPILLARY BLOOD GLUCOSE      POCT Blood Glucose.: 161 mg/dL (06 Mar 2020 05:48)  POCT Blood Glucose.: 169 mg/dL (06 Mar 2020 00:37)  POCT Blood Glucose.: 141 mg/dL (05 Mar 2020 18:02)  POCT Blood Glucose.: 135 mg/dL (05 Mar 2020 16:19)  POCT Blood Glucose.: 200 mg/dL (05 Mar 2020 13:02)    I&O's Summary    05 Mar 2020 07:01  -  06 Mar 2020 07:00  --------------------------------------------------------  IN: 308.6 mL / OUT: 3795 mL / NET: -3486.4 mL        PHYSICAL EXAM  GENERAL: NAD, well-developed  HEAD:  Atraumatic, Normocephalic  EYES: EOMI, PERRLA, conjunctiva and sclera clear  NECK: Supple, No JVD  CHEST/LUNG: Clear to auscultation bilaterally; No wheeze  HEART: Regular rate and rhythm; No murmurs, rubs, or gallops  ABDOMEN: Soft, Nontender, Nondistended; Bowel sounds present  EXTREMITIES:  2+ Peripheral Pulses, No clubbing, cyanosis, or edema  PSYCH: AAOx3  SKIN: No rashes or lesions    LABS:                        11.1   21.95 )-----------( 290      ( 06 Mar 2020 00:52 )             34.2     03-06    143  |  100  |  61<H>  ----------------------------<  188<H>  3.6   |  29  |  1.17    Ca    8.5      06 Mar 2020 00:52  Phos  3.6     03-06  Mg     2.4     03-06    TPro  6.4  /  Alb  2.9<L>  /  TBili  0.6  /  DBili  x   /  AST  19  /  ALT  34  /  AlkPhos  69  03-06        LINES:    HOSPITAL MEDICATIONS:  Standing Meds:  albuterol/ipratropium for Nebulization 3 milliLiter(s) Nebulizer every 6 hours  atorvastatin 10 milliGRAM(s) Oral at bedtime  busPIRone 15 milliGRAM(s) Oral three times a day  chlorhexidine 4% Liquid 1 Application(s) Topical <User Schedule>  clonazePAM  Tablet 1 milliGRAM(s) Oral daily  dexMEDEtomidine Infusion 0.2 MICROgram(s)/kG/Hr IV Continuous <Continuous>  dextrose 5%. 1000 milliLiter(s) IV Continuous <Continuous>  dextrose 50% Injectable 12.5 Gram(s) IV Push once  dextrose 50% Injectable 25 Gram(s) IV Push once  dextrose 50% Injectable 25 Gram(s) IV Push once  enoxaparin Injectable 120 milliGRAM(s) SubCutaneous two times a day  insulin lispro (HumaLOG) corrective regimen sliding scale   SubCutaneous every 6 hours  methylPREDNISolone sodium succinate Injectable 40 milliGRAM(s) IV Push every 8 hours  pantoprazole  Injectable 40 milliGRAM(s) IV Push once  PARoxetine 30 milliGRAM(s) Oral daily  polyethylene glycol 3350 17 Gram(s) Oral daily  psyllium Powder 1 Packet(s) Oral daily  senna Syrup 10 milliLiter(s) Oral at bedtime      PRN Meds:  dextrose 40% Gel 15 Gram(s) Oral once PRN  glucagon  Injectable 1 milliGRAM(s) IntraMuscular once PRN      LABS:                        11.1   21.95 )-----------( 290      ( 06 Mar 2020 00:52 )             34.2     Hgb Trend: 11.1<--, 9.8<--, 9.3<--, 10.8<--, 11.5<--  03-06    143  |  100  |  61<H>  ----------------------------<  188<H>  3.6   |  29  |  1.17    Ca    8.5      06 Mar 2020 00:52  Phos  3.6     03-06  Mg     2.4     03-06    TPro  6.4  /  Alb  2.9<L>  /  TBili  0.6  /  DBili  x   /  AST  19  /  ALT  34  /  AlkPhos  69  03-06    Creatinine Trend: 1.17<--, 1.30<--, 1.36<--, 1.31<--, 1.34<--, 1.50<--      Arterial Blood Gas:  03-05 @ 00:17  7.41/49/76/31/94/5.7  ABG lactate: --                MICROBIOLOGY:     RADIOLOGY: Reviewed CHIEF COMPLAINT: SOB, cough and weakness    OVERNIGHT / SUBJECTIVE:  -Overnight, no acute events. Extubated to high flow 70% yesterday. Also found to have b/l DVTs. Given Zyprexa 5 IM x1 and Aivan 0.5mg IV x1 for agitation. BiPAP was started overnight and pt was reportedly calmer and tolerating BiPAP. Hypertensive throughout the night, off home PO antihypertensives for concerns of aspiration. Given hydralazine 10mg IV x3.  -Today, pt seen and examined at bedside in AM. Pt had BiPAP on but mask did not appear to be in a proper position. Was responsive and interactive, but appeared lethargic. Hypertensive to 190s-200s, given another Hydralazine 10mg IV x1 and later Labetolol 10mg IV x1.    REVIEW OF SYSTEMS:  -Unable to obtain ROS as pt on BiPAP and was lethargic    OBJECTIVE:  ICU Vital Signs Last 24 Hrs  T(C): 36.4 (06 Mar 2020 04:00), Max: 37.3 (05 Mar 2020 08:00)  T(F): 97.5 (06 Mar 2020 04:00), Max: 99.1 (05 Mar 2020 08:00)  HR: 73 (06 Mar 2020 06:00) (55 - 102)  BP: 174/78 (06 Mar 2020 06:00) (115/58 - 195/72)  BP(mean): 112 (06 Mar 2020 06:00) (83 - 124)  ABP: --  ABP(mean): --  RR: 22 (06 Mar 2020 06:00) (19 - 30)  SpO2: 97% (06 Mar 2020 06:00) (89% - 99%)    Mode: CPAP with PS, FiO2: 40, PEEP: 5, PS: 10, MAP: 13, PIP: 32    03-05 @ 07:01  -  03-06 @ 07:00  --------------------------------------------------------  IN: 308.6 mL / OUT: 3795 mL / NET: -3486.4 mL      CAPILLARY BLOOD GLUCOSE      POCT Blood Glucose.: 161 mg/dL (06 Mar 2020 05:48)  POCT Blood Glucose.: 169 mg/dL (06 Mar 2020 00:37)  POCT Blood Glucose.: 141 mg/dL (05 Mar 2020 18:02)  POCT Blood Glucose.: 135 mg/dL (05 Mar 2020 16:19)  POCT Blood Glucose.: 200 mg/dL (05 Mar 2020 13:02)    I&O's Summary    05 Mar 2020 07:01  -  06 Mar 2020 07:00  --------------------------------------------------------  IN: 308.6 mL / OUT: 3795 mL / NET: -3486.4 mL        PHYSICAL EXAM  General: on BiPAP in AM  HEENT: PERRL, normal sclera and conjunctiva  Neck: Supple  Chest/Lungs: Clear to auscultation anteriorly, no wheezing appreciated  Heart: RRR, normal S1, S2, no murmurs or gallops  Abd: +BS, soft, obese, nontender, nondistended  Extremities: 2+ peripheral pulses, trace pedal edema  Skin: No rashes or lesions, warm, well-perfused    LABS:                        11.1   21.95 )-----------( 290      ( 06 Mar 2020 00:52 )             34.2     03-06    143  |  100  |  61<H>  ----------------------------<  188<H>  3.6   |  29  |  1.17    Ca    8.5      06 Mar 2020 00:52  Phos  3.6     03-06  Mg     2.4     03-06    TPro  6.4  /  Alb  2.9<L>  /  TBili  0.6  /  DBili  x   /  AST  19  /  ALT  34  /  AlkPhos  69  03-06        LINES:    HOSPITAL MEDICATIONS:  Standing Meds:  albuterol/ipratropium for Nebulization 3 milliLiter(s) Nebulizer every 6 hours  atorvastatin 10 milliGRAM(s) Oral at bedtime  busPIRone 15 milliGRAM(s) Oral three times a day  chlorhexidine 4% Liquid 1 Application(s) Topical <User Schedule>  clonazePAM  Tablet 1 milliGRAM(s) Oral daily  dexMEDEtomidine Infusion 0.2 MICROgram(s)/kG/Hr IV Continuous <Continuous>  dextrose 5%. 1000 milliLiter(s) IV Continuous <Continuous>  dextrose 50% Injectable 12.5 Gram(s) IV Push once  dextrose 50% Injectable 25 Gram(s) IV Push once  dextrose 50% Injectable 25 Gram(s) IV Push once  enoxaparin Injectable 120 milliGRAM(s) SubCutaneous two times a day  insulin lispro (HumaLOG) corrective regimen sliding scale   SubCutaneous every 6 hours  methylPREDNISolone sodium succinate Injectable 40 milliGRAM(s) IV Push every 8 hours  pantoprazole  Injectable 40 milliGRAM(s) IV Push once  PARoxetine 30 milliGRAM(s) Oral daily  polyethylene glycol 3350 17 Gram(s) Oral daily  psyllium Powder 1 Packet(s) Oral daily  senna Syrup 10 milliLiter(s) Oral at bedtime      PRN Meds:  dextrose 40% Gel 15 Gram(s) Oral once PRN  glucagon  Injectable 1 milliGRAM(s) IntraMuscular once PRN      LABS:                        11.1   21.95 )-----------( 290      ( 06 Mar 2020 00:52 )             34.2     Hgb Trend: 11.1<--, 9.8<--, 9.3<--, 10.8<--, 11.5<--  03-06    143  |  100  |  61<H>  ----------------------------<  188<H>  3.6   |  29  |  1.17    Ca    8.5      06 Mar 2020 00:52  Phos  3.6     03-06  Mg     2.4     03-06    TPro  6.4  /  Alb  2.9<L>  /  TBili  0.6  /  DBili  x   /  AST  19  /  ALT  34  /  AlkPhos  69  03-06    Creatinine Trend: 1.17<--, 1.30<--, 1.36<--, 1.31<--, 1.34<--, 1.50<--      Arterial Blood Gas:  03-05 @ 00:17  7.41/49/76/31/94/5.7  ABG lactate: --                MICROBIOLOGY:     RADIOLOGY: Reviewed CHIEF COMPLAINT: SOB, cough and weakness    OVERNIGHT / SUBJECTIVE:  -Overnight, no acute events. Extubated to high flow 70% yesterday. Also found to have b/l DVTs and started on therapeutic lovenox. Given Zyprexa 5 IM x1 and Aivan 0.5mg IV x1 for agitation. BiPAP was started overnight and pt was reportedly calmer and tolerating BiPAP. Hypertensive throughout the night, off home PO antihypertensives for concerns of aspiration. Given hydralazine 10mg IV x3.  -Today, pt seen and examined at bedside in AM. Pt had BiPAP on but mask did not appear to be in a proper position. Was responsive and interactive, but appeared lethargic. Hypertensive to 190s-200s, given another Hydralazine 10mg IV x1 and later Labetolol 10mg IV x1.    REVIEW OF SYSTEMS:  -Unable to obtain ROS as pt on BiPAP and was lethargic    OBJECTIVE:  ICU Vital Signs Last 24 Hrs  T(C): 36.4 (06 Mar 2020 04:00), Max: 37.3 (05 Mar 2020 08:00)  T(F): 97.5 (06 Mar 2020 04:00), Max: 99.1 (05 Mar 2020 08:00)  HR: 73 (06 Mar 2020 06:00) (55 - 102)  BP: 174/78 (06 Mar 2020 06:00) (115/58 - 195/72)  BP(mean): 112 (06 Mar 2020 06:00) (83 - 124)  ABP: --  ABP(mean): --  RR: 22 (06 Mar 2020 06:00) (19 - 30)  SpO2: 97% (06 Mar 2020 06:00) (89% - 99%)    Mode: CPAP with PS, FiO2: 40, PEEP: 5, PS: 10, MAP: 13, PIP: 32    03-05 @ 07:01  -  03-06 @ 07:00  --------------------------------------------------------  IN: 308.6 mL / OUT: 3795 mL / NET: -3486.4 mL      CAPILLARY BLOOD GLUCOSE      POCT Blood Glucose.: 161 mg/dL (06 Mar 2020 05:48)  POCT Blood Glucose.: 169 mg/dL (06 Mar 2020 00:37)  POCT Blood Glucose.: 141 mg/dL (05 Mar 2020 18:02)  POCT Blood Glucose.: 135 mg/dL (05 Mar 2020 16:19)  POCT Blood Glucose.: 200 mg/dL (05 Mar 2020 13:02)    I&O's Summary    05 Mar 2020 07:01  -  06 Mar 2020 07:00  --------------------------------------------------------  IN: 308.6 mL / OUT: 3795 mL / NET: -3486.4 mL        PHYSICAL EXAM  General: on BiPAP in AM, appearing a bit lethargic  HEENT: PERRL, normal sclera and conjunctiva  Neck: Supple  Chest/Lungs: Clear to auscultation anteriorly, no wheezing appreciated  Heart: RRR, normal S1, S2, no murmurs or gallops  Abd: +BS, soft, obese, nontender, nondistended  Extremities: 2+ peripheral pulses, trace pedal edema  Skin: No rashes or lesions, warm, well-perfused    LABS:                        11.1   21.95 )-----------( 290      ( 06 Mar 2020 00:52 )             34.2     03-06    143  |  100  |  61<H>  ----------------------------<  188<H>  3.6   |  29  |  1.17    Ca    8.5      06 Mar 2020 00:52  Phos  3.6     03-06  Mg     2.4     03-06    TPro  6.4  /  Alb  2.9<L>  /  TBili  0.6  /  DBili  x   /  AST  19  /  ALT  34  /  AlkPhos  69  03-06        LINES:    HOSPITAL MEDICATIONS:  Standing Meds:  albuterol/ipratropium for Nebulization 3 milliLiter(s) Nebulizer every 6 hours  atorvastatin 10 milliGRAM(s) Oral at bedtime  busPIRone 15 milliGRAM(s) Oral three times a day  chlorhexidine 4% Liquid 1 Application(s) Topical <User Schedule>  clonazePAM  Tablet 1 milliGRAM(s) Oral daily  dexMEDEtomidine Infusion 0.2 MICROgram(s)/kG/Hr IV Continuous <Continuous>  dextrose 5%. 1000 milliLiter(s) IV Continuous <Continuous>  dextrose 50% Injectable 12.5 Gram(s) IV Push once  dextrose 50% Injectable 25 Gram(s) IV Push once  dextrose 50% Injectable 25 Gram(s) IV Push once  enoxaparin Injectable 120 milliGRAM(s) SubCutaneous two times a day  insulin lispro (HumaLOG) corrective regimen sliding scale   SubCutaneous every 6 hours  methylPREDNISolone sodium succinate Injectable 40 milliGRAM(s) IV Push every 8 hours  pantoprazole  Injectable 40 milliGRAM(s) IV Push once  PARoxetine 30 milliGRAM(s) Oral daily  polyethylene glycol 3350 17 Gram(s) Oral daily  psyllium Powder 1 Packet(s) Oral daily  senna Syrup 10 milliLiter(s) Oral at bedtime      PRN Meds:  dextrose 40% Gel 15 Gram(s) Oral once PRN  glucagon  Injectable 1 milliGRAM(s) IntraMuscular once PRN      LABS:                        11.1   21.95 )-----------( 290      ( 06 Mar 2020 00:52 )             34.2     Hgb Trend: 11.1<--, 9.8<--, 9.3<--, 10.8<--, 11.5<--  03-06    143  |  100  |  61<H>  ----------------------------<  188<H>  3.6   |  29  |  1.17    Ca    8.5      06 Mar 2020 00:52  Phos  3.6     03-06  Mg     2.4     03-06    TPro  6.4  /  Alb  2.9<L>  /  TBili  0.6  /  DBili  x   /  AST  19  /  ALT  34  /  AlkPhos  69  03-06    Creatinine Trend: 1.17<--, 1.30<--, 1.36<--, 1.31<--, 1.34<--, 1.50<--      Arterial Blood Gas:  03-05 @ 00:17  7.41/49/76/31/94/5.7  ABG lactate: --                MICROBIOLOGY:     RADIOLOGY: Reviewed

## 2020-03-06 NOTE — PROGRESS NOTE ADULT - ATTENDING COMMENTS
Patient seen and examined, agree with above     69M PMH HTN, HLD, pulmonary fibrosis, anxiety/depression, admitted with acute hypoxic respiratory failure due to Influenza and superimposed bacterial pneumonia. Course complicated by worsening resp failure requiring intubation on 3/2, he had low grade fever on 2/29 and has worsening leucocytosis, albeit on steroids (since admission). Recent resp status deterioration could be due to mucus plugging vs superimposed pneumonia.     Extubated 3/5, doing well on HFNC, was on BiPAP overnight  Found to have b/l LE DVT (L femoral, R popliteal)     Recs:   Wean off Precedex   Resume home CNS meds   BP normal off pressor   Saturating better today and his resp effort has improved, denies SOB today, will wean HFNC as he tolerates, use BiPAP at night and prn during the day   Started on therapeutic Lovenox for b/l DVT - continue   Continue Solumedrol 40 mg q8 for another day, will start to wean from tomorrow   Hold off on diuresis today as he had significant diuresis yesterday (neg 3.5L/24 hrs)   Continue IS, DuoNebs, chest PT, aspiration precautions   Swallow eval, start dysphagia diet if passes   Hyperglycemia - stable glucose today    Renal fn stable, will d/c Kael in am   PT, OOB   Lovenox for DVT ppx     Patient remains critically ill, consult RCU

## 2020-03-06 NOTE — PROGRESS NOTE ADULT - SUBJECTIVE AND OBJECTIVE BOX
[facility] - [facility] - PROGRESS NOTE    CHIEF COMPLAINT: acute on chronic hypoxic/hypercapnic respiratory failure; influenza; pneumonia; abnormal chest CT    INTERVAL HISTORY: extubated yesterday to high flow nasal canula -> required BIPAP overnight; on full dose lovenox for bilateral above knee DVT; quite weak and depressed - lifted his arms and moved his legs with physical therapy but unable to get out of bed; hemodynamically stable off pressors; moderating leukocytosis (on steroids); resolved respiratory acidosis with pCO2 ~ 50mmHg; loza in place for ongoing urinary retention; no cough, sputum production, chest congestion or wheeze; no fevers, chills or sweats; no chest pain/pressure or palpitations;     REVIEW OF SYSTEMS:  Constitutional: As per interval history  HEENT: Within normal limits  CV: As per interval history  Resp: As per interval history  GI: Within normal limits   : urinary retention  Musculoskeletal: Within normal limits  Skin: Within normal limits  Neurological: Within normal limits  Psychiatric: anxiety type depression  Endocrine: Within normal limits  Hematologic/Lymphatic: Within normal limits  Allergic/Immunologic: Within normal limits    MEDICATIONS:     Pulmonary "  albuterol/ipratropium for Nebulization 3 milliLiter(s) Nebulizer every 6 hours    Anti-microbials:    Cardiovascular:    Other:  atorvastatin 10 milliGRAM(s) Oral at bedtime  busPIRone 15 milliGRAM(s) Oral three times a day  chlorhexidine 4% Liquid 1 Application(s) Topical <User Schedule>  clonazePAM  Tablet 1 milliGRAM(s) Oral daily  dexMEDEtomidine Infusion 0.2 MICROgram(s)/kG/Hr IV Continuous <Continuous>  dextrose 5%. 1000 milliLiter(s) IV Continuous <Continuous>  dextrose 50% Injectable 12.5 Gram(s) IV Push once  dextrose 50% Injectable 25 Gram(s) IV Push once  dextrose 50% Injectable 25 Gram(s) IV Push once  enoxaparin Injectable 100 milliGRAM(s) SubCutaneous two times a day  insulin lispro (HumaLOG) corrective regimen sliding scale   SubCutaneous every 6 hours  methylPREDNISolone sodium succinate Injectable 40 milliGRAM(s) IV Push every 8 hours  pantoprazole  Injectable 40 milliGRAM(s) IV Push once  PARoxetine 30 milliGRAM(s) Oral daily  polyethylene glycol 3350 17 Gram(s) Oral daily  psyllium Powder 1 Packet(s) Oral daily  senna Syrup 10 milliLiter(s) Oral at bedtime    MEDICATIONS  (PRN):  dextrose 40% Gel 15 Gram(s) Oral once PRN Blood Glucose LESS THAN 70 milliGRAM(s)/deciliter  glucagon  Injectable 1 milliGRAM(s) IntraMuscular once PRN Glucose LESS THAN 70 milligrams/deciliter        OBJECTIVE:    I&O's Detail    05 Mar 2020 07:01  -  06 Mar 2020 07:00  --------------------------------------------------------  IN:    dexmedetomidine Infusion: 189.9 mL    propofol Infusion: 127.6 mL  Total IN: 317.5 mL    OUT:    Indwelling Catheter - Urethral: 3845 mL  Total OUT: 3845 mL    Total NET: -3527.5 mL      06 Mar 2020 07:01  -  06 Mar 2020 15:08  --------------------------------------------------------  IN:    dexmedetomidine Infusion: 47.5 mL    Oral Fluid: 50 mL  Total IN: 97.5 mL    OUT:    Indwelling Catheter - Urethral: 475 mL  Total OUT: 475 mL    Total NET: -377.5 mL      Daily Weight in k.3 (06 Mar 2020 04:00)    POCT Blood Glucose.: 198 mg/dL (06 Mar 2020 12:54)  POCT Blood Glucose.: 161 mg/dL (06 Mar 2020 05:48)  POCT Blood Glucose.: 169 mg/dL (06 Mar 2020 00:37)  POCT Blood Glucose.: 141 mg/dL (05 Mar 2020 18:02)  POCT Blood Glucose.: 135 mg/dL (05 Mar 2020 16:19)      PHYSICAL EXAM:       ICU Vital Signs Last 24 Hrs  T(C): 36.8 (06 Mar 2020 12:00), Max: 37.1 (06 Mar 2020 08:00)  T(F): 98.2 (06 Mar 2020 12:00), Max: 98.8 (06 Mar 2020 08:00)  HR: 99 (06 Mar 2020 14:48) (60 - 99)  BP: 165/88 (06 Mar 2020 14:48) (144/67 - 192/83)  BP(mean): 108 (06 Mar 2020 13:00) (98 - 124)  ABP: --  ABP(mean): --  RR: 27 (06 Mar 2020 14:48) (19 - 30)  SpO2: 94% (06 Mar 2020 14:48) (89% - 100%) on 90% FiO2 high flow     HEENT: Atraumatic. Normocephalic. Anicteric. Normal oral mucosa. PERRL. EOMI.  Neck: Supple. Trachea midline. Thyroid without enlargement/tenderness/nodules. No carotid bruit. No JVD.	  Cardiovascular: Regular rate and rhythm. S1 S2 normal. No murmurs, rubs or gallops.  Respiratory: Respirations unlabored. Scattered rales. No curvature.  Abdomen: Soft. Non-tender. Non-distended. No organomegaly. No masses. Normal bowel sounds. Obese. Loza catheter.  Extremities: Warm to touch. No clubbing or cyanosis. No pedal edema.  Pulses: 2+ peripheral pulses all extremities.	  Skin: Normal skin color. No rashes or lesions. No ecchymoses. No cyanosis. Warm to touch.  Lymph Nodes: Cervical, supraclavicular and axillary nodes normal  Neurological: Moving all 4 extremities. Diffuse weakness. A and O x 3  Psychiatry: Appropriate mood and affect.    LABS:                          11.1   21.95 )-----------( 290      ( 06 Mar 2020 00:52 )             34.2     CBC    WBC  21.95 <==, 24.05 <==, 23.49 <==, 31.66 <==, 34.86 <==, 31.73 <==, 23.36 <==    Hemoglobin  11.1 <<==, 9.8 <<==, 9.3 <<==, 10.8 <<==, 11.5 <<==, 11.5 <<==, 11.6 <<==    Hematocrit  34.2 <==, 30.5 <==, 29.9 <==, 34.0 <==, 34.3 <==, 35.7 <==, 35.4 <==    Platelets  290 <==, 240 <==, 227 <==, 287 <==, 232 <==, 182 <==, 142 <==      143  |  100  |  61<H>  ----------------------------<  188<H>    03-06  3.6   |  29  |  1.17      LYTES    sodium  143 <==, 134 <==, 139 <==, 132 <==, 136 <==, 135 <==, 134 <==    potassium   3.6 <==, 4.4 <==, 4.4 <==, 4.8 <==, 5.1 <==, 5.5 <==, 6.8 <==    chloride  100 <==, 97 <==, 97 <==, 97 <==, 98 <==, 97 <==, 96 <==    carbon dioxide  29 <==, 26 <==, 28 <==, 25 <==, 27 <==, 26 <==, 25 <==    =============================================================================================  RENAL FUNCTION:    Creatinine:   1.17  <<==, 1.30  <<==, 1.36  <<==, 1.31  <<==, 1.34  <<==, 1.50  <<==, 1.40  <<==    BUN:   61 <==, 62 <==, 58 <==, 53 <==, 45 <==, 44 <==, 42 <==    ============================================================================================    calcium   8.5 <==, 8.6 <==, 8.9 <==, 8.4 <==, 8.5 <==, 8.5 <==, 8.3 <==    phos   3.6 <==, 4.1 <==, 4.7 <==, 4.3 <==, 4.3 <==, 5.5 <==, 5.9 <==    mag   2.4 <==, 2.5 <==, 2.4 <==, 2.6 <==, 2.5 <==, 2.5 <==, 2.5 <==    ============================================================================================  LFTs    AST:   19 <== , 18 <== , 16 <== , 23 <== , 71 <== , 36 <==     ALT:  34  <== , 41  <== , 47  <== , 66  <== , 99  <== , 39  <==     AP:  69  <=, 76  <=, 56  <=, 67  <=, 91  <=, 79  <=    Bili:  0.6  <=, 0.2  <=, 0.2  <=, 0.2  <=, 0.3  <=, 0.5  <=    ABG - ( 05 Mar 2020 00:17 )  pH: 7.41  /  pCO2: 49    /  pO2: 76    / HCO3: 31    / Base Excess: 5.7   /  SaO2: 94        ABG - ( 04 Mar 2020 00:11 )  pH: 7.40  /  pCO2: 49    /  pO2: 74    / HCO3: 30    / Base Excess: 4.7   /  SaO2: 94        ABG - ( 03 Mar 2020 15:46 )  pH: 7.38  /  pCO2: 52    /  pO2: 75    / HCO3: 30    / Base Excess: 4.7   /  SaO2: 96        ABG - ( 03 Mar 2020 00:27 )  pH: 7.33  /  pCO2: 58    /  pO2: 141   / HCO3: 30    / Base Excess: 3.6   /  SaO2: 98        ABG - ( 02 Mar 2020 16:59 )  pH: 7.30  /  pCO2: 64    /  pO2: 138   / HCO3: 30    / Base Excess: 3.1   /  SaO2: 99        ABG - ( 02 Mar 2020 14:47 )  pH: 7.28  /  pCO2: 67    /  pO2: 119   / HCO3: 31    / Base Excess: 3.2   /  SaO2: 98        ABG - ( 02 Mar 2020 13:47 )  pH: 7.21  /  pCO2: 80    /  pO2: 89    / HCO3: 31    / Base Excess: 1.5   /  SaO2: 95        Procalcitonin, Serum: 0.22 ng/mL ( @ 17:00)    Procalcitonin, Serum: 0.15 ng/mL ( @ 09:25)    Procalcitonin, Serum: 0.69 ng/mL ( @ 07:45)    Serum Pro-Brain Natriuretic Peptide: 364 pg/mL ( @ 22:30)    CARDIAC MARKERS ( 2020 00:57 )  CPK x     /CKMB x     /CKMB Units x        troponin 17 ng/L    CARDIAC MARKERS ( 2020 22:30 )  CPK x     /CKMB x     /CKMB Units x        troponin 19 ng/L      Patient name: DOUGLAS MORROW  YOB: 1950   Age: 69 (M)   MR#: [id number]  Study Date: 3/1/2020  [facility]  Study quality: Technically fair  Referring Physician: Tari Sommer MD  Blood Pressure: 137/62 mmHg  Height: 183 cm  Weight: 105 kg  BSA: 2.3 m2  ------------------------------------------------------------------------  PROCEDURE: Transthoracic echocardiogram with 2-D, M-Mode  and complete spectral and color flow Doppler.  INDICATION:Dyspnea, unspecified (R06.00)  ------------------------------------------------------------------------  Dimensions:    Normal Values:  LA:     3.8    2.0 - 4.0 cm  Ao:     2.9    2.0 - 3.8 cm  SEPTUM: 1.0    0.6 - 1.2 cm  PWT:    1.1    0.6 - 1.1 cm  LVIDd:  5.3    3.0 - 5.6 cm  LVIDs:  3.4    1.8 - 4.0 cm  Derived variables:  LVMI: 95 g/m2  RWT: 0.41  EF (Visual Estimate): 65 %  Doppler Peak Velocity (m/sec): AoV=1.6 TV=3.9  ------------------------------------------------------------------------  Observations:  Mitral Valve: Normal mitral valve. Minimal mitral  regurgitation.  Aortic Valve/Aorta: Calcified aortic valve with normal  opening.  Normal aortic root size.  Left Atrium: Normal left atrium.  Left Ventricle: Normal left ventricular internal dimensions  and wall thicknesses.  Normal left ventricular systolic function. No segmental  wall motion abnormalities.  Impaired LV-relaxation with normal filling pressure.  Right Heart: Normal right atrium. Suboptimal visualization  of the right heart in all windows.  Normal tricuspid valve.  Mild-moderate tricuspid regurgitation. Normal pulmonic  valve.  Pericardium/Pleura: Normal pericardium with no pericardial  effusion.  Hemodynamic: Severe pulmonary hypertension; severely  increased pulmonary vascular resistance.  Estimated PASP at least 65 mmHg.  ------------------------------------------------------------------------  Conclusions:  Normal left ventricular systolic function. No segmental  wall motion abnormalities.  Impaired LV-relaxation with normal filling pressure.  Suboptimal visualization of the right heart in all windows.    Severe pulmonary hypertension; severely increased pulmonary  vascular resistance.  ------------------------------------------------------------------------  Confirmed on  3/1/2020 - 14:35:54 by Tyrell Brock MD, FASE  ------------------------------------------------------------------------      MICROBIOLOGY:     Culture - Bronchial (20 @ 21:29)    Gram Stain:   Numerous polymorphonuclear leukocytes per low power field  Rare Squamous Epithelial Cells  Few Yeast like cells per oil power field    Specimen Source: Bronch Wash Bronchoalveolar Lavage    Culture Results:   Normal Respiratory Grecia present    FLU A B RSV Detection by PCR (20 @ 22:30)    Flu A Result: Detected    Flu B Result: NotDetec    RSV Result: NotDetec    Legionella pneumophila Antigen, Urine (20 @ 09:50)    Legionella Antigen, Urine: Negative    Culture - Blood (20 @ 10:14)    Specimen Source: .Blood Blood-Venous    Culture Results:   No growth to date.    Culture - Blood (20 @ 10:14)    Specimen Source: .Blood Blood-Peripheral    Culture Results:   No growth to date.    RADIOLOGY:  [x] Chest radiographs reviewed and interpreted by me      EXAM:  XR CHEST PORTABLE URGENT 1V                          PROCEDURE DATE:  2020      INTERPRETATION:  CLINICAL INFORMATION: Intubated.    TECHNIQUE: AP radiograph of the chest.    COMPARISON: Chest x-ray 2020.    FINDINGS:    LINES AND TUBES: Endotracheal tube with tip above the kayden. Enteric tube with side-port at the GE junction.    LUNGS: Mild pulmonary edema.    PLEURA: No pleural effusion or pneumothorax.    HEART AND MEDIASTINUM: Heart size is within normal limits.    SKELETON: Unremarkable skeletal structures.      IMPRESSION:     Endotracheal tube with tip above the kayden.    GEN TABOR M.D., RADIOLOGY RESIDENT  This document has been electronically signed.  SADIA RIBEIRO M.D., ATTENDING RADIOLOGIST  This document has been electronically signed. Mar  2 2020 12:09PM  ---------------------------------------------------------------------------------------------------------------    EXAM:  CT CHEST                          PROCEDURE DATE:  2020      INTERPRETATION:  CLINICAL INFORMATION: Dyspnea    COMPARISON: CT chest 2020    PROCEDURE:   CT of the Chest was performed without intravenous contrast.  [place] coronal reformats were performed.      FINDINGS:    LUNGS AND AIRWAYS: The central airways are patent.  Patchy areas of groundglass opacities containing traction bronchiectasis are noted within both lungs. Addition, superimposed patchy groundglass opacities are also noted. They are new when compared to previous exam.    PLEURA: No pleural effusion.    MEDIASTINUM AND CHAVO: There are scattered small hilar and mediastinal lymph nodes.    VESSELS: Within normal limits.    HEART: The heart size is normal and there is no pericardial effusion.     CHEST WALL AND LOWER NECK: Within normal limits.    VISUALIZED UPPER ABDOMEN: Within normal limits.    BONES: There are multilevel degenerative changes of the spine    IMPRESSION:     Patchy groundglass opacities containing traction bronchiectasis bilaterally is suggestive of pulmonary fibrosis of uncertain etiology.    Groundglass opacities are noted within both lungs. Primary consideration is infection.    BRITNEY VERONICA M.D., RADIOLOGY RESIDENT  This document has been electronically signed.  TAMMIE LOTT M.D., ATTENDING RADIOLOGIST  This document has been electronically signed. 2020  5:04PM  ---------------------------------------------------------------------------------------------------------------    EXAM:  CT ANGIO CHEST (W)AW IC                          PROCEDURE DATE:  2020      INTERPRETATION:  CLINICAL INFORMATION: Shortness of breath and fatigue. History of lung disease.    COMPARISON: Chest radiograph 2020    PROCEDURE:   CT Angiography of the Chest.  90 ml of Omnipaque 350 was injected intravenously. 10 ml were discarded.  Sagittal and coronal reformats were performed as well as 3D (MIP) reconstructions.    FINDINGS:    LUNGS AND AIRWAYS: Patent central airways. Areas of architectural distortion, mild traction bronchiectasis and linear/reticular opacities. Nonspecific groundglass/patchy opacities in both lungs.      PLEURA: No pleural effusion or pneumothorax.    MEDIASTINUM AND CHAVO: Subcentimeter mediastinal and hilar lymph nodes without lymphadenopathy.    VESSELS: Adequate contrast opacification of the pulmonary arteries. No obvious pulmonary embolus.    HEART: Normal heart size. No pericardial effusion.    CHEST WALL AND LOWER NECK: Heterogeneous thyroid gland, obscured by artifact.    VISUALIZED UPPER ABDOMEN: Colon diverticulosis.    BONES: Degenerative changes of the spine.    IMPRESSION:     No obvious pulmonary embolus.    Findings reflecting known pulmonary fibrosis. Recommend clinical correlation to assess underlying pneumonia. Recommend comparison to previous outside study and follow-up to exclude potential underlying neoplasm.    Heterogeneous thyroid gland, which is difficult to assess due to artifact and can be further characterized on a nonemergent ultrasound as clinically indicated.     Additional findings as described.    RAMONA REDDY M.D., RADIOLOGY RESIDENT  This document has been electronically signed.  MIAH CHAUDHRY M.D., ATTENDING RADIOLOGIST  This document has been electronically signed. 2020  1:49AM  ---------------------------------------------------------------------------------------------------------------

## 2020-03-07 VITALS — HEART RATE: 52 BPM | OXYGEN SATURATION: 62 %

## 2020-03-07 LAB
ALBUMIN SERPL ELPH-MCNC: 2.5 G/DL — LOW (ref 3.3–5)
ALP SERPL-CCNC: 61 U/L — SIGNIFICANT CHANGE UP (ref 40–120)
ALT FLD-CCNC: 208 U/L — HIGH (ref 10–45)
ANION GAP SERPL CALC-SCNC: 31 MMOL/L — HIGH (ref 5–17)
AST SERPL-CCNC: 177 U/L — HIGH (ref 10–40)
BASE EXCESS BLDV CALC-SCNC: -13.4 MMOL/L — LOW (ref -2–2)
BILIRUB SERPL-MCNC: 1.1 MG/DL — SIGNIFICANT CHANGE UP (ref 0.2–1.2)
BUN SERPL-MCNC: 68 MG/DL — HIGH (ref 7–23)
CA-I SERPL-SCNC: 1.12 MMOL/L — SIGNIFICANT CHANGE UP (ref 1.12–1.3)
CALCIUM SERPL-MCNC: 7.9 MG/DL — LOW (ref 8.4–10.5)
CHLORIDE BLDV-SCNC: 109 MMOL/L — HIGH (ref 96–108)
CHLORIDE SERPL-SCNC: 103 MMOL/L — SIGNIFICANT CHANGE UP (ref 96–108)
CO2 BLDV-SCNC: 17 MMOL/L — LOW (ref 22–30)
CO2 SERPL-SCNC: 11 MMOL/L — LOW (ref 22–31)
CREAT SERPL-MCNC: 1.79 MG/DL — HIGH (ref 0.5–1.3)
GAS PNL BLDA: SIGNIFICANT CHANGE UP
GAS PNL BLDV: 145 MMOL/L — SIGNIFICANT CHANGE UP (ref 135–145)
GAS PNL BLDV: SIGNIFICANT CHANGE UP
GAS PNL BLDV: SIGNIFICANT CHANGE UP
GLUCOSE BLDV-MCNC: 233 MG/DL — HIGH (ref 70–99)
GLUCOSE SERPL-MCNC: 248 MG/DL — HIGH (ref 70–99)
HCO3 BLDV-SCNC: 16 MMOL/L — LOW (ref 21–29)
HCT VFR BLD CALC: 28.4 % — LOW (ref 39–50)
HCT VFR BLDA CALC: 27 % — LOW (ref 39–50)
HGB BLD CALC-MCNC: 8.7 G/DL — LOW (ref 13–17)
HGB BLD-MCNC: 8.6 G/DL — LOW (ref 13–17)
HOROWITZ INDEX BLDV+IHG-RTO: 100 — SIGNIFICANT CHANGE UP
INR BLD: 1.58 RATIO — HIGH (ref 0.88–1.16)
LACTATE BLDV-MCNC: 14.3 MMOL/L — CRITICAL HIGH (ref 0.7–2)
MAGNESIUM SERPL-MCNC: 3.4 MG/DL — HIGH (ref 1.6–2.6)
MCHC RBC-ENTMCNC: 30.3 GM/DL — LOW (ref 32–36)
MCHC RBC-ENTMCNC: 30.6 PG — SIGNIFICANT CHANGE UP (ref 27–34)
MCV RBC AUTO: 101.1 FL — HIGH (ref 80–100)
NRBC # BLD: 0 /100 WBCS — SIGNIFICANT CHANGE UP (ref 0–0)
OTHER CELLS CSF MANUAL: 7 ML/DL — LOW (ref 18–22)
PCO2 BLDV: 55 MMHG — HIGH (ref 35–50)
PH BLDV: 7.08 — CRITICAL LOW (ref 7.35–7.45)
PHOSPHATE SERPL-MCNC: 13.9 MG/DL — HIGH (ref 2.5–4.5)
PLATELET # BLD AUTO: 256 K/UL — SIGNIFICANT CHANGE UP (ref 150–400)
PO2 BLDV: 50 MMHG — HIGH (ref 25–45)
POTASSIUM BLDV-SCNC: 5.7 MMOL/L — HIGH (ref 3.5–5.3)
POTASSIUM SERPL-MCNC: 6.3 MMOL/L — CRITICAL HIGH (ref 3.5–5.3)
POTASSIUM SERPL-SCNC: 6.3 MMOL/L — CRITICAL HIGH (ref 3.5–5.3)
PROT SERPL-MCNC: 5.5 G/DL — LOW (ref 6–8.3)
PROTHROM AB SERPL-ACNC: 18.4 SEC — HIGH (ref 10–12.9)
RBC # BLD: 2.81 M/UL — LOW (ref 4.2–5.8)
RBC # FLD: 15.1 % — HIGH (ref 10.3–14.5)
SAO2 % BLDV: 59 % — LOW (ref 67–88)
SODIUM SERPL-SCNC: 145 MMOL/L — SIGNIFICANT CHANGE UP (ref 135–145)
WBC # BLD: 30.72 K/UL — HIGH (ref 3.8–10.5)
WBC # FLD AUTO: 30.72 K/UL — HIGH (ref 3.8–10.5)

## 2020-03-07 PROCEDURE — 87633 RESP VIRUS 12-25 TARGETS: CPT

## 2020-03-07 PROCEDURE — 83735 ASSAY OF MAGNESIUM: CPT

## 2020-03-07 PROCEDURE — 99291 CRITICAL CARE FIRST HOUR: CPT | Mod: 25

## 2020-03-07 PROCEDURE — 87581 M.PNEUMON DNA AMP PROBE: CPT

## 2020-03-07 PROCEDURE — 71045 X-RAY EXAM CHEST 1 VIEW: CPT

## 2020-03-07 PROCEDURE — 84550 ASSAY OF BLOOD/URIC ACID: CPT

## 2020-03-07 PROCEDURE — 87102 FUNGUS ISOLATION CULTURE: CPT

## 2020-03-07 PROCEDURE — 82803 BLOOD GASES ANY COMBINATION: CPT

## 2020-03-07 PROCEDURE — 87641 MR-STAPH DNA AMP PROBE: CPT

## 2020-03-07 PROCEDURE — 84443 ASSAY THYROID STIM HORMONE: CPT

## 2020-03-07 PROCEDURE — 83605 ASSAY OF LACTIC ACID: CPT

## 2020-03-07 PROCEDURE — 84145 PROCALCITONIN (PCT): CPT

## 2020-03-07 PROCEDURE — 36556 INSERT NON-TUNNEL CV CATH: CPT

## 2020-03-07 PROCEDURE — 87206 SMEAR FLUORESCENT/ACID STAI: CPT

## 2020-03-07 PROCEDURE — 84300 ASSAY OF URINE SODIUM: CPT

## 2020-03-07 PROCEDURE — 85730 THROMBOPLASTIN TIME PARTIAL: CPT

## 2020-03-07 PROCEDURE — 97116 GAIT TRAINING THERAPY: CPT

## 2020-03-07 PROCEDURE — 94002 VENT MGMT INPAT INIT DAY: CPT

## 2020-03-07 PROCEDURE — 93970 EXTREMITY STUDY: CPT

## 2020-03-07 PROCEDURE — 86803 HEPATITIS C AB TEST: CPT

## 2020-03-07 PROCEDURE — 89051 BODY FLUID CELL COUNT: CPT

## 2020-03-07 PROCEDURE — 97162 PT EVAL MOD COMPLEX 30 MIN: CPT

## 2020-03-07 PROCEDURE — 83935 ASSAY OF URINE OSMOLALITY: CPT

## 2020-03-07 PROCEDURE — 83880 ASSAY OF NATRIURETIC PEPTIDE: CPT

## 2020-03-07 PROCEDURE — 87640 STAPH A DNA AMP PROBE: CPT

## 2020-03-07 PROCEDURE — 96365 THER/PROPH/DIAG IV INF INIT: CPT | Mod: XU

## 2020-03-07 PROCEDURE — 87116 MYCOBACTERIA CULTURE: CPT

## 2020-03-07 PROCEDURE — 84100 ASSAY OF PHOSPHORUS: CPT

## 2020-03-07 PROCEDURE — 82330 ASSAY OF CALCIUM: CPT

## 2020-03-07 PROCEDURE — 82962 GLUCOSE BLOOD TEST: CPT

## 2020-03-07 PROCEDURE — 87015 SPECIMEN INFECT AGNT CONCNTJ: CPT

## 2020-03-07 PROCEDURE — 71045 X-RAY EXAM CHEST 1 VIEW: CPT | Mod: 26

## 2020-03-07 PROCEDURE — 80048 BASIC METABOLIC PNL TOTAL CA: CPT

## 2020-03-07 PROCEDURE — 88305 TISSUE EXAM BY PATHOLOGIST: CPT

## 2020-03-07 PROCEDURE — 82570 ASSAY OF URINE CREATININE: CPT

## 2020-03-07 PROCEDURE — 87449 NOS EACH ORGANISM AG IA: CPT

## 2020-03-07 PROCEDURE — 80076 HEPATIC FUNCTION PANEL: CPT

## 2020-03-07 PROCEDURE — 83036 HEMOGLOBIN GLYCOSYLATED A1C: CPT

## 2020-03-07 PROCEDURE — 82436 ASSAY OF URINE CHLORIDE: CPT

## 2020-03-07 PROCEDURE — 93308 TTE F-UP OR LMTD: CPT

## 2020-03-07 PROCEDURE — 85014 HEMATOCRIT: CPT

## 2020-03-07 PROCEDURE — 85610 PROTHROMBIN TIME: CPT

## 2020-03-07 PROCEDURE — 82435 ASSAY OF BLOOD CHLORIDE: CPT

## 2020-03-07 PROCEDURE — 97530 THERAPEUTIC ACTIVITIES: CPT

## 2020-03-07 PROCEDURE — 51701 INSERT BLADDER CATHETER: CPT

## 2020-03-07 PROCEDURE — 87040 BLOOD CULTURE FOR BACTERIA: CPT

## 2020-03-07 PROCEDURE — 85027 COMPLETE CBC AUTOMATED: CPT

## 2020-03-07 PROCEDURE — 87070 CULTURE OTHR SPECIMN AEROBIC: CPT

## 2020-03-07 PROCEDURE — 87798 DETECT AGENT NOS DNA AMP: CPT

## 2020-03-07 PROCEDURE — 84295 ASSAY OF SERUM SODIUM: CPT

## 2020-03-07 PROCEDURE — 81001 URINALYSIS AUTO W/SCOPE: CPT

## 2020-03-07 PROCEDURE — 94640 AIRWAY INHALATION TREATMENT: CPT

## 2020-03-07 PROCEDURE — 31500 INSERT EMERGENCY AIRWAY: CPT

## 2020-03-07 PROCEDURE — 84484 ASSAY OF TROPONIN QUANT: CPT

## 2020-03-07 PROCEDURE — 71250 CT THORAX DX C-: CPT

## 2020-03-07 PROCEDURE — 94003 VENT MGMT INPAT SUBQ DAY: CPT

## 2020-03-07 PROCEDURE — 87631 RESP VIRUS 3-5 TARGETS: CPT

## 2020-03-07 PROCEDURE — 80053 COMPREHEN METABOLIC PANEL: CPT

## 2020-03-07 PROCEDURE — 36600 WITHDRAWAL OF ARTERIAL BLOOD: CPT

## 2020-03-07 PROCEDURE — 94660 CPAP INITIATION&MGMT: CPT

## 2020-03-07 PROCEDURE — 84480 ASSAY TRIIODOTHYRONINE (T3): CPT

## 2020-03-07 PROCEDURE — 71275 CT ANGIOGRAPHY CHEST: CPT

## 2020-03-07 PROCEDURE — 83930 ASSAY OF BLOOD OSMOLALITY: CPT

## 2020-03-07 PROCEDURE — 82947 ASSAY GLUCOSE BLOOD QUANT: CPT

## 2020-03-07 PROCEDURE — 84133 ASSAY OF URINE POTASSIUM: CPT

## 2020-03-07 PROCEDURE — 80202 ASSAY OF VANCOMYCIN: CPT

## 2020-03-07 PROCEDURE — 84439 ASSAY OF FREE THYROXINE: CPT

## 2020-03-07 PROCEDURE — 87486 CHLMYD PNEUM DNA AMP PROBE: CPT

## 2020-03-07 PROCEDURE — 93306 TTE W/DOPPLER COMPLETE: CPT

## 2020-03-07 PROCEDURE — 84156 ASSAY OF PROTEIN URINE: CPT

## 2020-03-07 PROCEDURE — 93005 ELECTROCARDIOGRAM TRACING: CPT

## 2020-03-07 PROCEDURE — 97110 THERAPEUTIC EXERCISES: CPT

## 2020-03-07 PROCEDURE — 84436 ASSAY OF TOTAL THYROXINE: CPT

## 2020-03-07 PROCEDURE — 82565 ASSAY OF CREATININE: CPT

## 2020-03-07 PROCEDURE — 80069 RENAL FUNCTION PANEL: CPT

## 2020-03-07 PROCEDURE — 88112 CYTOPATH CELL ENHANCE TECH: CPT

## 2020-03-07 PROCEDURE — 84132 ASSAY OF SERUM POTASSIUM: CPT

## 2020-03-07 RX ORDER — FENTANYL CITRATE 50 UG/ML
0.5 INJECTION INTRAVENOUS
Qty: 5000 | Refills: 0 | Status: DISCONTINUED | OUTPATIENT
Start: 2020-03-06 | End: 2020-03-07

## 2020-03-07 RX ORDER — SODIUM BICARBONATE 1 MEQ/ML
50 SYRINGE (ML) INTRAVENOUS ONCE
Refills: 0 | Status: COMPLETED | OUTPATIENT
Start: 2020-03-07 | End: 2020-03-07

## 2020-03-07 RX ORDER — HYDROCORTISONE 20 MG
50 TABLET ORAL EVERY 6 HOURS
Refills: 0 | Status: DISCONTINUED | OUTPATIENT
Start: 2020-03-07 | End: 2020-03-07

## 2020-03-07 RX ORDER — PHENYLEPHRINE HYDROCHLORIDE 10 MG/ML
4 INJECTION INTRAVENOUS
Qty: 40 | Refills: 0 | Status: DISCONTINUED | OUTPATIENT
Start: 2020-03-07 | End: 2020-03-07

## 2020-03-07 RX ORDER — EPINEPHRINE 0.3 MG/.3ML
1 INJECTION INTRAMUSCULAR; SUBCUTANEOUS
Qty: 8 | Refills: 0 | Status: DISCONTINUED | OUTPATIENT
Start: 2020-03-07 | End: 2020-03-07

## 2020-03-07 RX ORDER — FENTANYL CITRATE 50 UG/ML
100 INJECTION INTRAVENOUS ONCE
Refills: 0 | Status: DISCONTINUED | OUTPATIENT
Start: 2020-03-07 | End: 2020-03-07

## 2020-03-07 RX ORDER — PHENYLEPHRINE HYDROCHLORIDE 10 MG/ML
4 INJECTION INTRAVENOUS
Qty: 160 | Refills: 0 | Status: DISCONTINUED | OUTPATIENT
Start: 2020-03-07 | End: 2020-03-07

## 2020-03-07 RX ORDER — MIDAZOLAM HYDROCHLORIDE 1 MG/ML
4 INJECTION, SOLUTION INTRAMUSCULAR; INTRAVENOUS ONCE
Refills: 0 | Status: DISCONTINUED | OUTPATIENT
Start: 2020-03-07 | End: 2020-03-07

## 2020-03-07 RX ORDER — ALBUTEROL 90 UG/1
2.5 AEROSOL, METERED ORAL ONCE
Refills: 0 | Status: COMPLETED | OUTPATIENT
Start: 2020-03-07 | End: 2020-03-07

## 2020-03-07 RX ORDER — NOREPINEPHRINE BITARTRATE/D5W 8 MG/250ML
0.05 PLASTIC BAG, INJECTION (ML) INTRAVENOUS
Qty: 8 | Refills: 0 | Status: DISCONTINUED | OUTPATIENT
Start: 2020-03-06 | End: 2020-03-07

## 2020-03-07 RX ORDER — NOREPINEPHRINE BITARTRATE/D5W 8 MG/250ML
1 PLASTIC BAG, INJECTION (ML) INTRAVENOUS
Qty: 8 | Refills: 0 | Status: DISCONTINUED | OUTPATIENT
Start: 2020-03-07 | End: 2020-03-07

## 2020-03-07 RX ORDER — NOREPINEPHRINE BITARTRATE/D5W 8 MG/250ML
0.05 PLASTIC BAG, INJECTION (ML) INTRAVENOUS
Qty: 16 | Refills: 0 | Status: DISCONTINUED | OUTPATIENT
Start: 2020-03-07 | End: 2020-03-07

## 2020-03-07 RX ORDER — FENTANYL CITRATE 50 UG/ML
50 INJECTION INTRAVENOUS ONCE
Refills: 0 | Status: DISCONTINUED | OUTPATIENT
Start: 2020-03-07 | End: 2020-03-07

## 2020-03-07 RX ORDER — CASPOFUNGIN ACETATE 7 MG/ML
50 INJECTION, POWDER, LYOPHILIZED, FOR SOLUTION INTRAVENOUS ONCE
Refills: 0 | Status: COMPLETED | OUTPATIENT
Start: 2020-03-07 | End: 2020-03-07

## 2020-03-07 RX ORDER — FENTANYL CITRATE 50 UG/ML
0.5 INJECTION INTRAVENOUS
Qty: 5000 | Refills: 0 | Status: DISCONTINUED | OUTPATIENT
Start: 2020-03-07 | End: 2020-03-07

## 2020-03-07 RX ORDER — EPINEPHRINE 0.3 MG/.3ML
1 INJECTION INTRAMUSCULAR; SUBCUTANEOUS
Qty: 4 | Refills: 0 | Status: DISCONTINUED | OUTPATIENT
Start: 2020-03-07 | End: 2020-03-07

## 2020-03-07 RX ORDER — INSULIN HUMAN 100 [IU]/ML
10 INJECTION, SOLUTION SUBCUTANEOUS ONCE
Refills: 0 | Status: COMPLETED | OUTPATIENT
Start: 2020-03-07 | End: 2020-03-07

## 2020-03-07 RX ORDER — PROPOFOL 10 MG/ML
5 INJECTION, EMULSION INTRAVENOUS
Qty: 1000 | Refills: 0 | Status: DISCONTINUED | OUTPATIENT
Start: 2020-03-06 | End: 2020-03-07

## 2020-03-07 RX ORDER — VANCOMYCIN HCL 1 G
1000 VIAL (EA) INTRAVENOUS EVERY 12 HOURS
Refills: 0 | Status: DISCONTINUED | OUTPATIENT
Start: 2020-03-07 | End: 2020-03-07

## 2020-03-07 RX ORDER — MEROPENEM 1 G/30ML
1000 INJECTION INTRAVENOUS ONCE
Refills: 0 | Status: COMPLETED | OUTPATIENT
Start: 2020-03-07 | End: 2020-03-07

## 2020-03-07 RX ORDER — MEROPENEM 1 G/30ML
INJECTION INTRAVENOUS
Refills: 0 | Status: DISCONTINUED | OUTPATIENT
Start: 2020-03-07 | End: 2020-03-07

## 2020-03-07 RX ORDER — DEXTROSE 50 % IN WATER 50 %
50 SYRINGE (ML) INTRAVENOUS ONCE
Refills: 0 | Status: COMPLETED | OUTPATIENT
Start: 2020-03-07 | End: 2020-03-07

## 2020-03-07 RX ORDER — PHENYLEPHRINE HYDROCHLORIDE 10 MG/ML
1 INJECTION INTRAVENOUS
Qty: 40 | Refills: 0 | Status: DISCONTINUED | OUTPATIENT
Start: 2020-03-06 | End: 2020-03-07

## 2020-03-07 RX ORDER — CASPOFUNGIN ACETATE 7 MG/ML
50 INJECTION, POWDER, LYOPHILIZED, FOR SOLUTION INTRAVENOUS EVERY 24 HOURS
Refills: 0 | Status: CANCELLED | OUTPATIENT
Start: 2020-03-08 | End: 2020-03-07

## 2020-03-07 RX ORDER — CASPOFUNGIN ACETATE 7 MG/ML
INJECTION, POWDER, LYOPHILIZED, FOR SOLUTION INTRAVENOUS
Refills: 0 | Status: DISCONTINUED | OUTPATIENT
Start: 2020-03-07 | End: 2020-03-07

## 2020-03-07 RX ORDER — MIDAZOLAM HYDROCHLORIDE 1 MG/ML
0.02 INJECTION, SOLUTION INTRAMUSCULAR; INTRAVENOUS
Qty: 100 | Refills: 0 | Status: DISCONTINUED | OUTPATIENT
Start: 2020-03-07 | End: 2020-03-07

## 2020-03-07 RX ORDER — MEROPENEM 1 G/30ML
1000 INJECTION INTRAVENOUS EVERY 12 HOURS
Refills: 0 | Status: DISCONTINUED | OUTPATIENT
Start: 2020-03-07 | End: 2020-03-07

## 2020-03-07 RX ADMIN — Medication 50 MILLILITER(S): at 01:40

## 2020-03-07 RX ADMIN — Medication 50 MILLIEQUIVALENT(S): at 01:38

## 2020-03-07 RX ADMIN — Medication 40 MILLIGRAM(S): at 01:40

## 2020-03-07 RX ADMIN — CASPOFUNGIN ACETATE 260 MILLIGRAM(S): 7 INJECTION, POWDER, LYOPHILIZED, FOR SOLUTION INTRAVENOUS at 02:43

## 2020-03-07 RX ADMIN — FENTANYL CITRATE 100 MICROGRAM(S): 50 INJECTION INTRAVENOUS at 01:58

## 2020-03-07 RX ADMIN — MIDAZOLAM HYDROCHLORIDE 2.37 MG/KG/HR: 1 INJECTION, SOLUTION INTRAMUSCULAR; INTRAVENOUS at 01:55

## 2020-03-07 RX ADMIN — MEROPENEM 100 MILLIGRAM(S): 1 INJECTION INTRAVENOUS at 01:40

## 2020-03-07 RX ADMIN — Medication 3 MILLILITER(S): at 01:06

## 2020-03-07 RX ADMIN — INSULIN HUMAN 10 UNIT(S): 100 INJECTION, SOLUTION SUBCUTANEOUS at 01:39

## 2020-03-07 RX ADMIN — MIDAZOLAM HYDROCHLORIDE 4 MILLIGRAM(S): 1 INJECTION, SOLUTION INTRAMUSCULAR; INTRAVENOUS at 01:58

## 2020-03-07 RX ADMIN — Medication 50 MILLIEQUIVALENT(S): at 01:39

## 2020-03-07 NOTE — PROCEDURE NOTE - NSPROCDETAILS_GEN_ALL_CORE
difficult/crash intubation
patient pre-oxygenated, tube inserted, placement confirmed/connected to ventilator
sterile technique, catheter placed/lumen(s) aspirated and flushed/sterile dressing applied/ultrasound guidance/guidewire recovered
ultrasound guidance/sutured in place/positive blood return obtained via catheter/all materials/supplies accounted for at end of procedure/location identified, draped/prepped, sterile technique used, needle inserted/introduced/connected to a pressurized flush line/hemostasis with direct pressure, dressing applied

## 2020-03-07 NOTE — PROVIDER CONTACT NOTE (CHANGE IN STATUS NOTIFICATION) - BACKGROUND
pt admitted with with acute hypoxic respiratory failure. hx of htn pulmonary fibrosis, depression, anxiety. intubated 3/2 and extubated 3/5.

## 2020-03-07 NOTE — PROCEDURE NOTE - NSINDICATIONS_GEN_A_CORE
respiratory failure
respiratory failure/critical patient
critical patient
hemodynamic monitoring/emergency venous access

## 2020-03-07 NOTE — CHART NOTE - NSCHARTNOTEFT_GEN_A_CORE
DEATH NOTE    Called to bedside to evaluate the patient for hypoxic respiratory failure and hypotension. Pt was intubated and initiated on pressor support which was subsequently maxed out on 4 pressors. Patient's blood pressure was minimally responsive and family at bedside discussed cessation of pressor support. Pressor support was discontinued per family's indication of the patient's wishes.     On physical exam, patient did not respond to verbal or noxious stimuli.  No spontaneous respirations.  Absent heart and breath sounds.  Absent radial and carotid pulses.   Pupils are fixed and dilated, no corneal reflex.  EKG rhythm strip shows asystole.   Patient pronounced dead at 06:05.  Attending notified.  Family declined autopsy.    Latrell Dixon MD  Internal Medicine, PGY-1

## 2020-03-07 NOTE — PROVIDER CONTACT NOTE (CHANGE IN STATUS NOTIFICATION) - ASSESSMENT
pt placed on bedpan, upon return to the room found pt to be unarousable, agonally breathing, and hypotensive with unequal pupils

## 2020-03-09 NOTE — DISCHARGE NOTE FOR THE EXPIRED PATIENT - HOSPITAL COURSE
69M with PMH of HTN, HLD, depression, anxiety, and insignificant degree of pulmonary fibrosis, who initially presented 2/20/20 following a fall at home, admitted with acute hypoxic respiratory failure in setting of influenza with concern for superimposed PNA. Course c/b worsening respiratory failure with high oxygen requirements, transferred to MICU for close monitoring, intubated 3/2 and extubated 3/5 then reintubated on 03/06 2/2 hypoxic resp failure and decreased WOB. Pt subsequently became hypotensive requiring 4 pressors at maximum doses. At the time, pt's family expressed pt's wishes to not be kept on such medical management and and was thus removed off the pressors and passed. 69M with PMH of HTN, HLD, depression, anxiety, and insignificant degree of pulmonary fibrosis, who initially presented 2/20/20 following a fall at home, admitted with acute hypoxic respiratory failure in setting of influenza with concern for superimposed PNA. Course c/b worsening respiratory failure with high oxygen requirements, transferred to MICU for close monitoring, intubated 3/2 and extubated 3/5 then reintubated on 03/06 2/2 hypoxic resp failure and decreased WOB. Pt subsequently became hypotensive requiring 4 pressors at maximum doses. At the time, pt's family expressed pt's wishes to not be kept on such medical management and and was thus removed off the pressors and passed on the morning of 03/07/20 at 06:05

## 2020-03-12 LAB
CULTURE RESULTS: SIGNIFICANT CHANGE UP
CULTURE RESULTS: SIGNIFICANT CHANGE UP
SPECIMEN SOURCE: SIGNIFICANT CHANGE UP
SPECIMEN SOURCE: SIGNIFICANT CHANGE UP

## 2020-08-11 NOTE — H&P ADULT - PROBLEM SELECTOR PLAN 6
pt with e/o pulmonary fibrosis on CT chest, pt unaware of any such diagnosis but was following with pulmonologist who told pt he had nodules in his lung  would consult pulmonology for work-up   c/w O2 as needed pt with hypokalemia, hypoPhos and hypoCa in setting of AURA and poor PO intake   will replete   c/t monitor General Sunscreen Counseling: I recommended a broad spectrum sunscreen with a SPF of 70 or higher.These should be applied daily year round. I explained that SPF 70 sunscreens block approximately 97 percent of the sun's harmful rays.  Sunscreens should be applied at least 15 minutes prior to expected sun exposure and then every 2 hours after that as long as sun exposure continues. If swimming or exercising sunscreen should be reapplied every 45 minutes to an hour after getting wet or sweating.  One ounce, or the equivalent of a shot glass full of sunscreen, is adequate to protect the skin not covered by a bathing suit. I also recommended a lip balm with a sunscreen as well. Sun protective clothing can be used  but must be worn the entire time you are exposed to the sun's rays. Products Recommended: Any brand with an SPF of 70 or higher is OK.  [employer] and [employer] (blue butterfly) make oil free sunscreens that are suitable for use as daily moisturizers. Detail Level: Detailed

## 2021-05-24 NOTE — PROGRESS NOTE ADULT - ATTENDING COMMENTS
Patient seen and examined, agree with above     69M PMH HTN, HLD, pulmonary fibrosis, anxiety/depression, admitted with acute hypoxic respiratory failure due to Influenza and superimposed bacterial pneumonia. Course complicated by worsening resp failure requiring intubation on 3/2, he had low grade fever on 2/29 and has worsening leucocytosis, albeit on steroids (since admission). Recent resp status deterioration could be due to mucus plugging vs superimposed pneumonia.     Responded well to Lasix  Weaning vent settings     Recs:   Wean off propofol, started on Precedex for anxiety/discomfort   Off Dane   Tolerated PSV trial, extubate to HFNC, use BiPAP qhs and prn   Give Lasix 40 mg twice today, net neg fluid goal ~ 1.5L, repeat a 3rd dose overnight if necessary   Continue Solumedrol 40 mg q8  IS, DuoNebs, chest PT, aspiration precautions   Keep NPO except meds  Hyperglycemia - hold NPH because of NPO status, continue Humalog sliding scale   Renal fn improving, hyperkalemia resolved, maintain Scruggs today for retention   Lovenox for DVT ppx     Patient remains critically ill due to resp failure Patient seen and examined, agree with above     69M PMH HTN, HLD, pulmonary fibrosis, anxiety/depression, admitted with acute hypoxic respiratory failure due to Influenza and superimposed bacterial pneumonia. Course complicated by worsening resp failure requiring intubation on 3/2, he had low grade fever on 2/29 and has worsening leucocytosis, albeit on steroids (since admission). Recent resp status deterioration could be due to mucus plugging vs superimposed pneumonia.     Responded well to Lasix  Weaning vent settings     Recs:   Wean off propofol, started on Precedex for anxiety/discomfort   Off Dane   Tolerated PSV trial, extubate to HFNC, use BiPAP qhs and prn   Lasix 40 mg x 1 prior to extubation  Continue Solumedrol 40 mg q8  IS, DuoNebs, chest PT, aspiration precautions   Keep NPO except meds  Hyperglycemia - hold NPH because of NPO status, continue Humalog sliding scale   Renal fn improving, hyperkalemia resolved, maintain Scruggs today for retention   Lovenox for DVT ppx     Patient remains critically ill due to resp failure patient

## 2024-01-12 NOTE — DIETITIAN INITIAL EVALUATION ADULT. - SIGNS/SYMPTOMS
[FreeTextEntry1] : Status post open right inguinal hernia repair doing well.  Patient reassured that the repair is structurally sound. Patient encouraged to increase activities of daily living in an incremental fashion. Follow-up in 6 months and then annually. Questions answered to his satisfaction. eating < 50% of meals

## 2024-07-19 NOTE — PROVIDER CONTACT NOTE (CHANGE IN STATUS NOTIFICATION) - ACTION/TREATMENT ORDERED:
No care due was identified.  [facility] Embedded Care Due Messages. Reference number: [id number].   7/19/2024 9:39:09 AM CDT   MICU team at bedside, intubated pt, Right femoral a-line placed.

## 2025-03-07 NOTE — PHYSICAL THERAPY INITIAL EVALUATION ADULT - PRECAUTIONS/LIMITATIONS, REHAB EVAL
In the past couple of days, has been having very labored breathing; had an episode where he fell while trying to reach for alight switch and was so winded and weak, he was unable to get up. Has barely been eating anything due to poor appetite and nausea, but denies vomiting. +CXR 2/24/2020: Redemonstrated bilateral linear hazy opacities, left greater than right. +Chest CTA 2/20/2020: Heterogeneous thyroid gland. Pulmonary fibrosis. fall precautions/In the past couple of days, has been having very labored breathing; had an episode where he fell while trying to reach for alight switch and was so winded and weak, he was unable to get up. Has barely been eating anything due to poor appetite and nausea, but denies vomiting. +CXR 2/24/2020: Redemonstrated bilateral linear hazy opacities, left greater than right. +Chest CTA 2/20/2020: Heterogeneous thyroid gland. Pulmonary fibrosis. DISCHARGE